# Patient Record
Sex: FEMALE | Race: WHITE | Employment: UNEMPLOYED | URBAN - METROPOLITAN AREA
[De-identification: names, ages, dates, MRNs, and addresses within clinical notes are randomized per-mention and may not be internally consistent; named-entity substitution may affect disease eponyms.]

---

## 2017-10-01 LAB
GLUCOSE BLD STRIP.AUTO-MCNC: 555 MG/DL (ref 65–100)
SERVICE CMNT-IMP: ABNORMAL

## 2017-10-01 PROCEDURE — 99285 EMERGENCY DEPT VISIT HI MDM: CPT

## 2017-10-01 PROCEDURE — 93005 ELECTROCARDIOGRAM TRACING: CPT

## 2017-10-01 PROCEDURE — 96360 HYDRATION IV INFUSION INIT: CPT

## 2017-10-01 PROCEDURE — 36415 COLL VENOUS BLD VENIPUNCTURE: CPT | Performed by: STUDENT IN AN ORGANIZED HEALTH CARE EDUCATION/TRAINING PROGRAM

## 2017-10-01 PROCEDURE — 82962 GLUCOSE BLOOD TEST: CPT

## 2017-10-01 PROCEDURE — 83880 ASSAY OF NATRIURETIC PEPTIDE: CPT | Performed by: STUDENT IN AN ORGANIZED HEALTH CARE EDUCATION/TRAINING PROGRAM

## 2017-10-01 PROCEDURE — 80053 COMPREHEN METABOLIC PANEL: CPT | Performed by: STUDENT IN AN ORGANIZED HEALTH CARE EDUCATION/TRAINING PROGRAM

## 2017-10-01 PROCEDURE — 85025 COMPLETE CBC W/AUTO DIFF WBC: CPT | Performed by: STUDENT IN AN ORGANIZED HEALTH CARE EDUCATION/TRAINING PROGRAM

## 2017-10-02 ENCOUNTER — HOSPITAL ENCOUNTER (EMERGENCY)
Age: 37
Discharge: HOME | End: 2017-10-02
Attending: STUDENT IN AN ORGANIZED HEALTH CARE EDUCATION/TRAINING PROGRAM | Admitting: STUDENT IN AN ORGANIZED HEALTH CARE EDUCATION/TRAINING PROGRAM
Payer: MEDICAID

## 2017-10-02 VITALS
HEART RATE: 82 BPM | BODY MASS INDEX: 41.85 KG/M2 | TEMPERATURE: 97.8 F | RESPIRATION RATE: 20 BRPM | SYSTOLIC BLOOD PRESSURE: 137 MMHG | WEIGHT: 276.13 LBS | DIASTOLIC BLOOD PRESSURE: 73 MMHG | HEIGHT: 68 IN | OXYGEN SATURATION: 96 %

## 2017-10-02 DIAGNOSIS — R73.9 HYPERGLYCEMIA: Primary | ICD-10-CM

## 2017-10-02 LAB
ALBUMIN SERPL-MCNC: 2.1 G/DL (ref 3.5–5)
ALBUMIN/GLOB SERPL: ABNORMAL {RATIO} (ref 1.1–2.2)
ALP SERPL-CCNC: ABNORMAL U/L (ref 45–117)
ALT SERPL-CCNC: ABNORMAL U/L (ref 12–78)
ANION GAP SERPL CALC-SCNC: 11 MMOL/L (ref 5–15)
APPEARANCE UR: CLEAR
AST SERPL-CCNC: ABNORMAL U/L (ref 15–37)
ATRIAL RATE: 95 BPM
BACTERIA URNS QL MICRO: NEGATIVE /HPF
BASOPHILS # BLD: 0 K/UL (ref 0–0.1)
BASOPHILS NFR BLD: 0 % (ref 0–1)
BILIRUB SERPL-MCNC: 1.4 MG/DL (ref 0.2–1)
BILIRUB UR QL: NEGATIVE
BNP SERPL-MCNC: 65 PG/ML (ref 0–125)
BUN SERPL-MCNC: ABNORMAL MG/DL (ref 6–20)
BUN/CREAT SERPL: ABNORMAL (ref 12–20)
CALCIUM SERPL-MCNC: ABNORMAL MG/DL (ref 8.5–10.1)
CALCULATED P AXIS, ECG09: 47 DEGREES
CALCULATED R AXIS, ECG10: 0 DEGREES
CALCULATED T AXIS, ECG11: 34 DEGREES
CHLORIDE SERPL-SCNC: 86 MMOL/L (ref 97–108)
CO2 SERPL-SCNC: 25 MMOL/L (ref 21–32)
COLOR UR: ABNORMAL
CREAT SERPL-MCNC: 0.66 MG/DL (ref 0.55–1.02)
DIAGNOSIS, 93000: NORMAL
DIFFERENTIAL METHOD BLD: ABNORMAL
EOSINOPHIL # BLD: 0.1 K/UL (ref 0–0.4)
EOSINOPHIL NFR BLD: 1 % (ref 0–7)
EPITH CASTS URNS QL MICRO: ABNORMAL /LPF
ERYTHROCYTE [DISTWIDTH] IN BLOOD BY AUTOMATED COUNT: 15.3 % (ref 11.5–14.5)
GLOBULIN SER CALC-MCNC: ABNORMAL G/DL (ref 2–4)
GLUCOSE BLD STRIP.AUTO-MCNC: 303 MG/DL (ref 65–100)
GLUCOSE BLD STRIP.AUTO-MCNC: 424 MG/DL (ref 65–100)
GLUCOSE SERPL-MCNC: 718 MG/DL (ref 65–100)
GLUCOSE UR STRIP.AUTO-MCNC: >1000 MG/DL
HCT VFR BLD AUTO: 34.8 % (ref 35–47)
HGB BLD-MCNC: 11.8 G/DL (ref 11.5–16)
HGB UR QL STRIP: NEGATIVE
HYALINE CASTS URNS QL MICRO: ABNORMAL /LPF (ref 0–5)
KETONES UR QL STRIP.AUTO: NEGATIVE MG/DL
LEUKOCYTE ESTERASE UR QL STRIP.AUTO: NEGATIVE
LYMPHOCYTES # BLD: 1.6 K/UL (ref 0.8–3.5)
LYMPHOCYTES NFR BLD: 22 % (ref 12–49)
MCH RBC QN AUTO: 28 PG (ref 26–34)
MCHC RBC AUTO-ENTMCNC: 33.9 G/DL (ref 30–36.5)
MCV RBC AUTO: 82.7 FL (ref 80–99)
MONOCYTES # BLD: 0.3 K/UL (ref 0–1)
MONOCYTES NFR BLD: 4 % (ref 5–13)
NEUTS SEG # BLD: 5.2 K/UL (ref 1.8–8)
NEUTS SEG NFR BLD: 73 % (ref 32–75)
NITRITE UR QL STRIP.AUTO: NEGATIVE
P-R INTERVAL, ECG05: 126 MS
PH UR STRIP: 6 [PH] (ref 5–8)
PLATELET # BLD AUTO: 143 K/UL (ref 150–400)
POTASSIUM SERPL-SCNC: 3.8 MMOL/L (ref 3.5–5.1)
PROT SERPL-MCNC: ABNORMAL G/DL (ref 6.4–8.2)
PROT UR STRIP-MCNC: ABNORMAL MG/DL
Q-T INTERVAL, ECG07: 366 MS
QRS DURATION, ECG06: 86 MS
QTC CALCULATION (BEZET), ECG08: 459 MS
RBC # BLD AUTO: 4.21 M/UL (ref 3.8–5.2)
RBC #/AREA URNS HPF: ABNORMAL /HPF (ref 0–5)
RBC MORPH BLD: ABNORMAL
SERVICE CMNT-IMP: ABNORMAL
SERVICE CMNT-IMP: ABNORMAL
SODIUM SERPL-SCNC: 122 MMOL/L (ref 136–145)
SP GR UR REFRACTOMETRY: 1.03 (ref 1–1.03)
UROBILINOGEN UR QL STRIP.AUTO: 0.2 EU/DL (ref 0.2–1)
VENTRICULAR RATE, ECG03: 95 BPM
WBC # BLD AUTO: 7.2 K/UL (ref 3.6–11)
WBC URNS QL MICRO: ABNORMAL /HPF (ref 0–4)

## 2017-10-02 PROCEDURE — 74011636637 HC RX REV CODE- 636/637: Performed by: STUDENT IN AN ORGANIZED HEALTH CARE EDUCATION/TRAINING PROGRAM

## 2017-10-02 PROCEDURE — 74011250636 HC RX REV CODE- 250/636: Performed by: STUDENT IN AN ORGANIZED HEALTH CARE EDUCATION/TRAINING PROGRAM

## 2017-10-02 PROCEDURE — 82962 GLUCOSE BLOOD TEST: CPT

## 2017-10-02 PROCEDURE — 81001 URINALYSIS AUTO W/SCOPE: CPT | Performed by: STUDENT IN AN ORGANIZED HEALTH CARE EDUCATION/TRAINING PROGRAM

## 2017-10-02 RX ADMIN — HUMAN INSULIN 30 UNITS: 100 INJECTION, SOLUTION SUBCUTANEOUS at 01:36

## 2017-10-02 RX ADMIN — SODIUM CHLORIDE 1000 ML: 900 INJECTION, SOLUTION INTRAVENOUS at 02:56

## 2017-10-02 NOTE — ED TRIAGE NOTES
Per EMS pt. Called because she felt like her sugar was crashing. Ate some gummy bears. Pt. States she has been off her insulin pump since June . EMS BS read 584 and then Hi. Gave 500ml NS. Pt. Also c/o of abdominal fullness for 3 days and leg swelling. Pt. States she also fainted tonight.

## 2017-10-02 NOTE — DISCHARGE INSTRUCTIONS
Learning About High Blood Sugar  What is high blood sugar? Your body turns the food you eat into glucose (sugar), which it uses for energy. But if your body isn't able to use the sugar right away, it can build up in your blood and lead to high blood sugar. When the amount of sugar in your blood stays too high for too much of the time, you may have diabetes. Diabetes is a disease that can cause serious health problems. The good news is that lifestyle changes may help you get your blood sugar back to normal and avoid or delay diabetes. What causes high blood sugar? Sugar (glucose) can build up in your blood if you:  · Are overweight. · Have a family history of diabetes. · Take certain medicines, such as steroids. What are the symptoms? Having high blood sugar may not cause any symptoms at all. Or it may make you feel very thirsty or very hungry. You may also urinate more often than usual, have blurry vision, or lose weight without trying. How is high blood sugar treated? You can take steps to lower your blood sugar level if you understand what makes it get higher. Your doctor may want you to learn how to test your blood sugar level at home. Then you can see how illness, stress, or different kinds of food or medicine raise or lower your blood sugar level. Other tests may be needed to see if you have diabetes. How can you prevent high blood sugar? · Watch your weight. If you're overweight, losing just a small amount of weight may help. Reducing fat around your waist is most important. · Limit the amount of calories, sweets, and unhealthy fat you eat. Ask your doctor if a dietitian can help you. A registered dietitian can help you create meal plans that fit your lifestyle. · Get at least 30 minutes of exercise on most days of the week. Exercise helps control your blood sugar. It also helps you maintain a healthy weight. Walking is a good choice.  You also may want to do other activities, such as running, swimming, cycling, or playing tennis or team sports. · If your doctor prescribed medicines, take them exactly as prescribed. Call your doctor if you think you are having a problem with your medicine. You will get more details on the specific medicines your doctor prescribes. Follow-up care is a key part of your treatment and safety. Be sure to make and go to all appointments, and call your doctor if you are having problems. It's also a good idea to know your test results and keep a list of the medicines you take. Where can you learn more? Go to http://ish-irish.info/. Enter O108 in the search box to learn more about \"Learning About High Blood Sugar. \"  Current as of: March 13, 2017  Content Version: 11.3  © 3465-9006 [employer]. Care instructions adapted under license by [employer] (which disclaims liability or warranty for this information). If you have questions about a medical condition or this instruction, always ask your healthcare professional. [address] any warranty or liability for your use of this information.
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
## 2017-10-03 ENCOUNTER — OFFICE VISIT (OUTPATIENT)
Dept: INTERNAL MEDICINE CLINIC | Age: 37
End: 2017-10-03

## 2017-10-03 VITALS
SYSTOLIC BLOOD PRESSURE: 150 MMHG | OXYGEN SATURATION: 95 % | RESPIRATION RATE: 18 BRPM | BODY MASS INDEX: 41.65 KG/M2 | HEIGHT: 68 IN | TEMPERATURE: 98.8 F | WEIGHT: 274.8 LBS | HEART RATE: 84 BPM | DIASTOLIC BLOOD PRESSURE: 80 MMHG

## 2017-10-03 DIAGNOSIS — Z23 ENCOUNTER FOR IMMUNIZATION: ICD-10-CM

## 2017-10-03 DIAGNOSIS — E78.5 HYPERLIPIDEMIA LDL GOAL <70: ICD-10-CM

## 2017-10-03 DIAGNOSIS — F32.A DEPRESSION, UNSPECIFIED DEPRESSION TYPE: ICD-10-CM

## 2017-10-03 DIAGNOSIS — I10 ESSENTIAL HYPERTENSION: ICD-10-CM

## 2017-10-03 DIAGNOSIS — R60.0 BILATERAL LEG EDEMA: ICD-10-CM

## 2017-10-03 LAB — HBA1C MFR BLD HPLC: 12.3 %

## 2017-10-03 RX ORDER — SERTRALINE HYDROCHLORIDE 25 MG/1
25 TABLET, FILM COATED ORAL
Qty: 30 TAB | Refills: 2 | Status: SHIPPED | OUTPATIENT
Start: 2017-10-03 | End: 2018-05-17

## 2017-10-03 RX ORDER — LANCETS 28 GAUGE
EACH MISCELLANEOUS
Qty: 200 LANCET | Refills: 2 | Status: SHIPPED | OUTPATIENT
Start: 2017-10-03 | End: 2017-10-05

## 2017-10-03 RX ORDER — FUROSEMIDE 20 MG/1
20 TABLET ORAL DAILY
Qty: 5 TAB | Refills: 0 | Status: SHIPPED | OUTPATIENT
Start: 2017-10-03 | End: 2017-10-10

## 2017-10-03 RX ORDER — ERGOCALCIFEROL 1.25 MG/1
50000 CAPSULE ORAL
COMMUNITY
End: 2017-11-30 | Stop reason: SDUPTHER

## 2017-10-03 RX ORDER — ATORVASTATIN CALCIUM 40 MG/1
TABLET, FILM COATED ORAL DAILY
COMMUNITY
End: 2017-10-10

## 2017-10-03 RX ORDER — METFORMIN HYDROCHLORIDE 1000 MG/1
1000 TABLET ORAL 2 TIMES DAILY WITH MEALS
Qty: 60 TAB | Refills: 1 | Status: ON HOLD | OUTPATIENT
Start: 2017-10-03 | End: 2017-10-10

## 2017-10-03 RX ORDER — OMEGA-3-ACID ETHYL ESTERS 1 G/1
2 CAPSULE, LIQUID FILLED ORAL 2 TIMES DAILY
Status: ON HOLD | COMMUNITY
End: 2017-10-10

## 2017-10-03 RX ORDER — SERTRALINE HYDROCHLORIDE 25 MG/1
25 TABLET, FILM COATED ORAL
COMMUNITY
End: 2017-10-03 | Stop reason: SDUPTHER

## 2017-10-03 RX ORDER — LISINOPRIL 10 MG/1
10 TABLET ORAL DAILY
Qty: 30 TAB | Refills: 2 | Status: SHIPPED | OUTPATIENT
Start: 2017-10-03 | End: 2017-11-30 | Stop reason: SDUPTHER

## 2017-10-03 RX ORDER — INSULIN PUMP SYRINGE, 3 ML
EACH MISCELLANEOUS
Qty: 1 KIT | Refills: 0 | Status: SHIPPED | OUTPATIENT
Start: 2017-10-03 | End: 2017-10-05

## 2017-10-03 RX ORDER — METFORMIN HYDROCHLORIDE 500 MG/1
TABLET ORAL
COMMUNITY
End: 2017-10-03 | Stop reason: SDUPTHER
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
## 2017-10-03 NOTE — MR AVS SNAPSHOT
Visit Information Date & Time Provider Department Dept. Phone Encounter #  
 10/3/2017 10:15 AM Christian Ware MD [address] [place] [address] Internal Medicine [phone] [id number] Follow-up Instructions Return in about 1 month (around 11/3/2017) for HTN, DM. Upcoming Health Maintenance Date Due  
 FOOT EXAM Q1 8/26/1990 MICROALBUMIN Q1 8/26/1990 EYE EXAM RETINAL OR DILATED Q1 8/26/1990 DTaP/Tdap/Td series (1 - Tdap) 8/26/2001 PAP AKA CERVICAL CYTOLOGY 8/26/2001 HEMOGLOBIN A1C Q6M 9/9/2011 LIPID PANEL Q1 3/9/2012 INFLUENZA AGE 9 TO ADULT 8/1/2017 Allergies as of 10/3/2017  Review Complete On: 10/3/2017 By: Moon Roth MD  
  
 Severity Noted Reaction Type Reactions Toradol [Ketorolac Tromethamine] High 01/25/2011   Systemic Unknown (comments) Seizure like symptoms per pt. Augmentin [Amoxicillin-pot Clavulanate]  10/03/2017    Swelling Demerol [Meperidine]  10/01/2017    Swelling Ibuprofen  01/25/2011    Diarrhea, Nausea and Vomiting Muscle tightness Keflex [Cephalexin]  03/07/2011    Shortness of Breath Morphine  10/01/2017    Swelling Nubain [Nalbuphine]  12/12/2010    Hives Pcn [Penicillins]  12/12/2010    Swelling Sulfa (Sulfonamide Antibiotics)  12/12/2010    Unknown (comments) Current Immunizations  Reviewed on 4/15/2011 Name Date Influenza Vaccine Split 12/14/2010  6:36 PM  
 ZZZ-RETIRED (DO NOT USE) Pneumococcal Vaccine (Unspecified Type) 10/1/2007 Not reviewed this visit You Were Diagnosed With   
  
 Codes Comments IDDM (insulin dependent diabetes mellitus) ([facility]    -  Primary ICD-10-CM: E11.9, Z79.4 ICD-9-CM: 250.00, V58.67 Depression, unspecified depression type     ICD-10-CM: F32.9 ICD-9-CM: 108 Essential hypertension     ICD-10-CM: I10 
ICD-9-CM: 401.9 Bilateral leg edema     ICD-10-CM: R60.0 ICD-9-CM: 782.3 Encounter for immunization     ICD-10-CM: E88 ICD-9-CM: V03.89 Vitals BP Pulse Temp Resp Height(growth percentile) Weight(growth percentile) 166/89 (BP 1 Location: Left arm, BP Patient Position: Sitting) 84 98.8 °F (37.1 °C) (Oral) 18 5' 8\" (1.727 m) 274 lb 12.8 oz (124.6 kg) LMP SpO2 BMI OB Status Smoking Status 09/17/2017 95% 41.78 kg/m2 Having regular periods Current Every Day Smoker Vitals History BMI and BSA Data Body Mass Index Body Surface Area 41.78 kg/m 2 2.45 m 2 Preferred Pharmacy Pharmacy Name Phone [facility] [address] [facility] [address] Noelle Friedman [phone] Your Updated Medication List  
  
   
This list is accurate as of: 10/3/17 11:30 AM.  Always use your most recent med list.  
  
  
  
  
 Blood-Glucose Meter monitoring kit Commonly known as:  FREESTYLE LITE METER Check blood sugar 4-5 times/day. fenofibrate nanocrystallized 145 mg tablet Commonly known as:  [employer] Take 1 Tab by mouth daily. furosemide 20 mg tablet Commonly known as:  LASIX Take 1 Tab by mouth daily. gabapentin 300 mg capsule Commonly known as:  NEURONTIN Take 1 Cap by mouth three (3) times daily. glucose blood VI test strips strip Commonly known as:  FREESTYLE LITE STRIPS Check blood sugar 4-5 times/day. HumuLIN R U-500 500 unit/mL Soln Generic drug:  insulin CONCENTRATED regular  
by SubCUTAneous route. HYDROXYZINE HCL PO Take  by mouth. insulin aspart protamine/insulin aspart 100 unit/mL (70-30) injection Commonly known as:  NOVOLOG MIX 70/30  
0.4 mL by SubCUTAneous route. 25 units with breakfast and dinner  
  
 insulin detemir 100 unit/mL injection Commonly known as:  LEVEMIR  
80 Units by SubCUTAneous route nightly. lancets 28 gauge Misc Commonly known as:  FREESTYLE LANCETS Check blood sugar 4-5 times/day. LIPITOR 40 mg tablet Generic drug:  atorvastatin Take  by mouth daily. lisinopril 10 mg tablet Commonly known as:   Take 1 Tab by mouth daily. LOVAZA 1 gram capsule Generic drug:  omega-3 acid ethyl esters Take 2 g by mouth two (2) times a day. metFORMIN 1,000 mg tablet Commonly known as:  GLUCOPHAGE Take 1 Tab by mouth two (2) times daily (with meals). sertraline 25 mg tablet Commonly known as:  ZOLOFT Take 1 Tab by mouth nightly. VITAMIN D2 50,000 unit capsule Generic drug:  ergocalciferol Take 50,000 Units by mouth every seven (7) days. Prescriptions Sent to Pharmacy Refills  
 insulin detemir (LEVEMIR) 100 unit/mL injection 2 Si Units by SubCUTAneous route nightly. Class: Normal  
 Pharmacy: [facility] [address] [place] [address] Ph #: [phone] Route: SubCUTAneous  
 metFORMIN (GLUCOPHAGE) 1,000 mg tablet 1 Sig: Take 1 Tab by mouth two (2) times daily (with meals). Class: Normal  
 Pharmacy: [facility] [place] [address] Ph #: [phone] Route: Oral  
 lancets (FREESTYLE LANCETS) 28 gauge misc 2 Sig: Check blood sugar 4-5 times/day. Class: Normal  
 Pharmacy: [address] [place] [address] Ph #: [phone]  
 glucose blood VI test strips (FREESTYLE LITE STRIPS) strip 2 Sig: Check blood sugar 4-5 times/day. Class: Normal  
 Pharmacy: [facility] [place] [address] Ph #: [phone] Blood-Glucose Meter (FREESTYLE LITE METER) monitoring kit 0 Sig: Check blood sugar 4-5 times/day. Class: Normal  
 Pharmacy: [address] [place] [address] Ph #: [phone]  
 sertraline (ZOLOFT) 25 mg tablet 2 Sig: Take 1 Tab by mouth nightly. Class: Normal  
 Pharmacy: [facility] [place] [address] Ph #: [phone] Route: Oral  
 lisinopril (PRINIVIL, ZESTRIL) 10 mg tablet 2 Sig: Take 1 Tab by mouth daily. Class: Normal  
 Pharmacy: [id number] Ph #: [phone] Route: Oral  
 furosemide (LASIX) 20 mg tablet 0 Sig: Take 1 Tab by mouth daily. Class: Normal  
 Pharmacy: [address] Ph #: [phone] Route: Oral  
  
We Performed the Following LIPID PANEL [20928 CPT(R)] METABOLIC PANEL, COMPREHENSIVE [20448 CPT(R)] MICROALBUMIN, UR, RAND W/ MICROALBUMIN/CREA RATIO [id number] CPT(R)] REFERRAL TO ENDOCRINOLOGY [DRD50 Custom] REFERRAL TO OPHTHALMOLOGY [REF57 Custom] Follow-up Instructions Return in about 1 month (around 11/3/2017) for HTN, DM. Referral Information Referral ID Referred By Referred To  
  
 [id number] KIRT SHOEMAKER MD   
   [address] [place] [address] Phone: [phone] Fax: [phone] Visits Status Start Date End Date 1 New Request 10/3/17 10/3/18 If your referral has a status of pending review or denied, additional information will be sent to support the outcome of this decision. Referral ID Referred By Referred To  
 [id number] KIRT SHOEMAKER [facility] Phone: [phone] Fax: [phone] Visits Status Start Date End Date 1 New Request 10/3/17 10/3/18 If your referral has a status of pending review or denied, additional information will be sent to support the outcome of this decision. Introducing [facility]! Janet Cosme introduces [employer] patient portal. Now you can access parts of your medical record, email your doctor's office, and request medication refills online. 1. In your internet browser, go to [email or web address] 2. Click on the First Time User? Click Here link in the Sign In box. You will see the New Member Sign Up page. 3. Enter your [employer] Access Code exactly as it appears below.  You will not need to use this code after youve completed the sign-up process. If you do not sign up before the expiration date, you must request a new code. · [employer] Access Code: [email or web address] Expires: 12/31/2017  4:30 AM 
 
4. Enter the last four digits of your Social Security Number (xxxx) and Date of Birth (mm/dd/yyyy) as indicated and click Submit. You will be taken to the next sign-up page. 5. Create a [employer] ID. This will be your [employer] login ID and cannot be changed, so think of one that is secure and easy to remember. 6. Create a [employer] password. You can change your password at any time. 7. Enter your Password Reset Question and Answer. This can be used at a later time if you forget your password. 8. Enter your e-mail address. You will receive e-mail notification when new information is available in [address] 9. Click Sign Up. You can now view and download portions of your medical record. 10. Click the Download Summary menu link to download a portable copy of your medical information. If you have questions, please visit the Frequently Asked Questions section of the [employer] website. Remember, [employer] is NOT to be used for urgent needs. For medical emergencies, dial 911. Now available from your iPhone and Android! Please provide this summary of care documentation to your next provider. Your primary care clinician is listed as NONE. If you have any questions after today's visit, please call [phone].

## 2017-10-04 LAB
ALBUMIN/CREAT UR: 959.8 MG/G CREAT (ref 0–30)
CREAT UR-MCNC: 46.3 MG/DL
MICROALBUMIN UR-MCNC: 444.4 UG/ML

## 2017-10-05 ENCOUNTER — APPOINTMENT (OUTPATIENT)
Dept: GENERAL RADIOLOGY | Age: 37
DRG: 950 | End: 2017-10-05
Attending: EMERGENCY MEDICINE
Payer: MEDICAID

## 2017-10-05 ENCOUNTER — APPOINTMENT (OUTPATIENT)
Dept: ULTRASOUND IMAGING | Age: 37
DRG: 950 | End: 2017-10-05
Attending: INTERNAL MEDICINE
Payer: MEDICAID

## 2017-10-05 ENCOUNTER — HOSPITAL ENCOUNTER (INPATIENT)
Age: 37
LOS: 5 days | Discharge: HOME | DRG: 950 | End: 2017-10-10
Attending: EMERGENCY MEDICINE | Admitting: INTERNAL MEDICINE
Payer: MEDICAID

## 2017-10-05 DIAGNOSIS — K85.80 OTHER ACUTE PANCREATITIS, UNSPECIFIED COMPLICATION STATUS: Primary | ICD-10-CM

## 2017-10-05 DIAGNOSIS — E78.2 ELEVATED TRIGLYCERIDES WITH HIGH CHOLESTEROL: ICD-10-CM

## 2017-10-05 PROBLEM — K85.90 PANCREATITIS: Status: ACTIVE | Noted: 2017-10-05

## 2017-10-05 LAB
ADMINISTERED INITIALS, ADMINIT: NORMAL
ADMINISTERED INITIALS, ADMINIT: NORMAL
ALBUMIN SERPL-MCNC: 2 G/DL (ref 3.5–5)
ALBUMIN/GLOB SERPL: ABNORMAL {RATIO} (ref 1.1–2.2)
ALP SERPL-CCNC: ABNORMAL U/L (ref 45–117)
ALT SERPL-CCNC: ABNORMAL U/L (ref 12–78)
ANION GAP BLD CALC-SCNC: 14 MMOL/L (ref 5–15)
ANION GAP SERPL CALC-SCNC: 6 MMOL/L (ref 5–15)
ANION GAP SERPL CALC-SCNC: ABNORMAL MMOL/L (ref 5–15)
AST SERPL-CCNC: ABNORMAL U/L (ref 15–37)
BASOPHILS # BLD: 0 K/UL
BASOPHILS # BLD: 0 K/UL
BASOPHILS NFR BLD: 0 %
BASOPHILS NFR BLD: 0 %
BILIRUB SERPL-MCNC: ABNORMAL MG/DL (ref 0.2–1)
BUN BLD-MCNC: 15 MG/DL (ref 9–20)
BUN SERPL-MCNC: 11 MG/DL (ref 6–20)
BUN SERPL-MCNC: 9 MG/DL (ref 6–20)
BUN/CREAT SERPL: 5 (ref 12–20)
BUN/CREAT SERPL: 7 (ref 12–20)
CA-I BLD-MCNC: 1.14 MMOL/L (ref 1.12–1.32)
CALCIUM SERPL-MCNC: ABNORMAL MG/DL (ref 8.5–10.1)
CALCIUM SERPL-MCNC: ABNORMAL MG/DL (ref 8.5–10.1)
CHLORIDE BLD-SCNC: 102 MMOL/L (ref 98–107)
CHLORIDE SERPL-SCNC: 83 MMOL/L (ref 97–108)
CHLORIDE SERPL-SCNC: 88 MMOL/L (ref 97–108)
CO2 BLD-SCNC: 22 MMOL/L (ref 21–32)
CO2 SERPL-SCNC: 27 MMOL/L (ref 21–32)
CO2 SERPL-SCNC: 29 MMOL/L (ref 21–32)
CREAT BLD-MCNC: 0.7 MG/DL (ref 0.6–1.3)
CREAT SERPL-MCNC: 1.5 MG/DL (ref 0.55–1.02)
CREAT SERPL-MCNC: 1.81 MG/DL (ref 0.55–1.02)
D50 ADMINISTERED, D50ADM: 0 ML
D50 ADMINISTERED, D50ADM: 0 ML
D50 ORDER, D50ORD: 0 ML
D50 ORDER, D50ORD: 0 ML
EOSINOPHIL # BLD: 0 K/UL
EOSINOPHIL # BLD: 0.2 K/UL
EOSINOPHIL NFR BLD: 0 %
EOSINOPHIL NFR BLD: 2 %
ERYTHROCYTE [DISTWIDTH] IN BLOOD BY AUTOMATED COUNT: 14.8 % (ref 11.5–14.5)
ERYTHROCYTE [DISTWIDTH] IN BLOOD BY AUTOMATED COUNT: 14.9 % (ref 11.5–14.5)
GLOBULIN SER CALC-MCNC: ABNORMAL G/DL (ref 2–4)
GLSCOM COMMENTS: NORMAL
GLSCOM COMMENTS: NORMAL
GLUCOSE BLD STRIP.AUTO-MCNC: 367 MG/DL (ref 65–100)
GLUCOSE BLD STRIP.AUTO-MCNC: 374 MG/DL (ref 65–100)
GLUCOSE BLD STRIP.AUTO-MCNC: 387 MG/DL (ref 65–100)
GLUCOSE BLD STRIP.AUTO-MCNC: 406 MG/DL (ref 65–100)
GLUCOSE BLD STRIP.AUTO-MCNC: 410 MG/DL (ref 65–100)
GLUCOSE BLD-MCNC: 355 MG/DL (ref 65–100)
GLUCOSE SERPL-MCNC: 563 MG/DL (ref 65–100)
GLUCOSE SERPL-MCNC: 639 MG/DL (ref 65–100)
GLUCOSE, GLC: 374 MG/DL
GLUCOSE, GLC: 406 MG/DL
HCT VFR BLD AUTO: 36.1 % (ref 35–47)
HCT VFR BLD AUTO: 37.2 % (ref 35–47)
HCT VFR BLD CALC: 40 % (ref 35–47)
HGB BLD-MCNC: 13.6 GM/DL (ref 11.5–16)
HGB BLD-MCNC: ABNORMAL G/DL (ref 11.5–16)
HGB BLD-MCNC: ABNORMAL G/DL (ref 11.5–16)
HIGH TARGET, HITG: 300 MG/DL
HIGH TARGET, HITG: 300 MG/DL
INSULIN ADMINSTERED, INSADM: 6.9 UNITS/HOUR
INSULIN ADMINSTERED, INSADM: 9.4 UNITS/HOUR
INSULIN ORDER, INSORD: 6.9 UNITS/HOUR
INSULIN ORDER, INSORD: 9.4 UNITS/HOUR
LIPASE SERPL-CCNC: >3000 U/L (ref 73–393)
LOW TARGET, LOT: 200 MG/DL
LOW TARGET, LOT: 200 MG/DL
LYMPHOCYTES # BLD: 1.5 K/UL
LYMPHOCYTES # BLD: 1.5 K/UL
LYMPHOCYTES NFR BLD: 16 %
LYMPHOCYTES NFR BLD: 17 %
MAGNESIUM SERPL-MCNC: 1 MG/DL (ref 1.6–2.4)
MCH RBC QN AUTO: ABNORMAL PG (ref 26–34)
MCH RBC QN AUTO: ABNORMAL PG (ref 26–34)
MCHC RBC AUTO-ENTMCNC: ABNORMAL G/DL (ref 30–36.5)
MCHC RBC AUTO-ENTMCNC: ABNORMAL G/DL (ref 30–36.5)
MCV RBC AUTO: 79.5 FL (ref 80–99)
MCV RBC AUTO: 80.2 FL (ref 80–99)
MINUTES UNTIL NEXT BG, NBG: 60 MIN
MINUTES UNTIL NEXT BG, NBG: 60 MIN
MONOCYTES # BLD: 0.2 K/UL
MONOCYTES # BLD: 0.3 K/UL
MONOCYTES NFR BLD: 2 %
MONOCYTES NFR BLD: 4 %
MULTIPLIER, MUL: 0.02
MULTIPLIER, MUL: 0.03
NEUTS BAND NFR BLD MANUAL: 3 %
NEUTS BAND NFR BLD MANUAL: 4 %
NEUTS SEG # BLD: 6.7 K/UL
NEUTS SEG # BLD: 7.4 K/UL
NEUTS SEG NFR BLD: 74 %
NEUTS SEG NFR BLD: 78 %
ORDER INITIALS, ORDINIT: NORMAL
ORDER INITIALS, ORDINIT: NORMAL
PHOSPHATE SERPL-MCNC: 2.1 MG/DL (ref 2.6–4.7)
PLATELET # BLD AUTO: 149 K/UL (ref 150–400)
PLATELET # BLD AUTO: 160 K/UL (ref 150–400)
POTASSIUM BLD-SCNC: 3.4 MMOL/L (ref 3.5–5.1)
POTASSIUM SERPL-SCNC: 3.5 MMOL/L (ref 3.5–5.1)
POTASSIUM SERPL-SCNC: 3.6 MMOL/L (ref 3.5–5.1)
PROT SERPL-MCNC: ABNORMAL G/DL (ref 6.4–8.2)
RBC # BLD AUTO: 4.54 M/UL (ref 3.8–5.2)
RBC # BLD AUTO: 4.64 M/UL (ref 3.8–5.2)
RBC MORPH BLD: ABNORMAL
RBC MORPH BLD: ABNORMAL
SERVICE CMNT-IMP: ABNORMAL
SODIUM BLD-SCNC: 134 MMOL/L (ref 136–145)
SODIUM SERPL-SCNC: 121 MMOL/L (ref 136–145)
SODIUM SERPL-SCNC: ABNORMAL MMOL/L (ref 136–145)
WBC # BLD AUTO: 8.7 K/UL (ref 3.6–11)
WBC # BLD AUTO: 9.1 K/UL (ref 3.6–11)
WBC MORPH BLD: ABNORMAL

## 2017-10-05 PROCEDURE — 76700 US EXAM ABDOM COMPLETE: CPT

## 2017-10-05 PROCEDURE — 65660000000 HC RM CCU STEPDOWN

## 2017-10-05 PROCEDURE — 74011250636 HC RX REV CODE- 250/636: Performed by: EMERGENCY MEDICINE

## 2017-10-05 PROCEDURE — 80053 COMPREHEN METABOLIC PANEL: CPT | Performed by: EMERGENCY MEDICINE

## 2017-10-05 PROCEDURE — 83690 ASSAY OF LIPASE: CPT | Performed by: EMERGENCY MEDICINE

## 2017-10-05 PROCEDURE — 83735 ASSAY OF MAGNESIUM: CPT | Performed by: INTERNAL MEDICINE

## 2017-10-05 PROCEDURE — 96375 TX/PRO/DX INJ NEW DRUG ADDON: CPT

## 2017-10-05 PROCEDURE — 74011250636 HC RX REV CODE- 250/636: Performed by: INTERNAL MEDICINE

## 2017-10-05 PROCEDURE — 36415 COLL VENOUS BLD VENIPUNCTURE: CPT | Performed by: EMERGENCY MEDICINE

## 2017-10-05 PROCEDURE — 74011000258 HC RX REV CODE- 258: Performed by: INTERNAL MEDICINE

## 2017-10-05 PROCEDURE — 96376 TX/PRO/DX INJ SAME DRUG ADON: CPT

## 2017-10-05 PROCEDURE — 96374 THER/PROPH/DIAG INJ IV PUSH: CPT

## 2017-10-05 PROCEDURE — 74011636637 HC RX REV CODE- 636/637: Performed by: INTERNAL MEDICINE

## 2017-10-05 PROCEDURE — 74022 RADEX COMPL AQT ABD SERIES: CPT

## 2017-10-05 PROCEDURE — 99285 EMERGENCY DEPT VISIT HI MDM: CPT

## 2017-10-05 PROCEDURE — 80048 BASIC METABOLIC PNL TOTAL CA: CPT | Performed by: INTERNAL MEDICINE

## 2017-10-05 PROCEDURE — 82962 GLUCOSE BLOOD TEST: CPT

## 2017-10-05 PROCEDURE — 96361 HYDRATE IV INFUSION ADD-ON: CPT

## 2017-10-05 PROCEDURE — 83036 HEMOGLOBIN GLYCOSYLATED A1C: CPT | Performed by: INTERNAL MEDICINE

## 2017-10-05 PROCEDURE — 74011000250 HC RX REV CODE- 250: Performed by: INTERNAL MEDICINE

## 2017-10-05 PROCEDURE — 85025 COMPLETE CBC W/AUTO DIFF WBC: CPT | Performed by: EMERGENCY MEDICINE

## 2017-10-05 PROCEDURE — 80047 BASIC METABLC PNL IONIZED CA: CPT

## 2017-10-05 PROCEDURE — 74011250637 HC RX REV CODE- 250/637: Performed by: INTERNAL MEDICINE

## 2017-10-05 PROCEDURE — 74011636637 HC RX REV CODE- 636/637: Performed by: EMERGENCY MEDICINE

## 2017-10-05 PROCEDURE — 84100 ASSAY OF PHOSPHORUS: CPT | Performed by: INTERNAL MEDICINE

## 2017-10-05 RX ORDER — BISACODYL 5 MG
5 TABLET, DELAYED RELEASE (ENTERIC COATED) ORAL DAILY PRN
Status: DISCONTINUED | OUTPATIENT
Start: 2017-10-05 | End: 2017-10-10 | Stop reason: HOSPADM

## 2017-10-05 RX ORDER — ACETAMINOPHEN 325 MG/1
650 TABLET ORAL
Status: DISCONTINUED | OUTPATIENT
Start: 2017-10-05 | End: 2017-10-10 | Stop reason: HOSPADM

## 2017-10-05 RX ORDER — SODIUM CHLORIDE 0.9 % (FLUSH) 0.9 %
5-10 SYRINGE (ML) INJECTION EVERY 8 HOURS
Status: DISCONTINUED | OUTPATIENT
Start: 2017-10-05 | End: 2017-10-10 | Stop reason: HOSPADM

## 2017-10-05 RX ORDER — HYDROMORPHONE HYDROCHLORIDE 2 MG/ML
0.5 INJECTION, SOLUTION INTRAMUSCULAR; INTRAVENOUS; SUBCUTANEOUS
Status: COMPLETED | OUTPATIENT
Start: 2017-10-05 | End: 2017-10-05

## 2017-10-05 RX ORDER — ALBUTEROL SULFATE 90 UG/1
2 AEROSOL, METERED RESPIRATORY (INHALATION)
COMMUNITY

## 2017-10-05 RX ORDER — HYDRALAZINE HYDROCHLORIDE 20 MG/ML
10 INJECTION INTRAMUSCULAR; INTRAVENOUS
Status: DISCONTINUED | OUTPATIENT
Start: 2017-10-05 | End: 2017-10-10 | Stop reason: HOSPADM

## 2017-10-05 RX ORDER — MAGNESIUM SULFATE 100 %
4 CRYSTALS MISCELLANEOUS AS NEEDED
Status: DISCONTINUED | OUTPATIENT
Start: 2017-10-05 | End: 2017-10-08 | Stop reason: SDUPTHER

## 2017-10-05 RX ORDER — ACETAMINOPHEN 500 MG
2000 TABLET ORAL
COMMUNITY
End: 2017-10-10

## 2017-10-05 RX ORDER — HEPARIN SODIUM 5000 [USP'U]/ML
5000 INJECTION, SOLUTION INTRAVENOUS; SUBCUTANEOUS EVERY 8 HOURS
Status: DISCONTINUED | OUTPATIENT
Start: 2017-10-05 | End: 2017-10-05

## 2017-10-05 RX ORDER — ONDANSETRON 2 MG/ML
4 INJECTION INTRAMUSCULAR; INTRAVENOUS
Status: DISCONTINUED | OUTPATIENT
Start: 2017-10-05 | End: 2017-10-10 | Stop reason: HOSPADM

## 2017-10-05 RX ORDER — ATORVASTATIN CALCIUM 40 MG/1
40 TABLET, FILM COATED ORAL
Status: DISCONTINUED | OUTPATIENT
Start: 2017-10-05 | End: 2017-10-10 | Stop reason: HOSPADM

## 2017-10-05 RX ORDER — INSULIN LISPRO 100 [IU]/ML
INJECTION, SOLUTION INTRAVENOUS; SUBCUTANEOUS
Status: DISCONTINUED | OUTPATIENT
Start: 2017-10-06 | End: 2017-10-08

## 2017-10-05 RX ORDER — SODIUM CHLORIDE, SODIUM LACTATE, POTASSIUM CHLORIDE, CALCIUM CHLORIDE 600; 310; 30; 20 MG/100ML; MG/100ML; MG/100ML; MG/100ML
150 INJECTION, SOLUTION INTRAVENOUS CONTINUOUS
Status: DISCONTINUED | OUTPATIENT
Start: 2017-10-05 | End: 2017-10-05

## 2017-10-05 RX ORDER — HYDROCODONE BITARTRATE AND ACETAMINOPHEN 5; 325 MG/1; MG/1
1 TABLET ORAL
Status: DISCONTINUED | OUTPATIENT
Start: 2017-10-05 | End: 2017-10-10 | Stop reason: HOSPADM

## 2017-10-05 RX ORDER — DEXTROSE 50 % IN WATER (D50W) INTRAVENOUS SYRINGE
12.5-25 AS NEEDED
Status: DISCONTINUED | OUTPATIENT
Start: 2017-10-05 | End: 2017-10-08 | Stop reason: SDUPTHER

## 2017-10-05 RX ORDER — SODIUM CHLORIDE 450 MG/100ML
150 INJECTION, SOLUTION INTRAVENOUS CONTINUOUS
Status: DISCONTINUED | OUTPATIENT
Start: 2017-10-05 | End: 2017-10-06

## 2017-10-05 RX ORDER — ONDANSETRON 2 MG/ML
4 INJECTION INTRAMUSCULAR; INTRAVENOUS
Status: COMPLETED | OUTPATIENT
Start: 2017-10-05 | End: 2017-10-05

## 2017-10-05 RX ORDER — METOCLOPRAMIDE HYDROCHLORIDE 5 MG/ML
10 INJECTION INTRAMUSCULAR; INTRAVENOUS
Status: DISCONTINUED | OUTPATIENT
Start: 2017-10-05 | End: 2017-10-10 | Stop reason: HOSPADM

## 2017-10-05 RX ORDER — GABAPENTIN 300 MG/1
300 CAPSULE ORAL 3 TIMES DAILY
COMMUNITY
End: 2017-11-28

## 2017-10-05 RX ORDER — HYDROMORPHONE HYDROCHLORIDE 2 MG/ML
1 INJECTION, SOLUTION INTRAMUSCULAR; INTRAVENOUS; SUBCUTANEOUS
Status: DISCONTINUED | OUTPATIENT
Start: 2017-10-05 | End: 2017-10-10 | Stop reason: HOSPADM

## 2017-10-05 RX ORDER — SERTRALINE HYDROCHLORIDE 50 MG/1
25 TABLET, FILM COATED ORAL DAILY
Status: DISCONTINUED | OUTPATIENT
Start: 2017-10-06 | End: 2017-10-10 | Stop reason: HOSPADM

## 2017-10-05 RX ORDER — SODIUM CHLORIDE 0.9 % (FLUSH) 0.9 %
5-10 SYRINGE (ML) INJECTION AS NEEDED
Status: DISCONTINUED | OUTPATIENT
Start: 2017-10-05 | End: 2017-10-10 | Stop reason: HOSPADM

## 2017-10-05 RX ORDER — FENTANYL CITRATE 50 UG/ML
50 INJECTION, SOLUTION INTRAMUSCULAR; INTRAVENOUS
Status: COMPLETED | OUTPATIENT
Start: 2017-10-05 | End: 2017-10-05

## 2017-10-05 RX ORDER — FENOFIBRATE 145 MG/1
145 TABLET, COATED ORAL DAILY
Status: DISCONTINUED | OUTPATIENT
Start: 2017-10-06 | End: 2017-10-10 | Stop reason: HOSPADM

## 2017-10-05 RX ADMIN — SODIUM CHLORIDE 150 ML/HR: 450 INJECTION, SOLUTION INTRAVENOUS at 20:57

## 2017-10-05 RX ADMIN — INSULIN HUMAN 5 UNITS: 100 INJECTION, SOLUTION PARENTERAL at 18:36

## 2017-10-05 RX ADMIN — HYDROMORPHONE HYDROCHLORIDE 0.5 MG: 2 INJECTION INTRAMUSCULAR; INTRAVENOUS; SUBCUTANEOUS at 18:37

## 2017-10-05 RX ADMIN — FENTANYL CITRATE 50 MCG: 50 INJECTION, SOLUTION INTRAMUSCULAR; INTRAVENOUS at 16:28

## 2017-10-05 RX ADMIN — ONDANSETRON 4 MG: 2 INJECTION INTRAMUSCULAR; INTRAVENOUS at 18:37

## 2017-10-05 RX ADMIN — SODIUM CHLORIDE 1000 ML: 900 INJECTION, SOLUTION INTRAVENOUS at 18:40

## 2017-10-05 RX ADMIN — SODIUM CHLORIDE 1000 ML: 900 INJECTION, SOLUTION INTRAVENOUS at 16:30

## 2017-10-05 RX ADMIN — BISACODYL 5 MG: 5 TABLET, COATED ORAL at 22:23

## 2017-10-05 RX ADMIN — ATORVASTATIN CALCIUM 40 MG: 40 TABLET, FILM COATED ORAL at 22:22

## 2017-10-05 RX ADMIN — INSULIN HUMAN 5 UNITS: 100 INJECTION, SOLUTION PARENTERAL at 16:28

## 2017-10-05 RX ADMIN — FAMOTIDINE 20 MG: 10 INJECTION, SOLUTION INTRAVENOUS at 22:13

## 2017-10-05 RX ADMIN — ONDANSETRON 4 MG: 2 INJECTION INTRAMUSCULAR; INTRAVENOUS at 16:28

## 2017-10-05 RX ADMIN — HYDROMORPHONE HYDROCHLORIDE 1 MG: 2 INJECTION INTRAMUSCULAR; INTRAVENOUS; SUBCUTANEOUS at 20:57

## 2017-10-05 RX ADMIN — SODIUM CHLORIDE 6.9 UNITS/HR: 900 INJECTION, SOLUTION INTRAVENOUS at 22:11

## 2017-10-05 NOTE — IP AVS SNAPSHOT
[address] [facility] 
[phone] Patient: Cyndee Stewart MRN: [id number] Current Discharge Medication List  
  
START taking these medications Dose & Instructions Dispensing Information Comments Morning Noon Evening Bedtime  
 famotidine 20 mg tablet Commonly known as:  PEPCID Your last dose was: Your next dose is:    
   
   
 Dose:  20 mg Take 1 Tab by mouth two (2) times a day. Quantity:  60 Tab Refills:  1  
     
   
   
   
  
 folic acid 1 mg tablet Commonly known as:  Vidhya Your last dose was: Your next dose is:    
   
   
 Dose:  1 mg Take 1 Tab by mouth daily. Quantity:  30 Tab Refills:  1 HYDROcodone-acetaminophen 5-325 mg per tablet Commonly known as:  Dylan Callahan Your last dose was: Your next dose is:    
   
   
 Dose:  1 Tab Take 1 Tab by mouth every six (6) hours as needed. Max Daily Amount: 4 Tabs. Quantity:  20 Tab Refills:  0  
     
   
   
   
  
 insulin glargine 100 unit/mL (3 mL) Inpn Commonly known as:  LANTUS SOLOSTAR Your last dose was: Your next dose is:    
   
   
 Dose:  60 Units 60 Units by SubCUTAneous route daily. Quantity:  6 Pen Refills:  1 Insulin Needles (Disposable) 29 gauge x 1/2\" Ndle Commonly known as:  PEN NEEDLE Your last dose was: Your next dose is:    
   
   
 Dose:  1 Each  
1 Each by Does Not Apply route four (4) times daily. Quantity:  120 Pen Needle Refills:  1  
     
   
   
   
  
 ondansetron hcl 8 mg tablet Commonly known as:  ZOFRAN (AS HYDROCHLORIDE) Your last dose was: Your next dose is:    
   
   
 Dose:  8 mg Take 1 Tab by mouth every eight (8) hours as needed for Nausea. Quantity:  20 Tab Refills:  0 CONTINUE these medications which have CHANGED Dose & Instructions Dispensing Information Comments Morning Noon Evening Bedtime  
 atorvastatin 40 mg tablet Commonly known as:  LIPITOR What changed:   
- how much to take - when to take this Your last dose was: Your next dose is:    
   
   
 Dose:  40 mg Take 1 Tab by mouth nightly. Quantity:  30 Tab Refills:  1 CONTINUE these medications which have NOT CHANGED Dose & Instructions Dispensing Information Comments Morning Noon Evening Bedtime  
 fenofibrate nanocrystallized 145 mg tablet Commonly known as:  [employer] Your last dose was: Your next dose is:    
   
   
 Dose:  145 mg Take 1 Tab by mouth daily. Quantity:  30 Tab Refills:  1  
     
   
   
   
  
 gabapentin 300 mg capsule Commonly known as:  NEURONTIN Your last dose was: Your next dose is:    
   
   
 Dose:  300 mg Take 300 mg by mouth three (3) times daily. Refills:  0  
     
   
   
   
  
 lisinopril 10 mg tablet Commonly known as:  Monique Tor Your last dose was: Your next dose is:    
   
   
 Dose:  10 mg Take 1 Tab by mouth daily. Quantity:  30 Tab Refills:  2  
     
   
   
   
  
 metFORMIN 1,000 mg tablet Commonly known as:  GLUCOPHAGE Your last dose was: Your next dose is:    
   
   
 Dose:  1000 mg Take 1 Tab by mouth two (2) times daily (with meals). Quantity:  60 Tab Refills:  1  
     
   
   
   
  
 omega-3 acid ethyl esters 1 gram capsule Commonly known as:  Kristen Spatz Your last dose was: Your next dose is:    
   
   
 Dose:  2 g Take 2 Caps by mouth two (2) times a day. Quantity:  120 Cap Refills:  1 PROAIR HFA 90 mcg/actuation inhaler Generic drug:  albuterol Your last dose was: Your next dose is:    
   
   
 Dose:  2 Puff Take 2 Puffs by inhalation every four (4) hours as needed for Wheezing. Refills:  0  
     
   
   
   
  
 sertraline 25 mg tablet Commonly known as:  ZOLOFT Your last dose was: Your next dose is:    
   
   
 Dose:  25 mg Take 1 Tab by mouth nightly. Quantity:  30 Tab Refills:  2 VITAMIN D2 50,000 unit capsule Generic drug:  ergocalciferol Your last dose was: Your next dose is:    
   
   
 Dose:  45700 Units Take 50,000 Units by mouth every seven (7) days. Refills:  0 STOP taking these medications   
 acetaminophen 500 mg tablet Commonly known as:  TYLENOL  
   
  
 furosemide 20 mg tablet Commonly known as:  LASIX  
   
  
 insulin detemir 100 unit/mL injection Commonly known as:  LEVEMIR Where to Get Your Medications Information on where to get these meds will be given to you by the nurse or doctor. ! Ask your nurse or doctor about these medications  
  atorvastatin 40 mg tablet  
 famotidine 20 mg tablet  
 fenofibrate nanocrystallized 553 mg tablet  
 folic acid 1 mg tablet HYDROcodone-acetaminophen 5-325 mg per tablet  
 insulin glargine 100 unit/mL (3 mL) Inpn Insulin Needles (Disposable) 29 gauge x 1/2\" Ndle  
 metFORMIN 1,000 mg tablet  
 omega-3 acid ethyl esters 1 gram capsule  
 ondansetron hcl 8 mg tablet

## 2017-10-05 NOTE — ED NOTES
Bedside and Verbal shift change received from Los Jernigan RN. Report included the following information: SBAR, ED Summary, MAR and Recent Results.

## 2017-10-05 NOTE — ED NOTES
Bedside and Verbal shift change report given to Cristal Valentino RN (oncoming nurse). Report included the following information: SBAR, ED Summary, MAR and Recent Results.

## 2017-10-05 NOTE — IP AVS SNAPSHOT
[address] [facility] 
[phone] Patient: Kezia Cordero MRN: [id number] BTM:7/50/4900 You are allergic to the following Allergen Reactions Toradol (Ketorolac Tromethamine) Unknown (comments) Seizure like symptoms per pt. Augmentin (Amoxicillin-Pot Clavulanate) Swelling Demerol (Meperidine) Swelling Heparin Hives Ibuprofen Diarrhea Nausea and Vomiting Muscle tightness Keflex (Cephalexin) Shortness of Breath Morphine Swelling Nubain (Nalbuphine) Hives Pcn (Penicillins) Swelling Sulfa (Sulfonamide Antibiotics) Unknown (comments) Recent Documentation Height Weight BMI OB Status Smoking Status 1.727 m 123.6 kg 41.43 kg/m2 Having regular periods Current Every Day Smoker Unresulted Labs Order Current Status HEP B SURFACE AG In process Emergency Contacts Name Discharge Info Relation Home Work Mobile Angeles Palma N/A  AT THIS TIME [6] Other Relative [6] [phone] Kang Palma  Spouse [3] [phone] About your hospitalization You were admitted on:  October 5, 2017 You last received care in the:  [facility] 2 PROGRESSIVE CARE You were discharged on:  October 10, 2017 Unit phone number:  [phone] Why you were hospitalized Your primary diagnosis was:  Not on File Your diagnoses also included:  Pancreatitis, Iddm (Insulin Dependent Diabetes Mellitus) (Hcc), Hyperlipidemia Ldl Goal <70 Providers Seen During Your Hospitalizations Provider Role Specialty Primary office phone Marilu Espinoza MD Attending Provider Emergency Medicine [phone] Luis Winslow MD Attending Provider Hospitalist [phone] Your Primary Care Physician (PCP) Primary Care Physician Office Phone Office Fax [address] [phone] Follow-up Information Follow up With Details Comments Contact Info Lubna Obregon MD Go on 10/12/2017 PCP follow up at 8:[address] [facility] 13 
[phone] New Addison MD In 2 weeks The nurse will call you to schedule this apppointment [address] [facility] [address] [facility] 
[phone] Linda Bustamante MD Go on 10/27/2017 GI follow up at 11:15AM  [address] [facility] [address] [facility] 
[phone] Your Appointments Thursday October 12, 2017  8:45 AM EDT HOSPITAL FOLLOW-UP with Lubna Obregon MD  
[facility] Internal Medicine ([facility]) Ellen Santana [address] [facility] 13  
[phone] Tuesday October 31, 2017  8:00 AM EDT ROUTINE CARE with Lubna Obregon MD  
[facility] Internal Medicine [facility]) [address] [facility] 13  
[phone] Current Discharge Medication List  
  
START taking these medications Dose & Instructions Dispensing Information Comments Morning Noon Evening Bedtime  
 famotidine 20 mg tablet Commonly known as:  PEPCID Your last dose was: Your next dose is:    
   
   
 Dose:  20 mg Take 1 Tab by mouth two (2) times a day. Quantity:  60 Tab Refills:  1  
     
   
   
   
  
 folic acid 1 mg tablet Commonly known as:  [employer] Your last dose was: Your next dose is:    
   
   
 Dose:  1 mg Take 1 Tab by mouth daily. Quantity:  30 Tab Refills:  1 HYDROcodone-acetaminophen 5-325 mg per tablet Commonly known as:  Nandini Arts Your last dose was: Your next dose is:    
   
   
 Dose:  1 Tab Take 1 Tab by mouth every six (6) hours as needed. Max Daily Amount: 4 Tabs. Quantity:  20 Tab Refills:  0  
     
   
   
   
  
 insulin glargine 100 unit/mL (3 mL) Inpn Commonly known as:  LANTUS SOLOSTAR  
   
 Your last dose was: Your next dose is:    
   
   
 Dose:  60 Units 60 Units by SubCUTAneous route daily. Quantity:  6 Pen Refills:  1 Insulin Needles (Disposable) 29 gauge x 1/2\" Ndle Commonly known as:  PEN NEEDLE Your last dose was: Your next dose is:    
   
   
 Dose:  1 Each  
1 Each by Does Not Apply route four (4) times daily. Quantity:  120 Pen Needle Refills:  1  
     
   
   
   
  
 ondansetron hcl 8 mg tablet Commonly known as:  ZOFRAN (AS HYDROCHLORIDE) Your last dose was: Your next dose is:    
   
   
 Dose:  8 mg Take 1 Tab by mouth every eight (8) hours as needed for Nausea. Quantity:  20 Tab Refills:  0 CONTINUE these medications which have CHANGED Dose & Instructions Dispensing Information Comments Morning Noon Evening Bedtime  
 atorvastatin 40 mg tablet Commonly known as:  LIPITOR What changed:   
- how much to take - when to take this Your last dose was: Your next dose is:    
   
   
 Dose:  40 mg Take 1 Tab by mouth nightly. Quantity:  30 Tab Refills:  1 CONTINUE these medications which have NOT CHANGED Dose & Instructions Dispensing Information Comments Morning Noon Evening Bedtime  
 fenofibrate nanocrystallized 145 mg tablet Commonly known as:  [employer] Your last dose was: Your next dose is:    
   
   
 Dose:  145 mg Take 1 Tab by mouth daily. Quantity:  30 Tab Refills:  1  
     
   
   
   
  
 gabapentin 300 mg capsule Commonly known as:  NEURONTIN Your last dose was: Your next dose is:    
   
   
 Dose:  300 mg Take 300 mg by mouth three (3) times daily. Refills:  0  
     
   
   
   
  
 lisinopril 10 mg tablet Commonly known as:  Danica Kan Your last dose was: Your next dose is:    
   
   
 Dose:  10 mg Take 1 Tab by mouth daily. Quantity:  30 Tab Refills:  2  
     
   
   
   
  
 metFORMIN 1,000 mg tablet Commonly known as:  GLUCOPHAGE Your last dose was: Your next dose is:    
   
   
 Dose:  1000 mg Take 1 Tab by mouth two (2) times daily (with meals). Quantity:  60 Tab Refills:  1  
     
   
   
   
  
 omega-3 acid ethyl esters 1 gram capsule Commonly known as:  Evelin Walker Your last dose was: Your next dose is:    
   
   
 Dose:  2 g Take 2 Caps by mouth two (2) times a day. Quantity:  120 Cap Refills:  1 PROAIR HFA 90 mcg/actuation inhaler Generic drug:  albuterol Your last dose was: Your next dose is:    
   
   
 Dose:  2 Puff Take 2 Puffs by inhalation every four (4) hours as needed for Wheezing. Refills:  0  
     
   
   
   
  
 sertraline 25 mg tablet Commonly known as:  ZOLOFT Your last dose was: Your next dose is:    
   
   
 Dose:  25 mg Take 1 Tab by mouth nightly. Quantity:  30 Tab Refills:  2 VITAMIN D2 50,000 unit capsule Generic drug:  ergocalciferol Your last dose was: Your next dose is:    
   
   
 Dose:  84270 Units Take 50,000 Units by mouth every seven (7) days. Refills:  0 STOP taking these medications   
 acetaminophen 500 mg tablet Commonly known as:  TYLENOL  
   
  
 furosemide 20 mg tablet Commonly known as:  LASIX  
   
  
 insulin detemir 100 unit/mL injection Commonly known as:  LEVEMIR Where to Get Your Medications Information on where to get these meds will be given to you by the nurse or doctor. ! Ask your nurse or doctor about these medications  
  atorvastatin 40 mg tablet  
 famotidine 20 mg tablet  
 fenofibrate nanocrystallized 588 mg tablet  
 folic acid 1 mg tablet HYDROcodone-acetaminophen 5-325 mg per tablet  
 insulin glargine 100 unit/mL (3 mL) Inpn Insulin Needles (Disposable) 29 gauge x 1/2\" Ndle  
 metFORMIN 1,000 mg tablet  
 omega-3 acid ethyl esters 1 gram capsule  
 ondansetron hcl 8 mg tablet Discharge Instructions HOSPITALIST DISCHARGE INSTRUCTIONS 
NAME: Miguel Owens :  1980 MRN:  [id number] Date/Time:  10/10/2017 9:36 AM 
 
ADMIT DATE: 10/5/2017 DISCHARGE DATE: 10/10/2017 DIAGNOSIS:  DKA, Hypertriglyceridemia, Acute Pancreatitis, Hyponatremia, LORRIE, HTN, Thrombocytopenia, Morbid Obesity MEDICATIONS: 
  
 
         As per medication reconciliation · It is important that you take the medication exactly as they are prescribed. · Keep your medication in the bottles provided by the pharmacist and keep a list of the medication names, dosages, and times to be taken in your wallet. · Do not take other medications without consulting your doctor. Pain Management: per above medications What to do at [facility] Recommended diet:  Cardiac Diet and Diabetic Diet Recommended activity: Activity as tolerated and No driving while on analgesics If you experience any of the following symptoms then please call your primary care physician or return to the emergency room if you cannot get hold of your doctor: 
Fever, chills, nausea, vomiting, diarrhea, change in mentation, falling, bleeding, shortness of breath. Follow Up: 
 PCP you are to call and set up an appointment to see them in 2 week. F/U GI 
F/U Endocrinology Information obtained by : 
I understand that if any problems occur once I am at home I am to contact my physician. I understand and acknowledge receipt of the instructions indicated above. Physician's or R.N.'s Signature                                                                  Date/Time Patient or Representative Signature                                                          Date/Time Discharge Orders None [employer] Announcement We are excited to announce that we are making your provider's discharge notes available to you in [employer]. You will see these notes when they are completed and signed by the physician that discharged you from your recent hospital stay. If you have any questions or concerns about any information you see in [employer], please call the Health Information Department where you were seen or reach out to your Primary Care Provider for more information about your plan of care. Introducing [facility]! [employer] introduces [employer] patient portal. Now you can access parts of your medical record, email your doctor's office, and request medication refills online. 1. In your internet browser, go to [email or web address] 2. Click on the First Time User? Click Here link in the Sign In box. You will see the New Member Sign Up page. 3. Enter your [employer] Access Code exactly as it appears below. You will not need to use this code after youve completed the sign-up process. If you do not sign up before the expiration date, you must request a new code. · [employer] Access Code: [email or web address] Expires: 12/31/2017  4:30 AM 
 
4. Enter the last four digits of your Social Security Number (xxxx) and Date of Birth (mm/dd/yyyy) as indicated and click Submit. You will be taken to the next sign-up page. 5. Create a [employer] ID. This will be your [employer] login ID and cannot be changed, so think of one that is secure and easy to remember. 6. Create a [employer] password. You can change your password at any time. 7. Enter your Password Reset Question and Answer.  This can be used at a later time if you forget your password. 8. Enter your e-mail address. You will receive e-mail notification when new information is available in [address] 9. Click Sign Up. You can now view and download portions of your medical record. 10. Click the Download Summary menu link to download a portable copy of your medical information. If you have questions, please visit the Frequently Asked Questions section of the [employer] website. Remember, [employer] is NOT to be used for urgent needs. For medical emergencies, dial 911. Now available from your iPhone and Android! General Information Please provide this summary of care documentation to your next provider. Patient Signature:  ____________________________________________________________ Date:  ____________________________________________________________  
  
Nannette Kwaner Provider Signature:  ____________________________________________________________ Date:  ____________________________________________________________

## 2017-10-05 NOTE — ED NOTES
Patient arrives via EMS. Patient states she started with generalized abdominal pain x 2 hours with nausea and vomiting. Patient reports vomiting x 6. Hx of gastritis and pancreatitis.
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
## 2017-10-06 ENCOUNTER — APPOINTMENT (OUTPATIENT)
Dept: GENERAL RADIOLOGY | Age: 37
DRG: 950 | End: 2017-10-06
Attending: RADIOLOGY
Payer: MEDICAID

## 2017-10-06 ENCOUNTER — APPOINTMENT (OUTPATIENT)
Dept: INTERVENTIONAL RADIOLOGY/VASCULAR | Age: 37
DRG: 950 | End: 2017-10-06
Attending: INTERNAL MEDICINE
Payer: MEDICAID

## 2017-10-06 LAB
ADMINISTERED INITIALS, ADMINIT: NORMAL
ANION GAP SERPL CALC-SCNC: 7 MMOL/L (ref 5–15)
ANION GAP SERPL CALC-SCNC: 8 MMOL/L (ref 5–15)
ANION GAP SERPL CALC-SCNC: 9 MMOL/L (ref 5–15)
BUN SERPL-MCNC: 10 MG/DL (ref 6–20)
BUN SERPL-MCNC: ABNORMAL MG/DL (ref 6–20)
BUN SERPL-MCNC: ABNORMAL MG/DL (ref 6–20)
BUN/CREAT SERPL: 7 (ref 12–20)
BUN/CREAT SERPL: ABNORMAL (ref 12–20)
BUN/CREAT SERPL: ABNORMAL (ref 12–20)
CALCIUM SERPL-MCNC: ABNORMAL MG/DL (ref 8.5–10.1)
CHLORIDE SERPL-SCNC: 88 MMOL/L (ref 97–108)
CHLORIDE SERPL-SCNC: 90 MMOL/L (ref 97–108)
CHLORIDE SERPL-SCNC: 91 MMOL/L (ref 97–108)
CHOLEST SERPL-MCNC: 758 MG/DL
CO2 SERPL-SCNC: 25 MMOL/L (ref 21–32)
CO2 SERPL-SCNC: 26 MMOL/L (ref 21–32)
CO2 SERPL-SCNC: 26 MMOL/L (ref 21–32)
CREAT SERPL-MCNC: 1.5 MG/DL (ref 0.55–1.02)
CREAT SERPL-MCNC: 1.52 MG/DL (ref 0.55–1.02)
CREAT SERPL-MCNC: 1.76 MG/DL (ref 0.55–1.02)
D50 ADMINISTERED, D50ADM: 0 ML
D50 ORDER, D50ORD: 0 ML
EST. AVERAGE GLUCOSE BLD GHB EST-MCNC: 258 MG/DL
GLSCOM COMMENTS: NORMAL
GLUCOSE BLD STRIP.AUTO-MCNC: 192 MG/DL (ref 65–100)
GLUCOSE BLD STRIP.AUTO-MCNC: 212 MG/DL (ref 65–100)
GLUCOSE BLD STRIP.AUTO-MCNC: 221 MG/DL (ref 65–100)
GLUCOSE BLD STRIP.AUTO-MCNC: 222 MG/DL (ref 65–100)
GLUCOSE BLD STRIP.AUTO-MCNC: 222 MG/DL (ref 65–100)
GLUCOSE BLD STRIP.AUTO-MCNC: 233 MG/DL (ref 65–100)
GLUCOSE BLD STRIP.AUTO-MCNC: 244 MG/DL (ref 65–100)
GLUCOSE BLD STRIP.AUTO-MCNC: 246 MG/DL (ref 65–100)
GLUCOSE BLD STRIP.AUTO-MCNC: 252 MG/DL (ref 65–100)
GLUCOSE BLD STRIP.AUTO-MCNC: 261 MG/DL (ref 65–100)
GLUCOSE BLD STRIP.AUTO-MCNC: 261 MG/DL (ref 65–100)
GLUCOSE BLD STRIP.AUTO-MCNC: 266 MG/DL (ref 65–100)
GLUCOSE BLD STRIP.AUTO-MCNC: 296 MG/DL (ref 65–100)
GLUCOSE BLD STRIP.AUTO-MCNC: 297 MG/DL (ref 65–100)
GLUCOSE BLD STRIP.AUTO-MCNC: 347 MG/DL (ref 65–100)
GLUCOSE BLD STRIP.AUTO-MCNC: 356 MG/DL (ref 65–100)
GLUCOSE SERPL-MCNC: 382 MG/DL (ref 65–100)
GLUCOSE SERPL-MCNC: 412 MG/DL (ref 65–100)
GLUCOSE SERPL-MCNC: 496 MG/DL (ref 65–100)
GLUCOSE, GLC: 192 MG/DL
GLUCOSE, GLC: 212 MG/DL
GLUCOSE, GLC: 221 MG/DL
GLUCOSE, GLC: 222 MG/DL
GLUCOSE, GLC: 222 MG/DL
GLUCOSE, GLC: 233 MG/DL
GLUCOSE, GLC: 244 MG/DL
GLUCOSE, GLC: 246 MG/DL
GLUCOSE, GLC: 252 MG/DL
GLUCOSE, GLC: 261 MG/DL
GLUCOSE, GLC: 261 MG/DL
GLUCOSE, GLC: 266 MG/DL
GLUCOSE, GLC: 296 MG/DL
GLUCOSE, GLC: 297 MG/DL
GLUCOSE, GLC: 347 MG/DL
GLUCOSE, GLC: 356 MG/DL
HBA1C MFR BLD: 10.6 % (ref 4.2–6.3)
HDLC SERPL-MCNC: 74 MG/DL
HDLC SERPL: 10.2 {RATIO} (ref 0–5)
HIGH TARGET, HITG: 300 MG/DL
INSULIN ADMINSTERED, INSADM: 10.1 UNITS/HOUR
INSULIN ADMINSTERED, INSADM: 10.1 UNITS/HOUR
INSULIN ADMINSTERED, INSADM: 10.3 UNITS/HOUR
INSULIN ADMINSTERED, INSADM: 11.8 UNITS/HOUR
INSULIN ADMINSTERED, INSADM: 11.8 UNITS/HOUR
INSULIN ADMINSTERED, INSADM: 11.9 UNITS/HOUR
INSULIN ADMINSTERED, INSADM: 14.4 UNITS/HOUR
INSULIN ADMINSTERED, INSADM: 5.3 UNITS/HOUR
INSULIN ADMINSTERED, INSADM: 6.1 UNITS/HOUR
INSULIN ADMINSTERED, INSADM: 6.4 UNITS/HOUR
INSULIN ADMINSTERED, INSADM: 6.5 UNITS/HOUR
INSULIN ADMINSTERED, INSADM: 7.4 UNITS/HOUR
INSULIN ADMINSTERED, INSADM: 7.7 UNITS/HOUR
INSULIN ADMINSTERED, INSADM: 8.1 UNITS/HOUR
INSULIN ADMINSTERED, INSADM: 8.7 UNITS/HOUR
INSULIN ADMINSTERED, INSADM: 9.2 UNITS/HOUR
INSULIN ORDER, INSORD: 10.1 UNITS/HOUR
INSULIN ORDER, INSORD: 10.1 UNITS/HOUR
INSULIN ORDER, INSORD: 10.3 UNITS/HOUR
INSULIN ORDER, INSORD: 11.8 UNITS/HOUR
INSULIN ORDER, INSORD: 11.8 UNITS/HOUR
INSULIN ORDER, INSORD: 11.9 UNITS/HOUR
INSULIN ORDER, INSORD: 14.4 UNITS/HOUR
INSULIN ORDER, INSORD: 5.3 UNITS/HOUR
INSULIN ORDER, INSORD: 6.1 UNITS/HOUR
INSULIN ORDER, INSORD: 6.4 UNITS/HOUR
INSULIN ORDER, INSORD: 6.5 UNITS/HOUR
INSULIN ORDER, INSORD: 7.4 UNITS/HOUR
INSULIN ORDER, INSORD: 7.7 UNITS/HOUR
INSULIN ORDER, INSORD: 8.1 UNITS/HOUR
INSULIN ORDER, INSORD: 8.7 UNITS/HOUR
INSULIN ORDER, INSORD: 9.2 UNITS/HOUR
LDLC SERPL CALC-MCNC: ABNORMAL MG/DL (ref 0–100)
LDLC SERPL DIRECT ASSAY-MCNC: 306 MG/DL (ref 0–100)
LIPASE SERPL-CCNC: >3000 U/L (ref 73–393)
LIPID PROFILE,FLP: ABNORMAL
LOW TARGET, LOT: 200 MG/DL
MAGNESIUM SERPL-MCNC: 1.2 MG/DL (ref 1.6–2.4)
MAGNESIUM SERPL-MCNC: 1.2 MG/DL (ref 1.6–2.4)
MAGNESIUM SERPL-MCNC: 2.5 MG/DL (ref 1.6–2.4)
MINUTES UNTIL NEXT BG, NBG: 120 MIN
MINUTES UNTIL NEXT BG, NBG: 60 MIN
MULTIPLIER, MUL: 0.04
MULTIPLIER, MUL: 0.05
ORDER INITIALS, ORDINIT: NORMAL
PHOSPHATE SERPL-MCNC: 3.3 MG/DL (ref 2.6–4.7)
POTASSIUM SERPL-SCNC: 3.1 MMOL/L (ref 3.5–5.1)
POTASSIUM SERPL-SCNC: 3.2 MMOL/L (ref 3.5–5.1)
POTASSIUM SERPL-SCNC: 3.2 MMOL/L (ref 3.5–5.1)
SERVICE CMNT-IMP: ABNORMAL
SODIUM SERPL-SCNC: 122 MMOL/L (ref 136–145)
SODIUM SERPL-SCNC: 123 MMOL/L (ref 136–145)
SODIUM SERPL-SCNC: 125 MMOL/L (ref 136–145)
TRIGL SERPL-MCNC: >4000 MG/DL (ref ?–150)
TRIGL SERPL-MCNC: >4000 MG/DL (ref ?–150)
VLDLC SERPL CALC-MCNC: ABNORMAL MG/DL

## 2017-10-06 PROCEDURE — 80048 BASIC METABOLIC PNL TOTAL CA: CPT | Performed by: EMERGENCY MEDICINE

## 2017-10-06 PROCEDURE — 65660000000 HC RM CCU STEPDOWN

## 2017-10-06 PROCEDURE — 83721 ASSAY OF BLOOD LIPOPROTEIN: CPT | Performed by: INTERNAL MEDICINE

## 2017-10-06 PROCEDURE — 82962 GLUCOSE BLOOD TEST: CPT

## 2017-10-06 PROCEDURE — C1752 CATH,HEMODIALYSIS,SHORT-TERM: HCPCS

## 2017-10-06 PROCEDURE — P9045 ALBUMIN (HUMAN), 5%, 250 ML: HCPCS | Performed by: INTERNAL MEDICINE

## 2017-10-06 PROCEDURE — 74011000250 HC RX REV CODE- 250: Performed by: INTERNAL MEDICINE

## 2017-10-06 PROCEDURE — 74011000258 HC RX REV CODE- 258: Performed by: INTERNAL MEDICINE

## 2017-10-06 PROCEDURE — 02H633Z INSERTION OF INFUSION DEVICE INTO RIGHT ATRIUM, PERCUTANEOUS APPROACH: ICD-10-PCS | Performed by: RADIOLOGY

## 2017-10-06 PROCEDURE — 80061 LIPID PANEL: CPT | Performed by: INTERNAL MEDICINE

## 2017-10-06 PROCEDURE — 80048 BASIC METABOLIC PNL TOTAL CA: CPT | Performed by: INTERNAL MEDICINE

## 2017-10-06 PROCEDURE — 77030018719 HC DRSG PTCH ANTIMIC J&J -A

## 2017-10-06 PROCEDURE — 71010 XR CHEST PORT: CPT

## 2017-10-06 PROCEDURE — 84100 ASSAY OF PHOSPHORUS: CPT | Performed by: INTERNAL MEDICINE

## 2017-10-06 PROCEDURE — 74011000258 HC RX REV CODE- 258: Performed by: HOSPITALIST

## 2017-10-06 PROCEDURE — 74011250636 HC RX REV CODE- 250/636: Performed by: INTERNAL MEDICINE

## 2017-10-06 PROCEDURE — 83735 ASSAY OF MAGNESIUM: CPT | Performed by: INTERNAL MEDICINE

## 2017-10-06 PROCEDURE — 76937 US GUIDE VASCULAR ACCESS: CPT

## 2017-10-06 PROCEDURE — 74011000250 HC RX REV CODE- 250: Performed by: RADIOLOGY

## 2017-10-06 PROCEDURE — 74011636637 HC RX REV CODE- 636/637: Performed by: INTERNAL MEDICINE

## 2017-10-06 PROCEDURE — 77010033678 HC OXYGEN DAILY

## 2017-10-06 PROCEDURE — 83690 ASSAY OF LIPASE: CPT | Performed by: INTERNAL MEDICINE

## 2017-10-06 PROCEDURE — 74011250637 HC RX REV CODE- 250/637: Performed by: INTERNAL MEDICINE

## 2017-10-06 PROCEDURE — 6A551Z3 PHERESIS OF PLASMA, MULTIPLE: ICD-10-PCS | Performed by: INTERNAL MEDICINE

## 2017-10-06 PROCEDURE — 84478 ASSAY OF TRIGLYCERIDES: CPT | Performed by: INTERNAL MEDICINE

## 2017-10-06 PROCEDURE — 83735 ASSAY OF MAGNESIUM: CPT | Performed by: EMERGENCY MEDICINE

## 2017-10-06 PROCEDURE — C1892 INTRO/SHEATH,FIXED,PEEL-AWAY: HCPCS

## 2017-10-06 PROCEDURE — 36514 APHERESIS PLASMA: CPT

## 2017-10-06 PROCEDURE — 36415 COLL VENOUS BLD VENIPUNCTURE: CPT | Performed by: INTERNAL MEDICINE

## 2017-10-06 RX ORDER — CALCIUM GLUCONATE 94 MG/ML
3 INJECTION, SOLUTION INTRAVENOUS ONCE
Status: COMPLETED | OUTPATIENT
Start: 2017-10-06 | End: 2017-10-06

## 2017-10-06 RX ORDER — DEXTROSE MONOHYDRATE AND SODIUM CHLORIDE 5; .9 G/100ML; G/100ML
100 INJECTION, SOLUTION INTRAVENOUS CONTINUOUS
Status: DISCONTINUED | OUTPATIENT
Start: 2017-10-06 | End: 2017-10-08

## 2017-10-06 RX ORDER — MAGNESIUM SULFATE HEPTAHYDRATE 40 MG/ML
2 INJECTION, SOLUTION INTRAVENOUS ONCE
Status: COMPLETED | OUTPATIENT
Start: 2017-10-06 | End: 2017-10-06

## 2017-10-06 RX ORDER — LIDOCAINE HYDROCHLORIDE 20 MG/ML
20 INJECTION, SOLUTION INFILTRATION; PERINEURAL
Status: COMPLETED | OUTPATIENT
Start: 2017-10-06 | End: 2017-10-06

## 2017-10-06 RX ORDER — DEXTROSE MONOHYDRATE AND SODIUM CHLORIDE 5; .45 G/100ML; G/100ML
150 INJECTION, SOLUTION INTRAVENOUS CONTINUOUS
Status: DISCONTINUED | OUTPATIENT
Start: 2017-10-06 | End: 2017-10-06

## 2017-10-06 RX ORDER — ALBUMIN HUMAN 50 G/1000ML
3500 SOLUTION INTRAVENOUS ONCE
Status: COMPLETED | OUTPATIENT
Start: 2017-10-06 | End: 2017-10-06

## 2017-10-06 RX ORDER — POTASSIUM CHLORIDE 750 MG/1
40 TABLET, FILM COATED, EXTENDED RELEASE ORAL
Status: COMPLETED | OUTPATIENT
Start: 2017-10-06 | End: 2017-10-06

## 2017-10-06 RX ORDER — DIPHENHYDRAMINE HYDROCHLORIDE 50 MG/ML
25 INJECTION, SOLUTION INTRAMUSCULAR; INTRAVENOUS ONCE
Status: COMPLETED | OUTPATIENT
Start: 2017-10-06 | End: 2017-10-06

## 2017-10-06 RX ADMIN — SODIUM CHLORIDE 7.4 UNITS/HR: 900 INJECTION, SOLUTION INTRAVENOUS at 18:33

## 2017-10-06 RX ADMIN — SODIUM CHLORIDE 8.7 UNITS/HR: 900 INJECTION, SOLUTION INTRAVENOUS at 13:05

## 2017-10-06 RX ADMIN — DIPHENHYDRAMINE HYDROCHLORIDE 25 MG: 50 INJECTION, SOLUTION INTRAMUSCULAR; INTRAVENOUS at 18:57

## 2017-10-06 RX ADMIN — HYDROMORPHONE HYDROCHLORIDE 1 MG: 2 INJECTION INTRAMUSCULAR; INTRAVENOUS; SUBCUTANEOUS at 09:12

## 2017-10-06 RX ADMIN — HYDROCODONE BITARTRATE AND ACETAMINOPHEN 1 TABLET: 5; 325 TABLET ORAL at 01:17

## 2017-10-06 RX ADMIN — ATORVASTATIN CALCIUM 40 MG: 40 TABLET, FILM COATED ORAL at 21:20

## 2017-10-06 RX ADMIN — ONDANSETRON 4 MG: 2 INJECTION INTRAMUSCULAR; INTRAVENOUS at 03:54

## 2017-10-06 RX ADMIN — SODIUM CHLORIDE 10.3 UNITS/HR: 900 INJECTION, SOLUTION INTRAVENOUS at 04:38

## 2017-10-06 RX ADMIN — SODIUM CHLORIDE 8.1 UNITS/HR: 900 INJECTION, SOLUTION INTRAVENOUS at 07:05

## 2017-10-06 RX ADMIN — SERTRALINE HYDROCHLORIDE 25 MG: 50 TABLET ORAL at 08:54

## 2017-10-06 RX ADMIN — SODIUM CHLORIDE 9.2 UNITS/HR: 900 INJECTION, SOLUTION INTRAVENOUS at 05:45

## 2017-10-06 RX ADMIN — POTASSIUM CHLORIDE 40 MEQ: 750 TABLET, FILM COATED, EXTENDED RELEASE ORAL at 08:53

## 2017-10-06 RX ADMIN — LIDOCAINE HYDROCHLORIDE 400 MG: 20 INJECTION, SOLUTION INFILTRATION; PERINEURAL at 14:42

## 2017-10-06 RX ADMIN — POTASSIUM PHOSPHATE, MONOBASIC AND POTASSIUM PHOSPHATE, DIBASIC: 224; 236 INJECTION, SOLUTION INTRAVENOUS at 11:56

## 2017-10-06 RX ADMIN — HYDROMORPHONE HYDROCHLORIDE 1 MG: 2 INJECTION INTRAMUSCULAR; INTRAVENOUS; SUBCUTANEOUS at 23:36

## 2017-10-06 RX ADMIN — ONDANSETRON 4 MG: 2 INJECTION INTRAMUSCULAR; INTRAVENOUS at 17:04

## 2017-10-06 RX ADMIN — Medication 10 ML: at 06:51

## 2017-10-06 RX ADMIN — HYDROMORPHONE HYDROCHLORIDE 1 MG: 2 INJECTION INTRAMUSCULAR; INTRAVENOUS; SUBCUTANEOUS at 03:54

## 2017-10-06 RX ADMIN — FENOFIBRATE 145 MG: 145 TABLET ORAL at 08:53

## 2017-10-06 RX ADMIN — FAMOTIDINE 20 MG: 10 INJECTION, SOLUTION INTRAVENOUS at 21:19

## 2017-10-06 RX ADMIN — HYDROMORPHONE HYDROCHLORIDE 1 MG: 2 INJECTION INTRAMUSCULAR; INTRAVENOUS; SUBCUTANEOUS at 17:04

## 2017-10-06 RX ADMIN — ALBUMIN (HUMAN) 175 G: 2.5 SOLUTION INTRAVENOUS at 18:57

## 2017-10-06 RX ADMIN — SODIUM CHLORIDE 5.3 UNITS/HR: 900 INJECTION, SOLUTION INTRAVENOUS at 15:45

## 2017-10-06 RX ADMIN — DEXTROSE MONOHYDRATE AND SODIUM CHLORIDE 150 ML/HR: 5; .45 INJECTION, SOLUTION INTRAVENOUS at 08:39

## 2017-10-06 RX ADMIN — Medication 10 ML: at 21:19

## 2017-10-06 RX ADMIN — FAMOTIDINE 20 MG: 10 INJECTION, SOLUTION INTRAVENOUS at 08:54

## 2017-10-06 RX ADMIN — SODIUM CHLORIDE 6.1 UNITS/HR: 900 INJECTION, SOLUTION INTRAVENOUS at 21:25

## 2017-10-06 RX ADMIN — SODIUM CHLORIDE 6.4 UNITS/HR: 900 INJECTION, SOLUTION INTRAVENOUS at 17:05

## 2017-10-06 RX ADMIN — CALCIUM GLUCONATE 3 G: 94 INJECTION, SOLUTION INTRAVENOUS at 18:56

## 2017-10-06 RX ADMIN — SODIUM CHLORIDE 150 ML/HR: 450 INJECTION, SOLUTION INTRAVENOUS at 02:00

## 2017-10-06 RX ADMIN — MAGNESIUM SULFATE HEPTAHYDRATE 2 G: 40 INJECTION, SOLUTION INTRAVENOUS at 10:46

## 2017-10-06 RX ADMIN — DEXTROSE MONOHYDRATE AND SODIUM CHLORIDE 100 ML/HR: 5; .9 INJECTION, SOLUTION INTRAVENOUS at 10:46
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
## 2017-10-06 NOTE — ED NOTES
Hospitalist Dr. Deshawn Fang updated at this time on pt's accucheck results and current labs. To continue with glucostabilizer template to adjust insulin rate. Continue with current titrate orders and notify MD with changes.

## 2017-10-06 NOTE — PROGRESS NOTES
Called report to [facility] for patient. Informed of Ihsan placement and chest Xray.

## 2017-10-06 NOTE — PROGRESS NOTES
Patient arrived from floor on room air sats 89%. I asked patient if she has been on oxygen and she stated yes. Placed on [id number] [facility] now 95%. Patient alert and oriented X 3. Consent Obtained.

## 2017-10-06 NOTE — ED NOTES
TRANSFER - OUT REPORT:      Verbal report given to Jarret Fernandez   RN on Gael Jefferson  being transferred to [phone] PCU for routine progression of care       Report consisted of patients Situation, Background, Assessment and   Recommendations(SBAR). Information from the following report(s) SBAR, ED Summary and MAR was reviewed with the receiving nurse. Lines:   Peripheral IV 10/05/17 Left Forearm (Active)   Site Assessment Clean, dry, & intact 10/5/2017  4:11 PM   Phlebitis Assessment 0 10/5/2017  4:11 PM   Infiltration Assessment 0 10/5/2017  4:11 PM   Hub Color/Line Status Pink 10/5/2017  4:11 PM       Peripheral IV 10/05/17 Right Antecubital (Active)   Site Assessment Clean, dry, & intact 10/5/2017  8:19 PM   Dressing Type 4 X 4 10/5/2017  8:19 PM   Hub Color/Line Status Pink 10/5/2017  8:19 PM        Opportunity for questions and clarification was provided.       Patient transported with:   Monitor  O2 @ 2 liters

## 2017-10-06 NOTE — PROCEDURES
[facility]   [phone]   Vitals Pre Post Assessment Pre Post   /70 105/65 LOC A&Ox4 A&Ox4   HR 96 90 Lungs Clear&bronchovescicularx5, even&unlaboured. Clear&broncho  vescicularx5, even&unlabour  ed. Temp 99.0 98.8 Cardiac Reg rate&NSR Reg rate&NSR   Resp 18 18 Skin Warm & dry Warm & dry   Weight 274lbs 274lbs Edema None noted None noted   Height 5' 8\" 5' 8\" Pain denies denies     Plasmapheresis Orders   Product: 5% Albumin IV                                            Volume: 3.5L                                               Duration: min Start: 1840 End: 1955 Total: 90min     Fluids    Volume Processed: 8373ml   Plasma Removed: 4056ml   Replacement Fluid: 3376ml   Citrate: 162ml   NS: 120ml (Solvent for Calcium Gluconate)     Access   Type & Location: RIJ catheter   Comments: Newly placed, placement verified radiologically. Exhibits good flows upon aspiration; new dressing dry and intact and s drainage nor inflammation noted around biopatch. Post-tx all possible blood returned via full rinceback. Both catheter ports flushed and indwelled c 0.9% and capped and taped. Meds Given   Name Dose Route   Benadryl 25mg IV   Calcium Gluconate 3grams administered slow IVPB over tx course s difficulties               Labs   Obtained/Reviewed  Critical Results Called CBC, plts, retic count, triglycerides  MD aware     Comments:  Pre-tx consents verified/time-out performed. Tx progressed well and without incident; writer left pts room c pt in no new acute distress and denying complaints c stable vss WNL, bed in lowest position c side rails upx3, wheels lockedx4, and call bell within reach. Reported off to Bre Noonan. Atif Martinez RN.

## 2017-10-06 NOTE — PROGRESS NOTES
Hospitalist Progress Note    NAME: Julia Moralez   :  1980   MRN:  [id number]       Assessment / Plan:  DKA: DKA is resolved, gap has closed, but patient still NPO due to pancreatitis, will c/w insulin drip for now. Acute Pancreatitis: due to hypertriglyceridemia, keep NPO, give symptomatic treatment. Monitor Lipase. Hypertriglyceridemia: may need Plasma Exchange, D/W Nephrology. Patient also has a CBD dilated, will ask for GI input. Hyponatremia: changed IVF to NS, monitor. ?Seizure disorder  not on meds, monitor. Acute kidney injury: creatinine so far trending down, monitor, c/w IVF. Depression  C/w zoloft  HTN hold nephrotoxic meds, c/w hydralazine prn and pain control with IV dilaudid  Morbid obesity Body mass index is 41.66 kg/(m^2). Surrogate Decision Maker: pt's mother in law  Code status: Full  Prophylaxis: Hep SQ  Recommended Disposition: Home w/Family     Subjective:     Chief Complaint / Reason for Physician Visit  \"My belly still hurts\". Discussed with RN events overnight. Review of Systems:  Symptom Y/N Comments  Symptom Y/N Comments   Fever/Chills    Chest Pain     Poor Appetite    Edema     Cough    Abdominal Pain y    Sputum    Joint Pain     SOB/VIRK    Pruritis/Rash     Nausea/vomit    Tolerating PT/OT     Diarrhea    Tolerating Diet n    Constipation    Other       Could NOT obtain due to:      Objective:     VITALS:   Last 24hrs VS reviewed since prior progress note.  Most recent are:  Patient Vitals for the past 24 hrs:   Temp Pulse Resp BP SpO2   10/06/17 0715 98.4 °F (36.9 °C) 98 16 113/76 94 %   10/06/17 0627 98.3 °F (36.8 °C) 100 18 123/78 92 %   10/06/17 0600 - - - 123/73 96 %   10/06/17 0515 98.6 °F (37 °C) - - - -   10/06/17 0500 - - - 113/75 96 %   10/06/17 0400 - - - 135/77 95 %   10/06/17 0359 98.2 °F (36.8 °C) 98 17 138/87 96 %   10/06/17 0321 98.1 °F (36.7 °C) (!) 103 17 (!) 139/93 95 %   10/06/17 0320 - - - 139/83 95 %   10/06/17 0300 - - - 142/86 95 % 10/06/17 0201 98.2 °F (36.8 °C) (!) 104 17 (!) 143/93 95 %   10/06/17 0119 98.8 °F (37.1 °C) (!) 113 18 (!) 153/95 94 %   10/06/17 0100 - - - (!) 150/97 95 %   10/06/17 0000 - - - (!) 155/95 94 %   10/05/17 2351 98.2 °F (36.8 °C) (!) 102 18 (!) 167/97 94 %   10/05/17 2300 - - - (!) 164/96 92 %   10/05/17 2214 98.2 °F (36.8 °C) 95 17 160/88 95 %   10/05/17 2000 - - - 165/90 93 %   10/05/17 1915 - - - (!) 154/92 93 %   10/05/17 1900 - - - 164/90 93 %   10/05/17 1845 - - - 166/90 92 %   10/05/17 1833 - 90 18 (!) 164/94 97 %   10/05/17 1800 - - - (!) 161/96 95 %   10/05/17 1730 - - - 160/89 92 %   10/05/17 1715 - - - 164/90 92 %   10/05/17 1700 - - - (!) 160/94 93 %   10/05/17 1645 - - - 161/89 95 %   10/05/17 1630 - - - (!) 165/98 96 %   10/05/17 1603 98.5 °F (36.9 °C) 97 16 (!) 161/109 100 %     No intake or output data in the 24 hours ending 10/06/17 0940     PHYSICAL EXAM:  General: WD, WN. Alert, cooperative, no acute distress    EENT:  EOMI. Anicteric sclerae. MMM  Resp:  Coarse BS  CV:  Regular  rhythm,  No edema  GI:  Soft, Non distended, epi-meso gastrium  tender.  +Bowel sounds  Neurologic:  Alert and oriented X 3, normal speech,   Psych:   Good insight. Not anxious nor agitated  Skin:  No rashes. No jaundice    Reviewed most current lab test results and cultures  YES  Reviewed most current radiology test results   YES  Review and summation of old records today    NO  Reviewed patient's current orders and MAR    YES  PMH/SH reviewed - no change compared to H&P  ________________________________________________________________________  Care Plan discussed with:    Comments   Patient y    Family      RN y    Care Manager     Consultant  y Renal                     Multidiciplinary team rounds were held today with , nursing, pharmacist and clinical coordinator. Patient's plan of care was discussed; medications were reviewed and discharge planning was addressed. ________________________________________________________________________  Total NON critical care TIME:    Minutes    Total CRITICAL CARE TIME Spent: 35  Minutes non procedure based      Comments   >50% of visit spent in counseling and coordination of care y    ________________________________________________________________________  Alexa Carrasco MD     Procedures: see electronic medical records for all procedures/Xrays and details which were not copied into this note but were reviewed prior to creation of Plan. LABS:  I reviewed today's most current labs and imaging studies.   Pertinent labs include:  Recent Labs      10/05/17   2007  10/05/17   1610   WBC  9.1  8.7   HGB  UNABLE TO OBTAIN ACCURATE RESULTS DUE TO GROSS LIPEMIA  UNABLE TO OBTAIN ACCURATE RESULTS DUE TO GROSS LIPEMIA   HCT  36.1  37.2   PLT  160  149*     Recent Labs      10/06/17   0509  10/06/17   0351  10/06/17   0004  10/05/17   2007  10/05/17   1610   NA   --   123*  122*  121*  UNABLE TO OBTAIN ACCURATE RESULTS DUE TO GROSS LIPEMIA   K   --   3.1*  3.2*  3.6  3.5   CL   --   90*  88*  88*  83*   CO2   --   26  25  27  29   GLU   --   412*  496*  563*  639*   BUN   --   UNABLE TO OBTAIN ACCURATE RESULTS DUE TO GROSS LIPEMIA  UNABLE TO OBTAIN ACCURATE RESULTS DUE TO GROSS LIPEMIA  11  9   CREA   --   1.52*  1.76*  1.50*  1.81*   CA   --   UNABLE TO OBTAIN ACCURATE RESULTS DUE TO GROSS LIPEMIA  UNABLE TO OBTAIN ACCURATE RESULTS DUE TO GROSS LIPEMIA  UNABLE TO OBTAIN ACCURATE RESULTS DUE TO GROSS LIPEMIA  UNABLE TO OBTAIN ACCURATE RESULTS DUE TO GROSS LIPEMIA   MG  2.5*   --   1.2*  1.0*   --    PHOS   --    --    --   2.1*   --    ALB   --    --    --    --   2.0*   TBILI   --    --    --    --   UNABLE TO OBTAIN ACCURATE RESULTS DUE TO GROSS LIPEMIA   SGOT   --    --    --    --   UNABLE TO OBTAIN ACCURATE RESULTS DUE TO GROSS LIPEMIA   ALT   --    --    --    --   UNABLE TO OBTAIN ACCURATE RESULTS DUE TO GROSS LIPEMIA       Signed: Oren Coleman MD

## 2017-10-06 NOTE — ED NOTES
Hospitalist paged at this time for update on blood sugar. Standing orders state to contact MD if blood sugar is less than 250. Pt's current blood sugar is 244. Rate adjusted according to glucostabilizer.   Awaiting orders for fluids
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
## 2017-10-06 NOTE — PROGRESS NOTES
Endocrinology Progress Note    Received a call from Dr. Juan Antonio Mckenna about this patient. She is planning on pheresis due to high TGs that have caused pancreatitis. Pt is currently on an insulin drip. I told her that I would be happy to see her in the hospital Monday morning as I'm headed out of [facility] and my partner, Dr. Faheem Sheets, is on call for our group over the weekend should questions arise. I would recommend continuing the insulin drip over the weekend to try and get her TGs back down under 1000 and we can determine an insulin regimen to start her on when I see her on Monday. I asked Dr. Juan Antonio Mckenna to place a formal consult and she said she would do so. Please don't hesitate to call [phone] with further questions.     Tutu Swanson MD

## 2017-10-06 NOTE — PROGRESS NOTES
PCU SHIFT NURSING NOTE      Bedside shift change report given to Mulu Batista (oncoming nurse) by Milton Hand (offgoing nurse). Report included the following information SBAR, Kardex, Intake/Output, MAR and Recent Results. Shift Summary: 0715  Pt in bed resting  On insulin gtt at 8.1  NSR on monitor    0900  Labs sent  1100  Called GI consult   1500  Pt returned to unit from [facility]      Admission Date 10/5/2017   Admission Diagnosis Pancreatitis   Consults None        Consults   []PT   []OT   []Speech   []Case Management      [] Palliative      Cardiac Monitoring Order   []Yes   []No     IV drips   []Yes    Drip:                            Dose:  Drip:                            Dose:  Drip:                            Dose:   []No     GI Prophylaxis   []Yes   []No         DVT Prophylaxis   SCDs:             Romario stockings:         [] Medication   []Contraindicated   []None      Activity Level Activity Level: Up with Assistance     Activity Assistance: Partial (one person)   Purposeful Rounding every 1-2 hour? []Yes   Mancia Score  Total Score: 1   Bed Alarm (If score 3 or >)   []Yes   [] Refused (See signed refusal form in chart)   Joel Score  Joel Score: 18   Joel Score (if score 14 or less)   []PMT consult   []Wound Care consult      []Specialty bed   [] Nutrition consult          Needs prior to discharge:   Home O2 required:    []Yes   []No    If yes, how much O2 required? Other:    Last Bowel Movement:        Influenza Vaccine          Pneumonia Vaccine           Diet Active Orders   Diet    DIET NPO      LDAs               Peripheral IV 10/05/17 Left Forearm (Active)   Site Assessment Clean, dry, & intact 10/6/2017  6:27 AM   Phlebitis Assessment 0 10/6/2017  6:27 AM   Infiltration Assessment 0 10/6/2017  6:27 AM   Dressing Status Clean, dry, & intact 10/6/2017  6:27 AM   Dressing Type Tape;Transparent 10/6/2017  6:27 AM   Hub Color/Line Status Pink; Infusing;Flushed 10/6/2017  6:27 AM   Action Taken Open ports on tubing capped 10/6/2017  6:27 AM   Alcohol Cap Used Yes 10/6/2017  6:27 AM       Peripheral IV 10/05/17 Right Antecubital (Active)   Site Assessment Clean, dry, & intact 10/6/2017  6:27 AM   Phlebitis Assessment 0 10/6/2017  6:27 AM   Infiltration Assessment 0 10/6/2017  6:27 AM   Dressing Status Clean, dry, & intact 10/6/2017  6:27 AM   Dressing Type Tape;Transparent 10/6/2017  6:27 AM   Hub Color/Line Status Pink; Infusing;Flushed 10/6/2017  6:27 AM   Action Taken Open ports on tubing capped 10/6/2017  6:27 AM   Alcohol Cap Used Yes 10/6/2017  6:27 AM                      Urinary Catheter      Intake & Output        Readmission Risk Assessment Tool Score Low Risk            4       Total Score        4 Pt. Coverage (Medicare=5 , Medicaid, or Self-Pay=4)        Criteria that do not apply:    Has Seen PCP in Last 6 Months (Yes=3, No=0)    . Living with Significant Other. Assisted Living. LTAC. SNF.  or   Rehab    Patient Length of Stay (>5 days = 3)    IP Visits Last 12 Months (1-3=4, 4=9, >4=11)    Charlson Comorbidity Score (Age + Comorbid Conditions)       Expected Length of Stay - - -   Actual Length of Stay 1

## 2017-10-06 NOTE — CONSULTS
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 possible (tomorrow?)  -- agree with involving nephrology for plasmapheresis  -- tricor, plus may need addition of niaspan and/or omega3's after recovery in acute setting  -- continue NPO, if pain improving would add clears tomorrow  -- will continue to follow    Discussed Code Status:    [x]    Full Code      []    DNR    ___________________________________________________  Care Plan discussed with:    [x]    Patient   []    Family   [x]    Nursing   []    Attending  Total Time :  45   minutes   ___________________________________________________  GI: Marta Friend MD
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
## 2017-10-06 NOTE — DIABETES MGMT
[facility]    Elevated A1C Visit Note    Patient currently on insulin drip. Would recommend continuing insulin drip at this time 2' elevated triglycerides and high insulin needs. Attempted to see patient for A1C >9%- patient currently out of her room. DTC to follow up on Monday if she is still here. Patient is a 40 y.o. female with known diabetes- suspect Type 2 because if she really didn't have her insulin pump on since June, patient would not be able to survive without insulin for more than a few days if she had Type 1 diabetes. A1c:   Lab Results   Component Value Date/Time    Hemoglobin A1c 10.6 10/05/2017 09:01 PM    Hemoglobin A1c SENT TO [place] FOR TESTING 03/09/2011 03:45 AM       Recent Glucose Results:   Lab Results   Component Value Date/Time     (H) 10/06/2017 08:56 AM     (H) 10/06/2017 03:51 AM     (H) 10/06/2017 12:04 AM    GLUCPOC 233 (H) 10/06/2017 12:29 PM    GLUCPOC 261 (H) 10/06/2017 10:33 AM    GLUCPOC 261 (H) 10/06/2017 08:34 AM        Lab Results   Component Value Date/Time    Creatinine 1.50 10/06/2017 08:56 AM       Active Orders   Diet    DIET NPO        Will continue to follow as needed. Thank you.     Giacomo Thompson, 66 [address], [address]  Office:  [phone]

## 2017-10-07 LAB
ADMINISTERED INITIALS, ADMINIT: NORMAL
ALBUMIN SERPL-MCNC: 3.4 G/DL (ref 3.5–5)
ALBUMIN/GLOB SERPL: 1.7 {RATIO} (ref 1.1–2.2)
ALP SERPL-CCNC: 32 U/L (ref 45–117)
ALT SERPL-CCNC: 27 U/L (ref 12–78)
ANION GAP SERPL CALC-SCNC: 7 MMOL/L (ref 5–15)
AST SERPL-CCNC: 23 U/L (ref 15–37)
BASOPHILS # BLD: 0 K/UL (ref 0–0.1)
BASOPHILS NFR BLD: 0 % (ref 0–1)
BILIRUB SERPL-MCNC: 0.6 MG/DL (ref 0.2–1)
BUN SERPL-MCNC: 15 MG/DL (ref 6–20)
BUN/CREAT SERPL: 18 (ref 12–20)
CALCIUM SERPL-MCNC: 7.2 MG/DL (ref 8.5–10.1)
CHLORIDE SERPL-SCNC: 108 MMOL/L (ref 97–108)
CO2 SERPL-SCNC: 24 MMOL/L (ref 21–32)
CREAT SERPL-MCNC: 0.85 MG/DL (ref 0.55–1.02)
D50 ADMINISTERED, D50ADM: 0 ML
D50 ORDER, D50ORD: 0 ML
EOSINOPHIL # BLD: 0.1 K/UL (ref 0–0.4)
EOSINOPHIL NFR BLD: 1 % (ref 0–7)
ERYTHROCYTE [DISTWIDTH] IN BLOOD BY AUTOMATED COUNT: 15.6 % (ref 11.5–14.5)
EST. AVERAGE GLUCOSE BLD GHB EST-MCNC: 266 MG/DL
GLOBULIN SER CALC-MCNC: 2 G/DL (ref 2–4)
GLSCOM COMMENTS: NORMAL
GLUCOSE BLD STRIP.AUTO-MCNC: 183 MG/DL (ref 65–100)
GLUCOSE BLD STRIP.AUTO-MCNC: 192 MG/DL (ref 65–100)
GLUCOSE BLD STRIP.AUTO-MCNC: 195 MG/DL (ref 65–100)
GLUCOSE BLD STRIP.AUTO-MCNC: 217 MG/DL (ref 65–100)
GLUCOSE BLD STRIP.AUTO-MCNC: 219 MG/DL (ref 65–100)
GLUCOSE BLD STRIP.AUTO-MCNC: 224 MG/DL (ref 65–100)
GLUCOSE BLD STRIP.AUTO-MCNC: 227 MG/DL (ref 65–100)
GLUCOSE BLD STRIP.AUTO-MCNC: 228 MG/DL (ref 65–100)
GLUCOSE BLD STRIP.AUTO-MCNC: 232 MG/DL (ref 65–100)
GLUCOSE BLD STRIP.AUTO-MCNC: 234 MG/DL (ref 65–100)
GLUCOSE BLD STRIP.AUTO-MCNC: 238 MG/DL (ref 65–100)
GLUCOSE BLD STRIP.AUTO-MCNC: 239 MG/DL (ref 65–100)
GLUCOSE BLD STRIP.AUTO-MCNC: 240 MG/DL (ref 65–100)
GLUCOSE BLD STRIP.AUTO-MCNC: 241 MG/DL (ref 65–100)
GLUCOSE BLD STRIP.AUTO-MCNC: 242 MG/DL (ref 65–100)
GLUCOSE BLD STRIP.AUTO-MCNC: 244 MG/DL (ref 65–100)
GLUCOSE SERPL-MCNC: 241 MG/DL (ref 65–100)
GLUCOSE, GLC: 183 MG/DL
GLUCOSE, GLC: 192 MG/DL
GLUCOSE, GLC: 195 MG/DL
GLUCOSE, GLC: 217 MG/DL
GLUCOSE, GLC: 219 MG/DL
GLUCOSE, GLC: 224 MG/DL
GLUCOSE, GLC: 227 MG/DL
GLUCOSE, GLC: 228 MG/DL
GLUCOSE, GLC: 232 MG/DL
GLUCOSE, GLC: 234 MG/DL
GLUCOSE, GLC: 238 MG/DL
GLUCOSE, GLC: 239 MG/DL
GLUCOSE, GLC: 240 MG/DL
GLUCOSE, GLC: 241 MG/DL
GLUCOSE, GLC: 242 MG/DL
GLUCOSE, GLC: 244 MG/DL
HBA1C MFR BLD: 10.9 % (ref 4.2–6.3)
HCT VFR BLD AUTO: 34 % (ref 35–47)
HGB BLD-MCNC: 11.8 G/DL (ref 11.5–16)
HIGH TARGET, HITG: 300 MG/DL
INSULIN ADMINSTERED, INSADM: 1.4 UNITS/HOUR
INSULIN ADMINSTERED, INSADM: 1.6 UNITS/HOUR
INSULIN ADMINSTERED, INSADM: 1.7 UNITS/HOUR
INSULIN ADMINSTERED, INSADM: 1.8 UNITS/HOUR
INSULIN ADMINSTERED, INSADM: 2.6 UNITS/HOUR
INSULIN ADMINSTERED, INSADM: 3.7 UNITS/HOUR
INSULIN ADMINSTERED, INSADM: 6.4 UNITS/HOUR
INSULIN ADMINSTERED, INSADM: 6.6 UNITS/HOUR
INSULIN ORDER, INSORD: 1.4 UNITS/HOUR
INSULIN ORDER, INSORD: 1.6 UNITS/HOUR
INSULIN ORDER, INSORD: 1.7 UNITS/HOUR
INSULIN ORDER, INSORD: 1.8 UNITS/HOUR
INSULIN ORDER, INSORD: 2.6 UNITS/HOUR
INSULIN ORDER, INSORD: 3.7 UNITS/HOUR
INSULIN ORDER, INSORD: 6.4 UNITS/HOUR
INSULIN ORDER, INSORD: 6.6 UNITS/HOUR
LIPASE SERPL-CCNC: 664 U/L (ref 73–393)
LOW TARGET, LOT: 200 MG/DL
LYMPHOCYTES # BLD: 1.2 K/UL (ref 0.8–3.5)
LYMPHOCYTES NFR BLD: 17 % (ref 12–49)
MAGNESIUM SERPL-MCNC: 1.8 MG/DL (ref 1.6–2.4)
MCH RBC QN AUTO: 28.6 PG (ref 26–34)
MCHC RBC AUTO-ENTMCNC: 34.7 G/DL (ref 30–36.5)
MCV RBC AUTO: 82.3 FL (ref 80–99)
MINUTES UNTIL NEXT BG, NBG: 120 MIN
MINUTES UNTIL NEXT BG, NBG: 60 MIN
MONOCYTES # BLD: 0.3 K/UL (ref 0–1)
MONOCYTES NFR BLD: 5 % (ref 5–13)
MULTIPLIER, MUL: 0.01
MULTIPLIER, MUL: 0.02
MULTIPLIER, MUL: 0.03
MULTIPLIER, MUL: 0.04
MULTIPLIER, MUL: 0.04
NEUTS SEG # BLD: 5.3 K/UL (ref 1.8–8)
NEUTS SEG NFR BLD: 77 % (ref 32–75)
ORDER INITIALS, ORDINIT: NORMAL
PHOSPHATE SERPL-MCNC: 3.2 MG/DL (ref 2.6–4.7)
PLATELET # BLD AUTO: 95 K/UL (ref 150–400)
POTASSIUM SERPL-SCNC: 3.9 MMOL/L (ref 3.5–5.1)
PROT SERPL-MCNC: 5.4 G/DL (ref 6.4–8.2)
RBC # BLD AUTO: 4.13 M/UL (ref 3.8–5.2)
SERVICE CMNT-IMP: ABNORMAL
SODIUM SERPL-SCNC: 139 MMOL/L (ref 136–145)
TRIGL SERPL-MCNC: 1723 MG/DL (ref ?–150)
WBC # BLD AUTO: 6.8 K/UL (ref 3.6–11)

## 2017-10-07 PROCEDURE — 74011250636 HC RX REV CODE- 250/636: Performed by: INTERNAL MEDICINE

## 2017-10-07 PROCEDURE — 36415 COLL VENOUS BLD VENIPUNCTURE: CPT | Performed by: INTERNAL MEDICINE

## 2017-10-07 PROCEDURE — 83735 ASSAY OF MAGNESIUM: CPT | Performed by: INTERNAL MEDICINE

## 2017-10-07 PROCEDURE — P9045 ALBUMIN (HUMAN), 5%, 250 ML: HCPCS | Performed by: INTERNAL MEDICINE

## 2017-10-07 PROCEDURE — 74011250637 HC RX REV CODE- 250/637: Performed by: INTERNAL MEDICINE

## 2017-10-07 PROCEDURE — 80053 COMPREHEN METABOLIC PANEL: CPT | Performed by: INTERNAL MEDICINE

## 2017-10-07 PROCEDURE — 84478 ASSAY OF TRIGLYCERIDES: CPT | Performed by: INTERNAL MEDICINE

## 2017-10-07 PROCEDURE — 84100 ASSAY OF PHOSPHORUS: CPT | Performed by: INTERNAL MEDICINE

## 2017-10-07 PROCEDURE — 83036 HEMOGLOBIN GLYCOSYLATED A1C: CPT | Performed by: INTERNAL MEDICINE

## 2017-10-07 PROCEDURE — 36514 APHERESIS PLASMA: CPT

## 2017-10-07 PROCEDURE — 74011636637 HC RX REV CODE- 636/637: Performed by: INTERNAL MEDICINE

## 2017-10-07 PROCEDURE — 87340 HEPATITIS B SURFACE AG IA: CPT | Performed by: INTERNAL MEDICINE

## 2017-10-07 PROCEDURE — 85025 COMPLETE CBC W/AUTO DIFF WBC: CPT | Performed by: INTERNAL MEDICINE

## 2017-10-07 PROCEDURE — 82962 GLUCOSE BLOOD TEST: CPT

## 2017-10-07 PROCEDURE — 74011000258 HC RX REV CODE- 258: Performed by: INTERNAL MEDICINE

## 2017-10-07 PROCEDURE — 77010033678 HC OXYGEN DAILY

## 2017-10-07 PROCEDURE — 74011000250 HC RX REV CODE- 250: Performed by: INTERNAL MEDICINE

## 2017-10-07 PROCEDURE — 65660000000 HC RM CCU STEPDOWN

## 2017-10-07 PROCEDURE — 83690 ASSAY OF LIPASE: CPT | Performed by: INTERNAL MEDICINE

## 2017-10-07 RX ORDER — ALBUMIN HUMAN 50 G/1000ML
3500 SOLUTION INTRAVENOUS ONCE
Status: COMPLETED | OUTPATIENT
Start: 2017-10-07 | End: 2017-10-07

## 2017-10-07 RX ORDER — DIPHENHYDRAMINE HYDROCHLORIDE 50 MG/ML
25 INJECTION, SOLUTION INTRAMUSCULAR; INTRAVENOUS ONCE
Status: COMPLETED | OUTPATIENT
Start: 2017-10-07 | End: 2017-10-07

## 2017-10-07 RX ORDER — CALCIUM GLUCONATE 94 MG/ML
3 INJECTION, SOLUTION INTRAVENOUS ONCE
Status: COMPLETED | OUTPATIENT
Start: 2017-10-07 | End: 2017-10-07

## 2017-10-07 RX ADMIN — SODIUM CHLORIDE 1.8 UNITS/HR: 900 INJECTION, SOLUTION INTRAVENOUS at 13:19

## 2017-10-07 RX ADMIN — DEXTROSE MONOHYDRATE AND SODIUM CHLORIDE 100 ML/HR: 5; .9 INJECTION, SOLUTION INTRAVENOUS at 23:31

## 2017-10-07 RX ADMIN — HYDROMORPHONE HYDROCHLORIDE 1 MG: 2 INJECTION INTRAMUSCULAR; INTRAVENOUS; SUBCUTANEOUS at 04:36

## 2017-10-07 RX ADMIN — ONDANSETRON 4 MG: 2 INJECTION INTRAMUSCULAR; INTRAVENOUS at 13:12

## 2017-10-07 RX ADMIN — CALCIUM GLUCONATE 3 G: 94 INJECTION, SOLUTION INTRAVENOUS at 13:45

## 2017-10-07 RX ADMIN — HYDROCODONE BITARTRATE AND ACETAMINOPHEN 1 TABLET: 5; 325 TABLET ORAL at 09:20

## 2017-10-07 RX ADMIN — Medication 10 ML: at 05:36

## 2017-10-07 RX ADMIN — FAMOTIDINE 20 MG: 10 INJECTION, SOLUTION INTRAVENOUS at 22:00

## 2017-10-07 RX ADMIN — DIPHENHYDRAMINE HYDROCHLORIDE 25 MG: 50 INJECTION, SOLUTION INTRAMUSCULAR; INTRAVENOUS at 13:43

## 2017-10-07 RX ADMIN — FENOFIBRATE 145 MG: 145 TABLET ORAL at 08:29

## 2017-10-07 RX ADMIN — HYDROMORPHONE HYDROCHLORIDE 1 MG: 2 INJECTION INTRAMUSCULAR; INTRAVENOUS; SUBCUTANEOUS at 08:29

## 2017-10-07 RX ADMIN — FAMOTIDINE 20 MG: 10 INJECTION, SOLUTION INTRAVENOUS at 08:29

## 2017-10-07 RX ADMIN — DEXTROSE MONOHYDRATE AND SODIUM CHLORIDE 100 ML/HR: 5; .9 INJECTION, SOLUTION INTRAVENOUS at 12:22

## 2017-10-07 RX ADMIN — ONDANSETRON 4 MG: 2 INJECTION INTRAMUSCULAR; INTRAVENOUS at 23:23

## 2017-10-07 RX ADMIN — HYDROMORPHONE HYDROCHLORIDE 1 MG: 2 INJECTION INTRAMUSCULAR; INTRAVENOUS; SUBCUTANEOUS at 19:01

## 2017-10-07 RX ADMIN — ATORVASTATIN CALCIUM 40 MG: 40 TABLET, FILM COATED ORAL at 22:00

## 2017-10-07 RX ADMIN — HYDROMORPHONE HYDROCHLORIDE 1 MG: 2 INJECTION INTRAMUSCULAR; INTRAVENOUS; SUBCUTANEOUS at 13:11

## 2017-10-07 RX ADMIN — SODIUM CHLORIDE 1.8 UNITS/HR: 900 INJECTION, SOLUTION INTRAVENOUS at 08:14

## 2017-10-07 RX ADMIN — SODIUM CHLORIDE 1.6 UNITS/HR: 900 INJECTION, SOLUTION INTRAVENOUS at 15:25

## 2017-10-07 RX ADMIN — Medication 10 ML: at 22:00

## 2017-10-07 RX ADMIN — SODIUM CHLORIDE 1.4 UNITS/HR: 900 INJECTION, SOLUTION INTRAVENOUS at 06:57

## 2017-10-07 RX ADMIN — SERTRALINE HYDROCHLORIDE 25 MG: 50 TABLET ORAL at 08:29

## 2017-10-07 RX ADMIN — ONDANSETRON 4 MG: 2 INJECTION INTRAMUSCULAR; INTRAVENOUS at 19:02

## 2017-10-07 RX ADMIN — HYDROMORPHONE HYDROCHLORIDE 1 MG: 2 INJECTION INTRAMUSCULAR; INTRAVENOUS; SUBCUTANEOUS at 23:23

## 2017-10-07 RX ADMIN — ONDANSETRON 4 MG: 2 INJECTION INTRAMUSCULAR; INTRAVENOUS at 04:36

## 2017-10-07 RX ADMIN — ALBUMIN (HUMAN) 175 G: 2.5 SOLUTION INTRAVENOUS at 13:44

## 2017-10-07 RX ADMIN — DEXTROSE MONOHYDRATE AND SODIUM CHLORIDE 100 ML/HR: 5; .9 INJECTION, SOLUTION INTRAVENOUS at 03:00

## 2017-10-07 NOTE — PROGRESS NOTES
PCU SHIFT NURSING NOTE      Bedside shift change report given to Sean Srivastava (oncoming nurse) by Andrew Guerra (offgoing nurse). Report included the following information SBAR, Kardex, Intake/Output, MAR and Recent Results. Shift Summary: 0810  Pt in bed resting  VSS  NSR on monitor  C/o pain in abdomen  Continue insulin gtt   May have ice chips per Dr Adam Hopkins    1300  Pt pulled out IV in L arm    1845  Continue to monitor BS, titrate insulin gtt   Admission Date 10/5/2017   Admission Diagnosis Pancreatitis   Consults IP CONSULT TO NEPHROLOGY  IP CONSULT TO GASTROENTEROLOGY  IP CONSULT TO ENDOCRINOLOGY        Consults   []PT   []OT   []Speech   []Case Management      [] Palliative      Cardiac Monitoring Order   []Yes   []No     IV drips   []Yes    Drip:                            Dose:  Drip:                            Dose:  Drip:                            Dose:   []No     GI Prophylaxis   []Yes   []No         DVT Prophylaxis   SCDs:             Romario stockings:         [] Medication   []Contraindicated   []None      Activity Level Activity Level: Bath Room Privileges, Up with Assistance     Activity Assistance: Partial (one person)   Purposeful Rounding every 1-2 hour? []Yes   Mancia Score  Total Score: 2   Bed Alarm (If score 3 or >)   []Yes   [] Refused (See signed refusal form in chart)   Joel Score  Joel Score: 20   Joel Score (if score 14 or less)   []PMT consult   []Wound Care consult      []Specialty bed   [] Nutrition consult          Needs prior to discharge:   Home O2 required:    []Yes   []No    If yes, how much O2 required?     Other:    Last Bowel Movement: Last Bowel Movement Date: 10/06/17      Influenza Vaccine          Pneumonia Vaccine           Diet Active Orders   Diet    DIET NPO      LDAs               Peripheral IV 10/05/17 Left Forearm (Active)   Site Assessment Clean, dry, & intact 10/7/2017  3:10 AM   Phlebitis Assessment 0 10/7/2017  3:10 AM   Infiltration Assessment 0 10/7/2017  3:10 AM Dressing Status Clean, dry, & intact 10/7/2017  3:10 AM   Dressing Type Tape;Transparent 10/7/2017  3:10 AM   Hub Color/Line Status Pink; Infusing 10/7/2017  3:10 AM   Action Taken Open ports on tubing capped 10/7/2017  3:10 AM   Alcohol Cap Used Yes 10/7/2017  3:10 AM       Peripheral IV 10/05/17 Right Antecubital (Active)   Site Assessment Clean, dry, & intact 10/7/2017  3:10 AM   Phlebitis Assessment 0 10/7/2017  3:10 AM   Infiltration Assessment 0 10/7/2017  3:10 AM   Dressing Status Clean, dry, & intact 10/7/2017  3:10 AM   Dressing Type Tape;Transparent 10/7/2017  3:10 AM   Hub Color/Line Status Pink;Capped;Flushed 10/7/2017  3:10 AM   Action Taken Open ports on tubing capped 10/7/2017  3:10 AM   Alcohol Cap Used Yes 10/7/2017  3:10 AM       Peripheral IV 10/07/17 Right Forearm (Active)   Site Assessment Clean, dry, & intact 10/7/2017  3:10 AM   Phlebitis Assessment 0 10/7/2017  3:10 AM   Infiltration Assessment 0 10/7/2017  3:10 AM   Dressing Status Clean, dry, & intact 10/7/2017  3:10 AM   Dressing Type Tape;Transparent 10/7/2017  3:10 AM   Hub Color/Line Status Pink; Infusing 10/7/2017  3:10 AM   Action Taken Open ports on tubing capped 10/7/2017  3:10 AM   Alcohol Cap Used Yes 10/7/2017  3:10 AM        Hemodialysis Catheter 10/06/17 (Active)   Central Line Being Utilized Yes 10/7/2017  3:10 AM   Criteria for Appropriate Use Dialysis/apheresis 10/7/2017  3:10 AM   Date Accessed  10/06/17 10/6/2017 11:24 PM   Site Assessment Clean, dry, & intact 10/7/2017  3:10 AM   Dressing Status Clean, dry, & intact 10/7/2017  3:10 AM   Dressing Type Disk with Chlorhexadine gluconate (CHG); Transparent 10/7/2017  3:10 AM   Proximal Hub Color/Line Status Red;Capped 10/7/2017  3:10 AM   Distal Hub Color/Line Status Blue;Capped 10/7/2017  3:10 AM                  Urinary Catheter      Intake & Output   Date 10/06/17 0700 - 10/07/17 0659 10/07/17 0700 - 10/08/17 0659   Shift 6220-2158 0729-4726 24 Hour Total 8965-0234 6375-3260 24 Hour Total   I  N  T  A  K  E   I.V.  (mL/kg/hr)  2652.1  (1.8) 2652.1  (0.9)         Anticoagulant  162 162         Rinseback Post Procedure  195 195         Calcium Replacement  120 120         Insulin Volume  263.5 263.5         Volume (dextrose 5% and 0.9% NaCl infusion)  1911.7 1911.7       Shift Total  (mL/kg)  2652.1  (22.1) 2652.1  (22.1)      O  U  T  P  U  T   Urine  (mL/kg/hr)            Urine Occurrence(s)  3 x 3 x       Stool            Stool Occurrence(s)  3 x 3 x       Blood  4056 4056         Blood Product Collected  4056 4056       Shift Total  (mL/kg)  4056  (33.7) 4056  (33.7)      NET  -1403.9 -1403.9      Weight (kg) 124.3 120.2 120.2 120.2 120.2 120.2         Readmission Risk Assessment Tool Score Low Risk            4       Total Score        4 Pt. Coverage (Medicare=5 , Medicaid, or Self-Pay=4)        Criteria that do not apply:    Has Seen PCP in Last 6 Months (Yes=3, No=0)    . Living with Significant Other. Assisted Living. LTAC. SNF.  or   Rehab    Patient Length of Stay (>5 days = 3)    IP Visits Last 12 Months (1-3=4, 4=9, >4=11)    Charlson Comorbidity Score (Age + Comorbid Conditions)       Expected Length of Stay 3d 19h   Actual Length of Stay 2

## 2017-10-07 NOTE — PROGRESS NOTES
[facility] Progress Note        NAME: Erin Lynne       :  1980       MRN:  [id number]     Date/Time: 10/7/2017    Risk of deterioration: medium       Assessment:    Plan:  Severe, longstanding hypertriglyceridemia/dyslipidemia  DM without control  Acute pancreatitis  LORRIE  Obesity  Hx of MI  Hx of seizures Triglycerides > 4000 down to 1700 after PLEX #1-lipase improving-#2   PV exchange with albumin today. HOld  and reassess Monday. I don't think she'll need anymore. Her insulin gtt is being weaned-Dr. Georgette Smith would like this to continue through the weekend (see his note)  Repleting mg/phos/k  No metformin for now  LORRIE resolved  Numerous cardiac risk factors. Will fu Monday, call if problems arise on      Subjective:     Feeling better today, nausea improved, abdominal pain persists, but better than on admission    Review of Systems: see above    Objective:     VITALS:   Last 24hrs VS reviewed since prior progress note. Most recent are:  Visit Vitals    /64 (BP 1 Location: Left arm, BP Patient Position: At rest)    Pulse 88    Temp 98.4 °F (36.9 °C)    Resp 16    Ht 5' 8\" (1.727 m)    Wt 120.2 kg (264 lb 15.9 oz)    SpO2 97%    BMI 40.29 kg/m2     SpO2 Readings from Last 6 Encounters:   10/07/17 97%   10/03/17 95%   10/02/17 96%   11 95%   03/10/11 98%   11 97%    O2 Flow Rate (L/min): 2 l/min       Intake/Output Summary (Last 24 hours) at 10/07/17 1035  Last data filed at 10/07/17 0553   Gross per 24 hour   Intake          2652.12 ml   Output             4056 ml   Net         -1403.88 ml        Telemetry Reviewed      PHYSICAL EXAM:    General   WD, morbidly obese WF in NARD  EENT  ncat  Respiratory   Clear anteriorly  Cardiology  RRR  Abdominal  Obese, tender  Extremities  Trace edema  Skin  Normal skin turgor.   No rashes or skin ulcers noted              Lab Data Reviewed: (see below)    Medications Reviewed: (see below)    PMH/SH reviewed - no change compared to H&P  ___________________________________________________    ___________________________________________________    Attending Physician: Leigh Posey MD     ____________________________________________________  MEDICATIONS:  Current Facility-Administered Medications   Medication Dose Route Frequency    albumin human 5% (BUMINATE) solution 175 g  3,500 mL IntraVENous ONCE    calcium gluconate injection 3 g  3 g IntraVENous ONCE    diphenhydrAMINE (BENADRYL) injection 25 mg  25 mg IntraVENous ONCE    dextrose 5% and 0.9% NaCl infusion  100 mL/hr IntraVENous CONTINUOUS    metoclopramide HCl (REGLAN) injection 10 mg  10 mg IntraVENous ONCE PRN    sodium chloride (NS) flush 5-10 mL  5-10 mL IntraVENous Q8H    sodium chloride (NS) flush 5-10 mL  5-10 mL IntraVENous PRN    insulin regular (NOVOLIN R, HUMULIN R) 100 Units in 0.9% sodium chloride 100 mL infusion  0-50 Units/hr IntraVENous TITRATE    insulin lispro (HUMALOG) injection   SubCUTAneous TIDAC    glucose chewable tablet 16 g  4 Tab Oral PRN    dextrose (D50W) injection syrg 12.5-25 g  12.5-25 g IntraVENous PRN    glucagon (GLUCAGEN) injection 1 mg  1 mg IntraMUSCular PRN    ondansetron (ZOFRAN) injection 4 mg  4 mg IntraVENous Q4H PRN    acetaminophen (TYLENOL) tablet 650 mg  650 mg Oral Q4H PRN    bisacodyl (DULCOLAX) tablet 5 mg  5 mg Oral DAILY PRN    HYDROmorphone (DILAUDID) injection 1 mg  1 mg IntraVENous Q4H PRN    HYDROcodone-acetaminophen (NORCO) 5-325 mg per tablet 1 Tab  1 Tab Oral Q6H PRN    atorvastatin (LIPITOR) tablet 40 mg  40 mg Oral QHS    fenofibrate nanocrystallized (TRICOR) tablet 145 mg  145 mg Oral DAILY    sertraline (ZOLOFT) tablet 25 mg  25 mg Oral DAILY    hydrALAZINE (APRESOLINE) 20 mg/mL injection 10 mg  10 mg IntraVENous Q6H PRN    famotidine (PF) (PEPCID) 20 mg in sodium chloride 0.9 % 10 mL injection  20 mg IntraVENous Q12H        LABS:  Recent Labs      10/07/17   0037  10/05/17   2007   WBC  6.8  9.1   HGB 11. 8  UNABLE TO OBTAIN ACCURATE RESULTS DUE TO GROSS LIPEMIA   HCT  34.0*  36.1   PLT  95*  160     Recent Labs      10/07/17   0037  10/06/17   0856  10/06/17   0509  10/06/17   0351   10/05/17   2007   NA  139  125*   --   123*   < >  121*   K  3.9  3.2*   --   3.1*   < >  3.6   CL  108  91*   --   90*   < >  88*   CO2  24  26   --   26   < >  27   BUN  15  10   --   UNABLE TO OBTAIN ACCURATE RESULTS DUE TO GROSS LIPEMIA   < >  11   CREA  0.85  1.50*   --   1.52*   < >  1.50*   GLU  241*  382*   --   412*   < >  563*   CA  7.2*  UNABLE TO OBTAIN ACCURATE RESULTS DUE TO GROSS LIPEMIA   --   UNABLE TO OBTAIN ACCURATE RESULTS DUE TO GROSS LIPEMIA   < >  UNABLE TO OBTAIN ACCURATE RESULTS DUE TO GROSS LIPEMIA   MG  1.8  1.2*  2.5*   --    < >  1.0*   PHOS  3.2  3.3   --    --    --   2.1*    < > = values in this interval not displayed. Recent Labs      10/07/17   0037  10/06/17   0004  10/05/17   1610   SGOT  23   --   UNABLE TO OBTAIN ACCURATE RESULTS DUE TO GROSS LIPEMIA   ALT  27   --   UNABLE TO OBTAIN ACCURATE RESULTS DUE TO GROSS LIPEMIA   AP  32*   --   UNABLE TO OBTAIN ACCURATE RESULTS DUE TO GROSS LIPEMIA   TBILI  0.6   --   UNABLE TO OBTAIN ACCURATE RESULTS DUE TO GROSS LIPEMIA   TP  5.4*   --   UNABLE TO OBTAIN ACCURATE RESULTS DUE TO GROSS LIPEMIA   ALB  3.4*   --   2.0*   GLOB  2.0   --   Cannot be calculated   LPSE  664*  >3000*  >3000*     No results for input(s): INR, PTP, APTT in the last 72 hours. No lab exists for component: INREXT, INREXT   No results for input(s): FE, TIBC, PSAT, FERR in the last 72 hours. No results for input(s): PH, PCO2, PO2 in the last 72 hours. No results for input(s): CPK, CKNDX, TROIQ in the last 72 hours.     No lab exists for component: CPKMB  Lab Results   Component Value Date/Time    Glucose (POC) 244 10/07/2017 10:16 AM    Glucose (POC) 241 10/07/2017 09:16 AM    Glucose (POC) 238 10/07/2017 08:07 AM    Glucose (POC) 195 10/07/2017 06:56 AM    Glucose (POC) 192 10/07/2017 05:35 AM

## 2017-10-07 NOTE — PROGRESS NOTES
Documented on 10/7/17:    Spiritual Care Partner Volunteer visited patient in [facility] on 10/6/17. Documented by:  Ulysses Milroy, M.Div.    Paging Service [phone])

## 2017-10-07 NOTE — PROCEDURES
[facility] Acutes   [phone]   Vitals Pre Post Assessment Pre Post   /67 115/66 LOC A&Ox4 A&Ox4   HR 86 80 Lungs Clear&bronchovescicularx5, even&unlaboured. Clear&broncho  vescicularx5, even&unlabour  ed. Temp 98.7 98.6 Cardiac Reg rate&NSR Reg rate&NSR   Resp 18 16 Skin Warm & dry Warm & dry   Weight 274lbs 274lbs Edema None noted None noted   Height 5' 8\" 5' 8\" Pain denies denies     Plasmapheresis Orders   Product: 5% Albumin IV                                            Volume: 3.5L                                               Duration: min Start: 1326 End: 1435 Total: 90min     Fluids    Volume Processed: 7838ml   Plasma Removed: 4000ml   Replacement Fluid: 3351ml   Citrate: 135ml   NS: 100ml (solvent for Calcium Gluconate)     Access   Type & Location: RIJ catheter   Comments: Exhibits good flows upon aspiration; new dressing remains dry and intact and s drainage nor inflammation noted around biopatch. Post-tx all possible blood returned via full rinceback. Both catheter ports flushed and indwelled c 0.9% NS and capped and taped. Meds Given   Name Dose Route   Benadryl 25 IV   Calcium Gluconate 3grams administered slow IVPB over tx course s difficulties               Labs   Obtained/Reviewed  Critical Results Called CBC, plts, retic count, triglycerides  MD aware     Comments:  Pre-tx consents verified/time-out performed. Tx progressed well and without incident; writer left pts room c pt in no new acute distress and denying complaints c stable vss WNL, bed in lowest position c side rails upx3, wheels lockedx4, and call bell within reach. Reported off to Colleen Trejo. Afia Lombardo RN.

## 2017-10-07 NOTE — PROGRESS NOTES
1900- Bedside shift change report given to YVONNE Black RN (oncoming nurse) by John Power RN (offgoing nurse). Report included the following information SBAR, Kardex, Procedure Summary, Intake/Output, MAR, Recent Results, Med Rec Status and Cardiac Rhythm NSR.

## 2017-10-07 NOTE — PROGRESS NOTES
1900- Bedside shift change report given to YVONNE Black RN (oncoming nurse) by Andreina Cisneros RN (offgoing nurse). Report included the following information SBAR, Kardex, Procedure Summary, Intake/Output, MAR, Recent Results, Med Rec Status and Cardiac Rhythm NSR     1930- plasmapheresis completed, pt assessed and resting comfortably, insulin gtt infusing with BG driven by glucostabilizer, will continue to monitor.

## 2017-10-07 NOTE — PROGRESS NOTES
GI PROGRESS NOTE    NAME:             Rebeca Sam   :              1980   MRN:              [id number]   Admit Date:     10/5/2017  Todays Date:  10/7/2017      Subjective:           Less abdominal pain. Plasmapheresis yesterday.     Medications-reviewed     Current Facility-Administered Medications   Medication Dose Route Frequency    dextrose 5% and 0.9% NaCl infusion  100 mL/hr IntraVENous CONTINUOUS    metoclopramide HCl (REGLAN) injection 10 mg  10 mg IntraVENous ONCE PRN    sodium chloride (NS) flush 5-10 mL  5-10 mL IntraVENous Q8H    sodium chloride (NS) flush 5-10 mL  5-10 mL IntraVENous PRN    insulin regular (NOVOLIN R, HUMULIN R) 100 Units in 0.9% sodium chloride 100 mL infusion  0-50 Units/hr IntraVENous TITRATE    insulin lispro (HUMALOG) injection   SubCUTAneous TIDAC    glucose chewable tablet 16 g  4 Tab Oral PRN    dextrose (D50W) injection syrg 12.5-25 g  12.5-25 g IntraVENous PRN    glucagon (GLUCAGEN) injection 1 mg  1 mg IntraMUSCular PRN    ondansetron (ZOFRAN) injection 4 mg  4 mg IntraVENous Q4H PRN    acetaminophen (TYLENOL) tablet 650 mg  650 mg Oral Q4H PRN    bisacodyl (DULCOLAX) tablet 5 mg  5 mg Oral DAILY PRN    HYDROmorphone (DILAUDID) injection 1 mg  1 mg IntraVENous Q4H PRN    HYDROcodone-acetaminophen (NORCO) 5-325 mg per tablet 1 Tab  1 Tab Oral Q6H PRN    atorvastatin (LIPITOR) tablet 40 mg  40 mg Oral QHS    fenofibrate nanocrystallized (TRICOR) tablet 145 mg  145 mg Oral DAILY    sertraline (ZOLOFT) tablet 25 mg  25 mg Oral DAILY    hydrALAZINE (APRESOLINE) 20 mg/mL injection 10 mg  10 mg IntraVENous Q6H PRN    famotidine (PF) (PEPCID) 20 mg in sodium chloride 0.9 % 10 mL injection  20 mg IntraVENous Q12H        Objective:   Patient Vitals for the past 8 hrs:   BP Temp Pulse Resp SpO2   10/07/17 0811 117/64 98.4 °F (36.9 °C) 88 16 97 %   10/07/17 0310 117/69 99 °F (37.2 °C) 86 18 94 %        10/05 1901 - 10/07 0700  In: 2652.1 [I.V.:2652.1]  Out: 4056     EXAM:     NEURO-a&o   HEENT-wnl   LUNGS-clear   COR-regular rate and rhythym   ABD-firm, no tenderness,        LABS:  Recent Labs      10/07/17   0037  10/05/17   2007   WBC  6.8  9.1   HGB  11.8  UNABLE TO OBTAIN ACCURATE RESULTS DUE TO GROSS LIPEMIA   HCT  34.0*  36.1   PLT  95*  160     Recent Labs      10/07/17   0037  10/06/17   0856  10/06/17   0509  10/06/17   0351   10/05/17   2007   NA  139  125*   --   123*   < >  121*   K  3.9  3.2*   --   3.1*   < >  3.6   CL  108  91*   --   90*   < >  88*   CO2  24  26   --   26   < >  27   BUN  15  10   --   UNABLE TO OBTAIN ACCURATE RESULTS DUE TO GROSS LIPEMIA   < >  11   CREA  0.85  1.50*   --   1.52*   < >  1.50*   GLU  241*  382*   --   412*   < >  563*   CA  7.2*  UNABLE TO OBTAIN ACCURATE RESULTS DUE TO GROSS LIPEMIA   --   UNABLE TO OBTAIN ACCURATE RESULTS DUE TO GROSS LIPEMIA   < >  UNABLE TO OBTAIN ACCURATE RESULTS DUE TO GROSS LIPEMIA   MG  1.8  1.2*  2.5*   --    < >  1.0*   PHOS  3.2  3.3   --    --    --   2.1*    < > = values in this interval not displayed. Recent Labs      10/07/17   0037  10/06/17   0004  10/05/17   1610   SGOT  23   --   UNABLE TO OBTAIN ACCURATE RESULTS DUE TO GROSS LIPEMIA   AP  32*   --   UNABLE TO OBTAIN ACCURATE RESULTS DUE TO GROSS LIPEMIA   TBILI  0.6   --   UNABLE TO OBTAIN ACCURATE RESULTS DUE TO GROSS LIPEMIA   TP  5.4*   --   UNABLE TO OBTAIN ACCURATE RESULTS DUE TO GROSS LIPEMIA   ALB  3.4*   --   2.0*   GLOB  2.0   --   Cannot be calculated   LPSE  664*  >3000*  >3000*   ALT  27   --   UNABLE TO OBTAIN ACCURATE RESULTS DUE TO GROSS LIPEMIA     No results for input(s): INR, PTP, APTT in the last 72 hours. No lab exists for component: INREXT   No results for input(s): FE, TIBC, PSAT, FERR in the last 72 hours. No results found for: FOL, RBCF                         Assessment:   1. Acute pancreatitis due to 2. Lipase down to 664  2.  Hypertriglyceridemia         Active Problems:    Pancreatitis (10/5/2017)        Plan:   1. Ice chips fine  2.  Follow lipase

## 2017-10-07 NOTE — PROGRESS NOTES
Spiritual Care Partner Volunteer visited patient in [facility] on 10/7/17. Documented by:  Charles Mello M.Div.    Paging Service [phone])

## 2017-10-08 LAB
ADMINISTERED INITIALS, ADMINIT: NORMAL
ALBUMIN SERPL-MCNC: 3.4 G/DL (ref 3.5–5)
ALBUMIN/GLOB SERPL: 2 {RATIO} (ref 1.1–2.2)
ALP SERPL-CCNC: 28 U/L (ref 45–117)
ALT SERPL-CCNC: 12 U/L (ref 12–78)
ANION GAP SERPL CALC-SCNC: 6 MMOL/L (ref 5–15)
AST SERPL-CCNC: 9 U/L (ref 15–37)
BASOPHILS # BLD: 0 K/UL (ref 0–0.1)
BASOPHILS NFR BLD: 0 % (ref 0–1)
BILIRUB SERPL-MCNC: 0.3 MG/DL (ref 0.2–1)
BUN SERPL-MCNC: 10 MG/DL (ref 6–20)
BUN/CREAT SERPL: 16 (ref 12–20)
CALCIUM SERPL-MCNC: 6.8 MG/DL (ref 8.5–10.1)
CHLORIDE SERPL-SCNC: 112 MMOL/L (ref 97–108)
CO2 SERPL-SCNC: 22 MMOL/L (ref 21–32)
CREAT SERPL-MCNC: 0.64 MG/DL (ref 0.55–1.02)
D50 ADMINISTERED, D50ADM: 0 ML
D50 ORDER, D50ORD: 0 ML
EOSINOPHIL # BLD: 0.1 K/UL (ref 0–0.4)
EOSINOPHIL NFR BLD: 1 % (ref 0–7)
ERYTHROCYTE [DISTWIDTH] IN BLOOD BY AUTOMATED COUNT: 16 % (ref 11.5–14.5)
GLOBULIN SER CALC-MCNC: 1.7 G/DL (ref 2–4)
GLSCOM COMMENTS: NORMAL
GLUCOSE BLD STRIP.AUTO-MCNC: 210 MG/DL (ref 65–100)
GLUCOSE BLD STRIP.AUTO-MCNC: 216 MG/DL (ref 65–100)
GLUCOSE BLD STRIP.AUTO-MCNC: 223 MG/DL (ref 65–100)
GLUCOSE BLD STRIP.AUTO-MCNC: 224 MG/DL (ref 65–100)
GLUCOSE BLD STRIP.AUTO-MCNC: 229 MG/DL (ref 65–100)
GLUCOSE BLD STRIP.AUTO-MCNC: 248 MG/DL (ref 65–100)
GLUCOSE BLD STRIP.AUTO-MCNC: 257 MG/DL (ref 65–100)
GLUCOSE BLD STRIP.AUTO-MCNC: 261 MG/DL (ref 65–100)
GLUCOSE BLD STRIP.AUTO-MCNC: 289 MG/DL (ref 65–100)
GLUCOSE SERPL-MCNC: 224 MG/DL (ref 65–100)
GLUCOSE, GLC: 223 MG/DL
GLUCOSE, GLC: 224 MG/DL
GLUCOSE, GLC: 229 MG/DL
GLUCOSE, GLC: 248 MG/DL
GLUCOSE, GLC: 257 MG/DL
GLUCOSE, GLC: 261 MG/DL
HCT VFR BLD AUTO: 31.5 % (ref 35–47)
HGB BLD-MCNC: 10.3 G/DL (ref 11.5–16)
HIGH TARGET, HITG: 300 MG/DL
INSULIN ADMINSTERED, INSADM: 1.6 UNITS/HOUR
INSULIN ADMINSTERED, INSADM: 1.6 UNITS/HOUR
INSULIN ADMINSTERED, INSADM: 1.7 UNITS/HOUR
INSULIN ADMINSTERED, INSADM: 1.9 UNITS/HOUR
INSULIN ADMINSTERED, INSADM: 2 UNITS/HOUR
INSULIN ADMINSTERED, INSADM: 2 UNITS/HOUR
INSULIN ORDER, INSORD: 1.6 UNITS/HOUR
INSULIN ORDER, INSORD: 1.6 UNITS/HOUR
INSULIN ORDER, INSORD: 1.7 UNITS/HOUR
INSULIN ORDER, INSORD: 1.9 UNITS/HOUR
INSULIN ORDER, INSORD: 2 UNITS/HOUR
INSULIN ORDER, INSORD: 2 UNITS/HOUR
LIPASE SERPL-CCNC: 176 U/L (ref 73–393)
LOW TARGET, LOT: 200 MG/DL
LYMPHOCYTES # BLD: 1.2 K/UL (ref 0.8–3.5)
LYMPHOCYTES NFR BLD: 24 % (ref 12–49)
MAGNESIUM SERPL-MCNC: 1.8 MG/DL (ref 1.6–2.4)
MCH RBC QN AUTO: 27.5 PG (ref 26–34)
MCHC RBC AUTO-ENTMCNC: 32.7 G/DL (ref 30–36.5)
MCV RBC AUTO: 84.2 FL (ref 80–99)
MINUTES UNTIL NEXT BG, NBG: 120 MIN
MINUTES UNTIL NEXT BG, NBG: 60 MIN
MONOCYTES # BLD: 0.3 K/UL (ref 0–1)
MONOCYTES NFR BLD: 6 % (ref 5–13)
MULTIPLIER, MUL: 0.01
NEUTS SEG # BLD: 3.4 K/UL (ref 1.8–8)
NEUTS SEG NFR BLD: 69 % (ref 32–75)
ORDER INITIALS, ORDINIT: NORMAL
PLATELET # BLD AUTO: 75 K/UL (ref 150–400)
POTASSIUM SERPL-SCNC: 3.6 MMOL/L (ref 3.5–5.1)
PROT SERPL-MCNC: 5.1 G/DL (ref 6.4–8.2)
RBC # BLD AUTO: 3.74 M/UL (ref 3.8–5.2)
SERVICE CMNT-IMP: ABNORMAL
SODIUM SERPL-SCNC: 140 MMOL/L (ref 136–145)
TRIGL SERPL-MCNC: 626 MG/DL (ref ?–150)
WBC # BLD AUTO: 4.9 K/UL (ref 3.6–11)

## 2017-10-08 PROCEDURE — 74011636637 HC RX REV CODE- 636/637: Performed by: INTERNAL MEDICINE

## 2017-10-08 PROCEDURE — 83735 ASSAY OF MAGNESIUM: CPT | Performed by: INTERNAL MEDICINE

## 2017-10-08 PROCEDURE — 84478 ASSAY OF TRIGLYCERIDES: CPT | Performed by: INTERNAL MEDICINE

## 2017-10-08 PROCEDURE — 65660000000 HC RM CCU STEPDOWN

## 2017-10-08 PROCEDURE — 74011250636 HC RX REV CODE- 250/636: Performed by: INTERNAL MEDICINE

## 2017-10-08 PROCEDURE — 85025 COMPLETE CBC W/AUTO DIFF WBC: CPT | Performed by: INTERNAL MEDICINE

## 2017-10-08 PROCEDURE — 36415 COLL VENOUS BLD VENIPUNCTURE: CPT | Performed by: INTERNAL MEDICINE

## 2017-10-08 PROCEDURE — 74011250637 HC RX REV CODE- 250/637: Performed by: INTERNAL MEDICINE

## 2017-10-08 PROCEDURE — 82962 GLUCOSE BLOOD TEST: CPT

## 2017-10-08 PROCEDURE — 83690 ASSAY OF LIPASE: CPT | Performed by: INTERNAL MEDICINE

## 2017-10-08 PROCEDURE — 80053 COMPREHEN METABOLIC PANEL: CPT | Performed by: INTERNAL MEDICINE

## 2017-10-08 PROCEDURE — 74011000250 HC RX REV CODE- 250: Performed by: INTERNAL MEDICINE

## 2017-10-08 RX ORDER — MAGNESIUM SULFATE 100 %
4 CRYSTALS MISCELLANEOUS AS NEEDED
Status: DISCONTINUED | OUTPATIENT
Start: 2017-10-08 | End: 2017-10-10 | Stop reason: HOSPADM

## 2017-10-08 RX ORDER — DEXTROSE 50 % IN WATER (D50W) INTRAVENOUS SYRINGE
12.5-25 AS NEEDED
Status: DISCONTINUED | OUTPATIENT
Start: 2017-10-08 | End: 2017-10-10 | Stop reason: HOSPADM

## 2017-10-08 RX ORDER — SODIUM CHLORIDE 9 MG/ML
50 INJECTION, SOLUTION INTRAVENOUS CONTINUOUS
Status: DISCONTINUED | OUTPATIENT
Start: 2017-10-08 | End: 2017-10-10 | Stop reason: HOSPADM

## 2017-10-08 RX ORDER — FOLIC ACID 1 MG/1
1 TABLET ORAL DAILY
Status: DISCONTINUED | OUTPATIENT
Start: 2017-10-08 | End: 2017-10-10 | Stop reason: HOSPADM

## 2017-10-08 RX ORDER — INSULIN LISPRO 100 [IU]/ML
INJECTION, SOLUTION INTRAVENOUS; SUBCUTANEOUS
Status: DISCONTINUED | OUTPATIENT
Start: 2017-10-08 | End: 2017-10-09

## 2017-10-08 RX ORDER — INSULIN GLARGINE 100 [IU]/ML
40 INJECTION, SOLUTION SUBCUTANEOUS DAILY
Status: DISCONTINUED | OUTPATIENT
Start: 2017-10-08 | End: 2017-10-09

## 2017-10-08 RX ADMIN — HYDROMORPHONE HYDROCHLORIDE 1 MG: 2 INJECTION INTRAMUSCULAR; INTRAVENOUS; SUBCUTANEOUS at 14:42

## 2017-10-08 RX ADMIN — SODIUM CHLORIDE 50 ML/HR: 900 INJECTION, SOLUTION INTRAVENOUS at 09:38

## 2017-10-08 RX ADMIN — ONDANSETRON 4 MG: 2 INJECTION INTRAMUSCULAR; INTRAVENOUS at 23:20

## 2017-10-08 RX ADMIN — FAMOTIDINE 20 MG: 10 INJECTION, SOLUTION INTRAVENOUS at 20:46

## 2017-10-08 RX ADMIN — ATORVASTATIN CALCIUM 40 MG: 40 TABLET, FILM COATED ORAL at 21:01

## 2017-10-08 RX ADMIN — HYDROMORPHONE HYDROCHLORIDE 1 MG: 2 INJECTION INTRAMUSCULAR; INTRAVENOUS; SUBCUTANEOUS at 08:29

## 2017-10-08 RX ADMIN — HYDROMORPHONE HYDROCHLORIDE 1 MG: 2 INJECTION INTRAMUSCULAR; INTRAVENOUS; SUBCUTANEOUS at 22:26

## 2017-10-08 RX ADMIN — INSULIN LISPRO 2 UNITS: 100 INJECTION, SOLUTION INTRAVENOUS; SUBCUTANEOUS at 22:25

## 2017-10-08 RX ADMIN — INSULIN LISPRO 5 UNITS: 100 INJECTION, SOLUTION INTRAVENOUS; SUBCUTANEOUS at 12:46

## 2017-10-08 RX ADMIN — FENOFIBRATE 145 MG: 145 TABLET ORAL at 08:28

## 2017-10-08 RX ADMIN — FAMOTIDINE 20 MG: 10 INJECTION, SOLUTION INTRAVENOUS at 08:29

## 2017-10-08 RX ADMIN — INSULIN LISPRO 3 UNITS: 100 INJECTION, SOLUTION INTRAVENOUS; SUBCUTANEOUS at 16:58

## 2017-10-08 RX ADMIN — INSULIN GLARGINE 40 UNITS: 100 INJECTION, SOLUTION SUBCUTANEOUS at 08:37

## 2017-10-08 RX ADMIN — Medication 10 ML: at 16:59

## 2017-10-08 RX ADMIN — ACETAMINOPHEN 650 MG: 325 TABLET ORAL at 16:58

## 2017-10-08 RX ADMIN — FOLIC ACID 1 MG: 1 TABLET ORAL at 08:28

## 2017-10-08 RX ADMIN — Medication 10 ML: at 06:03

## 2017-10-08 RX ADMIN — Medication 10 ML: at 21:00

## 2017-10-08 RX ADMIN — SERTRALINE HYDROCHLORIDE 25 MG: 50 TABLET ORAL at 08:28

## 2017-10-08 NOTE — PROGRESS NOTES
Hospitalist Progress Note    NAME: Norbert Flynn   :  1980   MRN:  [id number]       Assessment / Plan:  DKA: DKA is resolved, gap has closed,HbA1C 10.9, D/c insulin drip, start clear liquids, start Lantus and SSI. Acute Pancreatitis: due to hypertriglyceridemia,  give symptomatic treatment. Monitor Lipase so far trending down, start clear liquids today, has a CBD that is dilated, will check MRCP tomorrow, keep NPO after MN. Hypertriglyceridemia:  Nephrology help appreciated got Plasma Exchange x2 days in a row, monitor triglycerides so far trending down. Patient also has a CBD dilated,  GI input appreciated, will check MRCP tomorrow  Hyponatremia: so far resolved, monitor. ?Seizure disorder  not on meds, monitor. Acute kidney injury: creatinine so far trending down, monitor, c/w IVF. So far resolved  Depression  C/w zoloft  HTN hold nephrotoxic meds, c/w hydralazine prn and pain control with IV dilaudid  Thrombocytopenia: will start Folic Acid, D/C  Heparin  Morbid obesity Body mass index is 41.66 kg/(m^2). Surrogate Decision Maker: pt's mother in law  Code status: Full  Prophylaxis: SCD  Recommended Disposition: Home w/Family     Subjective:     Chief Complaint / Reason for Physician Visit  \"I feel better, but my belly still hurts\". Discussed with RN events overnight. Review of Systems:  Symptom Y/N Comments  Symptom Y/N Comments   Fever/Chills    Chest Pain     Poor Appetite    Edema     Cough    Abdominal Pain y    Sputum    Joint Pain     SOB/VIRK    Pruritis/Rash     Nausea/vomit    Tolerating PT/OT     Diarrhea    Tolerating Diet n    Constipation    Other       Could NOT obtain due to:      Objective:     VITALS:   Last 24hrs VS reviewed since prior progress note.  Most recent are:  Patient Vitals for the past 24 hrs:   Temp Pulse Resp BP SpO2   10/08/17 0354 99 °F (37.2 °C) 81 20 102/61 97 %   10/07/17 2316 98.8 °F (37.1 °C) 83 16 106/65 98 %   10/07/17 1928 98.6 °F (37 °C) 80 16 104/69 98 %   10/07/17 1527 98.2 °F (36.8 °C) 78 18 109/59 95 %   10/07/17 1435 98.6 °F (37 °C) 80 16 115/66 100 %   10/07/17 1430 98.5 °F (36.9 °C) 87 18 103/68 100 %   10/07/17 1415 98.5 °F (36.9 °C) 80 16 116/68 100 %   10/07/17 1400 98.6 °F (37 °C) 83 16 111/60 100 %   10/07/17 1345 98.6 °F (37 °C) 85 18 110/65 99 %   10/07/17 1330 98.7 °F (37.1 °C) 84 18 115/67 99 %   10/07/17 1326 98.7 °F (37.1 °C) 86 18 111/67 98 %   10/07/17 1124 98.6 °F (37 °C) 80 16 95/65 98 %       Intake/Output Summary (Last 24 hours) at 10/08/17 0828  Last data filed at 10/08/17 0354   Gross per 24 hour   Intake          2670.73 ml   Output             4000 ml   Net         -1329.27 ml        PHYSICAL EXAM:  General: WD, WN. Alert, cooperative, no acute distress    EENT:  EOMI. Anicteric sclerae. MMM  Resp:  Coarse BS  CV:  Regular  rhythm,  No edema  GI:  Soft, Non distended, epi-meso gastrium  tender.  +Bowel sounds  Neurologic:  Alert and oriented X 3, normal speech,   Psych:   Good insight. Not anxious nor agitated  Skin:  No rashes. No jaundice    Reviewed most current lab test results and cultures  YES  Reviewed most current radiology test results   YES  Review and summation of old records today    NO  Reviewed patient's current orders and MAR    YES  PMH/SH reviewed - no change compared to H&P  ________________________________________________________________________  Care Plan discussed with:    Comments   Patient y    Family      RN y    Care Manager     Consultant  y Renal                     Multidiciplinary team rounds were held today with , nursing, pharmacist and clinical coordinator. Patient's plan of care was discussed; medications were reviewed and discharge planning was addressed.      ________________________________________________________________________  Total NON critical care TIME:  30  Minutes    Total CRITICAL CARE TIME Spent:   Minutes non procedure based      Comments   >50% of visit spent in counseling and coordination of care y    ________________________________________________________________________  Ralph Parrish MD     Procedures: see electronic medical records for all procedures/Xrays and details which were not copied into this note but were reviewed prior to creation of Plan. LABS:  I reviewed today's most current labs and imaging studies. Pertinent labs include:  Recent Labs      10/08/17   0343  10/07/17   0037  10/05/17   2007   WBC  4.9  6.8  9.1   HGB  10.3*  11.8  UNABLE TO OBTAIN ACCURATE RESULTS DUE TO GROSS LIPEMIA   HCT  31.5*  34.0*  36.1   PLT  75*  95*  160     Recent Labs      10/08/17   0343  10/07/17   0037  10/06/17   0856   10/05/17   2007  10/05/17   1610   NA  140  139  125*   < >  121*  UNABLE TO OBTAIN ACCURATE RESULTS DUE TO GROSS LIPEMIA   K  3.6  3.9  3.2*   < >  3.6  3.5   CL  112*  108  91*   < >  88*  83*   CO2  22  24  26   < >  27  29   GLU  224*  241*  382*   < >  563*  639*   BUN  10  15  10   < >  11  9   CREA  0.64  0.85  1.50*   < >  1.50*  1.81*   CA  6.8*  7.2*  UNABLE TO OBTAIN ACCURATE RESULTS DUE TO GROSS LIPEMIA   < >  UNABLE TO OBTAIN ACCURATE RESULTS DUE TO GROSS LIPEMIA  UNABLE TO OBTAIN ACCURATE RESULTS DUE TO GROSS LIPEMIA   MG  1.8  1.8  1.2*   < >  1.0*   --    PHOS   --   3.2  3.3   --   2.1*   --    ALB  3.4*  3.4*   --    --    --   2.0*   TBILI  0.3  0.6   --    --    --   UNABLE TO OBTAIN ACCURATE RESULTS DUE TO GROSS LIPEMIA   SGOT  9*  23   --    --    --   UNABLE TO OBTAIN ACCURATE RESULTS DUE TO GROSS LIPEMIA   ALT  12  27   --    --    --   UNABLE TO OBTAIN ACCURATE RESULTS DUE TO GROSS LIPEMIA    < > = values in this interval not displayed.        Signed: Ralph Parrish MD

## 2017-10-08 NOTE — PROGRESS NOTES
GI PROGRESS NOTE    NAME:             Martin Trinidad   :              1980   MRN:              [id number]   Admit Date:     10/5/2017  Todays Date:  10/8/2017      Subjective:           Less abdominal pain. Just starting clear liquids.     Medications-reviewed     Current Facility-Administered Medications   Medication Dose Route Frequency    insulin glargine (LANTUS) injection 40 Units  40 Units SubCUTAneous DAILY    insulin lispro (HUMALOG) injection   SubCUTAneous AC&HS    glucose chewable tablet 16 g  4 Tab Oral PRN    dextrose (D50W) injection syrg 12.5-25 g  12.5-25 g IntraVENous PRN    glucagon (GLUCAGEN) injection 1 mg  1 mg IntraMUSCular PRN    0.9% sodium chloride infusion  50 mL/hr IntraVENous CONTINUOUS    folic acid (FOLVITE) tablet 1 mg  1 mg Oral DAILY    metoclopramide HCl (REGLAN) injection 10 mg  10 mg IntraVENous ONCE PRN    sodium chloride (NS) flush 5-10 mL  5-10 mL IntraVENous Q8H    sodium chloride (NS) flush 5-10 mL  5-10 mL IntraVENous PRN    glucose chewable tablet 16 g  4 Tab Oral PRN    dextrose (D50W) injection syrg 12.5-25 g  12.5-25 g IntraVENous PRN    glucagon (GLUCAGEN) injection 1 mg  1 mg IntraMUSCular PRN    ondansetron (ZOFRAN) injection 4 mg  4 mg IntraVENous Q4H PRN    acetaminophen (TYLENOL) tablet 650 mg  650 mg Oral Q4H PRN    bisacodyl (DULCOLAX) tablet 5 mg  5 mg Oral DAILY PRN    HYDROmorphone (DILAUDID) injection 1 mg  1 mg IntraVENous Q4H PRN    HYDROcodone-acetaminophen (NORCO) 5-325 mg per tablet 1 Tab  1 Tab Oral Q6H PRN    atorvastatin (LIPITOR) tablet 40 mg  40 mg Oral QHS    fenofibrate nanocrystallized (TRICOR) tablet 145 mg  145 mg Oral DAILY    sertraline (ZOLOFT) tablet 25 mg  25 mg Oral DAILY    hydrALAZINE (APRESOLINE) 20 mg/mL injection 10 mg  10 mg IntraVENous Q6H PRN    famotidine (PF) (PEPCID) 20 mg in sodium chloride 0.9 % 10 mL injection  20 mg IntraVENous Q12H        Objective:     Patient Vitals for the past 8 hrs:   BP Temp Pulse Resp SpO2   10/08/17 0828 130/87 98.9 °F (37.2 °C) 88 18 100 %   10/08/17 0354 102/61 99 °F (37.2 °C) 81 20 97 %        10/06 1901 - 10/08 0700  In: 5322.9 [I.V.:5322.9]  Out: 8056     EXAM:     NEURO-a&o   HEENT-wnl   LUNGS-clear   COR-regular rate and rhythym   ABD-firm, no tenderness,        LABS:  Recent Labs      10/08/17   0343  10/07/17   0037   WBC  4.9  6.8   HGB  10.3*  11.8   HCT  31.5*  34.0*   PLT  75*  95*     Recent Labs      10/08/17   0343  10/07/17   0037  10/06/17   0856   10/05/17   2007   NA  140  139  125*   < >  121*   K  3.6  3.9  3.2*   < >  3.6   CL  112*  108  91*   < >  88*   CO2  22  24  26   < >  27   BUN  10  15  10   < >  11   CREA  0.64  0.85  1.50*   < >  1.50*   GLU  224*  241*  382*   < >  563*   CA  6.8*  7.2*  UNABLE TO OBTAIN ACCURATE RESULTS DUE TO GROSS LIPEMIA   < >  UNABLE TO OBTAIN ACCURATE RESULTS DUE TO GROSS LIPEMIA   MG  1.8  1.8  1.2*   < >  1.0*   PHOS   --   3.2  3.3   --   2.1*    < > = values in this interval not displayed. Recent Labs      10/08/17   0343  10/07/17   0037  10/06/17   0004  10/05/17   1610   SGOT  9*  23   --   UNABLE TO OBTAIN ACCURATE RESULTS DUE TO GROSS LIPEMIA   AP  28*  32*   --   UNABLE TO OBTAIN ACCURATE RESULTS DUE TO GROSS LIPEMIA   TBILI  0.3  0.6   --   UNABLE TO OBTAIN ACCURATE RESULTS DUE TO GROSS LIPEMIA   TP  5.1*  5.4*   --   UNABLE TO OBTAIN ACCURATE RESULTS DUE TO GROSS LIPEMIA   ALB  3.4*  3.4*   --   2.0*   GLOB  1.7*  2.0   --   Cannot be calculated   LPSE  176  664*  >3000*  >3000*   ALT  12  27   --   UNABLE TO OBTAIN ACCURATE RESULTS DUE TO GROSS LIPEMIA                            Assessment:   1. Acute pancreatitis due to 2. Slowly improving. Lipase 176 today. 2. Hypertriglyceridemia  3. Dilated cbd to 1.2 cm, but she is post cholecystectomy so this is likely normal         Active Problems:    Pancreatitis (10/5/2017)        Plan:   1. Clear liquids  2. Follow lipase  3.  MRCP to be obtained, but hope no cbd stone

## 2017-10-09 ENCOUNTER — APPOINTMENT (OUTPATIENT)
Dept: MRI IMAGING | Age: 37
DRG: 950 | End: 2017-10-09
Attending: INTERNAL MEDICINE
Payer: MEDICAID

## 2017-10-09 LAB
ALBUMIN SERPL-MCNC: 3.2 G/DL (ref 3.5–5)
ALBUMIN/GLOB SERPL: 1.5 {RATIO} (ref 1.1–2.2)
ALP SERPL-CCNC: 35 U/L (ref 45–117)
ALT SERPL-CCNC: 17 U/L (ref 12–78)
ANION GAP SERPL CALC-SCNC: 6 MMOL/L (ref 5–15)
AST SERPL-CCNC: 11 U/L (ref 15–37)
BASOPHILS # BLD: 0 K/UL (ref 0–0.1)
BASOPHILS NFR BLD: 0 % (ref 0–1)
BILIRUB SERPL-MCNC: 0.3 MG/DL (ref 0.2–1)
BUN SERPL-MCNC: 6 MG/DL (ref 6–20)
BUN/CREAT SERPL: 10 (ref 12–20)
CALCIUM SERPL-MCNC: 7 MG/DL (ref 8.5–10.1)
CHLORIDE SERPL-SCNC: 109 MMOL/L (ref 97–108)
CO2 SERPL-SCNC: 23 MMOL/L (ref 21–32)
CREAT SERPL-MCNC: 0.58 MG/DL (ref 0.55–1.02)
EOSINOPHIL # BLD: 0.1 K/UL (ref 0–0.4)
EOSINOPHIL NFR BLD: 2 % (ref 0–7)
ERYTHROCYTE [DISTWIDTH] IN BLOOD BY AUTOMATED COUNT: 15.7 % (ref 11.5–14.5)
GLOBULIN SER CALC-MCNC: 2.2 G/DL (ref 2–4)
GLUCOSE BLD STRIP.AUTO-MCNC: 129 MG/DL (ref 65–100)
GLUCOSE BLD STRIP.AUTO-MCNC: 204 MG/DL (ref 65–100)
GLUCOSE BLD STRIP.AUTO-MCNC: 206 MG/DL (ref 65–100)
GLUCOSE BLD STRIP.AUTO-MCNC: 220 MG/DL (ref 65–100)
GLUCOSE SERPL-MCNC: 195 MG/DL (ref 65–100)
HCT VFR BLD AUTO: 30.5 % (ref 35–47)
HGB BLD-MCNC: 9.9 G/DL (ref 11.5–16)
LIPASE SERPL-CCNC: 166 U/L (ref 73–393)
LYMPHOCYTES # BLD: 1.3 K/UL (ref 0.8–3.5)
LYMPHOCYTES NFR BLD: 32 % (ref 12–49)
MAGNESIUM SERPL-MCNC: 1.8 MG/DL (ref 1.6–2.4)
MCH RBC QN AUTO: 27.4 PG (ref 26–34)
MCHC RBC AUTO-ENTMCNC: 32.5 G/DL (ref 30–36.5)
MCV RBC AUTO: 84.5 FL (ref 80–99)
MONOCYTES # BLD: 0.3 K/UL (ref 0–1)
MONOCYTES NFR BLD: 7 % (ref 5–13)
NEUTS SEG # BLD: 2.4 K/UL (ref 1.8–8)
NEUTS SEG NFR BLD: 59 % (ref 32–75)
PLATELET # BLD AUTO: 72 K/UL (ref 150–400)
POTASSIUM SERPL-SCNC: 3.6 MMOL/L (ref 3.5–5.1)
PROT SERPL-MCNC: 5.4 G/DL (ref 6.4–8.2)
RBC # BLD AUTO: 3.61 M/UL (ref 3.8–5.2)
SERVICE CMNT-IMP: ABNORMAL
SODIUM SERPL-SCNC: 138 MMOL/L (ref 136–145)
TRIGL SERPL-MCNC: 550 MG/DL (ref ?–150)
WBC # BLD AUTO: 4.1 K/UL (ref 3.6–11)

## 2017-10-09 PROCEDURE — 74011636637 HC RX REV CODE- 636/637: Performed by: INTERNAL MEDICINE

## 2017-10-09 PROCEDURE — 36415 COLL VENOUS BLD VENIPUNCTURE: CPT | Performed by: INTERNAL MEDICINE

## 2017-10-09 PROCEDURE — 83690 ASSAY OF LIPASE: CPT | Performed by: INTERNAL MEDICINE

## 2017-10-09 PROCEDURE — 82962 GLUCOSE BLOOD TEST: CPT

## 2017-10-09 PROCEDURE — 80053 COMPREHEN METABOLIC PANEL: CPT | Performed by: INTERNAL MEDICINE

## 2017-10-09 PROCEDURE — 74011000250 HC RX REV CODE- 250: Performed by: INTERNAL MEDICINE

## 2017-10-09 PROCEDURE — 74181 MRI ABDOMEN W/O CONTRAST: CPT

## 2017-10-09 PROCEDURE — 65660000000 HC RM CCU STEPDOWN

## 2017-10-09 PROCEDURE — 74011250637 HC RX REV CODE- 250/637: Performed by: INTERNAL MEDICINE

## 2017-10-09 PROCEDURE — 84478 ASSAY OF TRIGLYCERIDES: CPT | Performed by: INTERNAL MEDICINE

## 2017-10-09 PROCEDURE — 83735 ASSAY OF MAGNESIUM: CPT | Performed by: INTERNAL MEDICINE

## 2017-10-09 PROCEDURE — 85025 COMPLETE CBC W/AUTO DIFF WBC: CPT | Performed by: INTERNAL MEDICINE

## 2017-10-09 PROCEDURE — 74011250636 HC RX REV CODE- 250/636: Performed by: INTERNAL MEDICINE

## 2017-10-09 RX ORDER — FAMOTIDINE 20 MG/1
20 TABLET, FILM COATED ORAL 2 TIMES DAILY
Status: DISCONTINUED | OUTPATIENT
Start: 2017-10-09 | End: 2017-10-10 | Stop reason: HOSPADM

## 2017-10-09 RX ORDER — INSULIN LISPRO 100 [IU]/ML
INJECTION, SOLUTION INTRAVENOUS; SUBCUTANEOUS EVERY 6 HOURS
Status: DISCONTINUED | OUTPATIENT
Start: 2017-10-09 | End: 2017-10-10 | Stop reason: HOSPADM

## 2017-10-09 RX ORDER — GABAPENTIN 300 MG/1
300 CAPSULE ORAL 3 TIMES DAILY
Status: DISCONTINUED | OUTPATIENT
Start: 2017-10-09 | End: 2017-10-10 | Stop reason: HOSPADM

## 2017-10-09 RX ORDER — INSULIN GLARGINE 100 [IU]/ML
60 INJECTION, SOLUTION SUBCUTANEOUS DAILY
Status: DISCONTINUED | OUTPATIENT
Start: 2017-10-09 | End: 2017-10-10 | Stop reason: HOSPADM

## 2017-10-09 RX ADMIN — GABAPENTIN 300 MG: 300 CAPSULE ORAL at 21:03

## 2017-10-09 RX ADMIN — Medication 10 ML: at 21:04

## 2017-10-09 RX ADMIN — INSULIN LISPRO 8 UNITS: 100 INJECTION, SOLUTION INTRAVENOUS; SUBCUTANEOUS at 09:22

## 2017-10-09 RX ADMIN — SERTRALINE HYDROCHLORIDE 25 MG: 50 TABLET ORAL at 09:23

## 2017-10-09 RX ADMIN — INSULIN LISPRO 8 UNITS: 100 INJECTION, SOLUTION INTRAVENOUS; SUBCUTANEOUS at 12:48

## 2017-10-09 RX ADMIN — ATORVASTATIN CALCIUM 40 MG: 40 TABLET, FILM COATED ORAL at 21:03

## 2017-10-09 RX ADMIN — Medication 10 ML: at 13:20

## 2017-10-09 RX ADMIN — FAMOTIDINE 20 MG: 20 TABLET, FILM COATED ORAL at 21:03

## 2017-10-09 RX ADMIN — INSULIN LISPRO 8 UNITS: 100 INJECTION, SOLUTION INTRAVENOUS; SUBCUTANEOUS at 21:41

## 2017-10-09 RX ADMIN — Medication 10 ML: at 09:36

## 2017-10-09 RX ADMIN — ONDANSETRON 4 MG: 2 INJECTION INTRAMUSCULAR; INTRAVENOUS at 13:20

## 2017-10-09 RX ADMIN — INSULIN GLARGINE 60 UNITS: 100 INJECTION, SOLUTION SUBCUTANEOUS at 09:22

## 2017-10-09 RX ADMIN — GABAPENTIN 300 MG: 300 CAPSULE ORAL at 15:48

## 2017-10-09 RX ADMIN — HYDROMORPHONE HYDROCHLORIDE 1 MG: 2 INJECTION INTRAMUSCULAR; INTRAVENOUS; SUBCUTANEOUS at 23:25

## 2017-10-09 RX ADMIN — HYDROMORPHONE HYDROCHLORIDE 1 MG: 2 INJECTION INTRAMUSCULAR; INTRAVENOUS; SUBCUTANEOUS at 13:20

## 2017-10-09 RX ADMIN — FENOFIBRATE 145 MG: 145 TABLET ORAL at 09:23

## 2017-10-09 RX ADMIN — FOLIC ACID 1 MG: 1 TABLET ORAL at 09:23

## 2017-10-09 RX ADMIN — FAMOTIDINE 20 MG: 10 INJECTION, SOLUTION INTRAVENOUS at 09:24

## 2017-10-09 NOTE — PROGRESS NOTES
Problem: Falls - Risk of  Goal: *Absence of Falls  Document Doug Fall Risk and appropriate interventions in the flowsheet.    Outcome: Progressing Towards Goal  Fall Risk Interventions:  Mobility Interventions: Assess mobility with egress test, Patient to call before getting OOB           Medication Interventions: Teach patient to arise slowly, Evaluate medications/consider consulting pharmacy

## 2017-10-09 NOTE — PROGRESS NOTES
PCU SHIFT NURSING NOTE      Bedside and Verbal shift change report given to Valentino Keepers RN (oncoming nurse) by Farhan Zelaya RN (offgoing nurse). Report included the following information SBAR, Kardex, MAR, Recent Results and Cardiac Rhythm NSR. Shift Summary:   0900: Pt currently NPO for MRI today. 1330: Ihsan cath removed. Pressure held for 5 minutes per policy. No s/s bleeding.

## 2017-10-09 NOTE — PROGRESS NOTES
GI PROGRESS NOTE    NAME:             Cindy Peralta   :              1980   MRN:              [id number]   Admit Date:     10/5/2017  Todays Date:  10/9/2017      Subjective:           Less abdominal pain. Tolerated clear liquids yesterday.  Npo all day long for MRCP    Medications-reviewed     Current Facility-Administered Medications   Medication Dose Route Frequency    insulin glargine (LANTUS) injection 60 Units  60 Units SubCUTAneous DAILY    insulin lispro (HUMALOG) injection   SubCUTAneous Q6H    famotidine (PEPCID) tablet 20 mg  20 mg Oral BID    gabapentin (NEURONTIN) capsule 300 mg  300 mg Oral TID    glucose chewable tablet 16 g  4 Tab Oral PRN    dextrose (D50W) injection syrg 12.5-25 g  12.5-25 g IntraVENous PRN    0.9% sodium chloride infusion  50 mL/hr IntraVENous CONTINUOUS    folic acid (FOLVITE) tablet 1 mg  1 mg Oral DAILY    metoclopramide HCl (REGLAN) injection 10 mg  10 mg IntraVENous ONCE PRN    sodium chloride (NS) flush 5-10 mL  5-10 mL IntraVENous Q8H    sodium chloride (NS) flush 5-10 mL  5-10 mL IntraVENous PRN    glucagon (GLUCAGEN) injection 1 mg  1 mg IntraMUSCular PRN    ondansetron (ZOFRAN) injection 4 mg  4 mg IntraVENous Q4H PRN    acetaminophen (TYLENOL) tablet 650 mg  650 mg Oral Q4H PRN    bisacodyl (DULCOLAX) tablet 5 mg  5 mg Oral DAILY PRN    HYDROmorphone (DILAUDID) injection 1 mg  1 mg IntraVENous Q4H PRN    HYDROcodone-acetaminophen (NORCO) 5-325 mg per tablet 1 Tab  1 Tab Oral Q6H PRN    atorvastatin (LIPITOR) tablet 40 mg  40 mg Oral QHS    fenofibrate nanocrystallized (TRICOR) tablet 145 mg  145 mg Oral DAILY    sertraline (ZOLOFT) tablet 25 mg  25 mg Oral DAILY    hydrALAZINE (APRESOLINE) 20 mg/mL injection 10 mg  10 mg IntraVENous Q6H PRN        Objective:     Patient Vitals for the past 8 hrs:   BP Temp Pulse Resp SpO2 Height Weight   10/09/17 1545 122/89 97.8 °F (36.6 °C) 69 16 98 % - -   10/09/17 0927 126/88 98.4 °F (36.9 °C) 74 16 100 % - -   10/09/17 0920 - - - - - 5' 8\" (1.727 m) 123.6 kg (272 lb 7.8 oz)        10/07 1901 - 10/09 0700  In: 3168.4 [I.V.:3168.4]  Out: -     EXAM:     NEURO-a&o   HEENT-wnl   LUNGS-clear   COR-regular rate and rhythym   ABD-soft, no tenderness,        LABS:  Recent Labs      10/09/17   0328  10/08/17   0343   WBC  4.1  4.9   HGB  9.9*  10.3*   HCT  30.5*  31.5*   PLT  72*  75*     Recent Labs      10/09/17   0328  10/08/17   0343  10/07/17   0037   NA  138  140  139   K  3.6  3.6  3.9   CL  109*  112*  108   CO2  23  22  24   BUN  6  10  15   CREA  0.58  0.64  0.85   GLU  195*  224*  241*   CA  7.0*  6.8*  7.2*   MG  1.8  1.8  1.8   PHOS   --    --   3.2     Recent Labs      10/09/17   0328  10/08/17   0343  10/07/17   0037   SGOT  11*  9*  23   AP  35*  28*  32*   TBILI  0.3  0.3  0.6   TP  5.4*  5.1*  5.4*   ALB  3.2*  3.4*  3.4*   GLOB  2.2  1.7*  2.0   LPSE  166  176  664*   ALT  17  12  27                            Assessment:   1. Acute pancreatitis due to 2. Continues improving. Lipase 166 today. 2. Hypertriglyceridemia  3. Dilated cbd to 1.2 cm, but she is post cholecystectomy so this is likely normal         Active Problems:    Pancreatitis (10/5/2017)        Plan:   1. Full liquids after MRCP  2. Follow lipase  3.  MRCP to be obtained, but hope no cbd stone

## 2017-10-09 NOTE — PROGRESS NOTES
Hospitalist Progress Note    NAME: Jackelyn Bullard   :  1980   MRN:  [id number]       Assessment / Plan:  DKA: DKA is resolved, gap has closed,HbA1C 10.9, D/c insulin drip, tolerated full liquids yesterday, c/w  Lantus and SSI. Endocrinology help appreciated  Acute Pancreatitis: due to hypertriglyceridemia,  give symptomatic treatment. Monitor Lipase so far trending down, so far tolerating full liquids, has a CBD that is dilated s/p Cholecystectomy, due for  MRCP today GI help appreciated  Hypertriglyceridemia:  Nephrology help appreciated got Plasma Exchange x2 days in a row, monitor triglycerides so far trending down. Patient also has a CBD dilated,  GI input appreciated, will check MRCP today  Hyponatremia: so far resolved, monitor. ?Seizure disorder  not on meds, monitor. Acute kidney injury: creatinine so far trending down, monitor, c/w IVF. So far resolved  Depression  C/w zoloft  HTN hold nephrotoxic meds, c/w hydralazine prn and pain control with IV dilaudid  Thrombocytopenia: will start Folic Acid, D/C  Heparin  Morbid obesity Body mass index is 41.66 kg/(m^2). Surrogate Decision Maker: pt's mother in law  Code status: Full  Prophylaxis: SCD  Recommended Disposition: Home w/Family     If MRCP is OK and no further recommendations from GI may D/c home tomorrow, will advance further her diet today     Subjective:     Chief Complaint / Reason for Physician Visit  \"I feel much better\". Discussed with RN events overnight. Review of Systems:  Symptom Y/N Comments  Symptom Y/N Comments   Fever/Chills    Chest Pain     Poor Appetite    Edema     Cough    Abdominal Pain n    Sputum    Joint Pain     SOB/VIRK    Pruritis/Rash     Nausea/vomit    Tolerating PT/OT     Diarrhea    Tolerating Diet n    Constipation    Other       Could NOT obtain due to:      Objective:     VITALS:   Last 24hrs VS reviewed since prior progress note.  Most recent are:  Patient Vitals for the past 24 hrs:   Temp Pulse Resp BP SpO2   10/09/17 0927 98.4 °F (36.9 °C) 74 16 126/88 100 %   10/09/17 0358 98.4 °F (36.9 °C) 63 16 103/77 97 %   10/08/17 2313 98.1 °F (36.7 °C) - - - -   10/08/17 2312 - 68 16 119/76 99 %   10/08/17 2040 98.7 °F (37.1 °C) 70 16 129/86 99 %   10/08/17 1438 98.6 °F (37 °C) 96 17 111/76 100 %       Intake/Output Summary (Last 24 hours) at 10/09/17 1138  Last data filed at 10/09/17 0358   Gross per 24 hour   Intake           879.17 ml   Output                0 ml   Net           879.17 ml        PHYSICAL EXAM:  General: WD, WN. Alert, cooperative, no acute distress    EENT:  EOMI. Anicteric sclerae. MMM  Resp:  Coarse BS  CV:  Regular  rhythm,  No edema  GI:  Soft, Non distended, no  tender.  +Bowel sounds  Neurologic:  Alert and oriented X 3, normal speech,   Psych:   Good insight. Not anxious nor agitated  Skin:  No rashes. No jaundice    Reviewed most current lab test results and cultures  YES  Reviewed most current radiology test results   YES  Review and summation of old records today    NO  Reviewed patient's current orders and MAR    YES  PMH/ reviewed - no change compared to H&P  ________________________________________________________________________  Care Plan discussed with:    Comments   Patient y    Family      RN y    Care Manager     Consultant  y Renal                     Multidiciplinary team rounds were held today with , nursing, pharmacist and clinical coordinator. Patient's plan of care was discussed; medications were reviewed and discharge planning was addressed.      ________________________________________________________________________  Total NON critical care TIME:  30  Minutes    Total CRITICAL CARE TIME Spent:   Minutes non procedure based      Comments   >50% of visit spent in counseling and coordination of care y    ________________________________________________________________________  Ladonna Han MD     Procedures: see electronic medical records for all procedures/Xrays and details which were not copied into this note but were reviewed prior to creation of Plan. LABS:  I reviewed today's most current labs and imaging studies.   Pertinent labs include:  Recent Labs      10/09/17   0328  10/08/17   0343  10/07/17   0037   WBC  4.1  4.9  6.8   HGB  9.9*  10.3*  11.8   HCT  30.5*  31.5*  34.0*   PLT  72*  75*  95*     Recent Labs      10/09/17   0328  10/08/17   0343  10/07/17   0037   NA  138  140  139   K  3.6  3.6  3.9   CL  109*  112*  108   CO2  23  22  24   GLU  195*  224*  241*   BUN  6  10  15   CREA  0.58  0.64  0.85   CA  7.0*  6.8*  7.2*   MG  1.8  1.8  1.8   PHOS   --    --   3.2   ALB  3.2*  3.4*  3.4*   TBILI  0.3  0.3  0.6   SGOT  11*  9*  23   ALT  17  12  27       Signed: Keyon Falcon MD
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
## 2017-10-09 NOTE — DIABETES MGMT
DTC Progress Note    Recommendations/ Comments: Noted pt being followed by [facility] - Dr. Cool Crew. Pt with elevated fasting BG this morning and Lantus dose increased to 60 units daily and correction scale adjusted. DTC to sign off at this time. Chart reviewed on [facility] for hyperglycemia/elevated A1c. Patient is a 40 y.o. female with known history of Type 2 Diabetes on Metformin 1,000mg bid and Levemir 80 units nightly at home. A1c:   Lab Results   Component Value Date/Time    Hemoglobin A1c 10.9 10/07/2017 12:37 AM    Hemoglobin A1c 10.6 10/05/2017 09:01 PM       Recent Glucose Results:   Lab Results   Component Value Date/Time     (H) 10/09/2017 03:28 AM    GLUCPOC 204 (H) 10/09/2017 11:16 AM    GLUCPOC 220 (H) 10/09/2017 07:14 AM    GLUCPOC 216 (H) 10/08/2017 09:43 PM        Lab Results   Component Value Date/Time    Creatinine 0.58 10/09/2017 03:28 AM     Estimated Creatinine Clearance: 184.1 mL/min (based on Cr of 0.58). Active Orders   Diet    DIET NPO        PO intake: No data found. Current hospital DM medication:   -Lantus 60 units daily   -Humalog normal sensitivity correction    Will continue to follow as needed.     Thank you    Nathaniel Allen, [address], [address]  Office: [phone]

## 2017-10-09 NOTE — PROGRESS NOTES
[facility] Progress Note        NAME: Pablito Bergeron       :  1980       MRN:  [id number]     Date/Time: 10/9/2017    Risk of deterioration: mild       Assessment:    Plan:  Severe, longstanding hypertriglyceridemia/dyslipidemia  DM without control  Acute pancreatitis  LORRIE  Obesity  Hx of MI  Hx of seizures Triglycerides > 4000 down to 1700>>to 550 today after 2 PLEX Rx    Lipase normalized  LORRIE resolved    No more need for PLEX  Remove Ihsan  Counseled on importance of good DM/Lipid control as outpt. F/u with Endocrinology    Will sign off. Please call us back with questions/concerns    D/W pt     Subjective:     Feeling better. Denies n/v/abd pain. oob in chair    Review of Systems: see above    Objective:     VITALS:   Last 24hrs VS reviewed since prior progress note.  Most recent are:  Visit Vitals    /88 (BP 1 Location: Left arm, BP Patient Position: Sitting)    Pulse 74    Temp 98.4 °F (36.9 °C)    Resp 16    Ht 5' 8\" (1.727 m)    Wt 123.6 kg (272 lb 7.8 oz)    SpO2 100%    BMI 41.43 kg/m2     SpO2 Readings from Last 6 Encounters:   10/09/17 100%   10/03/17 95%   10/02/17 96%   11 95%   03/10/11 98%   11 97%    O2 Flow Rate (L/min): 2 l/min       Intake/Output Summary (Last 24 hours) at 10/09/17 1032  Last data filed at 10/09/17 0358   Gross per 24 hour   Intake           879.17 ml   Output                0 ml   Net           879.17 ml        Telemetry Reviewed      PHYSICAL EXAM:    General   WD, morbidly obese WF in NARD  EENT  ncat  R Ihsan intact  Soft, NT  Extremities  Trace edema  AOx3     Lab Data Reviewed: (see below)    Medications Reviewed: (see below)    PMH/SH reviewed - no change compared to H&P  ___________________________________________________    ___________________________________________________    Attending Physician: Blanca Walker, MD     ____________________________________________________  MEDICATIONS:  Current Facility-Administered Medications Medication Dose Route Frequency    insulin glargine (LANTUS) injection 60 Units  60 Units SubCUTAneous DAILY    insulin lispro (HUMALOG) injection   SubCUTAneous Q6H    glucose chewable tablet 16 g  4 Tab Oral PRN    dextrose (D50W) injection syrg 12.5-25 g  12.5-25 g IntraVENous PRN    0.9% sodium chloride infusion  50 mL/hr IntraVENous CONTINUOUS    folic acid (FOLVITE) tablet 1 mg  1 mg Oral DAILY    metoclopramide HCl (REGLAN) injection 10 mg  10 mg IntraVENous ONCE PRN    sodium chloride (NS) flush 5-10 mL  5-10 mL IntraVENous Q8H    sodium chloride (NS) flush 5-10 mL  5-10 mL IntraVENous PRN    glucagon (GLUCAGEN) injection 1 mg  1 mg IntraMUSCular PRN    ondansetron (ZOFRAN) injection 4 mg  4 mg IntraVENous Q4H PRN    acetaminophen (TYLENOL) tablet 650 mg  650 mg Oral Q4H PRN    bisacodyl (DULCOLAX) tablet 5 mg  5 mg Oral DAILY PRN    HYDROmorphone (DILAUDID) injection 1 mg  1 mg IntraVENous Q4H PRN    HYDROcodone-acetaminophen (NORCO) 5-325 mg per tablet 1 Tab  1 Tab Oral Q6H PRN    atorvastatin (LIPITOR) tablet 40 mg  40 mg Oral QHS    fenofibrate nanocrystallized (TRICOR) tablet 145 mg  145 mg Oral DAILY    sertraline (ZOLOFT) tablet 25 mg  25 mg Oral DAILY    hydrALAZINE (APRESOLINE) 20 mg/mL injection 10 mg  10 mg IntraVENous Q6H PRN    famotidine (PF) (PEPCID) 20 mg in sodium chloride 0.9 % 10 mL injection  20 mg IntraVENous Q12H        LABS:  Recent Labs      10/09/17   0328  10/08/17   0343   WBC  4.1  4.9   HGB  9.9*  10.3*   HCT  30.5*  31.5*   PLT  72*  75*     Recent Labs      10/09/17   0328  10/08/17   0343  10/07/17   0037   NA  138  140  139   K  3.6  3.6  3.9   CL  109*  112*  108   CO2  23  22  24   BUN  6  10  15   CREA  0.58  0.64  0.85   GLU  195*  224*  241*   CA  7.0*  6.8*  7.2*   MG  1.8  1.8  1.8   PHOS   --    --   3.2     Recent Labs      10/09/17   0328  10/08/17   0343  10/07/17   0037   SGOT  11*  9*  23   ALT  17  12  27   AP  35*  28*  32*   TBILI  0.3 0.3  0.6   TP  5.4*  5.1*  5.4*   ALB  3.2*  3.4*  3.4*   GLOB  2.2  1.7*  2.0   LPSE  166  176  664*     No results for input(s): INR, PTP, APTT in the last 72 hours. No lab exists for component: INREXT, INREXT   No results for input(s): FE, TIBC, PSAT, FERR in the last 72 hours. No results for input(s): PH, PCO2, PO2 in the last 72 hours. No results for input(s): CPK, CKNDX, TROIQ in the last 72 hours.     No lab exists for component: CPKMB  Lab Results   Component Value Date/Time    Glucose (POC) 220 10/09/2017 07:14 AM    Glucose (POC) 216 10/08/2017 09:43 PM    Glucose (POC) 210 10/08/2017 04:33 PM    Glucose (POC) 289 10/08/2017 12:43 PM    Glucose (POC) 257 10/08/2017 10:41 AM

## 2017-10-09 NOTE — PROGRESS NOTES
I have reviewed discharge instructions with the patient and daughter. The patient and daughter verbalized understanding. A patient fact sheet was provided on new medications. Verified that patient had paper prescriptions for these new medications. Time was provided for questions. IVs were removed. Tele box was removed from patient and returned to the Telemetry Room.
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
## 2017-10-10 ENCOUNTER — TELEPHONE (OUTPATIENT)
Dept: ENDOCRINOLOGY | Age: 37
End: 2017-10-10

## 2017-10-10 VITALS
OXYGEN SATURATION: 96 % | HEIGHT: 68 IN | RESPIRATION RATE: 18 BRPM | HEART RATE: 77 BPM | SYSTOLIC BLOOD PRESSURE: 140 MMHG | BODY MASS INDEX: 41.3 KG/M2 | DIASTOLIC BLOOD PRESSURE: 87 MMHG | WEIGHT: 272.49 LBS | TEMPERATURE: 98.3 F

## 2017-10-10 LAB
ALBUMIN SERPL-MCNC: 3 G/DL (ref 3.5–5)
ALBUMIN/GLOB SERPL: 1.2 {RATIO} (ref 1.1–2.2)
ALP SERPL-CCNC: 40 U/L (ref 45–117)
ALT SERPL-CCNC: 20 U/L (ref 12–78)
ANION GAP SERPL CALC-SCNC: 6 MMOL/L (ref 5–15)
AST SERPL-CCNC: 23 U/L (ref 15–37)
BASOPHILS # BLD: 0 K/UL
BASOPHILS NFR BLD: 0 %
BILIRUB SERPL-MCNC: 0.3 MG/DL (ref 0.2–1)
BUN SERPL-MCNC: 5 MG/DL (ref 6–20)
BUN/CREAT SERPL: 9 (ref 12–20)
CALCIUM SERPL-MCNC: 7.5 MG/DL (ref 8.5–10.1)
CHLORIDE SERPL-SCNC: 112 MMOL/L (ref 97–108)
CO2 SERPL-SCNC: 24 MMOL/L (ref 21–32)
CREAT SERPL-MCNC: 0.54 MG/DL (ref 0.55–1.02)
DIFFERENTIAL METHOD BLD: ABNORMAL
EOSINOPHIL # BLD: 0.1 K/UL
EOSINOPHIL NFR BLD: 2 %
ERYTHROCYTE [DISTWIDTH] IN BLOOD BY AUTOMATED COUNT: 15 % (ref 11.5–14.5)
GLOBULIN SER CALC-MCNC: 2.5 G/DL (ref 2–4)
GLUCOSE BLD STRIP.AUTO-MCNC: 147 MG/DL (ref 65–100)
GLUCOSE BLD STRIP.AUTO-MCNC: 266 MG/DL (ref 65–100)
GLUCOSE SERPL-MCNC: 129 MG/DL (ref 65–100)
HBV SURFACE AG SER QL: 0.22 INDEX
HBV SURFACE AG SER QL: NEGATIVE
HCT VFR BLD AUTO: 29.5 % (ref 35–47)
HGB BLD-MCNC: 9.6 G/DL (ref 11.5–16)
LIPASE SERPL-CCNC: 124 U/L (ref 73–393)
LYMPHOCYTES # BLD: 1.3 K/UL
LYMPHOCYTES NFR BLD: 26 %
MAGNESIUM SERPL-MCNC: 2 MG/DL (ref 1.6–2.4)
MCH RBC QN AUTO: 27 PG (ref 26–34)
MCHC RBC AUTO-ENTMCNC: 32.5 G/DL (ref 30–36.5)
MCV RBC AUTO: 83.1 FL (ref 80–99)
MONOCYTES # BLD: 0.2 K/UL
MONOCYTES NFR BLD: 3 %
NEUTS BAND NFR BLD MANUAL: 1 %
NEUTS SEG # BLD: 3.4 K/UL
NEUTS SEG NFR BLD: 68 %
PLATELET # BLD AUTO: 92 K/UL (ref 150–400)
POTASSIUM SERPL-SCNC: 3.4 MMOL/L (ref 3.5–5.1)
PROT SERPL-MCNC: 5.5 G/DL (ref 6.4–8.2)
RBC # BLD AUTO: 3.55 M/UL (ref 3.8–5.2)
RBC MORPH BLD: ABNORMAL
SERVICE CMNT-IMP: ABNORMAL
SERVICE CMNT-IMP: ABNORMAL
SODIUM SERPL-SCNC: 142 MMOL/L (ref 136–145)
WBC # BLD AUTO: 5 K/UL (ref 3.6–11)
WBC MORPH BLD: ABNORMAL

## 2017-10-10 PROCEDURE — 74011636637 HC RX REV CODE- 636/637: Performed by: INTERNAL MEDICINE

## 2017-10-10 PROCEDURE — 36415 COLL VENOUS BLD VENIPUNCTURE: CPT | Performed by: INTERNAL MEDICINE

## 2017-10-10 PROCEDURE — 74011250637 HC RX REV CODE- 250/637: Performed by: INTERNAL MEDICINE

## 2017-10-10 PROCEDURE — 82962 GLUCOSE BLOOD TEST: CPT

## 2017-10-10 PROCEDURE — 85025 COMPLETE CBC W/AUTO DIFF WBC: CPT | Performed by: INTERNAL MEDICINE

## 2017-10-10 PROCEDURE — 83690 ASSAY OF LIPASE: CPT | Performed by: INTERNAL MEDICINE

## 2017-10-10 PROCEDURE — 74011250636 HC RX REV CODE- 250/636: Performed by: INTERNAL MEDICINE

## 2017-10-10 PROCEDURE — 80053 COMPREHEN METABOLIC PANEL: CPT | Performed by: INTERNAL MEDICINE

## 2017-10-10 PROCEDURE — 83735 ASSAY OF MAGNESIUM: CPT | Performed by: INTERNAL MEDICINE

## 2017-10-10 RX ORDER — HYDROCODONE BITARTRATE AND ACETAMINOPHEN 5; 325 MG/1; MG/1
1 TABLET ORAL
Qty: 20 TAB | Refills: 0 | Status: SHIPPED | OUTPATIENT
Start: 2017-10-10 | End: 2017-11-28

## 2017-10-10 RX ORDER — POTASSIUM CHLORIDE 20 MEQ/1
20 TABLET, EXTENDED RELEASE ORAL
Status: COMPLETED | OUTPATIENT
Start: 2017-10-10 | End: 2017-10-10

## 2017-10-10 RX ORDER — PEN NEEDLE, DIABETIC 29 G X1/2"
1 NEEDLE, DISPOSABLE MISCELLANEOUS 4 TIMES DAILY
Qty: 120 PEN NEEDLE | Refills: 1 | Status: SHIPPED | OUTPATIENT
Start: 2017-10-10 | End: 2017-11-30 | Stop reason: ALTCHOICE

## 2017-10-10 RX ORDER — INSULIN GLARGINE 100 [IU]/ML
INJECTION, SOLUTION SUBCUTANEOUS
Qty: 3 PEN | Refills: 0 | Status: SHIPPED | COMMUNITY
Start: 2017-10-10 | End: 2017-11-28

## 2017-10-10 RX ORDER — ONDANSETRON HYDROCHLORIDE 8 MG/1
8 TABLET, FILM COATED ORAL
Qty: 20 TAB | Refills: 0 | Status: SHIPPED | OUTPATIENT
Start: 2017-10-10 | End: 2018-01-11

## 2017-10-10 RX ORDER — FOLIC ACID 1 MG/1
1 TABLET ORAL DAILY
Qty: 30 TAB | Refills: 1 | Status: SHIPPED | OUTPATIENT
Start: 2017-10-10 | End: 2018-05-17

## 2017-10-10 RX ORDER — FENOFIBRATE 145 MG/1
145 TABLET, COATED ORAL DAILY
Qty: 30 TAB | Refills: 1 | Status: SHIPPED | OUTPATIENT
Start: 2017-10-10 | End: 2017-11-30 | Stop reason: SDUPTHER

## 2017-10-10 RX ORDER — METFORMIN HYDROCHLORIDE 1000 MG/1
1000 TABLET ORAL 2 TIMES DAILY WITH MEALS
Qty: 60 TAB | Refills: 1 | Status: SHIPPED | OUTPATIENT
Start: 2017-10-10 | End: 2017-11-30 | Stop reason: SDUPTHER

## 2017-10-10 RX ORDER — FAMOTIDINE 20 MG/1
20 TABLET, FILM COATED ORAL 2 TIMES DAILY
Qty: 60 TAB | Refills: 1 | Status: SHIPPED | OUTPATIENT
Start: 2017-10-10 | End: 2018-05-17

## 2017-10-10 RX ORDER — INSULIN GLARGINE 100 [IU]/ML
60 INJECTION, SOLUTION SUBCUTANEOUS DAILY
Qty: 6 PEN | Refills: 1 | Status: SHIPPED | OUTPATIENT
Start: 2017-10-10 | End: 2017-11-30 | Stop reason: SDUPTHER

## 2017-10-10 RX ORDER — INSULIN LISPRO 100 [IU]/ML
INJECTION, SOLUTION INTRAVENOUS; SUBCUTANEOUS
Qty: 2 PACKAGE | Refills: 1 | Status: SHIPPED | OUTPATIENT
Start: 2017-10-10 | End: 2017-10-10

## 2017-10-10 RX ORDER — ATORVASTATIN CALCIUM 40 MG/1
40 TABLET, FILM COATED ORAL
Qty: 30 TAB | Refills: 1 | Status: SHIPPED | OUTPATIENT
Start: 2017-10-10 | End: 2017-11-30 | Stop reason: SDUPTHER

## 2017-10-10 RX ORDER — OMEGA-3-ACID ETHYL ESTERS 1 G/1
2 CAPSULE, LIQUID FILLED ORAL 2 TIMES DAILY
Qty: 120 CAP | Refills: 1 | Status: SHIPPED | OUTPATIENT
Start: 2017-10-10 | End: 2017-11-30 | Stop reason: SDUPTHER

## 2017-10-10 RX ADMIN — GABAPENTIN 300 MG: 300 CAPSULE ORAL at 08:42

## 2017-10-10 RX ADMIN — FENOFIBRATE 145 MG: 145 TABLET ORAL at 08:42

## 2017-10-10 RX ADMIN — HYDROCODONE BITARTRATE AND ACETAMINOPHEN 1 TABLET: 5; 325 TABLET ORAL at 11:37

## 2017-10-10 RX ADMIN — INSULIN GLARGINE 60 UNITS: 100 INJECTION, SOLUTION SUBCUTANEOUS at 08:43

## 2017-10-10 RX ADMIN — FOLIC ACID 1 MG: 1 TABLET ORAL at 08:42

## 2017-10-10 RX ADMIN — FAMOTIDINE 20 MG: 20 TABLET, FILM COATED ORAL at 08:42

## 2017-10-10 RX ADMIN — POTASSIUM CHLORIDE 20 MEQ: 1500 TABLET, EXTENDED RELEASE ORAL at 08:42

## 2017-10-10 RX ADMIN — SERTRALINE HYDROCHLORIDE 25 MG: 50 TABLET ORAL at 08:42

## 2017-10-10 RX ADMIN — SODIUM CHLORIDE 50 ML/HR: 900 INJECTION, SOLUTION INTRAVENOUS at 00:47

## 2017-10-10 RX ADMIN — INSULIN LISPRO 4 UNITS: 100 INJECTION, SOLUTION INTRAVENOUS; SUBCUTANEOUS at 08:43

## 2017-10-10 NOTE — PROGRESS NOTES
[address] [facility] [facility]    Fax: [phone]        To Whom may it concern. This letter is to certify that  Miguel Owens   was admitted  10/05/17  and Discharged    10/10/17               . Miguel Owens  may return to work/school on 10/13/17      If you have any questions, please do not hesitate to contact me.             Dr. José Manuel Ferguson  [id number]

## 2017-10-10 NOTE — PROGRESS NOTES
Problem: Falls - Risk of  Goal: *Absence of Falls  Document Doug Fall Risk and appropriate interventions in the flowsheet.    Fall Risk Interventions:  Mobility Interventions: Assess mobility with egress test           Medication Interventions: Patient to call before getting OOB, Teach patient to arise slowly

## 2017-10-10 NOTE — PROGRESS NOTES
GI Progress Note (for Eran)  Fam Topete TPZ:[birth year] HTO:[id number]   [facility] GI: Joslyn Zapien MD  PCP: Tanya Knight MD  Date/Time:  10/10/2017 8:48 AM   Assessment:   · Acute pancreatitis 2/2 HTG s/p PLEX  · Dilated CBD s/p cholecystectomy; MRCP unremarkable  · Doing well clinically     Plan:   · Can likely be discharged from a GI standpoint if tolerates diet ok today and can transition to PO pain meds (should be on a low fat diet)  · Stressed importance of maintaining normal Triglyceride levels     Subjective:   Pt doing well w/o complaints of n/v. Eating diet this AM    Complaint Y/N Description   Abdominal Pain     Hematemesis     Hematochezia     Melena     Constipation     Diarrhea     Dyspepsia     Dysphagia     Jaundiced     Nausea/vomiting       Review of Systems:  Symptom Y/N Comments  Symptom Y/N Comments   Fever/Chills    Chest Pain     Cough    Headaches     Sputum    Joint Pain     SOB/VIRK    Pruritis/Rash     Tolerating Diet    Other       Could NOT obtain due to:      Objective:   VITALS:   Last 24hrs VS reviewed since prior progress note. Most recent are:  Visit Vitals    /87 (BP 1 Location: Left arm, BP Patient Position: At rest)    Pulse 77    Temp 98.3 °F (36.8 °C)    Resp 18    Ht 5' 8\" (1.727 m)    Wt 123.6 kg (272 lb 7.8 oz)    SpO2 96%    BMI 41.43 kg/m2       Intake/Output Summary (Last 24 hours) at 10/10/17 0848  Last data filed at 10/10/17 0622   Gross per 24 hour   Intake             2240 ml   Output                0 ml   Net             2240 ml     PHYSICAL EXAM:  General: WD, WN. Alert, cooperative, no acute distress    HEENT: NC, Atraumatic. PERRLA, EOMI. Anicteric sclerae. Lungs:  CTA Bilaterally. No Wheezing/Rhonchi/Rales. Heart:  Regular  rhythm,  No murmur (), No Rubs, No Gallops  Abdomen: Soft, Non distended, Non tender.  +Bowel sounds, no HSM  Extremities: No c/c/e  Neurologic:  Alert and oriented X 3.     Psych:   Good insight. Not anxious nor agitated. Lab and Radiology Data Reviewed: (see below)    Medications Reviewed: (see below)  PMH/SH reviewed - no change compared to H&P  ________________________________________________________________________  Care Plan discussed with:  Patient x   Family     RN               Consultant:       Dawit Breaux MD     Procedures: see electronic medical records for all procedures/Xrays and details which were not copied into this note but were reviewed prior to creation of Plan. LABS:  Recent Labs      10/10/17   0325  10/09/17   0328   WBC  5.0  4.1   HGB  9.6*  9.9*   HCT  29.5*  30.5*   PLT  92*  72*     Recent Labs      10/10/17   0325  10/09/17   0328  10/08/17   0343   NA  142  138  140   K  3.4*  3.6  3.6   CL  112*  109*  112*   CO2  24  23  22   BUN  5*  6  10   CREA  0.54*  0.58  0.64   GLU  129*  195*  224*   CA  7.5*  7.0*  6.8*   MG  2.0  1.8  1.8     Recent Labs      10/10/17   0325  10/09/17   0328  10/08/17   0343   SGOT  23  11*  9*   AP  40*  35*  28*   TP  5.5*  5.4*  5.1*   ALB  3.0*  3.2*  3.4*   GLOB  2.5  2.2  1.7*   LPSE  124  166  176     No results for input(s): INR, PTP, APTT in the last 72 hours. No lab exists for component: INREXT   No results for input(s): FE, TIBC, PSAT, FERR in the last 72 hours. No results found for: FOL, RBCF  No results for input(s): PH, PCO2, PO2 in the last 72 hours. No results for input(s): CPK, CKMB in the last 72 hours.     No lab exists for component: TROPONINI  Lab Results   Component Value Date/Time    Color YELLOW/STRAW 10/02/2017 02:16 AM    Appearance CLEAR 10/02/2017 02:16 AM    Specific gravity 1.030 10/02/2017 02:16 AM    Specific gravity 1.020 04/15/2011 01:30 AM    pH (UA) 6.0 10/02/2017 02:16 AM    Protein TRACE 10/02/2017 02:16 AM    Glucose >1000 10/02/2017 02:16 AM    Ketone NEGATIVE  10/02/2017 02:16 AM    Bilirubin NEGATIVE  10/02/2017 02:16 AM    Urobilinogen 0.2 10/02/2017 02:16 AM    Nitrites NEGATIVE  10/02/2017 02:16 AM Leukocyte Esterase NEGATIVE  10/02/2017 02:16 AM    Epithelial cells FEW 10/02/2017 02:16 AM    Bacteria NEGATIVE  10/02/2017 02:16 AM    WBC 0-4 10/02/2017 02:16 AM    RBC 0-5 10/02/2017 02:16 AM       MEDICATIONS:  Current Facility-Administered Medications   Medication Dose Route Frequency    insulin glargine (LANTUS) injection 60 Units  60 Units SubCUTAneous DAILY    insulin lispro (HUMALOG) injection   SubCUTAneous Q6H    famotidine (PEPCID) tablet 20 mg  20 mg Oral BID    gabapentin (NEURONTIN) capsule 300 mg  300 mg Oral TID    glucose chewable tablet 16 g  4 Tab Oral PRN    dextrose (D50W) injection syrg 12.5-25 g  12.5-25 g IntraVENous PRN    0.9% sodium chloride infusion  50 mL/hr IntraVENous CONTINUOUS    folic acid (FOLVITE) tablet 1 mg  1 mg Oral DAILY    metoclopramide HCl (REGLAN) injection 10 mg  10 mg IntraVENous ONCE PRN    sodium chloride (NS) flush 5-10 mL  5-10 mL IntraVENous Q8H    sodium chloride (NS) flush 5-10 mL  5-10 mL IntraVENous PRN    glucagon (GLUCAGEN) injection 1 mg  1 mg IntraMUSCular PRN    ondansetron (ZOFRAN) injection 4 mg  4 mg IntraVENous Q4H PRN    acetaminophen (TYLENOL) tablet 650 mg  650 mg Oral Q4H PRN    bisacodyl (DULCOLAX) tablet 5 mg  5 mg Oral DAILY PRN    HYDROmorphone (DILAUDID) injection 1 mg  1 mg IntraVENous Q4H PRN    HYDROcodone-acetaminophen (NORCO) 5-325 mg per tablet 1 Tab  1 Tab Oral Q6H PRN    atorvastatin (LIPITOR) tablet 40 mg  40 mg Oral QHS    fenofibrate nanocrystallized (TRICOR) tablet 145 mg  145 mg Oral DAILY    sertraline (ZOLOFT) tablet 25 mg  25 mg Oral DAILY    hydrALAZINE (APRESOLINE) 20 mg/mL injection 10 mg  10 mg IntraVENous Q6H PRN

## 2017-10-10 NOTE — TELEPHONE ENCOUNTER
[facility], called to schedule a hospital follow-up for this patient. She is currently scheduled in January as a new patient. Can you give me a day and time and I will call her?

## 2017-10-10 NOTE — PROGRESS NOTES
Pt provided with discharge instructions, Rx and instructions on follow up care. Pt verbalized understanding of discharge instructions, RX and follow up care. Pt ambulated off unit accompanied by family.

## 2017-10-10 NOTE — DISCHARGE INSTRUCTIONS
HOSPITALIST DISCHARGE INSTRUCTIONS  NAME: Miguel Owens   :  1980   MRN:  [id number]     Date/Time:  10/10/2017 9:36 AM    ADMIT DATE: 10/5/2017     DISCHARGE DATE: 10/10/2017     DIAGNOSIS:  DKA, Hypertriglyceridemia, Acute Pancreatitis, Hyponatremia, LORRIE, HTN, Thrombocytopenia, Morbid Obesity    MEDICATIONS:                As per medication reconciliation    · It is important that you take the medication exactly as they are prescribed. · Keep your medication in the bottles provided by the pharmacist and keep a list of the medication names, dosages, and times to be taken in your wallet. · Do not take other medications without consulting your doctor. Pain Management: per above medications    What to do at Home    Recommended diet:  Cardiac Diet and Diabetic Diet    Recommended activity: Activity as tolerated and No driving while on analgesics    If you experience any of the following symptoms then please call your primary care physician or return to the emergency room if you cannot get hold of your doctor:  Fever, chills, nausea, vomiting, diarrhea, change in mentation, falling, bleeding, shortness of breath. Follow Up:   PCP you are to call and set up an appointment to see them in 2 week. F/U GI  F/U Endocrinology      Information obtained by :  I understand that if any problems occur once I am at home I am to contact my physician. I understand and acknowledge receipt of the instructions indicated above.                                                                                                                                            Physician's or R.N.'s Signature                                                                  Date/Time                                                                                                                                              Patient or Representative Signature                                                          Date/Time
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
## 2017-10-10 NOTE — ROUTINE PROCESS
follow up appointments scheduled and placed on AVS.   Samir Wan, [address] [place] [address]  [phone]

## 2017-10-11 ENCOUNTER — TELEPHONE (OUTPATIENT)
Dept: INTERNAL MEDICINE CLINIC | Age: 37
End: 2017-10-11

## 2017-10-11 NOTE — TELEPHONE ENCOUNTER
Spoke to patient this morning and scheduled her hospital follow-up with you for 10/26/17 at 1:50 PM.

## 2017-10-11 NOTE — PROGRESS NOTES
Endocrinology Progress Note    **LATE ENTRY--PATIENT WAS SEEN TODAY AT 1PM PRIOR TO DISCHARGE**    Reviewed blood sugars from yesterday and today:    Component       GLUCOSE,FAST - POC   Latest Ref Rng & Units       65 - 100 mg/dL   10/10/2017      10:57  (H)   10/10/2017      7:22  (H)   10/9/2017      9:24  (H)   10/9/2017      4:14  (H)   10/9/2017      11:16  (H)   10/9/2017      7:14  (H)     The 60 units of lantus worked well while she was NPO for MRCP but she ate a full liquid diet last night and her sugar erica to 206 at bedtime and with 8 units humalog, her sugar was back down this morning. She is stable for discharge and I provided her with 3 sample lantus pens and 15 pen needles until we can assure that her lantus is covered by her insurance. I will also have her restart metformin upon discharge and provided her with the following plan when I saw her:    Assessment/Plan:  1) Type 2 diabetes uncontrolled: her most recent Hgb A1c was was 10.9% in 10/17. She was very insulin resistant and did best while on U-500 in the past but this may not be necessary going forward given she has lost a lot of weight. 60 units of lantus seemed to work well so I will continue this dose upon discharge. I will also have her go back on metformin and will have her slowly restart this as below. Our office will call her at [phone] to arrange a f/u appt in the next 2-3 weeks. I advised her to buy a Relion meter at [facility] until we can find out what meter is covered by her insurance.    - cont lantus 60 units daily  - restart metformin 1g 1/2 tab at dinner x 3 days then 1/2 tab bid x 3 days, then 1 tab bid     I spent 15 minutes of face to face time on her case and > 50% of the time was spent reviewing the chart and coordinating her care with Dr. Yarely Alba and the nurse caring for her.     Please don't hesitate to call [phone] with further questions.        Francisco Daily MD     Copy sent to:  Dr. Corona Carmichael via [facility]

## 2017-11-28 ENCOUNTER — HOSPITAL ENCOUNTER (EMERGENCY)
Age: 37
Discharge: HOME | End: 2017-11-28
Attending: EMERGENCY MEDICINE | Admitting: EMERGENCY MEDICINE
Payer: COMMERCIAL

## 2017-11-28 ENCOUNTER — APPOINTMENT (OUTPATIENT)
Dept: CT IMAGING | Age: 37
End: 2017-11-28
Attending: EMERGENCY MEDICINE
Payer: COMMERCIAL

## 2017-11-28 VITALS
HEIGHT: 68 IN | OXYGEN SATURATION: 99 % | BODY MASS INDEX: 40.16 KG/M2 | WEIGHT: 265 LBS | SYSTOLIC BLOOD PRESSURE: 144 MMHG | TEMPERATURE: 98.2 F | HEART RATE: 92 BPM | RESPIRATION RATE: 19 BRPM | DIASTOLIC BLOOD PRESSURE: 92 MMHG

## 2017-11-28 DIAGNOSIS — L02.213 ABSCESS OF CHEST WALL: Primary | ICD-10-CM

## 2017-11-28 DIAGNOSIS — E10.65 HYPERGLYCEMIA DUE TO TYPE 1 DIABETES MELLITUS (HCC): ICD-10-CM

## 2017-11-28 LAB
ALBUMIN SERPL-MCNC: 2.5 G/DL (ref 3.5–5)
ALBUMIN/GLOB SERPL: 0.6 {RATIO} (ref 1.1–2.2)
ALP SERPL-CCNC: 65 U/L (ref 45–117)
ALT SERPL-CCNC: ABNORMAL U/L (ref 12–78)
ANION GAP SERPL CALC-SCNC: 10 MMOL/L (ref 5–15)
AST SERPL-CCNC: 95 U/L (ref 15–37)
BASOPHILS # BLD: 0 K/UL (ref 0–0.1)
BASOPHILS NFR BLD: 0 % (ref 0–1)
BILIRUB SERPL-MCNC: 0.9 MG/DL (ref 0.2–1)
BUN SERPL-MCNC: 11 MG/DL (ref 6–20)
BUN/CREAT SERPL: 14 (ref 12–20)
CALCIUM SERPL-MCNC: ABNORMAL MG/DL (ref 8.5–10.1)
CHLORIDE SERPL-SCNC: 95 MMOL/L (ref 97–108)
CO2 SERPL-SCNC: 21 MMOL/L (ref 21–32)
CREAT SERPL-MCNC: 0.79 MG/DL (ref 0.55–1.02)
EOSINOPHIL # BLD: 0.1 K/UL (ref 0–0.4)
EOSINOPHIL NFR BLD: 1 % (ref 0–7)
ERYTHROCYTE [DISTWIDTH] IN BLOOD BY AUTOMATED COUNT: 14.6 % (ref 11.5–14.5)
GLOBULIN SER CALC-MCNC: 4.3 G/DL (ref 2–4)
GLUCOSE BLD STRIP.AUTO-MCNC: 320 MG/DL (ref 65–100)
GLUCOSE BLD STRIP.AUTO-MCNC: 347 MG/DL (ref 65–100)
GLUCOSE BLD STRIP.AUTO-MCNC: 419 MG/DL (ref 65–100)
GLUCOSE SERPL-MCNC: 454 MG/DL (ref 65–100)
HCT VFR BLD AUTO: 35 % (ref 35–47)
HGB BLD-MCNC: 12 G/DL (ref 11.5–16)
LACTATE SERPL-SCNC: NORMAL MMOL/L (ref 0.4–2)
LYMPHOCYTES # BLD: 0.9 K/UL (ref 0.8–3.5)
LYMPHOCYTES NFR BLD: 9 % (ref 12–49)
MCH RBC QN AUTO: 27.9 PG (ref 26–34)
MCHC RBC AUTO-ENTMCNC: 34.8 G/DL (ref 30–36.5)
MCV RBC AUTO: 80.3 FL (ref 80–99)
MONOCYTES # BLD: 1.1 K/UL (ref 0–1)
MONOCYTES NFR BLD: 11 % (ref 5–13)
NEUTS SEG # BLD: 7.8 K/UL (ref 1.8–8)
NEUTS SEG NFR BLD: 79 % (ref 32–75)
NRBC # BLD: 0.37 K/UL (ref 0–0.01)
NRBC BLD-RTO: 4.2 PER 100 WBC
PLATELET # BLD AUTO: 124 K/UL (ref 150–400)
POTASSIUM SERPL-SCNC: 4.2 MMOL/L (ref 3.5–5.1)
PROT SERPL-MCNC: 6.8 G/DL (ref 6.4–8.2)
RBC # BLD AUTO: 4.36 M/UL (ref 3.8–5.2)
RBC MORPH BLD: ABNORMAL
SERVICE CMNT-IMP: ABNORMAL
SODIUM SERPL-SCNC: 126 MMOL/L (ref 136–145)
WBC # BLD AUTO: 9.9 K/UL (ref 3.6–11)
WBC MORPH BLD: ABNORMAL

## 2017-11-28 PROCEDURE — 83605 ASSAY OF LACTIC ACID: CPT | Performed by: EMERGENCY MEDICINE

## 2017-11-28 PROCEDURE — 96375 TX/PRO/DX INJ NEW DRUG ADDON: CPT

## 2017-11-28 PROCEDURE — 99285 EMERGENCY DEPT VISIT HI MDM: CPT

## 2017-11-28 PROCEDURE — 75810000289 HC I&D ABSCESS SIMP/COMP/MULT

## 2017-11-28 PROCEDURE — 77030019895 HC PCKNG STRP IODO -A

## 2017-11-28 PROCEDURE — 96366 THER/PROPH/DIAG IV INF ADDON: CPT

## 2017-11-28 PROCEDURE — 71260 CT THORAX DX C+: CPT

## 2017-11-28 PROCEDURE — 36415 COLL VENOUS BLD VENIPUNCTURE: CPT | Performed by: EMERGENCY MEDICINE

## 2017-11-28 PROCEDURE — 96367 TX/PROPH/DG ADDL SEQ IV INF: CPT

## 2017-11-28 PROCEDURE — 74011636320 HC RX REV CODE- 636/320: Performed by: EMERGENCY MEDICINE

## 2017-11-28 PROCEDURE — 96361 HYDRATE IV INFUSION ADD-ON: CPT

## 2017-11-28 PROCEDURE — 74011636637 HC RX REV CODE- 636/637: Performed by: EMERGENCY MEDICINE

## 2017-11-28 PROCEDURE — 96365 THER/PROPH/DIAG IV INF INIT: CPT

## 2017-11-28 PROCEDURE — 82962 GLUCOSE BLOOD TEST: CPT

## 2017-11-28 PROCEDURE — 74011250636 HC RX REV CODE- 250/636: Performed by: EMERGENCY MEDICINE

## 2017-11-28 PROCEDURE — 74011000258 HC RX REV CODE- 258: Performed by: EMERGENCY MEDICINE

## 2017-11-28 PROCEDURE — 87040 BLOOD CULTURE FOR BACTERIA: CPT | Performed by: EMERGENCY MEDICINE

## 2017-11-28 PROCEDURE — 85025 COMPLETE CBC W/AUTO DIFF WBC: CPT | Performed by: EMERGENCY MEDICINE

## 2017-11-28 PROCEDURE — 80053 COMPREHEN METABOLIC PANEL: CPT | Performed by: EMERGENCY MEDICINE

## 2017-11-28 RX ORDER — SODIUM CHLORIDE 0.9 % (FLUSH) 0.9 %
10 SYRINGE (ML) INJECTION
Status: COMPLETED | OUTPATIENT
Start: 2017-11-28 | End: 2017-11-28

## 2017-11-28 RX ORDER — CLINDAMYCIN HYDROCHLORIDE 300 MG/1
300 CAPSULE ORAL 4 TIMES DAILY
Qty: 40 CAP | Refills: 0 | Status: SHIPPED | OUTPATIENT
Start: 2017-11-28 | End: 2018-01-11

## 2017-11-28 RX ORDER — SODIUM CHLORIDE 9 MG/ML
50 INJECTION, SOLUTION INTRAVENOUS
Status: COMPLETED | OUTPATIENT
Start: 2017-11-28 | End: 2017-11-28

## 2017-11-28 RX ORDER — HYDROCODONE BITARTRATE AND ACETAMINOPHEN 5; 325 MG/1; MG/1
1 TABLET ORAL
Qty: 12 TAB | Refills: 0 | Status: SHIPPED | OUTPATIENT
Start: 2017-11-28 | End: 2017-12-29

## 2017-11-28 RX ORDER — LEVOFLOXACIN 5 MG/ML
500 INJECTION, SOLUTION INTRAVENOUS
Status: DISCONTINUED | OUTPATIENT
Start: 2017-11-28 | End: 2017-11-28

## 2017-11-28 RX ORDER — FENTANYL CITRATE 50 UG/ML
50 INJECTION, SOLUTION INTRAMUSCULAR; INTRAVENOUS
Status: COMPLETED | OUTPATIENT
Start: 2017-11-28 | End: 2017-11-28

## 2017-11-28 RX ORDER — HYDROMORPHONE HYDROCHLORIDE 2 MG/ML
1 INJECTION, SOLUTION INTRAMUSCULAR; INTRAVENOUS; SUBCUTANEOUS
Status: COMPLETED | OUTPATIENT
Start: 2017-11-28 | End: 2017-11-28

## 2017-11-28 RX ADMIN — FENTANYL CITRATE 50 MCG: 50 INJECTION, SOLUTION INTRAMUSCULAR; INTRAVENOUS at 18:30

## 2017-11-28 RX ADMIN — Medication 10 ML: at 21:18

## 2017-11-28 RX ADMIN — HYDROMORPHONE HYDROCHLORIDE 1 MG: 2 INJECTION, SOLUTION INTRAMUSCULAR; INTRAVENOUS; SUBCUTANEOUS at 20:54

## 2017-11-28 RX ADMIN — IOPAMIDOL 80 ML: 755 INJECTION, SOLUTION INTRAVENOUS at 21:18

## 2017-11-28 RX ADMIN — SODIUM CHLORIDE 1000 ML: 900 INJECTION, SOLUTION INTRAVENOUS at 19:57

## 2017-11-28 RX ADMIN — CEFEPIME HYDROCHLORIDE 2 G: 2 INJECTION, POWDER, FOR SOLUTION INTRAVENOUS at 18:30

## 2017-11-28 RX ADMIN — INSULIN HUMAN 5 UNITS: 100 INJECTION, SOLUTION PARENTERAL at 19:55

## 2017-11-28 RX ADMIN — SODIUM CHLORIDE 1000 ML: 900 INJECTION, SOLUTION INTRAVENOUS at 18:31

## 2017-11-28 RX ADMIN — SODIUM CHLORIDE 50 ML/HR: 900 INJECTION, SOLUTION INTRAVENOUS at 21:18

## 2017-11-28 RX ADMIN — Medication 10 ML: at 19:56

## 2017-11-28 RX ADMIN — SODIUM CHLORIDE 1000 ML: 900 INJECTION, SOLUTION INTRAVENOUS at 21:53

## 2017-11-28 RX ADMIN — VANCOMYCIN HYDROCHLORIDE 1000 MG: 1 INJECTION, POWDER, LYOPHILIZED, FOR SOLUTION INTRAVENOUS at 19:14

## 2017-11-28 NOTE — ED NOTES
Patient arrives via EMS with c/o weakness and fatigue x1 week getting progressively worse. Patient states she also has a red swollen spot on the left breast that is painful to touch that she first saw at the beginning of November.

## 2017-11-29 ENCOUNTER — TELEPHONE (OUTPATIENT)
Dept: ENDOCRINOLOGY | Age: 37
End: 2017-11-29

## 2017-11-29 RX ORDER — BLOOD-GLUCOSE METER
EACH MISCELLANEOUS
Qty: 1 EACH | Refills: 0 | Status: ON HOLD | OUTPATIENT
Start: 2017-11-29 | End: 2018-01-01

## 2017-11-29 NOTE — ED NOTES
Elizabeth Weir MD   has done a bedside review of the discharge instructions. The patient is in understanding. The patients line(s) are removed. The patient is dressed, and belongings together for discharge.

## 2017-11-29 NOTE — TELEPHONE ENCOUNTER
Spoke to patient this afternoon. Offered her the 12:10 PM appointment tomorrow (11-30-17) and she accepted it. Patient is scheduled in [facility] for this day and time.

## 2017-11-29 NOTE — ED PROVIDER NOTES
EMERGENCY DEPARTMENT HISTORY AND PHYSICAL EXAM      Date: 11/28/2017  Patient Name: Anthony Barrera    History of Presenting Illness     Chief Complaint   Patient presents with    Fatigue     Patient arrives via EMS with c/o weakness and fatigue x1 week getting progressively worse. Patient states she also has a red swollen spot on the left breast that is painful to touch that she first saw at the beginning of November.  Wound Check       History Provided By: Patient    HPI: Anthony Barrera, 40 y.o. female with PMHx significant for DM / MI / HTN / Hyperlipidemia, presents via EMS to the ED with cc of a painful, erythematous and swollen abscess on her left breast x 1 month that has \"tripled in size\" since two days ago. She also endorses generalized weakness, nausea, vomiting and fever. Pt notes that her fever was 102.4 degrees this morning. She has applied heat to her left breast without relief of pain or having it come to a head. Pt states that she regularly exfoliates and cleanses her skin thoroughly. She has been taking Tylenol with some relief of sx, but ran out yesterday evening. Pt notes that as of recently, her blood sugar has been high. She denies any PMHx of MRSA. Pt denies any SOB, headache, back pain or sore throat. PCP: Estelle Martinez MD    There are no other complaints, changes, or physical findings at this time. Current Facility-Administered Medications   Medication Dose Route Frequency Provider Last Rate Last Dose    lidocaine-EPINEPHrine (PF) (XYLOCAINE) 1 %-1:200,000 injection 15 mg  1.5 mL SubCUTAneous NOW Ludwig Jaramillo MD         Current Outpatient Prescriptions   Medication Sig Dispense Refill    clindamycin (CLEOCIN) 300 mg capsule Take 1 Cap by mouth four (4) times daily. 40 Cap 0    HYDROcodone-acetaminophen (NORCO) 5-325 mg per tablet Take 1 Tab by mouth every six (6) hours as needed for Pain. Max Daily Amount: 4 Tabs.  12 Tab 0    atorvastatin (LIPITOR) 40 mg tablet Take 1 Tab by mouth nightly. 30 Tab 1    famotidine (PEPCID) 20 mg tablet Take 1 Tab by mouth two (2) times a day. 60 Tab 1    fenofibrate nanocrystallized (TRICOR) 145 mg tablet Take 1 Tab by mouth daily. 30 Tab 1    folic acid (FOLVITE) 1 mg tablet Take 1 Tab by mouth daily. 30 Tab 1    omega-3 acid ethyl esters (LOVAZA) 1 gram capsule Take 2 Caps by mouth two (2) times a day. 120 Cap 1    insulin glargine (LANTUS SOLOSTAR) 100 unit/mL (3 mL) inpn 60 Units by SubCUTAneous route daily. 6 Pen 1    Insulin Needles, Disposable, (PEN NEEDLE) 29 gauge x 1/2\" ndle 1 Each by Does Not Apply route four (4) times daily. 120 Pen Needle 1    ondansetron hcl (ZOFRAN, AS HYDROCHLORIDE,) 8 mg tablet Take 1 Tab by mouth every eight (8) hours as needed for Nausea. 20 Tab 0    metFORMIN (GLUCOPHAGE) 1,000 mg tablet Take 1 Tab by mouth two (2) times daily (with meals). 60 Tab 1    albuterol (PROAIR HFA) 90 mcg/actuation inhaler Take 2 Puffs by inhalation every four (4) hours as needed for Wheezing.  ergocalciferol (VITAMIN D2) 50,000 unit capsule Take 50,000 Units by mouth every seven (7) days.  sertraline (ZOLOFT) 25 mg tablet Take 1 Tab by mouth nightly. 30 Tab 2    lisinopril (PRINIVIL, ZESTRIL) 10 mg tablet Take 1 Tab by mouth daily.  30 Tab 2       Past History     Past Medical History:  Past Medical History:   Diagnosis Date    Calculus of kidney     Diabetes ([facility] [address]     Hyperlipidemia     Hypertension     MI (myocardial infarction)     Other ill-defined conditions(799.89)     Neuropathies       Past Surgical History:  Past Surgical History:   Procedure Laterality Date    CARDIAC SURG PROCEDURE UNLIST      cath    CYSTOSCOPY      HX  SECTION      x 2    HX CHOLECYSTECTOMY      HX GYN      tubes clamped    HX TONSIL AND ADENOIDECTOMY      HX WISDOM TEETH EXTRACTION         Family History:  Family History   Problem Relation Age of Onset    Hypertension Mother     Stroke Mother     Diabetes Mother  Heart Disease Father     Diabetes Father        Social History:  Social History   Substance Use Topics    Smoking status: Former Smoker     Packs/day: 0.50    Smokeless tobacco: Never Used    Alcohol use No      Comment: once per year       Allergies: Allergies   Allergen Reactions    Toradol [Ketorolac Tromethamine] Unknown (comments)     Seizure like symptoms per pt.  Augmentin [Amoxicillin-Pot Clavulanate] Swelling    Demerol [Meperidine] Swelling    Heparin Hives    Ibuprofen Diarrhea and Nausea and Vomiting     Muscle tightness    Keflex [Cephalexin] Shortness of Breath    Morphine Swelling    Nubain [Nalbuphine] Hives    Pcn [Penicillins] Swelling    Sulfa (Sulfonamide Antibiotics) Unknown (comments)         Review of Systems   Review of Systems   Constitutional: Positive for fever. Negative for chills. HENT: Negative for sore throat. Respiratory: Negative for cough and shortness of breath. Cardiovascular: Negative for chest pain. Gastrointestinal: Positive for nausea and vomiting. Negative for constipation and diarrhea. Genitourinary: Negative for dysuria. Musculoskeletal: Negative for back pain. Positive for swelling, erythema and pain to left breast   Skin: Negative for rash. Neurological: Positive for weakness. Negative for numbness and headaches. Psychiatric/Behavioral: Negative for confusion. All other systems reviewed and are negative. Physical Exam   Physical Exam   Constitutional: She is oriented to person, place, and time. She appears well-developed and well-nourished. HENT:   Head: Normocephalic and atraumatic. Eyes: Conjunctivae and EOM are normal.   Neck: Normal range of motion. Neck supple. Cardiovascular: Normal rate and regular rhythm. Pulmonary/Chest: Effort normal and breath sounds normal. No respiratory distress. She exhibits tenderness. Chest is exquisitely tender to palpation   Abdominal: Soft. She exhibits no distension. There is no tenderness. Musculoskeletal: Normal range of motion. Spontaneous and normal ROM of all extremities    Neurological: She is alert and oriented to person, place, and time. Skin: Skin is warm and dry. There is erythema. Large area of erythema over the mid sternal chest with induration and fluctuance    Psychiatric: She has a normal mood and affect. Nursing note and vitals reviewed. Diagnostic Study Results     Labs -     Recent Results (from the past 12 hour(s))   GLUCOSE, POC    Collection Time: 11/28/17  6:10 PM   Result Value Ref Range    Glucose (POC) 419 (H) 65 - 100 mg/dL    Performed by Monitor    CBC WITH AUTOMATED DIFF    Collection Time: 11/28/17  6:21 PM   Result Value Ref Range    WBC 9.9 3.6 - 11.0 K/uL    RBC 4.36 3.80 - 5.20 M/uL    HGB 12.0 11.5 - 16.0 g/dL    HCT 35.0 35.0 - 47.0 %    MCV 80.3 80.0 - 99.0 FL    MCH 27.9 26.0 - 34.0 PG    MCHC 34.8 30.0 - 36.5 g/dL    RDW 14.6 (H) 11.5 - 14.5 %    PLATELET 912 (L) 970 - 400 K/uL    NEUTROPHILS 79 (H) 32 - 75 %    LYMPHOCYTES 9 (L) 12 - 49 %    MONOCYTES 11 5 - 13 %    EOSINOPHILS 1 0 - 7 %    BASOPHILS 0 0 - 1 %    ABS. NEUTROPHILS 7.8 1.8 - 8.0 K/UL    ABS. LYMPHOCYTES 0.9 0.8 - 3.5 K/UL    ABS. MONOCYTES 1.1 (H) 0.0 - 1.0 K/UL    ABS. EOSINOPHILS 0.1 0.0 - 0.4 K/UL    ABS.  BASOPHILS 0.0 0.0 - 0.1 K/UL    RBC COMMENTS ANISOCYTOSIS  1+        WBC COMMENTS REACTIVE LYMPHS     LACTIC ACID    Collection Time: 11/28/17  6:21 PM   Result Value Ref Range    Lactic acid  0.4 - 2.0 MMOL/L     UNABLE TO OBTAIN ACCURATE RESULTS DUE TO GROSS LIPEMIA   METABOLIC PANEL, COMPREHENSIVE    Collection Time: 11/28/17  6:21 PM   Result Value Ref Range    Sodium 126 (L) 136 - 145 mmol/L    Potassium 4.2 3.5 - 5.1 mmol/L    Chloride 95 (L) 97 - 108 mmol/L    CO2 21 21 - 32 mmol/L    Anion gap 10 5 - 15 mmol/L    Glucose 454 (H) 65 - 100 mg/dL    BUN 11 6 - 20 MG/DL    Creatinine 0.79 0.55 - 1.02 MG/DL    BUN/Creatinine ratio 14 12 - 20 GFR est AA >60 >60 ml/min/1.73m2    GFR est non-AA >60 >60 ml/min/1.73m2    Calcium  8.5 - 10.1 MG/DL     UNABLE TO OBTAIN ACCURATE RESULTS DUE TO GROSS LIPEMIA    Bilirubin, total 0.9 0.2 - 1.0 MG/DL    ALT (SGPT)  12 - 78 U/L     UNABLE TO OBTAIN ACCURATE RESULTS DUE TO GROSS LIPEMIA    AST (SGOT) 95 (H) 15 - 37 U/L    Alk. phosphatase 65 45 - 117 U/L    Protein, total 6.8 6.4 - 8.2 g/dL    Albumin 2.5 (L) 3.5 - 5.0 g/dL    Globulin 4.3 (H) 2.0 - 4.0 g/dL    A-G Ratio 0.6 (L) 1.1 - 2.2     NUCLEATED RBC    Collection Time: 11/28/17  6:21 PM   Result Value Ref Range    NRBC 4.2 (H) 0  WBC    ABSOLUTE NRBC 0.37 (H) 0.00 - 0.01 K/uL   GLUCOSE, POC    Collection Time: 11/28/17  7:10 PM   Result Value Ref Range    Glucose (POC) 347 (H) 65 - 100 mg/dL    Performed by 2 Aden Craig, POC    Collection Time: 11/28/17  8:50 PM   Result Value Ref Range    Glucose (POC) 320 (H) 65 - 100 mg/dL    Performed by Vandana Hale        Radiologic Studies -   CT Results  (Last 48 hours)               11/28/17 2117  CT CHEST W CONT Final result    Impression:  IMPRESSION:   3.9 x 4.9 x 4.4 cm subcutaneous abscess anterior chest wall. No underlying bony   involvement       18 mm right upper paratracheal node likely reactive. Recommend follow-up in 3   months to ensure resolution           Narrative:  INDICATION: Chest wall abscess       COMPARISON: October 6, 2017       TECHNIQUE:  Following the uneventful intravenous administration of 80 cc   Isovue-300, 5 mm axial images were obtained through the chest. Coronal and   sagittal reconstructions were generated. CT dose reduction was achieved through   use of a standardized protocol tailored for this examination and automatic   exposure control for dose modulation. FINDINGS:       THYROID: No nodule. MEDIASTINUM: There is an 18 x 17 mm right upper paratracheal node   JANAE: No mass or lymphadenopathy. THORACIC AORTA: No dissection or aneurysm.    MAIN PULMONARY ARTERY: Normal in caliber. TRACHEA/BRONCHI: Patent. ESOPHAGUS: No wall thickening or dilatation. HEART: Normal in size. PLEURA: No effusion or pneumothorax. LUNGS: No nodule, mass, or airspace disease. INCIDENTALLY IMAGED UPPER ABDOMEN: No focal abnormality. BONES: No destructive bone lesion. In the anterior chest subcutaneous tissues there is a 3.9 x 4.9 x 4.4 cm   irregular subcutaneous low-density lesion with edema in the adjacent soft   tissues and thickening of the adjacent skin. Medical Decision Making   I am the first provider for this patient. I reviewed the vital signs, available nursing notes, past medical history, past surgical history, family history and social history. Vital Signs-Reviewed the patient's vital signs. Patient Vitals for the past 12 hrs:   Temp Pulse Resp BP SpO2   11/28/17 2101 98.2 °F (36.8 °C) 92 19 (!) 144/92 99 %   11/28/17 1749 99.6 °F (37.6 °C) 97 14 (!) 147/96 97 %       Pulse Oximetry Analysis - 99% on room air    Records Reviewed: Nursing Notes, Previous Radiology Studies and Previous Laboratory Studies    Provider Notes (Medical Decision Making):   Patient presents with skin erythema and warmth of the chest/breast. DDx: cellulitis, abscess, osteomyelitis, necrotizing fascitis, folliculitis. Will get labs and possibly imaging. Given patient is a type I diabetic with significant erythema and swelling, will get CT chest to evaluate abscess. ED Course:   Initial assessment performed. The patients presenting problems have been discussed, and they are in agreement with the care plan formulated and outlined with them. I have encouraged them to ask questions as they arise throughout their visit. CONSULT NOTE:   10:09 PM  Josh Zee MD spoke with Melisa Ca. Estrellita Larsen MD,   Specialty: Breast Surgery  Discussed pt's hx, disposition, and available diagnostic and imaging results. Reviewed care plans.  Consultant agrees with plans as outlined. Consultant says to do I&D in the ED, give patient antibiotics and have her follow up in the office tomorrow. Written by Laquita Sandoval. MARLON Felizibe, as dictated by Cleopatra Hodge MD.    PROGRESS NOTE:  10:10 PM  Pt's labs show no evidence of DKA or sepsis. Written by Laquita Sandoval. MARLON Felizibe, as dictated by Cleopatra Hodge MD    PROGRESS NOTE:  10:53 PM  Karla Enriquez PA-C did I&D and patient was able to express a large amount of purulent drainage. Written by Laquita Sandoval. MARLON Felizibe, as dictated by Cleopatra Hodge MD.    Procedure Note - Incision and Drainage:   10:53 PM  Performed by: Karla Enriquez PA-C  Complexity: complex  Skin prepped with Betadine. Sterile field established. Anesthesia achieved with 3 mLs of Lidocaine 1% with epinephrine using a local infiltration. Abscess to chest was incised with # 11 blade, and 50 mLs of purulent drainage was expressed. Wound probed and irrigated. Area was packed using 1 inch iodoform gauze. Sterile dressing applied. Estimated blood loss: 5 mL  The procedure took 16-30 minutes, and pt tolerated well. Written by MARLON Gruber, as dictated by Karla Enriquez PA-C. Critical Care Time:   0 minutes    Disposition:  DISCHARGE NOTE:  10:56 PM  The patient's results have been reviewed with family and/or caregiver. They verbally convey their understanding and agreement of the patient's signs, symptoms, diagnosis, treatment, and prognosis. They additionally agree to follow up as recommended in the discharge instructions or to return to the Emergency Room should the patient's condition change prior to their follow-up appointment. The family and/or caregiver verbally agrees with the care-plan and all of their questions have been answered.  The discharge instructions have also been provided to the them along with educational information regarding the patient's diagnosis and a list of reasons why the patient would want to return to the ER prior to their follow-up appointment should their condition change. Written by Janessa Granados. MARLON Carl, as dictated by Luis Felipe Stephens MD.    PLAN:  1. Discharge Medication List as of 11/28/2017 10:47 PM      START taking these medications    Details   clindamycin (CLEOCIN) 300 mg capsule Take 1 Cap by mouth four (4) times daily. , Normal, Disp-40 Cap, R-0      HYDROcodone-acetaminophen (NORCO) 5-325 mg per tablet Take 1 Tab by mouth every six (6) hours as needed for Pain. Max Daily Amount: 4 Tabs., Print, Disp-12 Tab, R-0         CONTINUE these medications which have NOT CHANGED    Details   atorvastatin (LIPITOR) 40 mg tablet Take 1 Tab by mouth nightly. , Print, Disp-30 Tab, R-1      famotidine (PEPCID) 20 mg tablet Take 1 Tab by mouth two (2) times a day., Print, Disp-60 Tab, R-1      fenofibrate nanocrystallized (TRICOR) 145 mg tablet Take 1 Tab by mouth daily. , Print, Disp-30 Tab, R-1      folic acid (FOLVITE) 1 mg tablet Take 1 Tab by mouth daily. , Print, Disp-30 Tab, R-1      omega-3 acid ethyl esters (LOVAZA) 1 gram capsule Take 2 Caps by mouth two (2) times a day., Print, Disp-120 Cap, R-1      insulin glargine (LANTUS SOLOSTAR) 100 unit/mL (3 mL) inpn 60 Units by SubCUTAneous route daily. , Print, Disp-6 Pen, R-1      Insulin Needles, Disposable, (PEN NEEDLE) 29 gauge x 1/2\" ndle 1 Each by Does Not Apply route four (4) times daily. , Print, Disp-120 Pen Needle, R-1      ondansetron hcl (ZOFRAN, AS HYDROCHLORIDE,) 8 mg tablet Take 1 Tab by mouth every eight (8) hours as needed for Nausea. , Print, Disp-20 Tab, R-0      metFORMIN (GLUCOPHAGE) 1,000 mg tablet Take 1 Tab by mouth two (2) times daily (with meals). , Print, Disp-60 Tab, R-1      albuterol (PROAIR HFA) 90 mcg/actuation inhaler Take 2 Puffs by inhalation every four (4) hours as needed for Wheezing., Historical Med      ergocalciferol (VITAMIN D2) 50,000 unit capsule Take 50,000 Units by mouth every seven (7) days. , Historical Med sertraline (ZOLOFT) 25 mg tablet Take 1 Tab by mouth nightly., Normal, Disp-30 Tab, R-2      lisinopril (PRINIVIL, ZESTRIL) 10 mg tablet Take 1 Tab by mouth daily. , Normal, Disp-30 Tab, R-2           2. Follow-up Information     Follow up With Details Comments Contact Info    Blane Perez MD Schedule an appointment as soon as possible for a visit in 1 day  [address] [place] [address]  [phone]          Return to ED if worse     Diagnosis     Clinical Impression:   1. Abscess of chest wall    2. Hyperglycemia due to type 1 diabetes mellitus ([facility] [address]        Attestations: This note is prepared by Esteban Joyner, acting as Scribe for Elizabeth Weir MD.    Elizabeth Weir MD: The scribe's documentation has been prepared under my direction and personally reviewed by me in its entirety. I confirm that the note above accurately reflects all work, treatment, procedures, and medical decision making performed by me.

## 2017-11-29 NOTE — TELEPHONE ENCOUNTER
You can offer her 12:10 tomorrow and if this doesn't work, you can put her on a Tuesday morning at 8:50am sometime in the next 1-2 months.

## 2017-11-30 ENCOUNTER — OFFICE VISIT (OUTPATIENT)
Dept: ENDOCRINOLOGY | Age: 37
End: 2017-11-30

## 2017-11-30 ENCOUNTER — TELEPHONE (OUTPATIENT)
Dept: ENDOCRINOLOGY | Age: 37
End: 2017-11-30

## 2017-11-30 VITALS
DIASTOLIC BLOOD PRESSURE: 73 MMHG | HEART RATE: 92 BPM | WEIGHT: 268.4 LBS | HEIGHT: 68 IN | BODY MASS INDEX: 40.68 KG/M2 | SYSTOLIC BLOOD PRESSURE: 135 MMHG

## 2017-11-30 DIAGNOSIS — E66.01 OBESITY, MORBID (HCC): ICD-10-CM

## 2017-11-30 DIAGNOSIS — E78.5 HYPERLIPIDEMIA LDL GOAL <100: ICD-10-CM

## 2017-11-30 DIAGNOSIS — I10 ESSENTIAL HYPERTENSION: ICD-10-CM

## 2017-11-30 DIAGNOSIS — E55.9 VITAMIN D DEFICIENCY: ICD-10-CM

## 2017-11-30 RX ORDER — METFORMIN HYDROCHLORIDE 1000 MG/1
1000 TABLET ORAL 2 TIMES DAILY WITH MEALS
Qty: 60 TAB | Refills: 11 | Status: SHIPPED | OUTPATIENT
Start: 2017-11-30 | End: 2018-08-23 | Stop reason: SDUPTHER

## 2017-11-30 RX ORDER — FENOFIBRATE 145 MG/1
145 TABLET, COATED ORAL DAILY
Qty: 30 TAB | Refills: 11 | Status: SHIPPED | OUTPATIENT
Start: 2017-11-30 | End: 2018-08-23 | Stop reason: SDUPTHER

## 2017-11-30 RX ORDER — LISINOPRIL 10 MG/1
10 TABLET ORAL DAILY
Qty: 30 TAB | Refills: 11 | Status: SHIPPED | OUTPATIENT
Start: 2017-11-30 | End: 2018-05-17

## 2017-11-30 RX ORDER — ERGOCALCIFEROL 1.25 MG/1
50000 CAPSULE ORAL
Qty: 4 CAP | Refills: 11 | Status: SHIPPED | OUTPATIENT
Start: 2017-11-30 | End: 2018-07-09

## 2017-11-30 RX ORDER — PEN NEEDLE, DIABETIC 30 GX3/16"
NEEDLE, DISPOSABLE MISCELLANEOUS
Qty: 100 PEN NEEDLE | Refills: 11 | Status: SHIPPED | OUTPATIENT
Start: 2017-11-30 | End: 2018-05-17

## 2017-11-30 RX ORDER — OMEGA-3-ACID ETHYL ESTERS 1 G/1
2 CAPSULE, LIQUID FILLED ORAL 2 TIMES DAILY
Qty: 120 CAP | Refills: 11 | Status: SHIPPED | OUTPATIENT
Start: 2017-11-30 | End: 2018-08-23 | Stop reason: SDUPTHER

## 2017-11-30 RX ORDER — ATORVASTATIN CALCIUM 40 MG/1
40 TABLET, FILM COATED ORAL
Qty: 30 TAB | Refills: 11 | Status: SHIPPED | OUTPATIENT
Start: 2017-11-30 | End: 2018-08-23 | Stop reason: SDUPTHER

## 2017-11-30 RX ORDER — GABAPENTIN 300 MG/1
600 CAPSULE ORAL 3 TIMES DAILY
Qty: 180 CAP | Refills: 11 | Status: SHIPPED | OUTPATIENT
Start: 2017-11-30 | End: 2018-08-23 | Stop reason: SDUPTHER

## 2017-11-30 RX ORDER — INSULIN GLARGINE 100 [IU]/ML
INJECTION, SOLUTION SUBCUTANEOUS
Qty: 45 ML | Refills: 11 | Status: ON HOLD | OUTPATIENT
Start: 2017-11-30 | End: 2018-01-01

## 2017-11-30 NOTE — PROGRESS NOTES
Chief Complaint   Patient presents with    Diabetes     pcp and pharmacy confirmed    Diabetic Foot Exam     due    Other     eye exam is due     History of Present Illness: Carlos Muro is a 40 y.o. female here for follow up of diabetes. Weight down 4 lbs since hospital stay in 10/17 when I saw her for an initial impatient consult. She was supposed to come on 10/26/17 and missed this appointment and called yesterday to reschedule and I'm seeing her today. She has been taking 60 of lantus at night since discharge and worked up on the metformin to a whole tab bid and hasn't missed any insulin or metformin. Initially when her appetite wasn't as good her sugars were staying down but when she started eating more regular food, her sugars went up into the 200-300s but most recently has developed a left breast abscess and has seen sugars in the 400-550 range over the past few days. Was seen in the ER on 11/28/17 and her sugar was 454 that day. Was started on clindamycin and underwent I&D and was told to f/u with Dr. Riggs Crew office but hasn't heard back yet from them about a visit so I sent him a staff message through [facility] today to try and get this scheduled. No abd pain or n/v. Trying not to eat any fried foods and baking all her foods. Previously was treated for vitamin D deficiency and hasn't been on this in the past 2 years. Has noticed a rash developing on her elbows and forearms since her sugars have been higher that looks like xanthomas from her high cholesterol as her lab specimen from the ER was lipemic. States she has been compliant with her BP and lipid regimen. Current Outpatient Prescriptions   Medication Sig    clindamycin (CLEOCIN) 300 mg capsule Take 1 Cap by mouth four (4) times daily.  HYDROcodone-acetaminophen (NORCO) 5-325 mg per tablet Take 1 Tab by mouth every six (6) hours as needed for Pain. Max Daily Amount: 4 Tabs.     atorvastatin (LIPITOR) 40 mg tablet Take 1 Tab by mouth nightly.  famotidine (PEPCID) 20 mg tablet Take 1 Tab by mouth two (2) times a day.  fenofibrate nanocrystallized (TRICOR) 145 mg tablet Take 1 Tab by mouth daily.  folic acid (FOLVITE) 1 mg tablet Take 1 Tab by mouth daily.  omega-3 acid ethyl esters (LOVAZA) 1 gram capsule Take 2 Caps by mouth two (2) times a day.  insulin glargine (LANTUS SOLOSTAR) 100 unit/mL (3 mL) inpn 60 Units by SubCUTAneous route daily.  Insulin Needles, Disposable, (PEN NEEDLE) 29 gauge x 1/2\" ndle 1 Each by Does Not Apply route four (4) times daily.  ondansetron hcl (ZOFRAN, AS HYDROCHLORIDE,) 8 mg tablet Take 1 Tab by mouth every eight (8) hours as needed for Nausea.  metFORMIN (GLUCOPHAGE) 1,000 mg tablet Take 1 Tab by mouth two (2) times daily (with meals).  albuterol (PROAIR HFA) 90 mcg/actuation inhaler Take 2 Puffs by inhalation every four (4) hours as needed for Wheezing.  sertraline (ZOLOFT) 25 mg tablet Take 1 Tab by mouth nightly.  lisinopril (PRINIVIL, ZESTRIL) 10 mg tablet Take 1 Tab by mouth daily.  glucose blood VI test strips (ONETOUCH VERIO) strip 4 times/day    Blood-Glucose Meter (ONETOUCH VERIO FLEX) misc 4 times/day     No current facility-administered medications for this visit. Allergies   Allergen Reactions    Toradol [Ketorolac Tromethamine] Unknown (comments)     Seizure like symptoms per pt.     Augmentin [Amoxicillin-Pot Clavulanate] Swelling    Demerol [Meperidine] Swelling    Heparin Hives    Ibuprofen Diarrhea and Nausea and Vomiting     Muscle tightness    Keflex [Cephalexin] Shortness of Breath    Morphine Swelling    Nubain [Nalbuphine] Hives    Pcn [Penicillins] Swelling    Sulfa (Sulfonamide Antibiotics) Unknown (comments)     Review of Systems:  - Eyes: no blurry vision or double vision  - Cardiovascular: no chest pain  - Respiratory: no shortness of breath  - Musculoskeletal: no myalgias  - Neurological: no numbness/tingling in extremities    Physical Examination:  Blood pressure 135/73, pulse 92, height 5' 8\" (1.727 m), weight 268 lb 6.4 oz (121.7 kg). - General: pleasant, no distress, good eye contact   - Neck: no carotid bruits  - Cardiovascular: regular, normal rate, nl s1 and s2, no m/r/g,   - Respiratory: clear bilaterally  - Integumentary: no edema,   - Psychiatric: normal mood and affect         Diabetic foot exam performed by Reva Zhong MD     Measurement  Response Nurse Comment Physician Comment   Monofilament  R - reduced sensation with micro filament  L - reduced sensation with micro filament     Pulse DP R - 2+ (normal)  L - 2+ (normal)     Pulse TP R - decreased  L - decreased     Structural deformity R - None  L - None     Skin Integrity / Deformity R - Mild - callus  L - Mild - callus        Reviewed by:                 Data Reviewed: Component      Latest Ref Rng & Units 11/28/2017           6:21 PM   Sodium      136 - 145 mmol/L 126 (L)   Potassium      3.5 - 5.1 mmol/L 4.2   Chloride      97 - 108 mmol/L 95 (L)   CO2      21 - 32 mmol/L 21   Anion gap      5 - 15 mmol/L 10   Glucose      65 - 100 mg/dL 454 (H)   BUN      6 - 20 MG/DL 11   Creatinine      0.55 - 1.02 MG/DL 0.79   BUN/Creatinine ratio      12 - 20   14   GFR est AA      >60 ml/min/1.73m2 >60   GFR est non-AA      >60 ml/min/1.73m2 >60   Calcium      8.5 - 10.1 MG/DL UNABLE TO OBTAIN ACCURATE RESULTS DUE TO GROSS LIPEMIA   Bilirubin, total      0.2 - 1.0 MG/DL 0.9   ALT (SGPT)      12 - 78 U/L UNABLE TO OBTAIN ACCURATE RESULTS DUE TO GROSS LIPEMIA   AST      15 - 37 U/L 95 (H)   Alk. phosphatase      45 - 117 U/L 65   Protein, total      6.4 - 8.2 g/dL 6.8   Albumin      3.5 - 5.0 g/dL 2.5 (L)   Globulin      2.0 - 4.0 g/dL 4.3 (H)   A-G Ratio      1.1 - 2.2   0.6 (L)       Assessment/Plan:    1.  Uncontrolled type 2 diabetes mellitus with diabetic polyneuropathy, with long-term current use of insulin ([address]): her most recent Hgb A1c was was 10.9% in 10/17.  She was very insulin resistant and did best while on U-500 in the past but this may not be necessary going forward given she has lost a lot of weight. I'm concerned she is not on enough basal insulin as her sugars have been very uncontrolled since discharge from the hospital when going back to a regular diet and this may have led to her developing a breast abscess and now the infection is making it even more difficult to control her sugars. I will add a morning dose of lantus and gave her titration parameters as below. Will plan on avoiding GLP-1 and DPP-IV agents as these can induce pancreatitis and she has already had several episodes of pancreatitis from high TGs.  - increase lantus to 40 units in the morning and 60 units at night  - cont metformin 1g bid   - check bs 4 times per day due to fluctuating sugars  - foot exam done 11/17  - due for eye exam  - check Hgb A1c, cmp, and microalbumin prior to next visit       2. Essential hypertension: her BP was at goal < 140/90  - cont lisinopril 10 mg daily        3. Hyperlipidemia LDL goal <100:  and TG > 4000 during inpatient stay in 10/17 when she require plasmapheresis and insulin drip to treat the high TGs. Part of her severely elevated TGs is relative deficiency of lipoprotein lipase which occurs with severe hyperglycemia as insulin is needed for this enzyme to work to cleave TGs. Therefore, with better blood sugar control, her TGs should improve. - cont lipitor 40 mg daily  - cont fenofibrate 145 mg daily  - cont lovaza 2 caps bid  - check lipids prior to next visit          4. Vitamin D deficiency: recalls being told her level was very low in the past and needed supplementation with high dose vitamin D.  - begin ergocalciferol 50K units weekly  - check Vitamin D 25-OH level prior to next visit    5.   Morbid Obesity: will screen for hypothyroidism again as previously she had a TSH of 4.28 in 12/10 during a hospital stay but was back down to 2.51 in 3/11 and hasn't been checked since then. May consider phentermine +/- topamax in the future. - check TSH prior to next visit          Patient Instructions   1) Go home and take 40 units of lantus now and then continue 60 units at night and starting tomorrow try 40 in the morning and 60 at night for the next 4 days. If your morning sugars are still over 150 on Monday morning, then try 50 of lantus in the morning and 60 at night for another 4 days and if still over 150, then try 60 twice daily. Be in touch with me by the end of next week if your sugars are still over 150 despite taking 60 units twice daily. 2) I sent refills for all your meds to [facility] including the once a week vitamin D so get back on this. 3) Sign up for [employer]. I will send you a reminder e-mail 3-4 weeks prior to your next visit and you will have the order already in the [employer] system so you can just go sometime in the 3-7 days before the next visit to have your labs drawn.           Follow-up Disposition:  Return for 1/11/18 at 12:10pm.    Copy sent to:  Dr. Juany Aguirre via [facility]  Dr. Aniceto Levy via [facility]

## 2017-11-30 NOTE — MR AVS SNAPSHOT
Visit Information Date & Time Provider Department Dept. Phone Encounter #  
 11/30/2017 12:10 PM Colvin Gaucher, [address] [facility] Diabetes and Endocrinology [phone] [id number] Follow-up Instructions Return for 1/11/18 at 12:10pm.  
  
Your Appointments 12/5/2017 11:45 AM  
ROUTINE CARE with Cher Martinez MD  
[facility] Internal Medicine [facility] MED [facility]) Appt Note: f/up Ellen Santana [address] [place] [address]  
[phone]  
  
   
 [facility] [id number]  
  
    
 2/20/2018 11:00 AM  
New Patient with MD Manda Howe [facility]) Appt Note: Np establish care [address] [facility] [address] [phone]  
  
   
 [address] [facility] [address] [address] Upcoming Health Maintenance Date Due  
 FOOT EXAM Q1 8/26/1990 EYE EXAM RETINAL OR DILATED Q1 8/26/1990 DTaP/Tdap/Td series (1 - Tdap) 8/26/2001 PAP AKA CERVICAL CYTOLOGY 8/26/2001 HEMOGLOBIN A1C Q6M 4/7/2018 MICROALBUMIN Q1 10/3/2018 LIPID PANEL Q1 10/6/2018 Allergies as of 11/30/2017  Review Complete On: 11/30/2017 By: Colvin Gaucher, MD  
  
 Severity Noted Reaction Type Reactions Toradol [Ketorolac Tromethamine] High 01/25/2011   Systemic Unknown (comments) Seizure like symptoms per pt. Augmentin [Amoxicillin-pot Clavulanate]  10/03/2017    Swelling Demerol [Meperidine]  10/01/2017    Swelling Heparin  10/05/2017    Hives Ibuprofen  01/25/2011    Diarrhea, Nausea and Vomiting Muscle tightness Keflex [Cephalexin]  03/07/2011    Shortness of Breath Morphine  10/01/2017    Swelling Nubain [Nalbuphine]  12/12/2010    Hives Pcn [Penicillins]  12/12/2010    Swelling Sulfa (Sulfonamide Antibiotics)  12/12/2010    Unknown (comments) Current Immunizations  Reviewed on 10/3/2017 Name Date Influenza Vaccine (Quad) PF 10/3/2017 Influenza Vaccine Split 12/14/2010  6:36 PM  
 ZZZ-RETIRED (DO NOT USE) Pneumococcal Vaccine (Unspecified Type) 10/1/2007 Not reviewed this visit You Were Diagnosed With   
  
 Codes Comments IDDM (insulin dependent diabetes mellitus) ([facility]     ICD-10-CM: E11.9, Z79.4 ICD-9-CM: 250.00, V58.67 Essential hypertension     ICD-10-CM: I10 
ICD-9-CM: 401.9 Vitals BP Pulse Height(growth percentile) Weight(growth percentile) BMI OB Status 135/73 92 5' 8\" (1.727 m) 268 lb 6.4 oz (121.7 kg) 40.81 kg/m2 Having regular periods Smoking Status Former Smoker Vitals History BMI and BSA Data Body Mass Index Body Surface Area  
 40.81 kg/m 2 2.42 m 2 Preferred Pharmacy Pharmacy Name [facility] [address] [phone] Your Updated Medication List  
  
   
This list is accurate as of: 11/30/17  1:34 PM.  Always use your most recent med list.  
  
  
  
  
 atorvastatin 40 mg tablet Commonly known as:  LIPITOR Take 1 Tab by mouth nightly. Blood-Glucose Meter Misc Commonly known as:  ONETOUCH VERIO FLEX  
4 times/day  
  
 clindamycin 300 mg capsule Commonly known as:  CLEOCIN Take 1 Cap by mouth four (4) times daily. ergocalciferol 50,000 unit capsule Commonly known as:  VITAMIN D2 Take 1 Cap by mouth every seven (7) days. famotidine 20 mg tablet Commonly known as:  PEPCID Take 1 Tab by mouth two (2) times a day. fenofibrate nanocrystallized 145 mg tablet Commonly known as:  [employer] Take 1 Tab by mouth daily. folic acid 1 mg tablet Commonly known as:  [employer] Take 1 Tab by mouth daily. gabapentin 300 mg capsule Commonly known as:  NEURONTIN Take 2 Caps by mouth three (3) times daily. glucose blood VI test strips strip Commonly known as:  ONETOUCH VERIO  
4 times/day HYDROcodone-acetaminophen 5-325 mg per tablet Commonly known as:  Yoshi Bennett Take 1 Tab by mouth every six (6) hours as needed for Pain. Max Daily Amount: 4 Tabs. insulin glargine 100 unit/mL (3 mL) Inpn Commonly known as:  LANTUS SOLOSTAR Inject 40 units in the morning and 60 units at night and increase as directed up to a max of 150 units per day Insulin Needles (Disposable) 31 gauge x 5/16\" Ndle Commonly known as:  BD INSULIN PEN NEEDLE UF SHORT Use as directed twice daily  
  
 lisinopril 10 mg tablet Commonly known as:  Giuliana Loss Take 1 Tab by mouth daily. metFORMIN 1,000 mg tablet Commonly known as:  GLUCOPHAGE Take 1 Tab by mouth two (2) times daily (with meals). omega-3 acid ethyl esters 1 gram capsule Commonly known as:  Bethene [place] Take 2 Caps by mouth two (2) times a day. ondansetron hcl 8 mg tablet Commonly known as:  ZOFRAN (AS HYDROCHLORIDE) Take 1 Tab by mouth every eight (8) hours as needed for Nausea. PROAIR HFA 90 mcg/actuation inhaler Generic drug:  albuterol Take 2 Puffs by inhalation every four (4) hours as needed for Wheezing. sertraline 25 mg tablet Commonly known as:  ZOLOFT Take 1 Tab by mouth nightly. Prescriptions Sent to Pharmacy Refills  
 insulin glargine (LANTUS SOLOSTAR) 100 unit/mL (3 mL) inpn 11 Sig: Inject 40 units in the morning and 60 units at night and increase as directed up to a max of 150 units per day Class: Normal  
 Pharmacy: [address] Ph #: [phone] Insulin Needles, Disposable, (BD INSULIN PEN NEEDLE UF SHORT) 31 gauge x 5/16\" ndle 11 Sig: Use as directed twice daily Class: Normal  
 Pharmacy: [address] [facility] Ph #: [phone]  
 atorvastatin (LIPITOR) 40 mg tablet 11 Sig: Take 1 Tab by mouth nightly.   
 Class: Normal  
 Pharmacy: [address] [facility] Ph #: [phone] Route: Oral  
 fenofibrate nanocrystallized (TRICOR) 145 mg tablet 11 Sig: Take 1 Tab by mouth daily. Class: Normal  
 Pharmacy: [facility] [address] [place] [address] Ph #: [phone] Route: Oral  
 omega-3 acid ethyl esters (LOVAZA) 1 gram capsule 11 Sig: Take 2 Caps by mouth two (2) times a day. Class: Normal  
 Pharmacy: [facility] [address] [place] [address] Ph #: [phone] Route: Oral  
 metFORMIN (GLUCOPHAGE) 1,000 mg tablet 11 Sig: Take 1 Tab by mouth two (2) times daily (with meals). Class: Normal  
 Pharmacy: [address] [place] [address] [address] [place] [address] Ph #: [phone] Route: Oral  
 ergocalciferol (VITAMIN D2) 50,000 unit capsule 11 Sig: Take 1 Cap by mouth every seven (7) days. Class: Normal  
 Pharmacy: [facility] [address] [place] [address] Ph #: [phone] Route: Oral  
 lisinopril (PRINIVIL, ZESTRIL) 10 mg tablet 11 Sig: Take 1 Tab by mouth daily. Class: Normal  
 Pharmacy: [facility] [address] [place] [address] Ph #: [phone] Route: Oral  
 gabapentin (NEURONTIN) 300 mg capsule 11 Sig: Take 2 Caps by mouth three (3) times daily. Class: Normal  
 Pharmacy: [facility] [address] [place] [address] Ph #: [phone] Route: Oral  
  
Follow-up Instructions Return for 1/11/18 at 12:10pm.  
  
  
Patient Instructions 1) Go home and take 40 units of lantus now and then continue 60 units at night and starting tomorrow try 40 in the morning and 60 at night for the next 4 days. If your morning sugars are still over 150 on Monday morning, then try 50 of lantus in the morning and 60 at night for another 4 days and if still over 150, then try 60 twice daily. Be in touch with me by the end of next week if your sugars are still over 150 despite taking 60 units twice daily. 2) I sent refills for all your meds to [facility] including the once a week vitamin D so get back on this. 3) Sign up for [employer]. I will send you a reminder e-mail 3-4 weeks prior to your next visit and you will have the order already in the [facility] system so you can just go sometime in the 3-7 days before the next visit to have your labs drawn. Introducing [facility]! [facility] introduces [employer] patient portal. Now you can access parts of your medical record, email your doctor's office, and request medication refills online. 1. In your internet browser, go to [email or web address] 2. Click on the First Time User? Click Here link in the Sign In box. You will see the New Member Sign Up page. 3. Enter your [employer] Access Code exactly as it appears below. You will not need to use this code after youve completed the sign-up process. If you do not sign up before the expiration date, you must request a new code. · [employer] Access Code: [email or web address] Expires: 12/31/2017  3:30 AM 
 
4. Enter the last four digits of your Social Security Number (xxxx) and Date of Birth (mm/dd/yyyy) as indicated and click Submit. You will be taken to the next sign-up page. 5. Create a [employer] ID. This will be your [employer] login ID and cannot be changed, so think of one that is secure and easy to remember. 6. Create a [employer] password. You can change your password at any time. 7. Enter your Password Reset Question and Answer. This can be used at a later time if you forget your password. 8. Enter your e-mail address. You will receive e-mail notification when new information is available in [address] 9. Click Sign Up. You can now view and download portions of your medical record. 10. Click the Download Summary menu link to download a portable copy of your medical information.  
 
If you have questions, please visit the Frequently Asked Questions section of the [employer]. Remember, [employer] [facility] is NOT to be used for urgent needs. For medical emergencies, dial 911. Now available from your iPhone and Android! Please provide this summary of care documentation to your next provider. Your primary care clinician is listed as Christian Ca. If you have any questions after today's visit, please call [phone].

## 2017-11-30 NOTE — PATIENT INSTRUCTIONS
1) Go home and take 40 units of lantus now and then continue 60 units at night and starting tomorrow try 40 in the morning and 60 at night for the next 4 days. If your morning sugars are still over 150 on Monday morning, then try 50 of lantus in the morning and 60 at night for another 4 days and if still over 150, then try 60 twice daily. Be in touch with me by the end of next week if your sugars are still over 150 despite taking 60 units twice daily. 2) I sent refills for all your meds to [facility] including the once a week vitamin D so get back on this. 3) Sign up for [employer]. I will send you a reminder e-mail 3-4 weeks prior to your next visit and you will have the order already in the [employer] system so you can just go sometime in the 3-7 days before the next visit to have your labs drawn.

## 2017-11-30 NOTE — TELEPHONE ENCOUNTER
----- Message from Suman Lowry sent at 11/30/2017  2:24 PM EST -----  Regarding: RE: New patient  Apologies, not sure where the disconnect happened but I called her and added her on for 11am tomorrow at [address] [facility]. ----- Message -----     From: Alex Mccann MD     Sent: 11/30/2017   1:50 PM       To: Esther Ivan MD, #  Subject: RE: New patient                                  She told me she spoke with someone from your office and they said they would call her back but never heard from anyone so that's why I was reaching out directly. Thanks so very much. Take care and sorry for any confusion. Edwin Conde    ----- Message -----     From: Los Irwin MD     Sent: 11/30/2017   1:44 PM       To: Alex Mccann MD, Nancy Sruthi, #  Subject: New patient                                      Amado Dubin,    I talked to the PA that night and gave her my phone number. The instructions were for the patient to call the office at 8:30am and we would work her in on Wednesday. I was wondering why we never heard from her. Sorry for the confusion. We will call her and get her in asap. Cristo Hanley, Janis Adams or Otoniel Moody, can one of you call her and work her in tomorrow. Thanks.  ----- Message -----     From: Alex Mccann MD     Sent: 11/30/2017   1:10 PM       To: Los Irwin MD    This patient of mine was seen in the ER on 11/28 and was told she should follow up with your office for a breast abscess and has called to make an appointment and was told she would be called back but has yet to hear from your office so if your nurse could call her at [phone] as soon as possible that would be great.      Thanks,  Edwin Conde

## 2017-12-01 ENCOUNTER — OFFICE VISIT (OUTPATIENT)
Dept: SURGERY | Age: 37
End: 2017-12-01

## 2017-12-01 VITALS
HEART RATE: 72 BPM | BODY MASS INDEX: 40.62 KG/M2 | SYSTOLIC BLOOD PRESSURE: 120 MMHG | DIASTOLIC BLOOD PRESSURE: 62 MMHG | WEIGHT: 268 LBS | HEIGHT: 68 IN

## 2017-12-01 DIAGNOSIS — L02.91 ABSCESS: Primary | ICD-10-CM

## 2017-12-01 RX ORDER — HYDROCODONE BITARTRATE AND ACETAMINOPHEN 10; 325 MG/1; MG/1
1 TABLET ORAL
Qty: 25 TAB | Refills: 0 | Status: SHIPPED | OUTPATIENT
Start: 2017-12-01 | End: 2017-12-29

## 2017-12-01 RX ORDER — LEVOFLOXACIN 500 MG/1
500 TABLET, FILM COATED ORAL DAILY
Qty: 10 TAB | Refills: 0 | Status: SHIPPED | OUTPATIENT
Start: 2017-12-01 | End: 2017-12-11

## 2017-12-01 RX ORDER — DOXYCYCLINE 100 MG/1
100 TABLET ORAL 2 TIMES DAILY
Qty: 20 TAB | Refills: 0 | Status: SHIPPED | OUTPATIENT
Start: 2017-12-01 | End: 2017-12-11

## 2017-12-01 NOTE — PROGRESS NOTES
HISTORY OF PRESENT ILLNESS  Lyssa Hodgson is a 40 y.o. female. HPI  NEW patient referral for consultation by Dr. Dano Faustin for a LEFT breast abscess. The patient was seen in the ER 2017 where the abscess was I&D'd and packed. The patient has not changed the packing since 2017. She also started clindamycin and has had not much change in the abscess since she started antibiotics. She describes the area as red, painful to palpation 8/10 on pain scale, and draining copious amounts of a bloody drainage. She is out of pain medication. Family history - mother had breast cancer post menopausal and  from a fall. Paternal grandmother - ovarian cancer post menopausal    No imaging. Past Medical History:   Diagnosis Date    Calculus of kidney     Diabetes ([facility] [address]     Hyperlipidemia     Hypertension     MI (myocardial infarction)     Other ill-defined conditions(799.89)     Neuropathies    Pancreatitis     due to hypertriglyceridemia    Vitamin D deficiency 2017       Past Surgical History:   Procedure Laterality Date    CARDIAC SURG PROCEDURE UNLIST      cath    CYSTOSCOPY      HX  SECTION      x 2    HX CHOLECYSTECTOMY      HX GYN      tubes clamped    HX TONSIL AND ADENOIDECTOMY      HX WISDOM TEETH EXTRACTION         Social History     Social History    Marital status:      Spouse name: N/A    Number of children: N/A    Years of education: N/A     Occupational History    Unemployed      Social History Main Topics    Smoking status: Former Smoker     Packs/day: 0.50     Quit date: 2017    Smokeless tobacco: Never Used    Alcohol use No      Comment: once per year    Drug use: No    Sexual activity: Yes     Partners: Male     Other Topics Concern    Not on file     Social History Narrative    Patient is .        Current Outpatient Prescriptions on File Prior to Visit   Medication Sig Dispense Refill    insulin glargine (LANTUS SOLOSTAR) 100 unit/mL (3 mL) inpn Inject 40 units in the morning and 60 units at night and increase as directed up to a max of 150 units per day 45 mL 11    Insulin Needles, Disposable, (BD INSULIN PEN NEEDLE UF SHORT) 31 gauge x 5/16\" ndle Use as directed twice daily 100 Pen Needle 11    atorvastatin (LIPITOR) 40 mg tablet Take 1 Tab by mouth nightly. 30 Tab 11    fenofibrate nanocrystallized (TRICOR) 145 mg tablet Take 1 Tab by mouth daily. 30 Tab 11    omega-3 acid ethyl esters (LOVAZA) 1 gram capsule Take 2 Caps by mouth two (2) times a day. 120 Cap 11    metFORMIN (GLUCOPHAGE) 1,000 mg tablet Take 1 Tab by mouth two (2) times daily (with meals). 60 Tab 11    ergocalciferol (VITAMIN D2) 50,000 unit capsule Take 1 Cap by mouth every seven (7) days. 4 Cap 11    lisinopril (PRINIVIL, ZESTRIL) 10 mg tablet Take 1 Tab by mouth daily. 30 Tab 11    gabapentin (NEURONTIN) 300 mg capsule Take 2 Caps by mouth three (3) times daily. 180 Cap 11    glucose blood VI test strips (ONETOUCH VERIO) strip 4 times/day 200 Strip 11    Blood-Glucose Meter (ONETOUCH VERIO FLEX) misc 4 times/day 1 Each 0    clindamycin (CLEOCIN) 300 mg capsule Take 1 Cap by mouth four (4) times daily. 40 Cap 0    HYDROcodone-acetaminophen (NORCO) 5-325 mg per tablet Take 1 Tab by mouth every six (6) hours as needed for Pain. Max Daily Amount: 4 Tabs. 12 Tab 0    famotidine (PEPCID) 20 mg tablet Take 1 Tab by mouth two (2) times a day. 60 Tab 1    folic acid (FOLVITE) 1 mg tablet Take 1 Tab by mouth daily. 30 Tab 1    ondansetron hcl (ZOFRAN, AS HYDROCHLORIDE,) 8 mg tablet Take 1 Tab by mouth every eight (8) hours as needed for Nausea. 20 Tab 0    albuterol (PROAIR HFA) 90 mcg/actuation inhaler Take 2 Puffs by inhalation every four (4) hours as needed for Wheezing.  sertraline (ZOLOFT) 25 mg tablet Take 1 Tab by mouth nightly. 30 Tab 2     No current facility-administered medications on file prior to visit.         Allergies Allergen Reactions    Toradol [Ketorolac Tromethamine] Unknown (comments)     Seizure like symptoms per pt.  Augmentin [Amoxicillin-Pot Clavulanate] Swelling    Demerol [Meperidine] Swelling    Heparin Hives    Ibuprofen Diarrhea and Nausea and Vomiting     Muscle tightness    Keflex [Cephalexin] Shortness of Breath    Morphine Swelling    Nubain [Nalbuphine] Hives    Pcn [Penicillins] Swelling    Sulfa (Sulfonamide Antibiotics) Unknown (comments)       OB History      Para Term  AB Living    0         SAB TAB Ectopic Molar Multiple Live Births                 Obstetric Comments    Menarche: 12   LMP:11/15/2017  # of Children: 2. Age at Delivery of First Child 35. Hysterectomy/oophorectomy:  No/no. Breast Bx: no Hx of Breast Feeding: no  BCP: tubal ligation. Hormone therapy:none. ROS  Constitutional: Positive for malaise/fatigue. HENT: Negative. Eyes: Negative. Respiratory: Negative. Cardiovascular: Negative. Gastrointestinal: Positive for abdominal pain. Genitourinary: Negative. Musculoskeletal: Positive for back pain and myalgias. Skin: Negative. Neurological: Negative. Psychiatric/Behavioral: Negative. Physical Exam   Cardiovascular: Normal rate and normal heart sounds. Pulmonary/Chest: Breath sounds normal. Right breast exhibits no inverted nipple, no mass, no nipple discharge, no skin change and no tenderness. Left breast exhibits skin change. Left breast exhibits no inverted nipple, no mass, no nipple discharge and no tenderness. Breasts are symmetrical.       Lymphadenopathy:        Right cervical: No superficial cervical, no deep cervical and no posterior cervical adenopathy present. Left cervical: No superficial cervical, no deep cervical and no posterior cervical adenopathy present. Right axillary: No pectoral and no lateral adenopathy present. Left axillary: No pectoral and no lateral adenopathy present.     I & D  Indication : Abscess cavity left breast.   Prep : We cleaned the skin with alcohol. Anesthesia : We anesthetized with Xylocaine. Guidance : We used real-time ultrasound guidance. Technique : We made a nicking incision in the skin  Yield : None. Site has already been drained in ED  Result : This substantially decompressed the swelling. Dressing : We placed a clean dressing,Clean dry dressing applied. Tolerance : The patient tolerated the procedure well,The patient's pain was decreased at the completion of the procedure. Disposition : The patient was sent home in stable condition,The patient will be on antibiotic medication for more days,We will followup as noted in the Plan and Next Appointment. ASSESSMENT and PLAN    ICD-10-CM ICD-9-CM    1. Abscess L02.91 682.9      Pt presents with previously packed LEFT breast abscess cavity, likely related to infected sebaceous cyst. Incised and cleaned abscess cavity today without complications, which pt tolerated well. Repacked site afterwards and covered with dressing. Advised pt to repack site once a day. She states her mother-in-law can help her with this. Will switch pt's antibiotic rx to Doxycycline and Levaquin as well as rx Hydrocodone for pain. She will f/u with Dr. Gordo Worrell on Monday. This plan was reviewed with the patient and patient agrees. All questions were answered.     Written by Sabina Garza, as dictated by Dr. Sarahy Zavala MD.

## 2017-12-01 NOTE — PROGRESS NOTES
HISTORY OF PRESENT ILLNESS  Lyssa Hodgson is a 40 y.o. female. HPI NEW patient referral for consultation by Dr. Erwin Carrera for a LEFT breast abscess. The patient was seen in the ER 2017 where the abscess was I&D'd and packed. The patient has not changed the packing since 2017. She also started clindamycin and has had not much change in the abscess since she started antibiotics. She describes the area as red, painful to palpation 8/10 on pain scale, and draining copious amounts of a bloody drainage. She is out of pain medication. Family history - mother had breast cancer post menopausal and  from a fall. Paternal grandmother - ovarian cancer post menopausal  No imaging. Review of Systems   Constitutional: Positive for malaise/fatigue. HENT: Negative. Eyes: Negative. Respiratory: Negative. Cardiovascular: Negative. Gastrointestinal: Positive for abdominal pain. Genitourinary: Negative. Musculoskeletal: Positive for back pain and myalgias. Skin: Negative. Neurological: Negative. Psychiatric/Behavioral: Negative.         Physical Exam    ASSESSMENT and PLAN  {ASSESSMENT/PLAN:86948}

## 2017-12-01 NOTE — COMMUNICATION BODY
HISTORY OF PRESENT ILLNESS  Eris Hope is a 40 y.o. female. HPI  NEW patient referral for consultation by Dr. Jennie Cisse for a LEFT breast abscess. The patient was seen in the ER 2017 where the abscess was I&D'd and packed. The patient has not changed the packing since 2017. She also started clindamycin and has had not much change in the abscess since she started antibiotics. She describes the area as red, painful to palpation 8/10 on pain scale, and draining copious amounts of a bloody drainage. She is out of pain medication. Family history - mother had breast cancer post menopausal and  from a fall. Paternal grandmother - ovarian cancer post menopausal    No imaging. Past Medical History:   Diagnosis Date    Calculus of kidney     Diabetes ([facility] [address]     Hyperlipidemia     Hypertension     MI (myocardial infarction)     Other ill-defined conditions(799.89)     Neuropathies    Pancreatitis     due to hypertriglyceridemia    Vitamin D deficiency 2017       Past Surgical History:   Procedure Laterality Date    CARDIAC SURG PROCEDURE UNLIST      cath    CYSTOSCOPY      HX  SECTION      x 2    HX CHOLECYSTECTOMY      HX GYN      tubes clamped    HX TONSIL AND ADENOIDECTOMY      HX WISDOM TEETH EXTRACTION         Social History     Social History    Marital status:      Spouse name: N/A    Number of children: N/A    Years of education: N/A     Occupational History    Unemployed      Social History Main Topics    Smoking status: Former Smoker     Packs/day: 0.50     Quit date: 2017    Smokeless tobacco: Never Used    Alcohol use No      Comment: once per year    Drug use: No    Sexual activity: Yes     Partners: Male     Other Topics Concern    Not on file     Social History Narrative    Patient is .        Current Outpatient Prescriptions on File Prior to Visit   Medication Sig Dispense Refill    insulin glargine (LANTUS SOLOSTAR) 100 unit/mL (3 mL) inpn Inject 40 units in the morning and 60 units at night and increase as directed up to a max of 150 units per day 45 mL 11    Insulin Needles, Disposable, (BD INSULIN PEN NEEDLE UF SHORT) 31 gauge x 5/16\" ndle Use as directed twice daily 100 Pen Needle 11    atorvastatin (LIPITOR) 40 mg tablet Take 1 Tab by mouth nightly. 30 Tab 11    fenofibrate nanocrystallized (TRICOR) 145 mg tablet Take 1 Tab by mouth daily. 30 Tab 11    omega-3 acid ethyl esters (LOVAZA) 1 gram capsule Take 2 Caps by mouth two (2) times a day. 120 Cap 11    metFORMIN (GLUCOPHAGE) 1,000 mg tablet Take 1 Tab by mouth two (2) times daily (with meals). 60 Tab 11    ergocalciferol (VITAMIN D2) 50,000 unit capsule Take 1 Cap by mouth every seven (7) days. 4 Cap 11    lisinopril (PRINIVIL, ZESTRIL) 10 mg tablet Take 1 Tab by mouth daily. 30 Tab 11    gabapentin (NEURONTIN) 300 mg capsule Take 2 Caps by mouth three (3) times daily. 180 Cap 11    glucose blood VI test strips (ONETOUCH VERIO) strip 4 times/day 200 Strip 11    Blood-Glucose Meter (ONETOUCH VERIO FLEX) misc 4 times/day 1 Each 0    clindamycin (CLEOCIN) 300 mg capsule Take 1 Cap by mouth four (4) times daily. 40 Cap 0    HYDROcodone-acetaminophen (NORCO) 5-325 mg per tablet Take 1 Tab by mouth every six (6) hours as needed for Pain. Max Daily Amount: 4 Tabs. 12 Tab 0    famotidine (PEPCID) 20 mg tablet Take 1 Tab by mouth two (2) times a day. 60 Tab 1    folic acid (FOLVITE) 1 mg tablet Take 1 Tab by mouth daily. 30 Tab 1    ondansetron hcl (ZOFRAN, AS HYDROCHLORIDE,) 8 mg tablet Take 1 Tab by mouth every eight (8) hours as needed for Nausea. 20 Tab 0    albuterol (PROAIR HFA) 90 mcg/actuation inhaler Take 2 Puffs by inhalation every four (4) hours as needed for Wheezing.  sertraline (ZOLOFT) 25 mg tablet Take 1 Tab by mouth nightly. 30 Tab 2     No current facility-administered medications on file prior to visit.         Allergies Allergen Reactions    Toradol [Ketorolac Tromethamine] Unknown (comments)     Seizure like symptoms per pt.  Augmentin [Amoxicillin-Pot Clavulanate] Swelling    Demerol [Meperidine] Swelling    Heparin Hives    Ibuprofen Diarrhea and Nausea and Vomiting     Muscle tightness    Keflex [Cephalexin] Shortness of Breath    Morphine Swelling    Nubain [Nalbuphine] Hives    Pcn [Penicillins] Swelling    Sulfa (Sulfonamide Antibiotics) Unknown (comments)       OB History      Para Term  AB Living    0         SAB TAB Ectopic Molar Multiple Live Births                 Obstetric Comments    Menarche: 12   LMP:11/15/2017  # of Children: 2. Age at Delivery of First Child 35. Hysterectomy/oophorectomy:  No/no. Breast Bx: no Hx of Breast Feeding: no  BCP: tubal ligation. Hormone therapy:none. ROS  Constitutional: Positive for malaise/fatigue. HENT: Negative. Eyes: Negative. Respiratory: Negative. Cardiovascular: Negative. Gastrointestinal: Positive for abdominal pain. Genitourinary: Negative. Musculoskeletal: Positive for back pain and myalgias. Skin: Negative. Neurological: Negative. Psychiatric/Behavioral: Negative. Physical Exam   Cardiovascular: Normal rate and normal heart sounds. Pulmonary/Chest: Breath sounds normal. Right breast exhibits no inverted nipple, no mass, no nipple discharge, no skin change and no tenderness. Left breast exhibits skin change. Left breast exhibits no inverted nipple, no mass, no nipple discharge and no tenderness. Breasts are symmetrical.       Lymphadenopathy:        Right cervical: No superficial cervical, no deep cervical and no posterior cervical adenopathy present. Left cervical: No superficial cervical, no deep cervical and no posterior cervical adenopathy present. Right axillary: No pectoral and no lateral adenopathy present. Left axillary: No pectoral and no lateral adenopathy present.     I & D  Indication : Abscess cavity left breast.   Prep : We cleaned the skin with alcohol. Anesthesia : We anesthetized with Xylocaine. Guidance : We used real-time ultrasound guidance. Technique : We made a nicking incision in the skin  Yield : None. Site has already been drained in ED  Result : This substantially decompressed the swelling. Dressing : We placed a clean dressing,Clean dry dressing applied. Tolerance : The patient tolerated the procedure well,The patient's pain was decreased at the completion of the procedure. Disposition : The patient was sent home in stable condition,The patient will be on antibiotic medication for more days,We will followup as noted in the Plan and Next Appointment. ASSESSMENT and PLAN    ICD-10-CM ICD-9-CM    1. Abscess L02.91 682.9      Pt presents with previously packed LEFT breast abscess cavity, likely related to infected sebaceous cyst. Incised and cleaned abscess cavity today without complications, which pt tolerated well. Repacked site afterwards and covered with dressing. Advised pt to repack site once a day. She states her mother-in-law can help her with this. Will switch pt's antibiotic rx to Doxycycline and Levaquin as well as rx Hydrocodone for pain. She will f/u with Dr. Miller Pink on Monday. This plan was reviewed with the patient and patient agrees. All questions were answered.     Written by Bethel Sal, as dictated by Dr. Emilio Douglas MD.

## 2017-12-01 NOTE — MR AVS SNAPSHOT
Visit Information Date & Time Provider Department Dept. Phone Encounter #  
 12/1/2017 72:49 AM Dixie Eubanks MD [address] at [address] [place] [address] [id number] Your Appointments 12/4/2017  4:10 PM  
Follow Up with [address] at [facility] Appt Note: LEFT Breast abscess dr. Ninoska Marino wants pt to be seen per patricia [address] [facility] [address] [facility] [address] [facility] [address]  
[phone]  
  
   
 [address] [address]  
  
    
 12/5/2017 11:45 AM  
ROUTINE CARE with Joan Gibbs MD  
[address] [facility]) Appt Note: f/up [address] [facility] [facility] [address] [facility] [address]  
[phone]  
  
   
 [facility] [address]  
  
    
 1/11/2018 12:10 PM  
Follow Up with Corina Wiley MD  
[address] [facility] Endocrinology [facility]) Appt Note: f/u   ok per provider [address] [phone] [address] [place] [address] Road  
  
    
 2/20/2018 11:00 AM  
New Patient with MD Slime Del Rosario [facility] Appt Note: Np establish care [address] [facility] [address] [phone]  
  
   
 [address] Upcoming Health Maintenance Date Due  
 FOOT EXAM Q1 8/26/1990 EYE EXAM RETINAL OR DILATED Q1 8/26/1990 DTaP/Tdap/Td series (1 - Tdap) 8/26/2001 PAP AKA CERVICAL CYTOLOGY 8/26/2001 HEMOGLOBIN A1C Q6M 4/7/2018 MICROALBUMIN Q1 10/3/2018 LIPID PANEL Q1 10/6/2018 Allergies as of 12/1/2017  Review Complete On: 12/1/2017 By: Jesse Fay RN Severity Noted Reaction Type Reactions Toradol [Ketorolac Tromethamine] High 01/25/2011   Systemic Unknown (comments) Seizure like symptoms per pt. Augmentin [Amoxicillin-pot Clavulanate]  10/03/2017    Swelling Demerol [Meperidine]  10/01/2017    Swelling Heparin  10/05/2017    Hives Ibuprofen  01/25/2011    Diarrhea, Nausea and Vomiting Muscle tightness Keflex [Cephalexin]  03/07/2011    Shortness of Breath Morphine  10/01/2017    Swelling Nubain [Nalbuphine]  12/12/2010    Hives Pcn [Penicillins]  12/12/2010    Swelling Sulfa (Sulfonamide Antibiotics)  12/12/2010    Unknown (comments) Current Immunizations  Reviewed on 10/3/2017 Name Date Influenza Vaccine (Quad) PF 10/3/2017 Influenza Vaccine Split 12/14/2010  6:36 PM  
 ZZZ-RETIRED (DO NOT USE) Pneumococcal Vaccine (Unspecified Type) 10/1/2007 Not reviewed this visit You Were Diagnosed With   
  
 Codes Comments Abscess    -  Primary ICD-10-CM: L02.91 
ICD-9-CM: 451. 9 Vitals BP Pulse Height(growth percentile) Weight(growth percentile) LMP BMI  
 120/62 72 5' 8\" (1.727 m) 268 lb (121.6 kg) 11/15/2017 40.75 kg/m2 OB Status Smoking Status Having regular periods Former Smoker BMI and BSA Data Body Mass Index Body Surface Area 40.75 kg/m 2 2.42 m 2 Preferred Pharmacy Pharmacy Name Phone [facility] [address] [place], [place] - [address] [place] [address] Valorie Marcus [phone] Your Updated Medication List  
  
   
This list is accurate as of: 12/1/17 12:10 PM.  Always use your most recent med list.  
  
  
  
  
 atorvastatin 40 mg tablet Commonly known as:  LIPITOR Take 1 Tab by mouth nightly. Blood-Glucose Meter Misc Commonly known as:  ONETOUCH VERIO FLEX  
4 times/day  
  
 clindamycin 300 mg capsule Commonly known as:  CLEOCIN Take 1 Cap by mouth four (4) times daily. doxycycline 100 mg tablet Commonly known as:  ADOXA Take 1 Tab by mouth two (2) times a day for 10 days. ergocalciferol 50,000 unit capsule Commonly known as:  VITAMIN D2  
 Take 1 Cap by mouth every seven (7) days. famotidine 20 mg tablet Commonly known as:  PEPCID Take 1 Tab by mouth two (2) times a day. fenofibrate nanocrystallized 145 mg tablet Commonly known as:  [employer] Take 1 Tab by mouth daily. folic acid 1 mg tablet Commonly known as:  [employer] Take 1 Tab by mouth daily. gabapentin 300 mg capsule Commonly known as:  NEURONTIN Take 2 Caps by mouth three (3) times daily. glucose blood VI test strips strip Commonly known as:  ONETOUCH VERIO  
4 times/day  
  
 * HYDROcodone-acetaminophen 5-325 mg per tablet Commonly known as:  Jordi Davey Take 1 Tab by mouth every six (6) hours as needed for Pain. Max Daily Amount: 4 Tabs. * HYDROcodone-acetaminophen  mg tablet Commonly known as:  Jordi Davey Take 1 Tab by mouth every six (6) hours as needed for Pain. Max Daily Amount: 4 Tabs. insulin glargine 100 unit/mL (3 mL) Inpn Commonly known as:  LANTUS SOLOSTAR Inject 40 units in the morning and 60 units at night and increase as directed up to a max of 150 units per day Insulin Needles (Disposable) 31 gauge x 5/16\" Ndle Commonly known as:  BD INSULIN PEN NEEDLE UF SHORT Use as directed twice daily  
  
 levoFLOXacin 500 mg tablet Commonly known as:  Jazmin Dianne Take 1 Tab by mouth daily for 10 days. lisinopril 10 mg tablet Commonly known as:  Harjinder Waterman Take 1 Tab by mouth daily. metFORMIN 1,000 mg tablet Commonly known as:  GLUCOPHAGE Take 1 Tab by mouth two (2) times daily (with meals). omega-3 acid ethyl esters 1 gram capsule Commonly known as:  Yamil Serene Take 2 Caps by mouth two (2) times a day. ondansetron hcl 8 mg tablet Commonly known as:  ZOFRAN (AS HYDROCHLORIDE) Take 1 Tab by mouth every eight (8) hours as needed for Nausea. PROAIR HFA 90 mcg/actuation inhaler Generic drug:  albuterol Take 2 Puffs by inhalation every four (4) hours as needed for Wheezing. sertraline 25 mg tablet Commonly known as:  ZOLOFT Take 1 Tab by mouth nightly. * Notice: This list has 2 medication(s) that are the same as other medications prescribed for you. Read the directions carefully, and ask your doctor or other care provider to review them with you. Prescriptions Printed Refills HYDROcodone-acetaminophen (NORCO)  mg tablet 0 Sig: Take 1 Tab by mouth every six (6) hours as needed for Pain. Max Daily Amount: 4 Tabs. Class: Print Route: Oral  
  
Prescriptions Sent to Pharmacy Refills  
 doxycycline (ADOXA) 100 mg tablet 0 Sig: Take 1 Tab by mouth two (2) times a day for 10 days. Class: Normal  
 Pharmacy: [address] Ph #: [phone] Route: Oral  
 levoFLOXacin (LEVAQUIN) 500 mg tablet 0 Sig: Take 1 Tab by mouth daily for 10 days. Class: Normal  
 Pharmacy: [address] Ph #: [phone] Route: Oral  
  
Introducing [facility]! [employer] introduces [employer] patient portal. Now you can access parts of your medical record, email your doctor's office, and request medication refills online. 1. In your internet browser, go to [email or web address] 2. Click on the First Time User? Click Here link in the Sign In box. You will see the New Member Sign Up page. 3. Enter your [employer] Access Code exactly as it appears below. You will not need to use this code after youve completed the sign-up process. If you do not sign up before the expiration date, you must request a new code. · [employer] Access Code: [email or web address] Expires: 12/31/2017  3:30 AM 
 
4. Enter the last four digits of your Social Security Number (xxxx) and Date of Birth (mm/dd/yyyy) as indicated and click Submit. You will be taken to the next sign-up page. 5. Create a [employer] ID. This will be your [employer] login ID and cannot be changed, so think of one that is secure and easy to remember. 6. Create a [employer] password. You can change your password at any time. 7. Enter your Password Reset Question and Answer. This can be used at a later time if you forget your password. 8. Enter your e-mail address. You will receive e-mail notification when new information is available in [address] 9. Click Sign Up. You can now view and download portions of your medical record. 10. Click the Download Summary menu link to download a portable copy of your medical information. If you have questions, please visit the Frequently Asked Questions section of the [employer] website. Remember, [employer] is NOT to be used for urgent needs. For medical emergencies, dial 911. Now available from your iPhone and Android! Please provide this summary of care documentation to your next provider. Your primary care clinician is listed as Christian Ca. If you have any questions after today's visit, please call [phone].

## 2017-12-04 ENCOUNTER — OFFICE VISIT (OUTPATIENT)
Dept: SURGERY | Age: 37
End: 2017-12-04

## 2017-12-04 ENCOUNTER — DOCUMENTATION ONLY (OUTPATIENT)
Dept: SURGERY | Age: 37
End: 2017-12-04

## 2017-12-04 VITALS
DIASTOLIC BLOOD PRESSURE: 71 MMHG | HEIGHT: 68 IN | SYSTOLIC BLOOD PRESSURE: 128 MMHG | HEART RATE: 78 BPM | WEIGHT: 268 LBS | BODY MASS INDEX: 40.62 KG/M2

## 2017-12-04 DIAGNOSIS — N61.1 BREAST ABSCESS OF FEMALE: Primary | ICD-10-CM

## 2017-12-04 LAB
BACTERIA SPEC CULT: NORMAL
SERVICE CMNT-IMP: NORMAL

## 2017-12-04 RX ORDER — FLUCONAZOLE 100 MG/1
100 TABLET ORAL DAILY
Qty: 7 TAB | Refills: 0 | Status: SHIPPED | OUTPATIENT
Start: 2017-12-04 | End: 2017-12-11

## 2017-12-04 NOTE — PROGRESS NOTES
HISTORY OF PRESENT ILLNESS  Kalpesh Zamora is a 40 y.o. female. HPI ESTABLISHED patient here for follow up of LEFT breast abscess. Patient saw Dr. Amada Grant on Friday and had LEFT I&D of what was likely an infected sebaceous cyst. Patient has been taking doxycycline and Levaquin and has developed a yeast infection. She has also been taking percocet for pain. Rates pain 8/10 at this time. The patient was seen in the ER 2017 where the abscess was I&D'd and packed and she started clindamycin.      Family history - mother had breast cancer post menopausal and  from a fall. Paternal grandmother - ovarian cancer post menopausal     No imaging. Review of Systems   All other systems reviewed and are negative. Physical Exam   Pulmonary/Chest:       Left abscess cavity packed. Clean. No purulence. Decreased erythema   Nursing note and vitals reviewed. ASSESSMENT and PLAN    ICD-10-CM ICD-9-CM    1.  Breast abscess of female N61.1 611.0      - continue daily wound care, abx  - f/u 2 weeks

## 2017-12-04 NOTE — PATIENT INSTRUCTIONS
Breast Abscess: Care Instructions  Your Care Instructions  An abscess is a pocket of pus formed by infection. Breast abscesses are most common in women who are breastfeeding. You can usually continue to breastfeed your baby in spite of a breast abscess. It will not harm your baby. If your doctor advises you to stop breastfeeding on the affected breast while it heals, you can continue breastfeeding from the healthy breast.  Sometimes antibiotics are used to treat a breast abscess. If antibiotics do not cure the abscess, it may need to be drained through a small cut (incision). You may have had a sedative to help you relax. You may be unsteady after having sedation. It can take a few hours for the medicine's effects to wear off. Common side effects of sedation include nausea, vomiting, and feeling sleepy or tired. The doctor has checked you carefully, but problems can develop later. If you notice any problems or new symptoms, get medical treatment right away. Follow-up care is a key part of your treatment and safety. Be sure to make and go to all appointments, and call your doctor if you are having problems. It's also a good idea to know your test results and keep a list of the medicines you take. How can you care for yourself at home? · If the doctor gave you a sedative:  ¨ For 24 hours, don't do anything that requires attention to detail. It takes time for the medicine's effects to completely wear off. ¨ For your safety, do not drive or operate any machinery that could be dangerous. Wait until the medicine wears off and you can think clearly and react easily. · If your doctor prescribed antibiotics, take them as directed. Do not stop taking them just because you feel better. You need to take the full course of antibiotics. · If your doctor drained the abscess, you may have a tube or gauze in the abscess to allow it to continue draining.  Follow your doctor's instructions on bathing and caring for the wound.  · If you are breastfeeding, continue breastfeeding or pumping breast milk, as your doctor advises. It is important to empty your breasts regularly. However, your doctor may advise you to discard the milk from the affected breast until the abscess heals. ¨ Before breastfeeding, place a warm, wet washcloth over the breast for about 15 minutes. Try this at least 3 times a day. ¨ If pus is no longer draining from the abscess, breastfeed on both sides. ¨ Gently massage your breast to stimulate milk flow. ¨ Pump or hand-express a small amount of breast milk before breastfeeding if your breasts are too full with milk or if it hurts too much to nurse. This will make your breasts less full and may make it easier for your baby to nurse. ¨ Try feeding from the healthy breast. Then, after your milk is flowing, breastfeed from the affected breast until it feels soft. · Be safe with medicines. Take pain medicines exactly as directed. ¨ If your doctor gave you a prescription medicine for pain, take it as prescribed. ¨ If you are not taking a prescription pain medicine, ask your doctor if you can take an over-the-counter medicine. ¨ Do not take two or more pain medicines at the same time unless your doctor told you to. Many pain medicines have acetaminophen, which is Tylenol. Too much acetaminophen (Tylenol) can be harmful. · Put ice or a cold pack on your breast for 10 to 15 minutes at a time to reduce pain and swelling. If you are breastfeeding, do this between feedings. Put a thin cloth between the ice and your skin. · If pus is draining from your infected breast, wash the nipple gently and let it air-dry before you put your bra back on. A disposable breast pad placed in the bra cup will help absorb the pus. When should you call for help? Call 911 anytime you think you may need emergency care. For example, call if:  ? · You have trouble breathing. ? · You passed out (lost consciousness).    ?Call your doctor now or seek immediate medical care if:  ? · You have new or worse nausea or vomiting. ? · Your symptoms of infection get worse. This may include:  ¨ Increased pain, swelling, redness, or warmth around a breast.  ¨ Red streaks extending from the breast.  ¨ Pus draining from a breast.  ¨ A new or higher fever. ? Watch closely for changes in your health, and be sure to contact your doctor if:  ? · You do not get better as expected. Where can you learn more? Go to http://ish-irish.info/. Enter H549 in the search box to learn more about \"Breast Abscess: Care Instructions. \"  Current as of: October 13, 2016  Content Version: 11.4  © 9445-4296 [employer]. Care instructions adapted under license by [employer] (which disclaims liability or warranty for this information). If you have questions about a medical condition or this instruction, always ask your healthcare professional. [place] [address] any warranty or liability for your use of this information.

## 2017-12-04 NOTE — PROGRESS NOTES
Returned call from [address], re: potential drug drug interaction with fluconazole and levaquin. Dr. Lowell Lofton is aware and okay with proceeding with patient taking these.

## 2017-12-04 NOTE — PROGRESS NOTES
HISTORY OF PRESENT ILLNESS  Belle Lutz is a 40 y.o. female.   HPI    ROS    Physical Exam    ASSESSMENT and PLAN  {ASSESSMENT/PLAN:72629}

## 2017-12-05 PROBLEM — N61.1 BREAST ABSCESS OF FEMALE: Status: ACTIVE | Noted: 2017-12-05

## 2017-12-12 ENCOUNTER — DOCUMENTATION ONLY (OUTPATIENT)
Dept: SURGERY | Age: 37
End: 2017-12-12

## 2017-12-12 NOTE — PROGRESS NOTES
Received documentation from [facility] stating that patient has received opioid therapy from three different providers in the last 60 days. Will make Dr. Mak River Dr. Lowell Lofton both aware of this. She has an upcoming appointment with Dr. Lowell Lofton on 12/18/17.

## 2017-12-29 ENCOUNTER — HOSPITAL ENCOUNTER (OUTPATIENT)
Dept: LAB | Age: 37
Discharge: HOME | End: 2017-12-29

## 2017-12-29 ENCOUNTER — HOSPITAL ENCOUNTER (INPATIENT)
Age: 37
LOS: 2 days | Discharge: HOME | DRG: 420 | End: 2018-01-01
Attending: EMERGENCY MEDICINE | Admitting: INTERNAL MEDICINE
Payer: MEDICAID

## 2017-12-29 ENCOUNTER — HOSPITAL ENCOUNTER (EMERGENCY)
Age: 37
Discharge: OTHER INSTITUTION | End: 2017-12-29
Attending: FAMILY MEDICINE

## 2017-12-29 VITALS
SYSTOLIC BLOOD PRESSURE: 122 MMHG | BODY MASS INDEX: 41.98 KG/M2 | RESPIRATION RATE: 16 BRPM | OXYGEN SATURATION: 99 % | HEART RATE: 74 BPM | TEMPERATURE: 97.6 F | DIASTOLIC BLOOD PRESSURE: 68 MMHG | HEIGHT: 68 IN | WEIGHT: 277 LBS

## 2017-12-29 DIAGNOSIS — N17.9 AKI (ACUTE KIDNEY INJURY) (HCC): ICD-10-CM

## 2017-12-29 DIAGNOSIS — E87.1 HYPONATREMIA: ICD-10-CM

## 2017-12-29 DIAGNOSIS — E10.10 DIABETIC KETOACIDOSIS WITHOUT COMA ASSOCIATED WITH TYPE 1 DIABETES MELLITUS (HCC): ICD-10-CM

## 2017-12-29 DIAGNOSIS — R10.31 ABDOMINAL PAIN, RIGHT LOWER QUADRANT: ICD-10-CM

## 2017-12-29 DIAGNOSIS — R73.9 HYPERGLYCEMIA: Primary | ICD-10-CM

## 2017-12-29 LAB
APPEARANCE UR: CLEAR
BACTERIA URNS QL MICRO: NEGATIVE /HPF
BASE DEFICIT BLDV-SCNC: 0.6 MMOL/L
BDY SITE: ABNORMAL
BILIRUB UR QL: NEGATIVE
BILIRUB UR QL: NEGATIVE
BREATHS.SPONTANEOUS ON VENT: 18
COLOR UR: ABNORMAL
EPITH CASTS URNS QL MICRO: ABNORMAL /LPF
GLUCOSE BLD STRIP.AUTO-MCNC: 487 MG/DL (ref 65–100)
GLUCOSE BLD STRIP.AUTO-MCNC: 503 MG/DL (ref 65–100)
GLUCOSE UR QL STRIP.AUTO: 500 MG/DL
GLUCOSE UR STRIP.AUTO-MCNC: >1000 MG/DL
HCG UR QL: NEGATIVE
HCO3 BLDV-SCNC: 24 MMOL/L (ref 23–28)
HGB UR QL STRIP: NEGATIVE
KETONES UR QL STRIP.AUTO: NEGATIVE MG/DL
KETONES UR-MCNC: NEGATIVE MG/DL
LEUKOCYTE ESTERASE UR QL STRIP.AUTO: NEGATIVE
LEUKOCYTE ESTERASE UR QL STRIP: NEGATIVE
NITRITE UR QL STRIP.AUTO: NEGATIVE
NITRITE UR QL: NEGATIVE
PCO2 BLDV: 39 MMHG (ref 41–51)
PH BLDV: 7.4 [PH] (ref 7.32–7.42)
PH UR STRIP: 6.5 [PH] (ref 5–8)
PH UR: 6 [PH] (ref 5–8)
PO2 BLDV: 61 MMHG (ref 25–40)
PROT UR QL: NEGATIVE MG/DL
PROT UR STRIP-MCNC: ABNORMAL MG/DL
RBC # UR STRIP: NEGATIVE /UL
RBC #/AREA URNS HPF: ABNORMAL /HPF (ref 0–5)
SAO2 % BLDV: 92 % (ref 65–88)
SAO2% DEVICE SAO2% SENSOR NAME: ABNORMAL
SERVICE CMNT-IMP: ABNORMAL
SERVICE CMNT-IMP: ABNORMAL
SP GR UR REFRACTOMETRY: <1.005 (ref 1–1.03)
SP GR UR: 1.01 (ref 1–1.03)
SPECIMEN SITE: ABNORMAL
UA: UC IF INDICATED,UAUC: ABNORMAL
UROBILINOGEN UR QL STRIP.AUTO: 0.2 EU/DL (ref 0.2–1)
UROBILINOGEN UR QL: 0.2 EU/DL (ref 0.2–1)
WBC URNS QL MICRO: ABNORMAL /HPF (ref 0–4)

## 2017-12-29 PROCEDURE — 83605 ASSAY OF LACTIC ACID: CPT | Performed by: EMERGENCY MEDICINE

## 2017-12-29 PROCEDURE — 85025 COMPLETE CBC W/AUTO DIFF WBC: CPT | Performed by: EMERGENCY MEDICINE

## 2017-12-29 PROCEDURE — 99285 EMERGENCY DEPT VISIT HI MDM: CPT

## 2017-12-29 PROCEDURE — 87040 BLOOD CULTURE FOR BACTERIA: CPT | Performed by: EMERGENCY MEDICINE

## 2017-12-29 PROCEDURE — 80053 COMPREHEN METABOLIC PANEL: CPT | Performed by: EMERGENCY MEDICINE

## 2017-12-29 PROCEDURE — 96361 HYDRATE IV INFUSION ADD-ON: CPT

## 2017-12-29 PROCEDURE — 81001 URINALYSIS AUTO W/SCOPE: CPT | Performed by: EMERGENCY MEDICINE

## 2017-12-29 PROCEDURE — 74011250636 HC RX REV CODE- 250/636: Performed by: EMERGENCY MEDICINE

## 2017-12-29 PROCEDURE — 82962 GLUCOSE BLOOD TEST: CPT

## 2017-12-29 PROCEDURE — 87086 URINE CULTURE/COLONY COUNT: CPT | Performed by: FAMILY MEDICINE

## 2017-12-29 PROCEDURE — 36415 COLL VENOUS BLD VENIPUNCTURE: CPT | Performed by: EMERGENCY MEDICINE

## 2017-12-29 PROCEDURE — 82803 BLOOD GASES ANY COMBINATION: CPT | Performed by: EMERGENCY MEDICINE

## 2017-12-29 PROCEDURE — 96375 TX/PRO/DX INJ NEW DRUG ADDON: CPT

## 2017-12-29 PROCEDURE — 96374 THER/PROPH/DIAG INJ IV PUSH: CPT

## 2017-12-29 RX ORDER — ONDANSETRON 2 MG/ML
4 INJECTION INTRAMUSCULAR; INTRAVENOUS
Status: COMPLETED | OUTPATIENT
Start: 2017-12-29 | End: 2017-12-29

## 2017-12-29 RX ORDER — HYDROMORPHONE HYDROCHLORIDE 2 MG/ML
1 INJECTION, SOLUTION INTRAMUSCULAR; INTRAVENOUS; SUBCUTANEOUS
Status: COMPLETED | OUTPATIENT
Start: 2017-12-29 | End: 2017-12-29

## 2017-12-29 RX ADMIN — SODIUM CHLORIDE 1000 ML: 900 INJECTION, SOLUTION INTRAVENOUS at 23:05

## 2017-12-29 RX ADMIN — HYDROMORPHONE HYDROCHLORIDE 1 MG: 2 INJECTION, SOLUTION INTRAMUSCULAR; INTRAVENOUS; SUBCUTANEOUS at 23:06

## 2017-12-29 RX ADMIN — ONDANSETRON 4 MG: 2 INJECTION INTRAMUSCULAR; INTRAVENOUS at 23:04

## 2017-12-29 NOTE — IP AVS SNAPSHOT
[address] [facility] 
[phone] Patient: He Jennings MRN: [id number] QIJ:4/45/7144 About your hospitalization You were admitted on:  December 30, 2017 You last received care in the:  [facility] PROGRESSIVE CARE You were discharged on:  January 1, 2018 Why you were hospitalized Your primary diagnosis was:  Not on File Your diagnoses also included:  Dka (Diabetic Ketoacidoses) (Hcc) Things You Need To Do (next 8 weeks) Follow up with Zandra Rinaldi MD  
  
Phone:  [phone] Where:  [address] [facility] Thursday Jan 11, 2018 Follow Up with Srinivas Seals MD at 12:10 PM  
Where:  [facility] [address] [place] [address] Tuesday Feb 20, 2018 New Patient with Zandra Rinaldi MD at 11:00 AM  
Where: [address] [place] [address]) Discharge Orders None A check jenise indicates which time of day the medication should be taken. My Medications TAKE these medications as instructed Instructions Each Dose to Equal  
 Morning Noon Evening Bedtime  
 atorvastatin 40 mg tablet Commonly known as:  LIPITOR Your last dose was: Your next dose is: Take 1 Tab by mouth nightly. 40 mg Blood-Glucose Meter Misc Commonly known as:  ONETOUCH VERIO FLEX Your last dose was: Your next dose is:    
   
   
 4 times/day  
     
   
   
   
  
 clindamycin 300 mg capsule Commonly known as:  CLEOCIN Your last dose was: Your next dose is: Take 1 Cap by mouth four (4) times daily. 300 mg  
    
   
   
   
  
 ergocalciferol 50,000 unit capsule Commonly known as:  VITAMIN D2 Your last dose was: Your next dose is: Take 1 Cap by mouth every seven (7) days. 01114 Units famotidine 20 mg tablet Commonly known as:  PEPCID Your last dose was: Your next dose is: Take 1 Tab by mouth two (2) times a day. 20 mg  
    
   
   
   
  
 fenofibrate nanocrystallized 145 mg tablet Commonly known as:  [employer] Your last dose was: Your next dose is: Take 1 Tab by mouth daily. 145 mg  
    
   
   
   
  
 folic acid 1 mg tablet Commonly known as:  Google Your last dose was: Your next dose is: Take 1 Tab by mouth daily. 1 mg  
    
   
   
   
  
 gabapentin 300 mg capsule Commonly known as:  NEURONTIN Your last dose was: Your next dose is: Take 2 Caps by mouth three (3) times daily. 600 mg  
    
   
   
   
  
 glucose blood VI test strips strip Commonly known as:  Mora Klinefelter Your last dose was: Your next dose is:    
   
   
 4 times/day  
     
   
   
   
  
 insulin glargine 100 unit/mL (3 mL) Inpn Commonly known as:  LANTUS SOLOSTAR Your last dose was: Your next dose is:    
   
   
 Inject 40 units in the morning and 60 units at night and increase as directed up to a max of 150 units per day  
     
   
   
   
  
 insulin lispro 100 unit/mL kwikpen Commonly known as:  HumaLOG KwikPen Your last dose was: Your next dose is:    
   
   
 Administer 3 times a day before meals, based your glucose levels as below         140-199=5 units           200-249=10 units 250-299=15 units 300-349=20 units 350-399=25 units 400-450=30 units Insulin Needles (Disposable) 31 gauge x 5/16\" Ndle Commonly known as:  BD INSULIN PEN NEEDLE UF SHORT Your last dose was: Your next dose is:    
   
   
 Use as directed twice daily  
     
   
   
   
  
 lisinopril 10 mg tablet Commonly known as:  He Jenkins Your last dose was: Your next dose is: Take 1 Tab by mouth daily. 10 mg  
    
   
   
   
  
 metFORMIN 1,000 mg tablet Commonly known as:  GLUCOPHAGE Your last dose was: Your next dose is: Take 1 Tab by mouth two (2) times daily (with meals). 1000 mg  
    
   
   
   
  
 miconazole 2 % vaginal cream  
Commonly known as:  Kim Labrador Your last dose was: Your next dose is: Insert 1 Applicator into vagina every evening for 5 days. 1 Applicator  
    
   
   
   
  
 omega-3 acid ethyl esters 1 gram capsule Commonly known as:  Guillen Fuss Your last dose was: Your next dose is: Take 2 Caps by mouth two (2) times a day. 2 g  
    
   
   
   
  
 ondansetron hcl 8 mg tablet Commonly known as:  ZOFRAN (AS HYDROCHLORIDE) Your last dose was: Your next dose is: Take 1 Tab by mouth every eight (8) hours as needed for Nausea. 8 mg PROAIR HFA 90 mcg/actuation inhaler Generic drug:  albuterol Your last dose was: Your next dose is: Take 2 Puffs by inhalation every four (4) hours as needed for Wheezing. 2 Puff  
    
   
   
   
  
 sertraline 25 mg tablet Commonly known as:  ZOLOFT Your last dose was: Your next dose is: Take 1 Tab by mouth nightly. 25 mg Where to Get Your Medications Information on where to get these meds will be given to you by the nurse or doctor. ! Ask your nurse or doctor about these medications Blood-Glucose Meter Misc  
 insulin lispro 100 unit/mL kwikpen  
 miconazole 2 % vaginal cream  
  
  
  
  
Discharge Instructions Take lantus 100 units twice a day Introducing \[facility]\"" & HEALTH SERVICES! [employer] introduces [employer] patient portal. Now you can access parts of your medical record, email your doctor's office, and request medication refills online.    
 
1. In your internet browser, go to [email or web address] 2. Click on the First Time User? Click Here link in the Sign In box. You will see the New Member Sign Up page. 3. Enter your [employer] Access Code exactly as it appears below. You will not need to use this code after youve completed the sign-up process. If you do not sign up before the expiration date, you must request a new code. · [employer] Access Code: [email or web address] Expires: 4/1/2018 12:47 PM 
 
4. Enter the last four digits of your Social Security Number (xxxx) and Date of Birth (mm/dd/yyyy) as indicated and click Submit. You will be taken to the next sign-up page. 5. Create a Cloud[employer] ID. This will be your [employer] login ID and cannot be changed, so think of one that is secure and easy to remember. 6. Create a [employer] password. You can change your password at any time. 7. Enter your Password Reset Question and Answer. This can be used at a later time if you forget your password. 8. Enter your e-mail address. You will receive e-mail notification when new information is available in [address] 9. Click Sign Up. You can now view and download portions of your medical record. 10. Click the Download Summary menu link to download a portable copy of your medical information. If you have questions, please visit the Frequently Asked Questions section of the [employer] website. Remember, [employer] is NOT to be used for urgent needs. For medical emergencies, dial 911. Now available from your iPhone and Android! Unresulted Labs-Please follow up with your PCP about these lab tests Order Current Status CULTURE, BLOOD, PAIRED Preliminary result Providers Seen During Your Hospitalization Provider Specialty Primary office phone Belen Enamorado MD Emergency Medicine [phone] Seward Mohs, MD Internal Medicine [phone] Your Primary Care Physician (PCP) Primary Care Physician Office Phone Office Fax [address] [place] [address], [address] [facility] [phone] You are allergic to the following Allergen Reactions Toradol (Ketorolac Tromethamine) Unknown (comments) Seizure like symptoms per pt. Augmentin (Amoxicillin-Pot Clavulanate) Swelling Demerol (Meperidine) Swelling Heparin Hives Ibuprofen Diarrhea Nausea and Vomiting Muscle tightness Keflex (Cephalexin) Shortness of Breath Morphine Swelling Nubain (Nalbuphine) Hives Pcn (Penicillins) Swelling Sulfa (Sulfonamide Antibiotics) Unknown (comments) Recent Documentation Height Weight Breastfeeding? BMI OB Status Smoking Status 1.727 m 125 kg No 41.9 kg/m2 Having regular periods Former Smoker Emergency Contacts Name Discharge Info Relation Home Work Mobile Angeles Palma DISCHARGE CAREGIVER [3] Other Relative [6] [phone] Kang Palma DISCHARGE CAREGIVER [3] Spouse [3] [phone] Patient Belongings The following personal items are in your possession at time of discharge: 
  Dental Appliances: None  Visual Aid: None      Home Medications: Sent home   Jewelry: Earrings  Clothing: At bedside, Belt, Footwear, Pants, Socks, Undergarments, With patient    Other Valuables: Purse  Personal Items Sent to Safe: n/a Please provide this summary of care documentation to your next provider. Signatures-by signing, you are acknowledging that this After Visit Summary has been reviewed with you and you have received a copy. Patient Signature:  ____________________________________________________________ Date:  ____________________________________________________________  
  
Shea Moritz Provider Signature:  ____________________________________________________________ Date:  ____________________________________________________________

## 2017-12-29 NOTE — IP AVS SNAPSHOT
[address] 
[phone] Patient: Slava Hale MRN: [id number] JCL:5/[birth year]/6151 My Medications TAKE these medications as instructed Instructions Each Dose to Equal  
 Morning Noon Evening Bedtime  
 atorvastatin 40 mg tablet Commonly known as:  LIPITOR Your last dose was: Your next dose is: Take 1 Tab by mouth nightly. 40 mg Blood-Glucose Meter Misc Commonly known as:  ONETOUCH VERIO FLEX Your last dose was: Your next dose is:    
   
   
 4 times/day  
     
   
   
   
  
 clindamycin 300 mg capsule Commonly known as:  CLEOCIN Your last dose was: Your next dose is: Take 1 Cap by mouth four (4) times daily. 300 mg  
    
   
   
   
  
 ergocalciferol 50,000 unit capsule Commonly known as:  VITAMIN D2 Your last dose was: Your next dose is: Take 1 Cap by mouth every seven (7) days. [address]674 Units  
    
   
   
   
  
 famotidine 20 mg tablet Commonly known as:  PEPCID Your last dose was: Your next dose is: Take 1 Tab by mouth two (2) times a day. 20 mg  
    
   
   
   
  
 fenofibrate nanocrystallized 145 mg tablet Commonly known as:  [employer] Your last dose was: Your next dose is: Take 1 Tab by mouth daily. 145 mg  
    
   
   
   
  
 folic acid 1 mg tablet Commonly known as:  Google Your last dose was: Your next dose is: Take 1 Tab by mouth daily. 1 mg  
    
   
   
   
  
 gabapentin 300 mg capsule Commonly known as:  NEURONTIN Your last dose was: Your next dose is: Take 2 Caps by mouth three (3) times daily. 600 mg  
    
   
   
   
  
 glucose blood VI test strips strip Commonly known as:  Eliud Vega Your last dose was: Your next dose is:    
   
   
 4 times/day  
     
   
   
   
  
 insulin glargine 100 unit/mL (3 mL) Inpn Commonly known as:  LANTUS SOLOSTAR Your last dose was: Your next dose is:    
   
   
 Inject 40 units in the morning and 60 units at night and increase as directed up to a max of 150 units per day  
     
   
   
   
  
 insulin lispro 100 unit/mL kwikpen Commonly known as:  HumaLOG KwikPen Your last dose was: Your next dose is:    
   
   
 Administer 3 times a day before meals, based your glucose levels as below         140-199=5 units           200-249=10 units 250-299=15 units 300-349=20 units 350-399=25 units 400-450=30 units Insulin Needles (Disposable) 31 gauge x 5/16\" Ndle Commonly known as:  BD INSULIN PEN NEEDLE UF SHORT Your last dose was: Your next dose is:    
   
   
 Use as directed twice daily  
     
   
   
   
  
 lisinopril 10 mg tablet Commonly known as:  Marlin Cage Your last dose was: Your next dose is: Take 1 Tab by mouth daily. 10 mg  
    
   
   
   
  
 metFORMIN 1,000 mg tablet Commonly known as:  GLUCOPHAGE Your last dose was: Your next dose is: Take 1 Tab by mouth two (2) times daily (with meals). 1000 mg  
    
   
   
   
  
 miconazole 2 % vaginal cream  
Commonly known as:  Rosilyn Ala Your last dose was: Your next dose is: Insert 1 Applicator into vagina every evening for 5 days. 1 Applicator  
    
   
   
   
  
 omega-3 acid ethyl esters 1 gram capsule Commonly known as:  Ion Mejia Your last dose was: Your next dose is: Take 2 Caps by mouth two (2) times a day. 2 g  
    
   
   
   
  
 ondansetron hcl 8 mg tablet Commonly known as:  ZOFRAN (AS HYDROCHLORIDE) Your last dose was: Your next dose is: Take 1 Tab by mouth every eight (8) hours as needed for Nausea. 8 mg PROAIR HFA 90 mcg/actuation inhaler Generic drug:  albuterol Your last dose was: Your next dose is: Take 2 Puffs by inhalation every four (4) hours as needed for Wheezing. 2 Puff  
    
   
   
   
  
 sertraline 25 mg tablet Commonly known as:  ZOLOFT Your last dose was: Your next dose is: Take 1 Tab by mouth nightly. 25 mg Where to Get Your Medications Information on where to get these meds will be given to you by the nurse or doctor. ! Ask your nurse or doctor about these medications   Blood-Glucose Meter Misc  
 insulin lispro 100 unit/mL kwikpen  
 miconazole 2 % vaginal cream

## 2017-12-30 ENCOUNTER — APPOINTMENT (OUTPATIENT)
Dept: CT IMAGING | Age: 37
DRG: 420 | End: 2017-12-30
Attending: EMERGENCY MEDICINE
Payer: MEDICAID

## 2017-12-30 PROBLEM — E11.10 DKA (DIABETIC KETOACIDOSES): Status: ACTIVE | Noted: 2017-12-30

## 2017-12-30 LAB
ADMINISTERED INITIALS, ADMINIT: NORMAL
ALBUMIN SERPL-MCNC: ABNORMAL G/DL (ref 3.5–5)
ALBUMIN SERPL-MCNC: ABNORMAL G/DL (ref 3.5–5)
ALBUMIN/GLOB SERPL: ABNORMAL {RATIO} (ref 1.1–2.2)
ALBUMIN/GLOB SERPL: ABNORMAL {RATIO} (ref 1.1–2.2)
ALP SERPL-CCNC: ABNORMAL U/L (ref 45–117)
ALP SERPL-CCNC: ABNORMAL U/L (ref 45–117)
ALT SERPL-CCNC: 144 U/L (ref 12–78)
ALT SERPL-CCNC: ABNORMAL U/L (ref 12–78)
ANION GAP SERPL CALC-SCNC: 13 MMOL/L (ref 5–15)
ANION GAP SERPL CALC-SCNC: 14 MMOL/L (ref 5–15)
ANION GAP SERPL CALC-SCNC: 15 MMOL/L (ref 5–15)
ANION GAP SERPL CALC-SCNC: 16 MMOL/L (ref 5–15)
ANION GAP SERPL CALC-SCNC: ABNORMAL MMOL/L (ref 5–15)
AST SERPL-CCNC: 352 U/L (ref 15–37)
AST SERPL-CCNC: ABNORMAL U/L (ref 15–37)
BASOPHILS # BLD: 0 K/UL (ref 0–0.1)
BASOPHILS NFR BLD: 0 % (ref 0–1)
BILIRUB SERPL-MCNC: ABNORMAL MG/DL (ref 0.2–1)
BILIRUB SERPL-MCNC: ABNORMAL MG/DL (ref 0.2–1)
BUN SERPL-MCNC: 12 MG/DL (ref 6–20)
BUN SERPL-MCNC: 8 MG/DL (ref 6–20)
BUN SERPL-MCNC: ABNORMAL MG/DL (ref 6–20)
BUN/CREAT SERPL: 19 (ref 12–20)
BUN/CREAT SERPL: ABNORMAL (ref 12–20)
CALCIUM SERPL-MCNC: ABNORMAL MG/DL (ref 8.5–10.1)
CHLORIDE SERPL-SCNC: 88 MMOL/L (ref 97–108)
CHLORIDE SERPL-SCNC: 92 MMOL/L (ref 97–108)
CHLORIDE SERPL-SCNC: 93 MMOL/L (ref 97–108)
CHLORIDE SERPL-SCNC: 96 MMOL/L (ref 97–108)
CHLORIDE SERPL-SCNC: 99 MMOL/L (ref 97–108)
CO2 SERPL-SCNC: 17 MMOL/L (ref 21–32)
CO2 SERPL-SCNC: 18 MMOL/L (ref 21–32)
CO2 SERPL-SCNC: ABNORMAL MMOL/L (ref 21–32)
CREAT SERPL-MCNC: 0.63 MG/DL (ref 0.55–1.02)
CREAT SERPL-MCNC: 1.09 MG/DL (ref 0.55–1.02)
CREAT SERPL-MCNC: 1.39 MG/DL (ref 0.55–1.02)
CREAT SERPL-MCNC: ABNORMAL MG/DL (ref 0.55–1.02)
CREAT SERPL-MCNC: ABNORMAL MG/DL (ref 0.55–1.02)
D50 ADMINISTERED, D50ADM: 0 ML
D50 ORDER, D50ORD: 0 ML
DIFFERENTIAL METHOD BLD: ABNORMAL
EOSINOPHIL # BLD: 0.1 K/UL (ref 0–0.4)
EOSINOPHIL NFR BLD: 1 % (ref 0–7)
ERYTHROCYTE [DISTWIDTH] IN BLOOD BY AUTOMATED COUNT: 15.5 % (ref 11.5–14.5)
EST. AVERAGE GLUCOSE BLD GHB EST-MCNC: 275 MG/DL
GLOBULIN SER CALC-MCNC: ABNORMAL G/DL (ref 2–4)
GLOBULIN SER CALC-MCNC: ABNORMAL G/DL (ref 2–4)
GLSCOM COMMENTS: NORMAL
GLUCOSE BLD STRIP.AUTO-MCNC: 138 MG/DL (ref 65–100)
GLUCOSE BLD STRIP.AUTO-MCNC: 139 MG/DL (ref 65–100)
GLUCOSE BLD STRIP.AUTO-MCNC: 147 MG/DL (ref 65–100)
GLUCOSE BLD STRIP.AUTO-MCNC: 173 MG/DL (ref 65–100)
GLUCOSE BLD STRIP.AUTO-MCNC: 188 MG/DL (ref 65–100)
GLUCOSE BLD STRIP.AUTO-MCNC: 195 MG/DL (ref 65–100)
GLUCOSE BLD STRIP.AUTO-MCNC: 199 MG/DL (ref 65–100)
GLUCOSE BLD STRIP.AUTO-MCNC: 205 MG/DL (ref 65–100)
GLUCOSE BLD STRIP.AUTO-MCNC: 218 MG/DL (ref 65–100)
GLUCOSE BLD STRIP.AUTO-MCNC: 227 MG/DL (ref 65–100)
GLUCOSE BLD STRIP.AUTO-MCNC: 229 MG/DL (ref 65–100)
GLUCOSE BLD STRIP.AUTO-MCNC: 241 MG/DL (ref 65–100)
GLUCOSE BLD STRIP.AUTO-MCNC: 263 MG/DL (ref 65–100)
GLUCOSE BLD STRIP.AUTO-MCNC: 298 MG/DL (ref 65–100)
GLUCOSE BLD STRIP.AUTO-MCNC: 303 MG/DL (ref 65–100)
GLUCOSE BLD STRIP.AUTO-MCNC: 310 MG/DL (ref 65–100)
GLUCOSE BLD STRIP.AUTO-MCNC: 360 MG/DL (ref 65–100)
GLUCOSE BLD STRIP.AUTO-MCNC: 438 MG/DL (ref 65–100)
GLUCOSE BLD STRIP.AUTO-MCNC: 439 MG/DL (ref 65–100)
GLUCOSE SERPL-MCNC: 328 MG/DL (ref 65–100)
GLUCOSE SERPL-MCNC: 354 MG/DL (ref 65–100)
GLUCOSE SERPL-MCNC: 587 MG/DL (ref 65–100)
GLUCOSE SERPL-MCNC: 608 MG/DL (ref 65–100)
GLUCOSE SERPL-MCNC: 631 MG/DL (ref 65–100)
GLUCOSE, GLC: 138 MG/DL
GLUCOSE, GLC: 147 MG/DL
GLUCOSE, GLC: 173 MG/DL
GLUCOSE, GLC: 188 MG/DL
GLUCOSE, GLC: 195 MG/DL
GLUCOSE, GLC: 199 MG/DL
GLUCOSE, GLC: 205 MG/DL
GLUCOSE, GLC: 218 MG/DL
GLUCOSE, GLC: 227 MG/DL
GLUCOSE, GLC: 229 MG/DL
GLUCOSE, GLC: 241 MG/DL
GLUCOSE, GLC: 263 MG/DL
GLUCOSE, GLC: 298 MG/DL
GLUCOSE, GLC: 303 MG/DL
GLUCOSE, GLC: 310 MG/DL
GLUCOSE, GLC: 360 MG/DL
GLUCOSE, GLC: 439 MG/DL
HBA1C MFR BLD: 11.2 % (ref 4.2–6.3)
HCG UR QL: NEGATIVE
HCT VFR BLD AUTO: 33 % (ref 35–47)
HGB BLD-MCNC: 11.3 G/DL (ref 11.5–16)
HIGH TARGET, HITG: 180 MG/DL
HIGH TARGET, HITG: 250 MG/DL
INSULIN ADMINSTERED, INSADM: 10.2 UNITS/HOUR
INSULIN ADMINSTERED, INSADM: 10.2 UNITS/HOUR
INSULIN ADMINSTERED, INSADM: 11.1 UNITS/HOUR
INSULIN ADMINSTERED, INSADM: 12.4 UNITS/HOUR
INSULIN ADMINSTERED, INSADM: 12.5 UNITS/HOUR
INSULIN ADMINSTERED, INSADM: 12.7 UNITS/HOUR
INSULIN ADMINSTERED, INSADM: 14.2 UNITS/HOUR
INSULIN ADMINSTERED, INSADM: 14.3 UNITS/HOUR
INSULIN ADMINSTERED, INSADM: 14.9 UNITS/HOUR
INSULIN ADMINSTERED, INSADM: 15.2 UNITS/HOUR
INSULIN ADMINSTERED, INSADM: 16.7 UNITS/HOUR
INSULIN ADMINSTERED, INSADM: 6.9 UNITS/HOUR
INSULIN ADMINSTERED, INSADM: 7.6 UNITS/HOUR
INSULIN ADMINSTERED, INSADM: 9 UNITS/HOUR
INSULIN ADMINSTERED, INSADM: 9.6 UNITS/HOUR
INSULIN ADMINSTERED, INSADM: 9.7 UNITS/HOUR
INSULIN ADMINSTERED, INSADM: 9.7 UNITS/HOUR
INSULIN ORDER, INSORD: 10.2 UNITS/HOUR
INSULIN ORDER, INSORD: 10.2 UNITS/HOUR
INSULIN ORDER, INSORD: 11.1 UNITS/HOUR
INSULIN ORDER, INSORD: 12.4 UNITS/HOUR
INSULIN ORDER, INSORD: 12.5 UNITS/HOUR
INSULIN ORDER, INSORD: 12.7 UNITS/HOUR
INSULIN ORDER, INSORD: 14.2 UNITS/HOUR
INSULIN ORDER, INSORD: 14.3 UNITS/HOUR
INSULIN ORDER, INSORD: 14.9 UNITS/HOUR
INSULIN ORDER, INSORD: 15.2 UNITS/HOUR
INSULIN ORDER, INSORD: 16.7 UNITS/HOUR
INSULIN ORDER, INSORD: 6.9 UNITS/HOUR
INSULIN ORDER, INSORD: 7.6 UNITS/HOUR
INSULIN ORDER, INSORD: 9 UNITS/HOUR
INSULIN ORDER, INSORD: 9.6 UNITS/HOUR
INSULIN ORDER, INSORD: 9.7 UNITS/HOUR
INSULIN ORDER, INSORD: 9.7 UNITS/HOUR
LACTATE BLD-SCNC: 1.5 MMOL/L (ref 0.4–2)
LACTATE SERPL-SCNC: NORMAL MMOL/L (ref 0.4–2)
LACTATE SERPL-SCNC: NORMAL MMOL/L (ref 0.4–2)
LIPASE SERPL-CCNC: 149 U/L (ref 73–393)
LOW TARGET, LOT: 140 MG/DL
LOW TARGET, LOT: 150 MG/DL
LYMPHOCYTES # BLD: 0.3 K/UL (ref 0.8–3.5)
LYMPHOCYTES NFR BLD: 3 % (ref 12–49)
MCH RBC QN AUTO: 27.1 PG (ref 26–34)
MCHC RBC AUTO-ENTMCNC: 34.2 G/DL (ref 30–36.5)
MCV RBC AUTO: 79.1 FL (ref 80–99)
MINUTES UNTIL NEXT BG, NBG: 60 MIN
MONOCYTES # BLD: 0.2 K/UL (ref 0–1)
MONOCYTES NFR BLD: 2 % (ref 5–13)
MULTIPLIER, MUL: 0.02
MULTIPLIER, MUL: 0.03
MULTIPLIER, MUL: 0.04
MULTIPLIER, MUL: 0.05
MULTIPLIER, MUL: 0.06
MULTIPLIER, MUL: 0.07
MULTIPLIER, MUL: 0.08
MULTIPLIER, MUL: 0.09
MULTIPLIER, MUL: 0.09
MULTIPLIER, MUL: 0.1
MULTIPLIER, MUL: 0.11
NEUTS SEG # BLD: 8.1 K/UL (ref 1.8–8)
NEUTS SEG NFR BLD: 94 % (ref 32–75)
NRBC # BLD: 0.06 K/UL (ref 0–0.01)
NRBC BLD-RTO: 0.6 PER 100 WBC
ORDER INITIALS, ORDINIT: NORMAL
PLATELET # BLD AUTO: 175 K/UL (ref 150–400)
POTASSIUM SERPL-SCNC: 3.6 MMOL/L (ref 3.5–5.1)
POTASSIUM SERPL-SCNC: 3.6 MMOL/L (ref 3.5–5.1)
POTASSIUM SERPL-SCNC: 3.7 MMOL/L (ref 3.5–5.1)
POTASSIUM SERPL-SCNC: 3.8 MMOL/L (ref 3.5–5.1)
POTASSIUM SERPL-SCNC: 3.9 MMOL/L (ref 3.5–5.1)
PROT SERPL-MCNC: ABNORMAL G/DL (ref 6.4–8.2)
PROT SERPL-MCNC: ABNORMAL G/DL (ref 6.4–8.2)
RBC # BLD AUTO: 4.17 M/UL (ref 3.8–5.2)
RBC MORPH BLD: ABNORMAL
SERVICE CMNT-IMP: ABNORMAL
SODIUM SERPL-SCNC: 123 MMOL/L (ref 136–145)
SODIUM SERPL-SCNC: 125 MMOL/L (ref 136–145)
SODIUM SERPL-SCNC: 125 MMOL/L (ref 136–145)
SODIUM SERPL-SCNC: 128 MMOL/L (ref 136–145)
SODIUM SERPL-SCNC: 129 MMOL/L (ref 136–145)
WBC # BLD AUTO: 8.7 K/UL (ref 3.6–11)
WBC NRBC COR # BLD: ABNORMAL 10*3/UL

## 2017-12-30 PROCEDURE — 74011250636 HC RX REV CODE- 250/636: Performed by: INTERNAL MEDICINE

## 2017-12-30 PROCEDURE — 74011250637 HC RX REV CODE- 250/637: Performed by: INTERNAL MEDICINE

## 2017-12-30 PROCEDURE — 74011636637 HC RX REV CODE- 636/637: Performed by: INTERNAL MEDICINE

## 2017-12-30 PROCEDURE — 82962 GLUCOSE BLOOD TEST: CPT

## 2017-12-30 PROCEDURE — 96376 TX/PRO/DX INJ SAME DRUG ADON: CPT

## 2017-12-30 PROCEDURE — 96365 THER/PROPH/DIAG IV INF INIT: CPT

## 2017-12-30 PROCEDURE — 81025 URINE PREGNANCY TEST: CPT

## 2017-12-30 PROCEDURE — 83690 ASSAY OF LIPASE: CPT | Performed by: EMERGENCY MEDICINE

## 2017-12-30 PROCEDURE — 96361 HYDRATE IV INFUSION ADD-ON: CPT

## 2017-12-30 PROCEDURE — 83605 ASSAY OF LACTIC ACID: CPT | Performed by: EMERGENCY MEDICINE

## 2017-12-30 PROCEDURE — 74176 CT ABD & PELVIS W/O CONTRAST: CPT

## 2017-12-30 PROCEDURE — 74011000258 HC RX REV CODE- 258: Performed by: INTERNAL MEDICINE

## 2017-12-30 PROCEDURE — 74011636637 HC RX REV CODE- 636/637: Performed by: EMERGENCY MEDICINE

## 2017-12-30 PROCEDURE — 74011250636 HC RX REV CODE- 250/636: Performed by: EMERGENCY MEDICINE

## 2017-12-30 PROCEDURE — 77030027138 HC INCENT SPIROMETER -A

## 2017-12-30 PROCEDURE — 65660000000 HC RM CCU STEPDOWN

## 2017-12-30 PROCEDURE — 83605 ASSAY OF LACTIC ACID: CPT

## 2017-12-30 PROCEDURE — 80048 BASIC METABOLIC PNL TOTAL CA: CPT | Performed by: INTERNAL MEDICINE

## 2017-12-30 PROCEDURE — 36415 COLL VENOUS BLD VENIPUNCTURE: CPT | Performed by: INTERNAL MEDICINE

## 2017-12-30 PROCEDURE — 80048 BASIC METABOLIC PNL TOTAL CA: CPT | Performed by: EMERGENCY MEDICINE

## 2017-12-30 PROCEDURE — 96375 TX/PRO/DX INJ NEW DRUG ADDON: CPT

## 2017-12-30 PROCEDURE — 83036 HEMOGLOBIN GLYCOSYLATED A1C: CPT | Performed by: INTERNAL MEDICINE

## 2017-12-30 PROCEDURE — 80053 COMPREHEN METABOLIC PANEL: CPT | Performed by: EMERGENCY MEDICINE

## 2017-12-30 RX ORDER — GABAPENTIN 300 MG/1
600 CAPSULE ORAL 3 TIMES DAILY
Status: DISCONTINUED | OUTPATIENT
Start: 2017-12-30 | End: 2018-01-01 | Stop reason: HOSPADM

## 2017-12-30 RX ORDER — FENOFIBRATE 145 MG/1
145 TABLET, COATED ORAL DAILY
Status: DISCONTINUED | OUTPATIENT
Start: 2017-12-30 | End: 2018-01-01 | Stop reason: HOSPADM

## 2017-12-30 RX ORDER — HYDROCODONE BITARTRATE AND ACETAMINOPHEN 5; 325 MG/1; MG/1
2 TABLET ORAL
Status: DISCONTINUED | OUTPATIENT
Start: 2017-12-30 | End: 2018-01-01 | Stop reason: HOSPADM

## 2017-12-30 RX ORDER — LABETALOL HYDROCHLORIDE 5 MG/ML
10 INJECTION, SOLUTION INTRAVENOUS
Status: DISCONTINUED | OUTPATIENT
Start: 2017-12-30 | End: 2018-01-01 | Stop reason: HOSPADM

## 2017-12-30 RX ORDER — SODIUM CHLORIDE 0.9 % (FLUSH) 0.9 %
5-10 SYRINGE (ML) INJECTION AS NEEDED
Status: DISCONTINUED | OUTPATIENT
Start: 2017-12-30 | End: 2018-01-01 | Stop reason: HOSPADM

## 2017-12-30 RX ORDER — SODIUM CHLORIDE AND POTASSIUM CHLORIDE .9; .15 G/100ML; G/100ML
SOLUTION INTRAVENOUS CONTINUOUS
Status: DISCONTINUED | OUTPATIENT
Start: 2017-12-30 | End: 2018-01-01 | Stop reason: HOSPADM

## 2017-12-30 RX ORDER — ONDANSETRON 2 MG/ML
4 INJECTION INTRAMUSCULAR; INTRAVENOUS
Status: DISCONTINUED | OUTPATIENT
Start: 2017-12-30 | End: 2018-01-01 | Stop reason: HOSPADM

## 2017-12-30 RX ORDER — IPRATROPIUM BROMIDE AND ALBUTEROL SULFATE 2.5; .5 MG/3ML; MG/3ML
3 SOLUTION RESPIRATORY (INHALATION)
Status: DISCONTINUED | OUTPATIENT
Start: 2017-12-30 | End: 2018-01-01 | Stop reason: HOSPADM

## 2017-12-30 RX ORDER — INSULIN LISPRO 100 [IU]/ML
INJECTION, SOLUTION INTRAVENOUS; SUBCUTANEOUS
Status: DISCONTINUED | OUTPATIENT
Start: 2017-12-30 | End: 2017-12-31

## 2017-12-30 RX ORDER — SERTRALINE HYDROCHLORIDE 50 MG/1
25 TABLET, FILM COATED ORAL
Status: DISCONTINUED | OUTPATIENT
Start: 2017-12-30 | End: 2018-01-01 | Stop reason: HOSPADM

## 2017-12-30 RX ORDER — METFORMIN HYDROCHLORIDE 500 MG/1
1000 TABLET ORAL 2 TIMES DAILY WITH MEALS
Status: DISCONTINUED | OUTPATIENT
Start: 2017-12-30 | End: 2018-01-01 | Stop reason: HOSPADM

## 2017-12-30 RX ORDER — ACETAMINOPHEN 325 MG/1
650 TABLET ORAL
Status: DISCONTINUED | OUTPATIENT
Start: 2017-12-30 | End: 2018-01-01 | Stop reason: HOSPADM

## 2017-12-30 RX ORDER — FOLIC ACID 1 MG/1
1 TABLET ORAL DAILY
Status: DISCONTINUED | OUTPATIENT
Start: 2017-12-30 | End: 2018-01-01 | Stop reason: HOSPADM

## 2017-12-30 RX ORDER — SODIUM CHLORIDE 0.9 % (FLUSH) 0.9 %
5-10 SYRINGE (ML) INJECTION EVERY 8 HOURS
Status: DISCONTINUED | OUTPATIENT
Start: 2017-12-30 | End: 2018-01-01 | Stop reason: HOSPADM

## 2017-12-30 RX ORDER — DEXTROSE, SODIUM CHLORIDE, AND POTASSIUM CHLORIDE 5; .9; .15 G/100ML; G/100ML; G/100ML
75 INJECTION INTRAVENOUS CONTINUOUS
Status: DISCONTINUED | OUTPATIENT
Start: 2017-12-30 | End: 2017-12-30

## 2017-12-30 RX ORDER — FAMOTIDINE 20 MG/1
20 TABLET, FILM COATED ORAL 2 TIMES DAILY
Status: DISCONTINUED | OUTPATIENT
Start: 2017-12-30 | End: 2018-01-01 | Stop reason: HOSPADM

## 2017-12-30 RX ORDER — ONDANSETRON 2 MG/ML
4 INJECTION INTRAMUSCULAR; INTRAVENOUS
Status: COMPLETED | OUTPATIENT
Start: 2017-12-30 | End: 2017-12-30

## 2017-12-30 RX ORDER — DEXTROSE 50 % IN WATER (D50W) INTRAVENOUS SYRINGE
12.5-25 AS NEEDED
Status: DISCONTINUED | OUTPATIENT
Start: 2017-12-30 | End: 2018-01-01 | Stop reason: HOSPADM

## 2017-12-30 RX ORDER — MAGNESIUM SULFATE 100 %
4 CRYSTALS MISCELLANEOUS AS NEEDED
Status: DISCONTINUED | OUTPATIENT
Start: 2017-12-30 | End: 2017-12-31

## 2017-12-30 RX ORDER — POTASSIUM CHLORIDE 750 MG/1
40 TABLET, FILM COATED, EXTENDED RELEASE ORAL ONCE
Status: COMPLETED | OUTPATIENT
Start: 2017-12-30 | End: 2017-12-30

## 2017-12-30 RX ORDER — LISINOPRIL 5 MG/1
10 TABLET ORAL DAILY
Status: DISCONTINUED | OUTPATIENT
Start: 2017-12-30 | End: 2018-01-01 | Stop reason: HOSPADM

## 2017-12-30 RX ORDER — HYDROMORPHONE HYDROCHLORIDE 2 MG/ML
1 INJECTION, SOLUTION INTRAMUSCULAR; INTRAVENOUS; SUBCUTANEOUS
Status: COMPLETED | OUTPATIENT
Start: 2017-12-30 | End: 2017-12-30

## 2017-12-30 RX ORDER — SODIUM CHLORIDE 9 MG/ML
125 INJECTION, SOLUTION INTRAVENOUS CONTINUOUS
Status: DISCONTINUED | OUTPATIENT
Start: 2017-12-30 | End: 2017-12-30

## 2017-12-30 RX ORDER — ATORVASTATIN CALCIUM 40 MG/1
40 TABLET, FILM COATED ORAL
Status: DISCONTINUED | OUTPATIENT
Start: 2017-12-30 | End: 2018-01-01 | Stop reason: HOSPADM

## 2017-12-30 RX ADMIN — SODIUM CHLORIDE AND POTASSIUM CHLORIDE: 9; 1.49 INJECTION, SOLUTION INTRAVENOUS at 20:08

## 2017-12-30 RX ADMIN — SODIUM CHLORIDE 7.6 UNITS/HR: 900 INJECTION, SOLUTION INTRAVENOUS at 05:36

## 2017-12-30 RX ADMIN — GABAPENTIN 600 MG: 300 CAPSULE ORAL at 21:06

## 2017-12-30 RX ADMIN — INSULIN HUMAN 10 UNITS: 100 INJECTION, SOLUTION PARENTERAL at 00:34

## 2017-12-30 RX ADMIN — FAMOTIDINE 20 MG: 20 TABLET, FILM COATED ORAL at 17:12

## 2017-12-30 RX ADMIN — GABAPENTIN 600 MG: 300 CAPSULE ORAL at 09:15

## 2017-12-30 RX ADMIN — METFORMIN HYDROCHLORIDE 1000 MG: 500 TABLET, FILM COATED ORAL at 09:15

## 2017-12-30 RX ADMIN — SODIUM CHLORIDE 10.2 UNITS/HR: 900 INJECTION, SOLUTION INTRAVENOUS at 14:50

## 2017-12-30 RX ADMIN — SODIUM CHLORIDE 9.6 UNITS/HR: 900 INJECTION, SOLUTION INTRAVENOUS at 22:06

## 2017-12-30 RX ADMIN — SODIUM CHLORIDE 9 UNITS/HR: 900 INJECTION, SOLUTION INTRAVENOUS at 06:51

## 2017-12-30 RX ADMIN — POTASSIUM CHLORIDE 40 MEQ: 750 TABLET, FILM COATED, EXTENDED RELEASE ORAL at 09:15

## 2017-12-30 RX ADMIN — OMEGA-3 FATTY ACIDS CAP DELAYED RELEASE 1000 MG 1 CAPSULE: 1000 CAPSULE DELAYED RELEASE at 09:15

## 2017-12-30 RX ADMIN — Medication 10 ML: at 23:05

## 2017-12-30 RX ADMIN — GABAPENTIN 600 MG: 300 CAPSULE ORAL at 16:10

## 2017-12-30 RX ADMIN — SODIUM CHLORIDE 125 ML/HR: 900 INJECTION, SOLUTION INTRAVENOUS at 08:18

## 2017-12-30 RX ADMIN — FENOFIBRATE 145 MG: 145 TABLET ORAL at 09:15

## 2017-12-30 RX ADMIN — ATORVASTATIN CALCIUM 40 MG: 40 TABLET, FILM COATED ORAL at 21:07

## 2017-12-30 RX ADMIN — HYDROCODONE BITARTRATE AND ACETAMINOPHEN 2 TABLET: 5; 325 TABLET ORAL at 11:42

## 2017-12-30 RX ADMIN — Medication 10 ML: at 14:00

## 2017-12-30 RX ADMIN — LISINOPRIL 10 MG: 5 TABLET ORAL at 09:15

## 2017-12-30 RX ADMIN — Medication 10 ML: at 09:15

## 2017-12-30 RX ADMIN — SODIUM CHLORIDE 500 ML: 900 INJECTION, SOLUTION INTRAVENOUS at 09:54

## 2017-12-30 RX ADMIN — METFORMIN HYDROCHLORIDE 1000 MG: 500 TABLET, FILM COATED ORAL at 16:11

## 2017-12-30 RX ADMIN — POTASSIUM CHLORIDE, DEXTROSE MONOHYDRATE AND SODIUM CHLORIDE 75 ML/HR: 150; 5; 900 INJECTION, SOLUTION INTRAVENOUS at 13:20

## 2017-12-30 RX ADMIN — HYDROMORPHONE HYDROCHLORIDE 1 MG: 1 INJECTION, SOLUTION INTRAMUSCULAR; INTRAVENOUS; SUBCUTANEOUS at 03:40

## 2017-12-30 RX ADMIN — ACETAMINOPHEN 650 MG: 325 TABLET ORAL at 10:02

## 2017-12-30 RX ADMIN — FAMOTIDINE 20 MG: 20 TABLET, FILM COATED ORAL at 09:15

## 2017-12-30 RX ADMIN — SERTRALINE HYDROCHLORIDE 25 MG: 50 TABLET ORAL at 21:07

## 2017-12-30 RX ADMIN — ONDANSETRON HYDROCHLORIDE 4 MG: 2 INJECTION, SOLUTION INTRAMUSCULAR; INTRAVENOUS at 14:00

## 2017-12-30 RX ADMIN — INSULIN HUMAN 10 UNITS: 100 INJECTION, SOLUTION PARENTERAL at 05:14

## 2017-12-30 RX ADMIN — FOLIC ACID 1 MG: 1 TABLET ORAL at 09:15

## 2017-12-30 RX ADMIN — ONDANSETRON 4 MG: 2 INJECTION INTRAMUSCULAR; INTRAVENOUS at 03:12

## 2017-12-30 RX ADMIN — SODIUM CHLORIDE 1000 ML: 900 INJECTION, SOLUTION INTRAVENOUS at 02:53

## 2017-12-30 NOTE — PROGRESS NOTES
TRANSFER - IN REPORT:    Verbal report received from Leonel Laurent RN (name) on Vasquez Gates  being received from ED (unit) for routine progression of care      Report consisted of patients Situation, Background, Assessment and   Recommendations(SBAR). Information from the following report(s) SBAR, Kardex, ED Summary, Procedure Summary, Intake/Output, MAR, Recent Results, Med Rec Status, Cardiac Rhythm NSR and Alarm Parameters  was reviewed with the receiving nurse. Opportunity for questions and clarification was provided. Assessment completed upon patients arrival to unit and care assumed. 0850--Primary Nurse Peter Lynn RN and Harpal Bergeron RN performed a dual skin assessment on this patient No impairment noted  Joel score is 22      1320--Paged Dr. Sandra Fisher to consult on patient. Patient is currently a patient of this doctor. Will wait for return call from doctor. 7817--Dr. Chantel Chicas returned page and consult information given.

## 2017-12-30 NOTE — ED NOTES
TRANSFER - OUT REPORT:    Verbal report given to [place] (on behalf of Live Pedersen) (name) on Santi Schwarz  being transferred to [facility] [phone] (unit) for routine progression of care       Report consisted of patients Situation, Background, Assessment and   Recommendations(SBAR). Information from the following report(s) SBAR, ED Summary, [facility] and Recent Results was reviewed with the receiving nurse. Lines:   Peripheral IV 12/29/17 Left Antecubital (Active)   Site Assessment Clean, dry, & intact 12/29/2017 10:36 PM   Phlebitis Assessment 0 12/29/2017 10:36 PM   Infiltration Assessment 0 12/29/2017 10:36 PM   Dressing Status Clean, dry, & intact 12/29/2017 10:36 PM   Dressing Type Transparent 12/29/2017 10:36 PM   Hub Color/Line Status Flushed 12/29/2017 10:36 PM   Action Taken Blood drawn 12/29/2017 10:36 PM       Peripheral IV 12/29/17 Right Wrist (Active)   Site Assessment Clean, dry, & intact 12/29/2017 10:36 PM   Phlebitis Assessment 0 12/29/2017 10:36 PM   Infiltration Assessment 0 12/29/2017 10:36 PM   Dressing Status Clean, dry, & intact 12/29/2017 10:36 PM   Dressing Type Transparent 12/29/2017 10:36 PM   Hub Color/Line Status Flushed 12/29/2017 10:36 PM   Action Taken Blood drawn 12/29/2017 10:36 PM        Opportunity for questions and clarification was provided.       Patient transported with:   Registered Nurse, IV pump

## 2017-12-30 NOTE — ED TRIAGE NOTES
Patient arrives to ED with visitors from the [facility] with a report of a blood sugar of about 500. Patient reports that she was over there for right sided abdominbal pain and left flank pain x 2 weeks. Patient reports nausea x 1 week and vomiting today. Patient reports constipation today. Patient reports frequency and painful urination.

## 2017-12-30 NOTE — ED PROVIDER NOTES
EMERGENCY DEPARTMENT HISTORY AND PHYSICAL EXAM      Date: 12/29/2017  Patient Name: Arpita Rosas    History of Presenting Illness     Chief Complaint   Patient presents with    High Blood Sugar     referred to ED by [facility] for blood glucose 503       History Provided By: Patient    HPI: Arpita Rosas, 40 y.o. female with PMHx significant for DM, MI, HTN, and HLD, presents ambulatory to the ED with cc of persistent moderate dysuria and hematuria x several days with nausea, vomiting, subjective fever, and chills onset today. Pt states she was seen at [address] [place] [address] earlier today regarding current sx's and was referred to the ED for a blood glucose of 503. She states her glucose has been high over the past several days as her baseline is 250. Pt endorses using 50 units of lantus in the morning and 60 units at night. She indicates she is not currently on a sliding scale and does not take anything PRN for when her glucose is elevated. Of note, pt is currently being treated for \"a cyst or an abscess\" to her chest followed by the breast center. She indicates she was on multiple abx, however has been off for the past two weeks. She reports three days of diarrhea followed by three days of constipation, but states this has since resolved as her last normal bm was this morning. PCP: Lula Davis MD    There are no other complaints, changes, or physical findings at this time.     Current Facility-Administered Medications   Medication Dose Route Frequency Provider Last Rate Last Dose    insulin regular (NOVOLIN R, HUMULIN R) injection 10 Units  10 Units IntraVENous NOW Selma Saldivar MD        insulin regular (NOVOLIN R, HUMULIN R) 100 Units in 0.9% sodium chloride 100 mL infusion  0-50 Units/hr IntraVENous TITRATE Minnie Austin MD        insulin lispro (HUMALOG) injection   SubCUTAneous TIDAC Minnie Austin MD        glucose chewable tablet 16 g  4 Tab Oral PRN Minnie Austin MD        dextrose (D50W) injection syrg 12.5-25 g  12.5-25 g IntraVENous PRN Tabitha Ferguson MD        glucagon (GLUCAGEN) injection 1 mg  1 mg IntraMUSCular PRN Tabitha Ferguson MD        sodium chloride 0.9 % bolus infusion 500 mL  500 mL IntraVENous ONCE Bertha Valdes MD         Current Outpatient Prescriptions   Medication Sig Dispense Refill    insulin glargine (LANTUS SOLOSTAR) 100 unit/mL (3 mL) inpn Inject 40 units in the morning and 60 units at night and increase as directed up to a max of 150 units per day 45 mL 11    Insulin Needles, Disposable, (BD INSULIN PEN NEEDLE UF SHORT) 31 gauge x 5/16\" ndle Use as directed twice daily 100 Pen Needle 11    atorvastatin (LIPITOR) 40 mg tablet Take 1 Tab by mouth nightly. 30 Tab 11    fenofibrate nanocrystallized (TRICOR) 145 mg tablet Take 1 Tab by mouth daily. 30 Tab 11    omega-3 acid ethyl esters (LOVAZA) 1 gram capsule Take 2 Caps by mouth two (2) times a day. 120 Cap 11    metFORMIN (GLUCOPHAGE) 1,000 mg tablet Take 1 Tab by mouth two (2) times daily (with meals). 60 Tab 11    ergocalciferol (VITAMIN D2) 50,000 unit capsule Take 1 Cap by mouth every seven (7) days. 4 Cap 11    lisinopril (PRINIVIL, ZESTRIL) 10 mg tablet Take 1 Tab by mouth daily. 30 Tab 11    gabapentin (NEURONTIN) 300 mg capsule Take 2 Caps by mouth three (3) times daily. 180 Cap 11    glucose blood VI test strips (ONETOUCH VERIO) strip 4 times/day 200 Strip 11    Blood-Glucose Meter (ONETOUCH VERIO FLEX) misc 4 times/day 1 Each 0    clindamycin (CLEOCIN) 300 mg capsule Take 1 Cap by mouth four (4) times daily. 40 Cap 0    famotidine (PEPCID) 20 mg tablet Take 1 Tab by mouth two (2) times a day. 60 Tab 1    folic acid (FOLVITE) 1 mg tablet Take 1 Tab by mouth daily. 30 Tab 1    ondansetron hcl (ZOFRAN, AS HYDROCHLORIDE,) 8 mg tablet Take 1 Tab by mouth every eight (8) hours as needed for Nausea.  20 Tab 0    albuterol (PROAIR HFA) 90 mcg/actuation inhaler Take 2 Puffs by inhalation every four (4) hours as needed for Wheezing.  sertraline (ZOLOFT) 25 mg tablet Take 1 Tab by mouth nightly. 27 Tab 2       Past History     Past Medical History:  Past Medical History:   Diagnosis Date    Calculus of kidney     Diabetes ([address]     Hyperlipidemia     Hypertension     MI (myocardial infarction)     Other ill-defined conditions(799.89)     Neuropathies    Pancreatitis     due to hypertriglyceridemia    Vitamin D deficiency 2017       Past Surgical History:  Past Surgical History:   Procedure Laterality Date    CARDIAC SURG PROCEDURE UNLIST      cath    CYSTOSCOPY      HX  SECTION      x 2    HX CHOLECYSTECTOMY      HX GYN      tubes clamped    HX TONSIL AND ADENOIDECTOMY      HX WISDOM TEETH EXTRACTION         Family History:  Family History   Problem Relation Age of Onset    Hypertension Mother     Stroke Mother     Diabetes Mother     Cancer Mother 59     breast?    Heart Disease Father     Diabetes Father     Cancer Paternal Grandmother 79     ovarian       Social History:  Social History   Substance Use Topics    Smoking status: Former Smoker     Packs/day: 0.50     Quit date: 2017    Smokeless tobacco: Never Used    Alcohol use No      Comment: once per year       Allergies: Allergies   Allergen Reactions    Toradol [Ketorolac Tromethamine] Unknown (comments)     Seizure like symptoms per pt.  Augmentin [Amoxicillin-Pot Clavulanate] Swelling    Demerol [Meperidine] Swelling    Heparin Hives    Ibuprofen Diarrhea and Nausea and Vomiting     Muscle tightness    Keflex [Cephalexin] Shortness of Breath    Morphine Swelling    Nubain [Nalbuphine] Hives    Pcn [Penicillins] Swelling    Sulfa (Sulfonamide Antibiotics) Unknown (comments)         Review of Systems   Review of Systems   Constitutional: Positive for chills and fever (subjective). Negative for activity change, appetite change and fatigue. HENT: Negative.   Negative for congestion, rhinorrhea and sore throat. Respiratory: Negative. Negative for cough, shortness of breath and wheezing. Cardiovascular: Negative. Negative for chest pain and leg swelling. Gastrointestinal: Positive for constipation (resolved), diarrhea (resolved), nausea and vomiting. Negative for abdominal distention and abdominal pain. Endocrine: Negative. Genitourinary: Positive for dysuria and hematuria. Negative for difficulty urinating, menstrual problem, vaginal bleeding and vaginal discharge. Musculoskeletal: Negative. Negative for arthralgias, joint swelling and myalgias. Skin: Negative. Negative for rash. Neurological: Negative. Negative for dizziness, weakness, light-headedness and headaches. Psychiatric/Behavioral: Negative. Physical Exam   Physical Exam   Constitutional: She is oriented to person, place, and time. She appears well-developed and well-nourished. Mildly ill appearing. Pt appears flushed. HENT:   Head: Atraumatic. Eyes: EOM are normal.   Cardiovascular: Regular rhythm, normal heart sounds and intact distal pulses. Tachycardia present. Exam reveals no gallop and no friction rub. No murmur heard. Pulmonary/Chest: Effort normal and breath sounds normal. No respiratory distress. She has no wheezes. She has no rales. She exhibits no tenderness. Abdominal: Soft. Bowel sounds are normal. She exhibits distension. She exhibits no mass. There is tenderness. There is no rebound and no guarding. Mild RLQ pain and mildly distended abdomen   Musculoskeletal: Normal range of motion. She exhibits no edema or tenderness. Neurological: She is oriented to person, place, and time. Skin: Skin is warm. She is diaphoretic. Cool to the touch   Psychiatric: She has a normal mood and affect. Nursing note and vitals reviewed.         Diagnostic Study Results     Labs -     Recent Results (from the past 12 hour(s))   POC URINE MACROSCOPIC    Collection Time: 12/29/17  6:59 PM   Result Value Ref Range    Spec. gravity (POC) 1.010 1.003 - 1.030      pH, urine  (POC) 6.0 5.0 - 8.0      Protein (POC) NEGATIVE  NEG mg/dL    Glucose, urine (POC) 500 (A) NEG mg/dL    Ketones (POC) NEGATIVE  NEG mg/dL    Bilirubin (POC) NEGATIVE  NEG      Blood (POC) NEGATIVE  NEG      Urobilinogen (POC) 0.2 0.2 - 1.0 EU/dL    Nitrite (POC) NEGATIVE  NEG      Leukocyte esterase (POC) NEGATIVE  NEG     HCG URINE, QL. - POC    Collection Time: 12/29/17  6:59 PM   Result Value Ref Range    Pregnancy test,urine (POC) NEGATIVE  NEG     GLUCOSE, POC    Collection Time: 12/29/17  7:09 PM   Result Value Ref Range    Glucose (POC) 503 (H) 65 - 100 mg/dL    Performed by Cong Powell 852, POC    Collection Time: 12/29/17 10:22 PM   Result Value Ref Range    Glucose (POC) 487 (H) 65 - 100 mg/dL    Performed by Kem Ortiz    VENOUS BLOOD GAS    Collection Time: 12/29/17 10:30 PM   Result Value Ref Range    VENOUS PH 7.40 7.32 - 7.42      VENOUS PCO2 39 (L) 41 - 51 mmHg    VENOUS PO2 61 (H) 25 - 40 mmHg    VENOUS O2 SATURATION 92 (H) 65 - 88 %    VENOUS BICARBONATE 24 23 - 28 mmol/L    VENOUS BASE DEFICIT 0.6 mmol/L    O2 METHOD ROOM AIR      SPONTANEOUS RATE 18.0      Sample source VENOUS      SITE OTHER     CBC WITH AUTOMATED DIFF    Collection Time: 12/29/17 10:32 PM   Result Value Ref Range    WBC 8.7 3.6 - 11.0 K/uL    RBC 4.17 3.80 - 5.20 M/uL    HGB 11.3 (L) 11.5 - 16.0 g/dL    HCT 33.0 (L) 35.0 - 47.0 %    MCV 79.1 (L) 80.0 - 99.0 FL    MCH 27.1 26.0 - 34.0 PG    MCHC 34.2 30.0 - 36.5 g/dL    RDW 15.5 (H) 11.5 - 14.5 %    PLATELET 775 417 - 570 K/uL    NEUTROPHILS 94 (H) 32 - 75 %    LYMPHOCYTES 3 (L) 12 - 49 %    MONOCYTES 2 (L) 5 - 13 %    EOSINOPHILS 1 0 - 7 %    BASOPHILS 0 0 - 1 %    ABS. NEUTROPHILS 8.1 (H) 1.8 - 8.0 K/UL    ABS. LYMPHOCYTES 0.3 (L) 0.8 - 3.5 K/UL    ABS. MONOCYTES 0.2 0.0 - 1.0 K/UL    ABS. EOSINOPHILS 0.1 0.0 - 0.4 K/UL    ABS.  BASOPHILS 0.0 0.0 - 0.1 K/UL    DF SMEAR SCANNED      RBC COMMENTS NORMOCYTIC, NORMOCHROMIC     METABOLIC PANEL, COMPREHENSIVE    Collection Time: 12/29/17 10:32 PM   Result Value Ref Range    Sodium 123 (L) 136 - 145 mmol/L    Potassium 3.6 3.5 - 5.1 mmol/L    Chloride 88 (L) 97 - 108 mmol/L    CO2  21 - 32 mmol/L     UNABLE TO OBTAIN ACCURATE RESULTS DUE TO GROSS LIPEMIA    Anion gap Cannot be calculated 5 - 15 mmol/L    Glucose 631 (HH) 65 - 100 mg/dL    BUN  6 - 20 MG/DL     UNABLE TO OBTAIN ACCURATE RESULTS DUE TO GROSS LIPEMIA    Creatinine  0.55 - 1.02 MG/DL     UNABLE TO OBTAIN ACCURATE RESULTS DUE TO GROSS LIPEMIA    BUN/Creatinine ratio  12 - 20       UNABLE TO OBTAIN ACCURATE RESULTS DUE TO GROSS LIPEMIA    GFR est AA Cannot be calculated >60 ml/min/1.73m2    GFR est non-AA Cannot be calculated >60 ml/min/1.73m2    Calcium  8.5 - 10.1 MG/DL     UNABLE TO OBTAIN ACCURATE RESULTS DUE TO GROSS LIPEMIA    Bilirubin, total  0.2 - 1.0 MG/DL     UNABLE TO OBTAIN ACCURATE RESULTS DUE TO GROSS LIPEMIA    ALT (SGPT)  12 - 78 U/L     UNABLE TO OBTAIN ACCURATE RESULTS DUE TO GROSS LIPEMIA    AST (SGOT)  15 - 37 U/L     UNABLE TO OBTAIN ACCURATE RESULTS DUE TO GROSS LIPEMIA    Alk.  phosphatase  45 - 117 U/L     UNABLE TO OBTAIN ACCURATE RESULTS DUE TO GROSS LIPEMIA    Protein, total  6.4 - 8.2 g/dL     UNABLE TO OBTAIN ACCURATE RESULTS DUE TO GROSS LIPEMIA    Albumin  3.5 - 5.0 g/dL     UNABLE TO OBTAIN ACCURATE RESULTS DUE TO GROSS LIPEMIA    Globulin Cannot be calculated 2.0 - 4.0 g/dL    A-G Ratio Cannot be calculated 1.1 - 2.2     URINALYSIS W/ REFLEX CULTURE    Collection Time: 12/29/17 10:32 PM   Result Value Ref Range    Color YELLOW/STRAW      Appearance CLEAR CLEAR      Specific gravity <1.005 1.003 - 1.030    pH (UA) 6.5 5.0 - 8.0      Protein TRACE (A) NEG mg/dL    Glucose >1000 (A) NEG mg/dL    Ketone NEGATIVE  NEG mg/dL    Bilirubin NEGATIVE  NEG      Blood NEGATIVE  NEG      Urobilinogen 0.2 0.2 - 1.0 EU/dL    Nitrites NEGATIVE  NEG      Leukocyte Esterase NEGATIVE  NEG WBC 0-4 0 - 4 /hpf    RBC 0-5 0 - 5 /hpf    Epithelial cells FEW FEW /lpf    Bacteria NEGATIVE  NEG /hpf    UA:UC IF INDICATED CULTURE NOT INDICATED BY UA RESULT CNI     LACTIC ACID    Collection Time: 12/29/17 10:32 PM   Result Value Ref Range    Lactic acid  0.4 - 2.0 MMOL/L     UNABLE TO OBTAIN ACCURATE RESULTS DUE TO GROSS LIPEMIA   CULTURE, BLOOD, PAIRED    Collection Time: 12/29/17 10:32 PM   Result Value Ref Range    Special Requests: NO SPECIAL REQUESTS      Culture result:        ONE OF FOUR BOTTLES HAS BEEN FLAGGED POSITIVE BY INSTRUMENT. BOTTLE HAS BEEN SENT TO [facility] TO ASSESS FOR POSSIBLE GROWTH. Culture result: REMAINING BOTTLE(S) HAS/HAVE NO GROWTH SO FAR     NUCLEATED RBC    Collection Time: 12/29/17 10:32 PM   Result Value Ref Range    NRBC 0.6 (H) 0  WBC    ABSOLUTE NRBC 0.06 (H) 0.00 - 0.01 K/uL    WBC CORRECTED FOR NR ADJUSTED FOR NUCLEATED RBC'S     LACTIC ACID    Collection Time: 12/30/17 12:46 AM   Result Value Ref Range    Lactic acid  0.4 - 2.0 MMOL/L     UNABLE TO OBTAIN ACCURATE RESULTS DUE TO GROSS LIPEMIA   METABOLIC PANEL, COMPREHENSIVE    Collection Time: 12/30/17 12:46 AM   Result Value Ref Range    Sodium 125 (L) 136 - 145 mmol/L    Potassium 3.6 3.5 - 5.1 mmol/L    Chloride 92 (L) 97 - 108 mmol/L    CO2 17 (L) 21 - 32 mmol/L    Anion gap 16 (H) 5 - 15 mmol/L    Glucose 608 (HH) 65 - 100 mg/dL    BUN  6 - 20 MG/DL     UNABLE TO OBTAIN ACCURATE RESULTS DUE TO GROSS LIPEMIA    Creatinine 1.39 (H) 0.55 - 1.02 MG/DL    BUN/Creatinine ratio  12 - 20       UNABLE TO OBTAIN ACCURATE RESULTS DUE TO GROSS LIPEMIA    GFR est AA 52 (L) >60 ml/min/1.73m2    GFR est non-AA 43 (L) >60 ml/min/1.73m2    Calcium  8.5 - 10.1 MG/DL     UNABLE TO OBTAIN ACCURATE RESULTS DUE TO GROSS LIPEMIA    Bilirubin, total  0.2 - 1.0 MG/DL     UNABLE TO OBTAIN ACCURATE RESULTS DUE TO GROSS LIPEMIA    ALT (SGPT) 144 (H) 12 - 78 U/L    AST (SGOT) 352 (H) 15 - 37 U/L    Alk.  phosphatase  45 - 117 U/L     UNABLE TO OBTAIN ACCURATE RESULTS DUE TO GROSS LIPEMIA    Protein, total  6.4 - 8.2 g/dL     UNABLE TO OBTAIN ACCURATE RESULTS DUE TO GROSS LIPEMIA    Albumin  3.5 - 5.0 g/dL     UNABLE TO OBTAIN ACCURATE RESULTS DUE TO GROSS LIPEMIA    Globulin Cannot be calculated 2.0 - 4.0 g/dL    A-G Ratio Cannot be calculated 1.1 - 2.2     GLUCOSE, POC    Collection Time: 12/30/17  1:40 AM   Result Value Ref Range    Glucose (POC) 438 (H) 65 - 100 mg/dL    Performed by Giles Romberg ASHLEY    HCG URINE, QL. - POC    Collection Time: 12/30/17  1:41 AM   Result Value Ref Range    Pregnancy test,urine (POC) NEGATIVE  NEG     METABOLIC PANEL, BASIC    Collection Time: 12/30/17  3:23 AM   Result Value Ref Range    Sodium 125 (L) 136 - 145 mmol/L    Potassium 3.8 3.5 - 5.1 mmol/L    Chloride 93 (L) 97 - 108 mmol/L    CO2 18 (L) 21 - 32 mmol/L    Anion gap 14 5 - 15 mmol/L    Glucose 587 (H) 65 - 100 mg/dL    BUN  6 - 20 MG/DL     UNABLE TO OBTAIN ACCURATE RESULTS DUE TO GROSS LIPEMIA    Creatinine 1.09 (H) 0.55 - 1.02 MG/DL    BUN/Creatinine ratio  12 - 20       UNABLE TO OBTAIN ACCURATE RESULTS DUE TO GROSS LIPEMIA    GFR est AA >60 >60 ml/min/1.73m2    GFR est non-AA 56 (L) >60 ml/min/1.73m2    Calcium  8.5 - 10.1 MG/DL     UNABLE TO OBTAIN ACCURATE RESULTS DUE TO GROSS LIPEMIA   POC LACTIC ACID    Collection Time: 12/30/17  3:25 AM   Result Value Ref Range    Lactic Acid (POC) 1.5 0.4 - 2.0 mmol/L       Radiologic Studies -     CT Results  (Last 48 hours)               12/30/17 0326  CT ABD PELV WO CONT Final result    Impression:  IMPRESSION:   No acute intraperitoneal process is identified. Narrative:  EXAM:  CT ABD PELV WO CONT   Clinical history: Abdominal pain, dysuria, hematuria   INDICATION: Abdominal pain, dysuria, hematuria, nausea and vomiting       COMPARISON: 2011       CONTRAST:  None. TECHNIQUE:    Thin axial images were obtained through the abdomen and pelvis.  Coronal and   sagittal reconstructions were generated. Oral contrast was not administered. CT   dose reduction was achieved through use of a standardized protocol tailored for   this examination and automatic exposure control for dose modulation. The absence of intravenous contrast material reduces the sensitivity for   evaluation of the solid parenchymal organs of the abdomen. FINDINGS:    LUNG BASES: Clear. INCIDENTALLY IMAGED HEART AND MEDIASTINUM: Imaging findings suggestive of   anemia. LIVER: Hepatic steatosis. GALLBLADDER: Postcholecystectomy   SPLEEN: No mass. PANCREAS: No mass or ductal dilatation. ADRENALS: Unremarkable. KIDNEYS/URETERS: Perinephric stranding. No renal calculi. No hydroureter. No   hydronephrosis. STOMACH: Unremarkable. SMALL BOWEL: No dilatation or wall thickening. COLON: Fecal stasis. APPENDIX: Unremarkable. PERITONEUM: No ascites or pneumoperitoneum. RETROPERITONEUM: No lymphadenopathy or aortic aneurysm. REPRODUCTIVE ORGANS: Tubal ligation clips. URINARY BLADDER: No mass or calculus. BONES: No destructive bone lesion. ADDITIONAL COMMENTS: N/A                 Medical Decision Making   I am the first provider for this patient. I reviewed the vital signs, available nursing notes, past medical history, past surgical history, family history and social history. Vital Signs-Reviewed the patient's vital signs.   Patient Vitals for the past 12 hrs:   Temp Pulse Resp BP SpO2   12/30/17 0430 - 68 15 119/69 95 %   12/30/17 0400 - 82 21 130/81 94 %   12/30/17 0336 - 75 17 - 95 %   12/30/17 0333 - - - 120/79 -   12/30/17 0300 - 78 21 133/68 92 %   12/30/17 0230 - 77 19 132/69 93 %   12/30/17 0200 - 76 17 133/76 93 %   12/30/17 0130 - 87 21 132/76 95 %   12/30/17 0100 - 87 18 140/74 96 %   12/30/17 0030 - 85 22 134/76 95 %   12/30/17 0001 - 86 21 122/76 92 %   12/29/17 2330 - 91 17 135/72 94 %   12/29/17 2314 - - - 146/75 -   12/29/17 2213 99 °F (37.2 °C) (!) 121 26 161/84 97 %       Pulse Oximetry Analysis - 97% on RA    Cardiac Monitor:   Rate: 91 bpm  Rhythm: Normal Sinus Rhythm      Records Reviewed: Nursing Notes and Old Medical Records    Provider Notes (Medical Decision Making): Uncontrolled DM with elevated blood glucose greater than 500 likely due to UTI, pyelonephritis, kidney stone, with concern for associated dehydration and/or electrolyte abnormality     ED Course:   Initial assessment performed. The patients presenting problems have been discussed, and they are in agreement with the care plan formulated and outlined with them. I have encouraged them to ask questions as they arise throughout their visit. CONSULT NOTE:   4:30 AM  Esdras Lorenzo MD spoke with Chantel Baker MD   Specialty: Hospitalist  Discussed pt's hx, disposition, and available diagnostic and imaging results. Reviewed care plans. Consultant will evaluate pt for admission. Dr. Bin Astudillo asked to add another 10 IV insulin bolus and he will add the drip given it may be a period of time before she is seen, but pt will be admitted. Written by Mariluz Hardy ED Scribe, as dictated by Esdras Lorenzo MD.    Critical Care Time:     CRITICAL CARE NOTE :    4:30 AM    IMPENDING DETERIORATION -Metabolic  ASSOCIATED RISK FACTORS - Metabolic changes and Dehydration  MANAGEMENT- Bedside Assessment and Supervision of Care  INTERPRETATION -  CT Scan and Blood Pressure  INTERVENTIONS - hemodynamic mngmt and Metobolic interventions  CASE REVIEW - Hospitalist, Nursing and Family  TREATMENT RESPONSE -Stable  PERFORMED BY - Self    NOTES   :    I have spent >100 minutes of critical care time involved in lab review, consultations with specialist, family decision- making, bedside attention and documentation. During this entire length of time I was immediately available to the patient .     Esdras Lorenzo MD    Disposition:    ADMIT NOTE:  4:35 AM  The patient is being admitted to the hospital by Chantel Baker MD.  The results of their tests and reasons for their admission have been discussed with the patient and/or available family. They convey agreement and understanding for the need to be admitted and for their admission diagnosis. PLAN:  1. Admit to hospitalist      Diagnosis     Clinical Impression:   1. Hyperglycemia    2. LORRIE (acute kidney injury) ([facility] [address]    3. Hyponatremia    4. Abdominal pain, right lower quadrant    5. Diabetic ketoacidosis without coma associated with type 1 diabetes mellitus ([facility] [address]        Attestations: This note is prepared by Gale Garvin, acting as Scribe for Marcelo Anderson MD.    Marcelo Anderson MD: The scribe's documentation has been prepared under my direction and personally reviewed by me in its entirety. I confirm that the note above accurately reflects all work, treatment, procedures, and medical decision making performed by me.

## 2017-12-30 NOTE — UC NOTE
After being seen by provider pt advised to be evaluated in the emergency department.  Report called to [facility] by Dr Julianne Bence

## 2017-12-30 NOTE — PROGRESS NOTES
Physical Therapy  Referral received, chart reviewed and consulted with nursing. Per conversation with nursing, patient has been up ab christopher demonstrating steady gait and nurse reports she has been performing her own ADLs and moving with no difficulty. Planned to speak with patient to confirm but RN requesting PT not wake patient as she is resting post bout with nausea. Will sign off. RN aware of plan to re-consult if PT needs arise.   Hector Galindo, PT, DPT

## 2017-12-30 NOTE — H&P
Hospitalist Admission Note    NAME: Wilberto Rouse   :  1980   MRN:  [id number]     Date/Time:  2017 6:31 AM    Patient PCP: Sivan Tabares MD  ________________________________________________________________________    Given the patient's current clinical presentation, I have a high level of concern for decompensation if discharged from the ED. Complex decision making was performed which includes reviewing the patient's available past medical records, laboratory results, and Xray films. I have also directly communicated my plan and discussed this case with the involved ED physician. My assessment of this patient's clinical condition and my plan of care is as follows:    Assessment / Plan:  Diabetic ketoacidosis  Pseudohyponatremia  Nausea with emesis causing Hypokalemia  constipation with intermittent diarrhea  Dehydration without renal injury  Hyperlipidemia  -IVF NS aggressively  -insulin gtt started by me in ED  -goal is <250 today  -will have endocrinology see and evaluate in IP setting today to help with insulin gtt wean and initiation of home medications for diabetes - she had insulin pump prior to pregnancy - would like to be back on the pump OP if able.  -sodium low due to hyperglycemia - sodium corrects to 137 with bs this high  -serial bmp to ensure corrections appropriate  -will attempt to resume home medicines to include hypercholesterol/hypertriglyceridemic medicines  -bringing bs down should help her elevated triglycerides  -zofran for n/v  -encourage PO intake  -DTC to see for continued recommendations  -replace K PO/IV as tolerated to bring to K >4  -miralax for constipation    I have personally reviewed the radiographs, laboratory data in Epic and decisions and statements above are based partially on this personal interpretation.     Code Status: Full Code  DVT Prophylaxis: Hep SQ  GI Prophylaxis: PPI        Subjective:   CHIEF COMPLAINT: \"i did not feel well\"    HISTORY OF PRESENT ILLNESS:     Angelita Li is a 40 y.o.  female with known history as listed below presents to ED with complaint noted above. Available records were reviewed at the time of H&P. Patient with known hyperlipidemia on treatment with Dr Vidal Cabot, pancreatitis and difficult to control DM2 whom was on insulin pump in past taken off due to pregnancy now on high doses of bid lantus and remains with elevated bs 250's per her account on average. She notes feeling poorly over last 24hrs further noting elevated blood sugars from her baseline. +dysuria maybe with flecks of blood in it as well as n/v of stomach contetns without hematemesis x 5. +mixed constipation with diarrhea over last week with last normal BM on . This is unusual for her. No f/c. No sick contacts. She has not missed medications. No new medications of recent. To ED with subjective chills. In ED noted no fevers but elevated bs to >580's. Low sodium. Lipase wnl. We were asked to admit for work up and evaluation of the above problems.      Past Medical History:   Diagnosis Date    Calculus of kidney     Diabetes ([facility] [address]     Hyperlipidemia     Hypertension     MI (myocardial infarction)     Other ill-defined conditions(799.89)     Neuropathies    Pancreatitis     due to hypertriglyceridemia    Vitamin D deficiency 2017      Past Surgical History:   Procedure Laterality Date    CARDIAC SURG PROCEDURE UNLIST      cath    CYSTOSCOPY      HX  SECTION      x 2    HX CHOLECYSTECTOMY      HX GYN      tubes clamped    HX TONSIL AND ADENOIDECTOMY      HX WISDOM TEETH EXTRACTION       Social History   Substance Use Topics    Smoking status: Former Smoker     Packs/day: 0.50     Quit date: 2017    Smokeless tobacco: Never Used    Alcohol use No      Comment: once per year      Family History   Problem Relation Age of Onset    Hypertension Mother     Stroke Mother     Diabetes Mother    Isra Nolanaria Mother 59 breast?    Heart Disease Father     Diabetes Father     Cancer Paternal Grandmother 79     ovarian        Allergies   Allergen Reactions    Toradol [Ketorolac Tromethamine] Unknown (comments)     Seizure like symptoms per pt.  Augmentin [Amoxicillin-Pot Clavulanate] Swelling    Demerol [Meperidine] Swelling    Heparin Hives    Ibuprofen Diarrhea and Nausea and Vomiting     Muscle tightness    Keflex [Cephalexin] Shortness of Breath    Morphine Swelling    Nubain [Nalbuphine] Hives    Pcn [Penicillins] Swelling    Sulfa (Sulfonamide Antibiotics) Unknown (comments)        Prior to Admission medications    Medication Sig Start Date End Date Taking? Authorizing Provider   insulin glargine (LANTUS SOLOSTAR) 100 unit/mL (3 mL) inpn Inject 40 units in the morning and 60 units at night and increase as directed up to a max of 150 units per day 11/30/17   Kristin Ramos MD   Insulin Needles, Disposable, (BD INSULIN PEN NEEDLE UF SHORT) 31 gauge x 5/16\" ndle Use as directed twice daily 11/30/17   Kristin Ramos MD   atorvastatin (LIPITOR) 40 mg tablet Take 1 Tab by mouth nightly. 11/30/17   Kristin Ramos MD   fenofibrate nanocrystallized (TRICOR) 145 mg tablet Take 1 Tab by mouth daily. 11/30/17   Kristin Ramos MD   omega-3 acid ethyl esters (LOVAZA) 1 gram capsule Take 2 Caps by mouth two (2) times a day. 11/30/17   Kristin Ramos MD   metFORMIN (GLUCOPHAGE) 1,000 mg tablet Take 1 Tab by mouth two (2) times daily (with meals). 11/30/17   Kristin Ramos MD   ergocalciferol (VITAMIN D2) 50,000 unit capsule Take 1 Cap by mouth every seven (7) days. 11/30/17   Kristin Ramos MD   lisinopril (PRINIVIL, ZESTRIL) 10 mg tablet Take 1 Tab by mouth daily. 11/30/17   Kristin Ramos MD   gabapentin (NEURONTIN) 300 mg capsule Take 2 Caps by mouth three (3) times daily.  11/30/17   Kristin Ramos MD   glucose blood VI test strips [facility]) strip 4 times/day 11/29/17   Christian Ca, MD   Blood-Glucose Meter (ONETOUCH VERIO FLEX) misc 4 times/day 11/29/17   Christian Ca MD   clindamycin (CLEOCIN) 300 mg capsule Take 1 Cap by mouth four (4) times daily. 11/28/17   Tanisha Camacho MD   famotidine (PEPCID) 20 mg tablet Take 1 Tab by mouth two (2) times a day. 10/10/17   Maggy Diaz MD   folic acid (FOLVITE) 1 mg tablet Take 1 Tab by mouth daily. 10/10/17   Maggy Diaz MD   ondansetron hcl (ZOFRAN, AS HYDROCHLORIDE,) 8 mg tablet Take 1 Tab by mouth every eight (8) hours as needed for Nausea. 10/10/17   Maggy Diaz MD   albuterol (PROAIR HFA) 90 mcg/actuation inhaler Take 2 Puffs by inhalation every four (4) hours as needed for Wheezing. Historical Provider   sertraline (ZOLOFT) 25 mg tablet Take 1 Tab by mouth nightly.  10/3/17   Christian Ca MD     REVIEW OF SYSTEMS:  See HPI for details  General: negative for fever, chills, sweats, ++ weakness, no weight loss  Eyes: negative for blurred vision, eye pain, loss of vision, diplopia  Ear Nose and Throat: negative for rhinorrhea, pharyngitis, otalgia, tinnitus, speech or swallowing difficulties  Respiratory:  negative for pleuritic pain, cough, sputum production, wheezing, SOB, VIRK  Cardiology:  negative for chest pain, palpitations, orthopnea, PND, edema, syncope   Gastrointestinal: +RUQ abdominal pain with N/V, +constipation with intermittent diarrhea, no dysphagia, NO bleeding  Genitourinary: negative for frequency, urgency, dysuria, hematuria, incontinence  Muskuloskeletal : negative for arthralgia, myalgia  Hematology: negative for easy bruising, bleeding, lymphadenopathy  Dermatological: negative for rash, ulceration, mole change, new lesion  Endocrine: negative for hot flashes or polydipsia  Neurological: negative for headache, dizziness, confusion, focal weakness, paresthesia, memory loss, gait disturbance  Psychological: negative for anxiety, depression, agitation    Objective:   VITALS:    Visit Vitals    /70 (BP Patient Position: At rest)    Pulse 72    Temp 97.6 °F (36.4 °C)    Resp 18    Ht 5' 8\" (1.727 m)    Wt 125.6 kg (277 lb)    SpO2 95%    BMI 42.12 kg/m2     PHYSICAL EXAM:     GENERAL:    WD y   WN y   Cachectic    Thin    Obese y   Disheveled y   Ill Appearing Critically    Ill Appearing Chronically y   Acute Distress y   Other      HEENT:    NC/AT/EOMI y   PERRLA y   Conjunctivae Pink    Conjunctivae Pale y   Moist Mucosa    Dry Mucosa y   Hearing intact to voice y   Other      NECK:    Supple y   Masses n   Thyroid Tender n   Other                   RESPIRATORY:    CTA bilaterally WITHOUT wheezing/rhonchi/rales or crackles y   Wheezing    Rhonchi    Crackles    Use of accessory muscles n   Other      CARDIAC:    regular rate and rhythm No murmurs/rubs/gallops y   Murmur    Rubs    Gallops    Rate Regular/Irregular reg   Carotid Bruit Left/Right n   Lower Extremity Edema n   JVP  n   Other Normal capillary refill     ABDOMEN:    Soft non distended non tender +bowel sounds no HSM    Rigid n   Tenderness y ruq   Hepatomegaly n   Splenomegaly n   Distended n   Increased girth due to habitus y   Normal/Hyper/Hypo Active Bowel Sounds hypo   Other      SKIN / MUSCULOSKELETAL:    Rashes n   Ecchymosis n   Ulcers    Tight to palpitation    Turgor Good/Poor poor   Cyanosis/Clubbing n   Amputation(s) n   Other      NEUROLOGY:    cranial nerves II-XII grossly intact y   Cranial Nerve Deficit    Facial Droop    Slurred Speech n   Aphasia    Strength Normal y   Weakness    Meningismus/Kernig's Sign/ Brudzinsky n   Follows Commands y   Other      PSYCHIATRIC:    AAOx3 in no acute distress y   Insight Poor    Insight Good y   Alert and Oriented to Person     Alert and Oriented to Place    Alert and Oriented toTime    Depressed    Anxious    Agitated n   Lethargic n   Stuporous n   Sedated    Other    _______________________________________________________________________  Care Plan discussed with:    Comments   Patient x Discussed with patient in room. POC outlined and Questions answered (22   Family      RN x    Care Manager                    Consultant:  ananda MASON MD 10   _______________________________________________________________________  Recommended Disposition:   Home with Family y   HH/PT/OT/RN    SNF/LTC    MARIAJOSE    ________________________________________________________________________  TOTAL TIME:  48 Minutes    Critical Care Provided     Minutes non procedure based      Comments   >50% of visit spent in counseling and coordination of care x Chart review  Discussion with patient and/or family and questions answered     ________________________________________________________________________  Signed: Loreta Sanford MD    This note will not be viewable in [address] Procedures: see electronic medical records for all procedures/Xrays and details which were not copied into this note but were reviewed prior to creation of Plan.     LAB DATA REVIEWED:    Recent Results (from the past 24 hour(s))   POC URINE MACROSCOPIC    Collection Time: 12/29/17  6:59 PM   Result Value Ref Range    Spec. gravity (POC) 1.010 1.003 - 1.030      pH, urine  (POC) 6.0 5.0 - 8.0      Protein (POC) NEGATIVE  NEG mg/dL    Glucose, urine (POC) 500 (A) NEG mg/dL    Ketones (POC) NEGATIVE  NEG mg/dL    Bilirubin (POC) NEGATIVE  NEG      Blood (POC) NEGATIVE  NEG      Urobilinogen (POC) 0.2 0.2 - 1.0 EU/dL    Nitrite (POC) NEGATIVE  NEG      Leukocyte esterase (POC) NEGATIVE  NEG     HCG URINE, QL. - POC    Collection Time: 12/29/17  6:59 PM   Result Value Ref Range    Pregnancy test,urine (POC) NEGATIVE  NEG     GLUCOSE, POC    Collection Time: 12/29/17  7:09 PM   Result Value Ref Range    Glucose (POC) 503 (H) 65 - 100 mg/dL    Performed by Cong Powell [address], POC    Collection Time: 12/29/17 10:22 PM   Result Value Ref Range    Glucose (POC) 487 (H) 65 - 100 mg/dL    Performed by [id number] Breen GAS    Collection Time: 12/29/17 10:30 PM   Result Value Ref Range    VENOUS PH 7.40 7.32 - 7.42      VENOUS PCO2 39 (L) 41 - 51 mmHg    VENOUS PO2 61 (H) 25 - 40 mmHg    VENOUS O2 SATURATION 92 (H) 65 - 88 %    VENOUS BICARBONATE 24 23 - 28 mmol/L    VENOUS BASE DEFICIT 0.6 mmol/L    O2 METHOD ROOM AIR      SPONTANEOUS RATE 18.0      Sample source VENOUS      SITE OTHER     CBC WITH AUTOMATED DIFF    Collection Time: 12/29/17 10:32 PM   Result Value Ref Range    WBC 8.7 3.6 - 11.0 K/uL    RBC 4.17 3.80 - 5.20 M/uL    HGB 11.3 (L) 11.5 - 16.0 g/dL    HCT 33.0 (L) 35.0 - 47.0 %    MCV 79.1 (L) 80.0 - 99.0 FL    MCH 27.1 26.0 - 34.0 PG    MCHC 34.2 30.0 - 36.5 g/dL    RDW 15.5 (H) 11.5 - 14.5 %    PLATELET 843 883 - 268 K/uL    NEUTROPHILS 94 (H) 32 - 75 %    LYMPHOCYTES 3 (L) 12 - 49 %    MONOCYTES 2 (L) 5 - 13 %    EOSINOPHILS 1 0 - 7 %    BASOPHILS 0 0 - 1 %    ABS. NEUTROPHILS 8.1 (H) 1.8 - 8.0 K/UL    ABS. LYMPHOCYTES 0.3 (L) 0.8 - 3.5 K/UL    ABS. MONOCYTES 0.2 0.0 - 1.0 K/UL    ABS. EOSINOPHILS 0.1 0.0 - 0.4 K/UL    ABS.  BASOPHILS 0.0 0.0 - 0.1 K/UL    DF SMEAR SCANNED      RBC COMMENTS NORMOCYTIC, NORMOCHROMIC     METABOLIC PANEL, COMPREHENSIVE    Collection Time: 12/29/17 10:32 PM   Result Value Ref Range    Sodium 123 (L) 136 - 145 mmol/L    Potassium 3.6 3.5 - 5.1 mmol/L    Chloride 88 (L) 97 - 108 mmol/L    CO2  21 - 32 mmol/L     UNABLE TO OBTAIN ACCURATE RESULTS DUE TO GROSS LIPEMIA    Anion gap Cannot be calculated 5 - 15 mmol/L    Glucose 631 (HH) 65 - 100 mg/dL    BUN  6 - 20 MG/DL     UNABLE TO OBTAIN ACCURATE RESULTS DUE TO GROSS LIPEMIA    Creatinine  0.55 - 1.02 MG/DL     UNABLE TO OBTAIN ACCURATE RESULTS DUE TO GROSS LIPEMIA    BUN/Creatinine ratio  12 - 20       UNABLE TO OBTAIN ACCURATE RESULTS DUE TO GROSS LIPEMIA    GFR est AA Cannot be calculated >60 ml/min/1.73m2    GFR est non-AA Cannot be calculated >60 ml/min/1.73m2    Calcium  8.5 - 10.1 MG/DL     UNABLE TO OBTAIN ACCURATE RESULTS DUE TO GROSS LIPEMIA    Bilirubin, total 0.2 - 1.0 MG/DL     UNABLE TO OBTAIN ACCURATE RESULTS DUE TO GROSS LIPEMIA    ALT (SGPT)  12 - 78 U/L     UNABLE TO OBTAIN ACCURATE RESULTS DUE TO GROSS LIPEMIA    AST (SGOT)  15 - 37 U/L     UNABLE TO OBTAIN ACCURATE RESULTS DUE TO GROSS LIPEMIA    Alk. phosphatase  45 - 117 U/L     UNABLE TO OBTAIN ACCURATE RESULTS DUE TO GROSS LIPEMIA    Protein, total  6.4 - 8.2 g/dL     UNABLE TO OBTAIN ACCURATE RESULTS DUE TO GROSS LIPEMIA    Albumin  3.5 - 5.0 g/dL     UNABLE TO OBTAIN ACCURATE RESULTS DUE TO GROSS LIPEMIA    Globulin Cannot be calculated 2.0 - 4.0 g/dL    A-G Ratio Cannot be calculated 1.1 - 2.2     URINALYSIS W/ REFLEX CULTURE    Collection Time: 12/29/17 10:32 PM   Result Value Ref Range    Color YELLOW/STRAW      Appearance CLEAR CLEAR      Specific gravity <1.005 1.003 - 1.030    pH (UA) 6.5 5.0 - 8.0      Protein TRACE (A) NEG mg/dL    Glucose >1000 (A) NEG mg/dL    Ketone NEGATIVE  NEG mg/dL    Bilirubin NEGATIVE  NEG      Blood NEGATIVE  NEG      Urobilinogen 0.2 0.2 - 1.0 EU/dL    Nitrites NEGATIVE  NEG      Leukocyte Esterase NEGATIVE  NEG      WBC 0-4 0 - 4 /hpf    RBC 0-5 0 - 5 /hpf    Epithelial cells FEW FEW /lpf    Bacteria NEGATIVE  NEG /hpf    UA:UC IF INDICATED CULTURE NOT INDICATED BY UA RESULT CNI     LACTIC ACID    Collection Time: 12/29/17 10:32 PM   Result Value Ref Range    Lactic acid  0.4 - 2.0 MMOL/L     UNABLE TO OBTAIN ACCURATE RESULTS DUE TO GROSS LIPEMIA   CULTURE, BLOOD, PAIRED    Collection Time: 12/29/17 10:32 PM   Result Value Ref Range    Special Requests: NO SPECIAL REQUESTS      Culture result:        ONE OF FOUR BOTTLES HAS BEEN FLAGGED POSITIVE BY INSTRUMENT. BOTTLE HAS BEEN SENT TO [facility] TO ASSESS FOR POSSIBLE GROWTH. No organisms seen on gram stain. Multiple subcultures are in progress.       Culture result: REMAINING BOTTLE(S) HAS/HAVE NO GROWTH SO FAR     NUCLEATED RBC    Collection Time: 12/29/17 10:32 PM   Result Value Ref Range    NRBC 0.6 (H) 0  WBC    ABSOLUTE NRBC 0.06 (H) 0.00 - 0.01 K/uL    WBC CORRECTED FOR NR ADJUSTED FOR NUCLEATED RBC'S     LACTIC ACID    Collection Time: 12/30/17 12:46 AM   Result Value Ref Range    Lactic acid  0.4 - 2.0 MMOL/L     UNABLE TO OBTAIN ACCURATE RESULTS DUE TO GROSS LIPEMIA   METABOLIC PANEL, COMPREHENSIVE    Collection Time: 12/30/17 12:46 AM   Result Value Ref Range    Sodium 125 (L) 136 - 145 mmol/L    Potassium 3.6 3.5 - 5.1 mmol/L    Chloride 92 (L) 97 - 108 mmol/L    CO2 17 (L) 21 - 32 mmol/L    Anion gap 16 (H) 5 - 15 mmol/L    Glucose 608 (HH) 65 - 100 mg/dL    BUN  6 - 20 MG/DL     UNABLE TO OBTAIN ACCURATE RESULTS DUE TO GROSS LIPEMIA    Creatinine 1.39 (H) 0.55 - 1.02 MG/DL    BUN/Creatinine ratio  12 - 20       UNABLE TO OBTAIN ACCURATE RESULTS DUE TO GROSS LIPEMIA    GFR est AA 52 (L) >60 ml/min/1.73m2    GFR est non-AA 43 (L) >60 ml/min/1.73m2    Calcium  8.5 - 10.1 MG/DL     UNABLE TO OBTAIN ACCURATE RESULTS DUE TO GROSS LIPEMIA    Bilirubin, total  0.2 - 1.0 MG/DL     UNABLE TO OBTAIN ACCURATE RESULTS DUE TO GROSS LIPEMIA    ALT (SGPT) 144 (H) 12 - 78 U/L    AST (SGOT) 352 (H) 15 - 37 U/L    Alk.  phosphatase  45 - 117 U/L     UNABLE TO OBTAIN ACCURATE RESULTS DUE TO GROSS LIPEMIA    Protein, total  6.4 - 8.2 g/dL     UNABLE TO OBTAIN ACCURATE RESULTS DUE TO GROSS LIPEMIA    Albumin  3.5 - 5.0 g/dL     UNABLE TO OBTAIN ACCURATE RESULTS DUE TO GROSS LIPEMIA    Globulin Cannot be calculated 2.0 - 4.0 g/dL    A-G Ratio Cannot be calculated 1.1 - 2.2     GLUCOSE, POC    Collection Time: 12/30/17  1:40 AM   Result Value Ref Range    Glucose (POC) 438 (H) 65 - 100 mg/dL    Performed by GELACIO CROSS    [facility] URINE, QL. - POC    Collection Time: 12/30/17  1:41 AM   Result Value Ref Range    Pregnancy test,urine (POC) NEGATIVE  NEG     METABOLIC PANEL, BASIC    Collection Time: 12/30/17  3:23 AM   Result Value Ref Range    Sodium 125 (L) 136 - 145 mmol/L    Potassium 3.8 3.5 - 5.1 mmol/L    Chloride 93 (L) 97 - 108 mmol/L    CO2 18 (L) 21 - 32 mmol/L    Anion gap 14 5 - 15 mmol/L    Glucose 587 (H) 65 - 100 mg/dL    BUN  6 - 20 MG/DL     UNABLE TO OBTAIN ACCURATE RESULTS DUE TO GROSS LIPEMIA    Creatinine 1.09 (H) 0.55 - 1.02 MG/DL    BUN/Creatinine ratio  12 - 20       UNABLE TO OBTAIN ACCURATE RESULTS DUE TO GROSS LIPEMIA    GFR est AA >60 >60 ml/min/1.73m2    GFR est non-AA 56 (L) >60 ml/min/1.73m2    Calcium  8.5 - 10.1 MG/DL     UNABLE TO OBTAIN ACCURATE RESULTS DUE TO GROSS LIPEMIA   LIPASE    Collection Time: 12/30/17  3:23 AM   Result Value Ref Range    Lipase 149 73 - 393 U/L   POC LACTIC ACID    Collection Time: 12/30/17  3:25 AM   Result Value Ref Range    Lactic Acid (POC) 1.5 0.4 - 2.0 mmol/L   GLUCOSE, POC    Collection Time: 12/30/17  5:13 AM   Result Value Ref Range    Glucose (POC) 439 (H) 65 - 100 mg/dL    Performed by Carli Salas    Collection Time: 12/30/17  5:35 AM   Result Value Ref Range    Glucose 439 mg/dL    Insulin order 7.6 units/hour    Insulin adminstered 7.6 units/hour    Multiplier 0.020     Low target 150 mg/dL    High target 250 mg/dL    D50 order 0.0 ml    D50 administered 0.00 ml    Minutes until next BG 60 min    Order initials ABBY     Administered initials ABBY     GLSCRYANNE Comments     GLUCOSE, POC    Collection Time: 12/30/17  6:49 AM   Result Value Ref Range    Glucose (POC) 360 (H) 65 - 100 mg/dL    Performed by Carli Salas    Collection Time: 12/30/17  6:50 AM   Result Value Ref Range    Glucose 360 mg/dL    Insulin order 9.0 units/hour    Insulin adminstered 9.0 units/hour    Multiplier 0.030     Low target 150 mg/dL    High target 250 mg/dL    D50 order 0.0 ml    D50 administered 0.00 ml    Minutes until next BG 60 min    Order initials ABBY     Administered initials ABBY     GLSCOM Comments

## 2017-12-30 NOTE — CONSULTS
Endocrinology Consult    Asked by Dr. Live Gurrola to see this clinic patient of mine for management of DKA. This consult is purely from chart review from home and from speaking with her nurse, Rodolfo Nuñez and from talking to patient over the phone. I just saw patient in the office on 11/30/17 and at that time she was dealing with a breast abscess and was seen by the breast clinic and put on abx and had I&D and she states her abscess has closed at this time aside from a very tiny area. She had increased her lantus to 40 units in the morning and 60 units at night for a week and then 50 units in the morning and 60 units at night but never went up higher than this. States her sugars were starting to come down on this regimen and on the metformin 1g bid but she has been under more stress over the holidays and hasn't been eating as well and thinks this is the reason her sugars started going up. She is not currently being treated for an infection and her WBC count was normal and blood and urine cultures are negative so far. She was started on the glucose stabilizer and her most recent sugar was 199 and this was with using a target of 150-250. I spoke with [facility] and told him to change her target to 140-180 to improve her control and decrease her risk of pancreatitis which she has had in the past.  Her lipase was normal at 149 while in the ER. Her A1c was 11.2% during this admission. Plan:  - continue insulin drip overnight but change target to 140-180  - check bmp now    Thanks for the consult. Please don't hesitate to call [phone] with further questions.     Sophie Smith MD

## 2017-12-30 NOTE — ED NOTES
Patient assisted to bathroom at this time; urine sample obtained and sent to lab at this time. POC urine pregnancy is negative.

## 2017-12-30 NOTE — ED NOTES
Bedside shift change report given to [address] [place] [address]. (oncoming nurse) by Bina Campos (offgoing nurse). Report included the following information SBAR, ED Summary, MAR and Recent Results.

## 2017-12-30 NOTE — UC PROVIDER NOTE
Patient is a 40 y.o. female presenting with abdominal pain. The history is provided by the patient. Abdominal Pain    This is a new problem. Episode onset: 3 days ago. The problem occurs constantly. The problem has been gradually worsening. The pain is located in the suprapubic region. The quality of the pain is aching. The pain is moderate. Associated symptoms include dysuria, frequency and hematuria. Pertinent negatives include no anorexia, no fever, no diarrhea, no nausea, no vomiting and no constipation. Past Medical History:   Diagnosis Date    Calculus of kidney     Diabetes ([address]     Hyperlipidemia     Hypertension     MI (myocardial infarction)     Other ill-defined conditions(799.89)     Neuropathies    Pancreatitis     due to hypertriglyceridemia    Vitamin D deficiency 2017        Past Surgical History:   Procedure Laterality Date    CARDIAC SURG PROCEDURE UNLIST      cath    CYSTOSCOPY      HX  SECTION      x 2    HX CHOLECYSTECTOMY      HX GYN      tubes clamped    HX TONSIL AND ADENOIDECTOMY      HX WISDOM TEETH EXTRACTION           Family History   Problem Relation Age of Onset    Hypertension Mother     Stroke Mother     Diabetes Mother     Cancer Mother 59     breast?    Heart Disease Father     Diabetes Father     Cancer Paternal Grandmother 79     ovarian        Social History     Social History    Marital status:      Spouse name: N/A    Number of children: N/A    Years of education: N/A     Occupational History    Unemployed      Social History Main Topics    Smoking status: Former Smoker     Packs/day: 0.50     Quit date: 2017    Smokeless tobacco: Never Used    Alcohol use No      Comment: once per year    Drug use: No    Sexual activity: Yes     Partners: Male     Other Topics Concern    Not on file     Social History Narrative    Patient is . ALLERGIES: Toradol [ketorolac tromethamine];  Augmentin [amoxicillin-pot clavulanate]; Demerol [meperidine]; Heparin; Ibuprofen; Keflex [cephalexin]; Morphine; Nubain [nalbuphine]; Pcn [penicillins]; and Sulfa (sulfonamide antibiotics)    Review of Systems   Constitutional: Negative for chills and fever. Respiratory: Negative for shortness of breath and wheezing. Gastrointestinal: Positive for abdominal pain. Negative for anorexia, constipation, diarrhea, nausea and vomiting. Genitourinary: Positive for dysuria, frequency and hematuria. Vitals:    12/29/17 1847   BP: 122/68   Pulse: 74   Resp: 16   Temp: 97.6 °F (36.4 °C)   SpO2: 99%   Weight: 125.6 kg (277 lb)   Height: 5' 8\" (1.727 m)       Physical Exam   Constitutional: She appears well-developed and well-nourished. No distress. Cardiovascular: Normal rate, regular rhythm and normal heart sounds. Pulmonary/Chest: Effort normal and breath sounds normal. No respiratory distress. She has no wheezes. She has no rales. Abdominal: Soft. Bowel sounds are normal. She exhibits no distension. There is tenderness in the right lower quadrant. There is guarding. There is no rigidity and no rebound. Neurological: She is alert. Psychiatric: She has a normal mood and affect. Her behavior is normal. Judgment and thought content normal.   Nursing note and vitals reviewed. MDM     Differential Diagnosis; Clinical Impression; Plan:     CLINICAL IMPRESSION:  Hyperglycemia  (primary encounter diagnosis)  Abdominal pain, right lower quadrant    Plan:  1. Referred to ER; ; R/o appendicitis  Amount and/or Complexity of Data Reviewed:   Clinical lab tests:  Ordered and reviewed  Risk of Significant Complications, Morbidity, and/or Mortality:   Presenting problems: Moderate  Diagnostic procedures: Moderate  Management options:   Moderate      Procedures

## 2017-12-30 NOTE — PROGRESS NOTES
Problem: Falls - Risk of  Goal: *Absence of Falls  Document Doug Fall Risk and appropriate interventions in the flowsheet.    Outcome: Progressing Towards Goal  Fall Risk Interventions:            Medication Interventions: Bed/chair exit alarm, Patient to call before getting OOB, Teach patient to arise slowly, Evaluate medications/consider consulting pharmacy

## 2017-12-31 LAB
ADMINISTERED INITIALS, ADMINIT: NORMAL
ANION GAP SERPL CALC-SCNC: 10 MMOL/L (ref 5–15)
BACTERIA SPEC CULT: NORMAL
BASOPHILS # BLD: 0.1 K/UL (ref 0–0.1)
BASOPHILS NFR BLD: 1 % (ref 0–1)
BUN SERPL-MCNC: 12 MG/DL (ref 6–20)
BUN/CREAT SERPL: 19 (ref 12–20)
CALCIUM SERPL-MCNC: ABNORMAL MG/DL (ref 8.5–10.1)
CC UR VC: NORMAL
CHLORIDE SERPL-SCNC: 103 MMOL/L (ref 97–108)
CO2 SERPL-SCNC: 20 MMOL/L (ref 21–32)
CREAT SERPL-MCNC: 0.64 MG/DL (ref 0.55–1.02)
D50 ADMINISTERED, D50ADM: 0 ML
D50 ORDER, D50ORD: 0 ML
DIFFERENTIAL METHOD BLD: ABNORMAL
EOSINOPHIL # BLD: 0 K/UL (ref 0–0.4)
EOSINOPHIL NFR BLD: 0 % (ref 0–7)
ERYTHROCYTE [DISTWIDTH] IN BLOOD BY AUTOMATED COUNT: 15.8 % (ref 11.5–14.5)
GLSCOM COMMENTS: NORMAL
GLUCOSE BLD STRIP.AUTO-MCNC: 143 MG/DL (ref 65–100)
GLUCOSE BLD STRIP.AUTO-MCNC: 149 MG/DL (ref 65–100)
GLUCOSE BLD STRIP.AUTO-MCNC: 153 MG/DL (ref 65–100)
GLUCOSE BLD STRIP.AUTO-MCNC: 153 MG/DL (ref 65–100)
GLUCOSE BLD STRIP.AUTO-MCNC: 154 MG/DL (ref 65–100)
GLUCOSE BLD STRIP.AUTO-MCNC: 158 MG/DL (ref 65–100)
GLUCOSE BLD STRIP.AUTO-MCNC: 159 MG/DL (ref 65–100)
GLUCOSE BLD STRIP.AUTO-MCNC: 204 MG/DL (ref 65–100)
GLUCOSE BLD STRIP.AUTO-MCNC: 253 MG/DL (ref 65–100)
GLUCOSE BLD STRIP.AUTO-MCNC: 268 MG/DL (ref 65–100)
GLUCOSE BLD STRIP.AUTO-MCNC: 280 MG/DL (ref 65–100)
GLUCOSE BLD STRIP.AUTO-MCNC: 322 MG/DL (ref 65–100)
GLUCOSE SERPL-MCNC: 253 MG/DL (ref 65–100)
GLUCOSE, GLC: 143 MG/DL
GLUCOSE, GLC: 149 MG/DL
GLUCOSE, GLC: 153 MG/DL
GLUCOSE, GLC: 153 MG/DL
GLUCOSE, GLC: 154 MG/DL
GLUCOSE, GLC: 158 MG/DL
GLUCOSE, GLC: 159 MG/DL
GLUCOSE, GLC: 253 MG/DL
GLUCOSE, GLC: 280 MG/DL
HCT VFR BLD AUTO: 32.3 % (ref 35–47)
HGB BLD-MCNC: 11.8 G/DL (ref 11.5–16)
HIGH TARGET, HITG: 180 MG/DL
INR PPP: 0.9 (ref 0.9–1.1)
INSULIN ADMINSTERED, INSADM: 18.9 UNITS/HOUR
INSULIN ADMINSTERED, INSADM: 19.4 UNITS/HOUR
INSULIN ADMINSTERED, INSADM: 7.3 UNITS/HOUR
INSULIN ADMINSTERED, INSADM: 7.8 UNITS/HOUR
INSULIN ADMINSTERED, INSADM: 8.2 UNITS/HOUR
INSULIN ADMINSTERED, INSADM: 8.2 UNITS/HOUR
INSULIN ADMINSTERED, INSADM: 8.3 UNITS/HOUR
INSULIN ADMINSTERED, INSADM: 8.6 UNITS/HOUR
INSULIN ADMINSTERED, INSADM: 8.7 UNITS/HOUR
INSULIN ORDER, INSORD: 18.9 UNITS/HOUR
INSULIN ORDER, INSORD: 19.4 UNITS/HOUR
INSULIN ORDER, INSORD: 7.3 UNITS/HOUR
INSULIN ORDER, INSORD: 7.8 UNITS/HOUR
INSULIN ORDER, INSORD: 8.2 UNITS/HOUR
INSULIN ORDER, INSORD: 8.2 UNITS/HOUR
INSULIN ORDER, INSORD: 8.3 UNITS/HOUR
INSULIN ORDER, INSORD: 8.6 UNITS/HOUR
INSULIN ORDER, INSORD: 8.7 UNITS/HOUR
LOW TARGET, LOT: 140 MG/DL
LYMPHOCYTES # BLD: 1.2 K/UL (ref 0.8–3.5)
LYMPHOCYTES NFR BLD: 23 % (ref 12–49)
MAGNESIUM SERPL-MCNC: 1.7 MG/DL (ref 1.6–2.4)
MCH RBC QN AUTO: 29.8 PG (ref 26–34)
MCHC RBC AUTO-ENTMCNC: 36.5 G/DL (ref 30–36.5)
MCV RBC AUTO: 81.6 FL (ref 80–99)
MINUTES UNTIL NEXT BG, NBG: 120 MIN
MINUTES UNTIL NEXT BG, NBG: 120 MIN
MINUTES UNTIL NEXT BG, NBG: 60 MIN
MONOCYTES # BLD: 0.4 K/UL (ref 0–1)
MONOCYTES NFR BLD: 7 % (ref 5–13)
MULTIPLIER, MUL: 0.09
MULTIPLIER, MUL: 0.1
NEUTS SEG # BLD: 3.3 K/UL (ref 1.8–8)
NEUTS SEG NFR BLD: 69 % (ref 32–75)
ORDER INITIALS, ORDINIT: NORMAL
PHOSPHATE SERPL-MCNC: 1.7 MG/DL (ref 2.6–4.7)
PLATELET # BLD AUTO: 119 K/UL (ref 150–400)
POTASSIUM SERPL-SCNC: 3.8 MMOL/L (ref 3.5–5.1)
PROTHROMBIN TIME: 9.6 SEC (ref 9–11.1)
RBC # BLD AUTO: 3.96 M/UL (ref 3.8–5.2)
RBC MORPH BLD: ABNORMAL
SERVICE CMNT-IMP: ABNORMAL
SERVICE CMNT-IMP: NORMAL
SODIUM SERPL-SCNC: 133 MMOL/L (ref 136–145)
WBC # BLD AUTO: 5 K/UL (ref 3.6–11)

## 2017-12-31 PROCEDURE — 83735 ASSAY OF MAGNESIUM: CPT | Performed by: INTERNAL MEDICINE

## 2017-12-31 PROCEDURE — 65660000000 HC RM CCU STEPDOWN

## 2017-12-31 PROCEDURE — 80048 BASIC METABOLIC PNL TOTAL CA: CPT | Performed by: INTERNAL MEDICINE

## 2017-12-31 PROCEDURE — 85025 COMPLETE CBC W/AUTO DIFF WBC: CPT | Performed by: INTERNAL MEDICINE

## 2017-12-31 PROCEDURE — 74011250637 HC RX REV CODE- 250/637: Performed by: INTERNAL MEDICINE

## 2017-12-31 PROCEDURE — 82962 GLUCOSE BLOOD TEST: CPT

## 2017-12-31 PROCEDURE — 36415 COLL VENOUS BLD VENIPUNCTURE: CPT | Performed by: INTERNAL MEDICINE

## 2017-12-31 PROCEDURE — 85610 PROTHROMBIN TIME: CPT | Performed by: INTERNAL MEDICINE

## 2017-12-31 PROCEDURE — 74011636637 HC RX REV CODE- 636/637: Performed by: INTERNAL MEDICINE

## 2017-12-31 PROCEDURE — 74011250636 HC RX REV CODE- 250/636: Performed by: INTERNAL MEDICINE

## 2017-12-31 PROCEDURE — 74011000258 HC RX REV CODE- 258: Performed by: INTERNAL MEDICINE

## 2017-12-31 PROCEDURE — 84100 ASSAY OF PHOSPHORUS: CPT | Performed by: INTERNAL MEDICINE

## 2017-12-31 RX ORDER — NYSTATIN 100000 [USP'U]/G
POWDER TOPICAL 2 TIMES DAILY
Status: DISCONTINUED | OUTPATIENT
Start: 2017-12-31 | End: 2018-01-01 | Stop reason: HOSPADM

## 2017-12-31 RX ORDER — INSULIN LISPRO 100 [IU]/ML
INJECTION, SOLUTION INTRAVENOUS; SUBCUTANEOUS
Status: DISCONTINUED | OUTPATIENT
Start: 2017-12-31 | End: 2018-01-01 | Stop reason: HOSPADM

## 2017-12-31 RX ORDER — MAGNESIUM SULFATE 100 %
4 CRYSTALS MISCELLANEOUS AS NEEDED
Status: DISCONTINUED | OUTPATIENT
Start: 2017-12-31 | End: 2018-01-01 | Stop reason: HOSPADM

## 2017-12-31 RX ORDER — INSULIN GLARGINE 100 [IU]/ML
100 INJECTION, SOLUTION SUBCUTANEOUS EVERY 12 HOURS
Status: DISCONTINUED | OUTPATIENT
Start: 2017-12-31 | End: 2018-01-01 | Stop reason: HOSPADM

## 2017-12-31 RX ORDER — DEXTROSE 50 % IN WATER (D50W) INTRAVENOUS SYRINGE
12.5-25 AS NEEDED
Status: DISCONTINUED | OUTPATIENT
Start: 2017-12-31 | End: 2018-01-01 | Stop reason: HOSPADM

## 2017-12-31 RX ADMIN — HYDROCODONE BITARTRATE AND ACETAMINOPHEN 2 TABLET: 5; 325 TABLET ORAL at 15:07

## 2017-12-31 RX ADMIN — INSULIN LISPRO 20 UNITS: 100 INJECTION, SOLUTION INTRAVENOUS; SUBCUTANEOUS at 21:17

## 2017-12-31 RX ADMIN — INSULIN GLARGINE 100 UNITS: 100 INJECTION, SOLUTION SUBCUTANEOUS at 21:18

## 2017-12-31 RX ADMIN — ONDANSETRON HYDROCHLORIDE 4 MG: 2 INJECTION, SOLUTION INTRAMUSCULAR; INTRAVENOUS at 15:07

## 2017-12-31 RX ADMIN — INSULIN GLARGINE 100 UNITS: 100 INJECTION, SOLUTION SUBCUTANEOUS at 09:10

## 2017-12-31 RX ADMIN — LISINOPRIL 10 MG: 5 TABLET ORAL at 09:11

## 2017-12-31 RX ADMIN — FAMOTIDINE 20 MG: 20 TABLET, FILM COATED ORAL at 18:33

## 2017-12-31 RX ADMIN — ATORVASTATIN CALCIUM 40 MG: 40 TABLET, FILM COATED ORAL at 21:16

## 2017-12-31 RX ADMIN — FOLIC ACID 1 MG: 1 TABLET ORAL at 09:11

## 2017-12-31 RX ADMIN — Medication 10 ML: at 05:26

## 2017-12-31 RX ADMIN — ONDANSETRON HYDROCHLORIDE 4 MG: 2 INJECTION, SOLUTION INTRAMUSCULAR; INTRAVENOUS at 09:10

## 2017-12-31 RX ADMIN — GABAPENTIN 600 MG: 300 CAPSULE ORAL at 21:16

## 2017-12-31 RX ADMIN — FENOFIBRATE 145 MG: 145 TABLET ORAL at 09:11

## 2017-12-31 RX ADMIN — SODIUM CHLORIDE 8.3 UNITS/HR: 900 INJECTION, SOLUTION INTRAVENOUS at 05:25

## 2017-12-31 RX ADMIN — INSULIN LISPRO 10 UNITS: 100 INJECTION, SOLUTION INTRAVENOUS; SUBCUTANEOUS at 12:12

## 2017-12-31 RX ADMIN — SERTRALINE HYDROCHLORIDE 25 MG: 50 TABLET ORAL at 21:16

## 2017-12-31 RX ADMIN — Medication 10 ML: at 15:08

## 2017-12-31 RX ADMIN — Medication 10 ML: at 21:26

## 2017-12-31 RX ADMIN — HYDROCODONE BITARTRATE AND ACETAMINOPHEN 2 TABLET: 5; 325 TABLET ORAL at 06:10

## 2017-12-31 RX ADMIN — METFORMIN HYDROCHLORIDE 1000 MG: 500 TABLET, FILM COATED ORAL at 09:11

## 2017-12-31 RX ADMIN — FAMOTIDINE 20 MG: 20 TABLET, FILM COATED ORAL at 09:11

## 2017-12-31 RX ADMIN — GABAPENTIN 600 MG: 300 CAPSULE ORAL at 15:07

## 2017-12-31 RX ADMIN — Medication 10 ML: at 09:11

## 2017-12-31 RX ADMIN — GABAPENTIN 600 MG: 300 CAPSULE ORAL at 09:11

## 2017-12-31 RX ADMIN — OMEGA-3 FATTY ACIDS CAP DELAYED RELEASE 1000 MG 1 CAPSULE: 1000 CAPSULE DELAYED RELEASE at 09:11

## 2017-12-31 RX ADMIN — METFORMIN HYDROCHLORIDE 1000 MG: 500 TABLET, FILM COATED ORAL at 16:16

## 2017-12-31 RX ADMIN — HYDROCODONE BITARTRATE AND ACETAMINOPHEN 2 TABLET: 5; 325 TABLET ORAL at 21:24

## 2017-12-31 RX ADMIN — ONDANSETRON HYDROCHLORIDE 4 MG: 2 INJECTION, SOLUTION INTRAMUSCULAR; INTRAVENOUS at 21:23

## 2017-12-31 RX ADMIN — NYSTATIN: 100000 POWDER TOPICAL at 23:32

## 2017-12-31 RX ADMIN — INSULIN LISPRO 15 UNITS: 100 INJECTION, SOLUTION INTRAVENOUS; SUBCUTANEOUS at 16:19

## 2017-12-31 NOTE — PROGRESS NOTES
Endocrinology Progress Note    Reviewed blood sugars from overnight remotely from home via [facility]. She remained on the insulin drip and D5 was stopped and changed to NS. Interestingly her blood sugars were in the 150s on POC for the glucose stabilizer but her sugar was 253 on bmp at 4 am this morning which makes me wonder about the accuracy of the fingersticks. Given she has been requiring about 8 units an hour on the insulin drip overnight, I will change her to lantus 100 units every 12 hours for basal insulin starting this morning and start her on correctional humalog 5 units for every 50 mg/dl > 150 and change her accu-checks to ac and hs. I will also stop her NS as she is eating and drinking well at this time. I communicated this plan to her nurse, Eve Lambert. Please don't hesitate to call [phone] with further questions.     Kong Chappell MD

## 2017-12-31 NOTE — PROGRESS NOTES
Problem: Falls - Risk of  Goal: *Absence of Falls  Document Doug Fall Risk and appropriate interventions in the flowsheet.    Outcome: Progressing Towards Goal  Fall Risk Interventions:            Medication Interventions: Bed/chair exit alarm

## 2017-12-31 NOTE — PROGRESS NOTES
PCU SHIFT NURSING NOTE      Bedside and Verbal shift change report given to Chante Tello RN (oncoming nurse) by Norbert Pete RN (offgoing nurse). Report included the following information SBAR, Kardex, ED Summary, Procedure Summary, Intake/Output, MAR, Recent Results, Med Rec Status, Cardiac Rhythm SB/NSR and Alarm Parameters . Shift Summary:         Admission Date 12/29/2017   Admission Diagnosis DKA (diabetic ketoacidoses) ([facility]   Consults IP CONSULT TO ENDOCRINOLOGY        Consults   []PT   []OT   []Speech   [x]Case Management      [] Palliative      Cardiac Monitoring Order   [x]Yes   []No     IV drips   [x]Yes    Drip:        Insulin gtt                    Dose:  titration  Drip:                            Dose:  Drip:                            Dose:   []No     GI Prophylaxis   [x]Yes   []No         DVT Prophylaxis   SCDs:             Romario stockings:         [] Medication   []Contraindicated   []None      Activity Level Activity Level: Up with Assistance     Activity Assistance: No assistance needed   Purposeful Rounding every 1-2 hour? [x]Yes   Mancia Score  Total Score: 1   Bed Alarm (If score 3 or >)   [x]Yes   [] Refused (See signed refusal form in chart)   Joel Score  Joel Score: 23   Joel Score (if score 14 or less)   []PMT consult   []Wound Care consult      []Specialty bed   [] Nutrition consult          Needs prior to discharge:   Home O2 required:    []Yes   [x]No    If yes, how much O2 required? Other:    Last Bowel Movement:        Influenza Vaccine Received Flu Vaccine for Current Season (usually Sept-March): Yes        Pneumonia Vaccine           Diet Active Orders   Diet    DIET CARDIAC Regular; 2 GM NA (House Low NA); Consistent Carb 1500-1600kcal; No Conc.  Sweets      LDAs               Peripheral IV 12/29/17 Left Antecubital (Active)   Site Assessment Clean, dry, & intact 12/31/2017  9:37 AM   Phlebitis Assessment 0 12/31/2017  9:37 AM   Infiltration Assessment 0 12/31/2017  9:37 AM   Dressing Status Clean, dry, & intact 12/31/2017  9:37 AM   Dressing Type Tape;Transparent 12/31/2017  9:37 AM   Hub Color/Line Status Pink; Infusing;Flushed 12/31/2017  9:37 AM   Action Taken Open ports on tubing capped 12/31/2017  9:37 AM   Alcohol Cap Used Yes 12/31/2017  9:37 AM       Peripheral IV 12/29/17 Right Wrist (Active)   Site Assessment Clean, dry, & intact 12/31/2017  9:37 AM   Phlebitis Assessment 0 12/31/2017  9:37 AM   Infiltration Assessment 0 12/31/2017  9:37 AM   Dressing Status Clean, dry, & intact 12/31/2017  9:37 AM   Dressing Type Tape;Transparent 12/31/2017  9:37 AM   Hub Color/Line Status Pink;Capped;Flushed 12/31/2017  9:37 AM   Action Taken Open ports on tubing capped 12/31/2017  9:37 AM   Alcohol Cap Used Yes 12/31/2017  9:37 AM                      Urinary Catheter      Intake & Output   Date 12/30/17 0700 - 12/31/17 0659 12/31/17 0700 - 01/01/18 0659   Shift 5652-5467 7490-6473 24 Hour Total 9138-4474 6504-3531 24 Hour Total   I  N  T  A  K  E   I.V.  (mL/kg/hr) 1624.1  (1.1) 933.2  (0.6) 2557.3  (0.8) 218.3  218.3      Insulin Volume 149.9 119.4 269.3 20.8  20.8      Volume (0.9% sodium chloride infusion) 587.5  587.5         Volume (sodium chloride 0.9 % bolus infusion 500 mL) 466.7  466.7         Volume (dextrose 5% - 0.9% NaCl with KCl 20 mEq/L infusion) 420  420         Volume (sodium chloride 0.9 % bolus infusion 500 mL) 0  0         Volume (0.9% sodium chloride with KCl 20 mEq/L infusion)  813.8 813.8 197.5  197.5    Shift Total  (mL/kg) 1624.1  (12.9) 933.2  (7.4) 2557.3  (20.4) 218.3  (1.7)  218.3  (1.7)   O  U  T  P  U  T   Urine  (mL/kg/hr) 1300  (0.9)  1300  (0.4) 600  600      Urine Voided 1300  1300 600  600      Urine Occurrence(s)    1 x  1 x    Stool            Stool Occurrence(s)    1 x  1 x    Shift Total  (mL/kg) 1300  (10.3)  1300  (10.3) 600  (4.8)  600  (4.8)   .1 933.2 1257.3 -381.7  -381. 7   Weight (kg) 125.6 125.6 125.6 125 125 125 Readmission Risk Assessment Tool Score Medium Risk            13       Total Score        3 Has Seen PCP in Last 6 Months (Yes=3, No=0)    2 . Living with Significant Other. Assisted Living. LTAC. SNF. or   Rehab    4 IP Visits Last 12 Months (1-3=4, 4=9, >4=11)    4 Pt.  Coverage (Medicare=5 , Medicaid, or Self-Pay=4)        Criteria that do not apply:    Patient Length of Stay (>5 days = 3)    Charlson Comorbidity Score (Age + Comorbid Conditions)       Expected Length of Stay - - -   Actual Length of Stay 1

## 2017-12-31 NOTE — PROGRESS NOTES
PCU SHIFT NURSING NOTE      Bedside and Verbal shift change report given to Bill Moon (oncoming nurse) by Clarence Garduno (offgoing nurse). Report included the following information SBAR, Kardex, MAR, Recent Results and Med Rec Status. Shift Summary:   1900 ; insulin drip at 16.7 Units/hr  2001 ; Insulin drip at 14.9  2103 ; Insulin drip at 12.4  2206 ; nsulin drip 9.6  2310 ; Insulin drip 6.9  0017 ; Insulin 8.2   0115 ; Insulin 7.3  0216 ; Insulin 8.2  0327 ; Insulin 8.6  0427 ; Insulin 8.7  0525 : Insulin 8.3             Bedside and Verbal shift change report given to Bill Moon (oncoming nurse) by Clarence Garduno (offgoing nurse). Report included the following information SBAR, Kardex, MAR, Recent Results and Med Rec Status. Admission Date 12/29/2017   Admission Diagnosis DKA (diabetic k[facility]acidoses[facility]   Consults IP CONSULT TO ENDOCRINOLOGY        Consults   []PT   []OT   []Speech   []Case Management      [] Palliative      Cardiac Monitoring Order   [x]Yes   []No     IV drips   [x]Yes    Drip:        insulin          Dose:  Drip:                            Dose:  Drip:                            Dose:   [x]No     GI Prophylaxis   []Yes   [x]No         DVT Prophylaxis   SCDs:             Romario stockings:         [] Medication   []Contraindicated   []None      Activity Level Activity Level: Up with Assistance     Activity Assistance: No assistance needed   Purposeful Rounding every 1-2 hour? [x]Yes   Mancia Score  Total Score: 1   Bed Alarm (If score 3 or >)   []Yes   [] Refused (See signed refusal form in chart)   Joel Score  Joel Score: 23   Joel Score (if score 14 or less)   []PMT consult   []Wound Care consult      []Specialty bed   [x] Nutrition consult          Needs prior to discharge:   Home O2 required:    []Yes   [x]No    If yes, how much O2 required?     Other:    Last Bowel Movement:        Influenza Vaccine Received Flu Vaccine for Current Season (usually Sept-March): Yes        Pneumonia Vaccine           Diet Active Orders   Diet    DIET CARDIAC Regular; 2 GM NA (House Low NA); Consistent Carb 1500-1600kcal; No Conc. Sweets      LDAs               Peripheral IV 12/29/17 Left Antecubital (Active)   Site Assessment Clean, dry, & intact 12/30/2017  7:43 PM   Phlebitis Assessment 0 12/30/2017  6:00 PM   Infiltration Assessment 0 12/30/2017  6:00 PM   Dressing Status Clean, dry, & intact 12/30/2017  6:00 PM   Dressing Type Tape;Transparent 12/30/2017  6:00 PM   Hub Color/Line Status Pink; Infusing;Flushed 12/30/2017  6:00 PM   Action Taken Open ports on tubing capped 12/30/2017  6:00 PM   Alcohol Cap Used Yes 12/30/2017  6:00 PM       Peripheral IV 12/29/17 Right Wrist (Active)   Site Assessment Clean, dry, & intact 12/30/2017  7:43 PM   Phlebitis Assessment 0 12/30/2017  6:00 PM   Infiltration Assessment 0 12/30/2017  6:00 PM   Dressing Status Clean, dry, & intact 12/30/2017  6:00 PM   Dressing Type Tape;Transparent 12/30/2017  6:00 PM   Hub Color/Line Status Pink;Capped;Flushed 12/30/2017  6:00 PM   Action Taken Open ports on tubing capped 12/30/2017  6:00 PM   Alcohol Cap Used Yes 12/30/2017  6:00 PM                      Urinary Catheter      Intake & Output   Date 12/29/17 1900 - 12/30/17 0659 12/30/17 0700 - 12/31/17 0659   Shift 9861-8385 24 Hour Total 2603-2910 3965-5649 24 Hour Total   I  N  T  A  K  E   I.V.  (mL/kg/hr)   1624.1  (1.1)  1624.1      Insulin Volume   149.9  149.9      Volume (0.9% sodium chloride infusion)   587.5  587.5      Volume (sodium chloride 0.9 % bolus infusion 500 mL)   466.7  466.7      Volume (dextrose 5% - 0.9% NaCl with KCl 20 mEq/L infusion)   420  420      Volume (sodium chloride 0.9 % bolus infusion 500 mL)   0  0    Shift Total  (mL/kg)   1624.1  (12.9)  1624.1  (12.9)   O  U  T  P  U  T   Urine  (mL/kg/hr)   1300  (0.9)  1300      Urine Voided   1300  1300    Shift Total  (mL/kg)   1300  (10.3)  1300  (10.3) NET   324.1  324.1   Weight (kg) 125.6 125.6 125.6 125.6 125.6         Readmission Risk Assessment Tool Score Medium Risk            13       Total Score        3 Has Seen PCP in Last 6 Months (Yes=3, No=0)    2 . Living with Significant Other. Assisted Living. LTAC. SNF. or   Rehab    4 IP Visits Last 12 Months (1-3=4, 4=9, >4=11)    4 Pt.  Coverage (Medicare=5 , Medicaid, or Self-Pay=4)        Criteria that do not apply:    Patient Length of Stay (>5 days = 3)    Charlson Comorbidity Score (Age + Comorbid Conditions)       Expected Length of Stay - - -   Actual Length of Stay 0

## 2017-12-31 NOTE — PROGRESS NOTES
Hospitalist Progress Note    NAME: Virgie Coon   :  1980   MRN:  [id number]   LOS:   1      Assessment / Plan:  Diabetic ketoacidosis  Pseudohyponatremia  -anion gap has closed  -transitioned to lantus and humalog per Dr. Jn Banerjee recommendations    Nausea with emesis causing Hypokalemia  Constipation with intermittent diarrhea  -hypokalemia resolved  -tolerating PO    Dehydration without renal injury  -improved after IV fluid resuscitation    Hypertriglyceridemia  -continue tricor, fish oil-omega fatty acids    Obesity  Body mass index is 42.12 kg/(m^2). Code status: Full  Prophylaxis: SCD's  Recommended Disposition: Home w/Family     Subjective:     Chief Complaint: hyperglycemia  Feeling improved    Objective:     VITALS:   Last 24hrs VS reviewed since prior progress note. Most recent are:  Patient Vitals for the past 24 hrs:   Temp Pulse Resp BP SpO2   17 0706 98.1 °F (36.7 °C) 73 18 133/77 97 %   17 0428 98.2 °F (36.8 °C) 72 18 137/73 94 %   17 0427 98.2 °F (36.8 °C) - - - -   17 2316 97.8 °F (36.6 °C) 69 18 98/68 96 %   17 2315 97.8 °F (36.6 °C) - - - -   17 1943 98.3 °F (36.8 °C) 67 18 105/63 95 %   17 1600 98.3 °F (36.8 °C) (!) 58 18 102/59 98 %   17 1300 98.2 °F (36.8 °C) 65 18 132/74 98 %   17 1134 98.2 °F (36.8 °C) 66 18 134/78 99 %       Intake/Output Summary (Last 24 hours) at 17 0943  Last data filed at 17 5549   Gross per 24 hour   Intake          2613.74 ml   Output             1500 ml   Net          1113.74 ml        PHYSICAL EXAM:  General: Alert, cooperative, no acute distress    EENT:  EOMI. Anicteric sclerae. Mucous membranes moist  Resp:  CTA bilaterally, no wheezing or rales. No accessory muscle use  CV:  Regular rhythm, no edema  GI:  Soft, non distended, non tender.  +Bowel sounds  Neurologic:  Alert and oriented X 3, normal speech  Psych:   Good insight, not anxious nor agitated  Skin:  No rashes, no jaundice  ________________________________________________________________________  Care Plan discussed with:    Comments   Patient x    Family      RN     Care Manager     Consultant                        Multidiciplinary team rounds were held today with , nursing, pharmacist and clinical coordinator. Patient's plan of care was discussed; medications were reviewed and discharge planning was addressed. ________________________________________________________________________  Total NON critical care TIME:  20   Minutes    >50% of visit spent in counseling and coordination of care       ________________________________________________________________________    Procedures: see electronic medical records for all procedures/Xrays and details which were not copied into this note but were reviewed prior to creation of Plan. LABS:  I reviewed today's most current labs and imaging studies. Pertinent labs include:  Recent Labs      12/31/17   0441  12/29/17   2232   WBC  5.0  8.7   HGB  11.8  11.3*   HCT  32.3*  33.0*   PLT  119*  175     Recent Labs      12/31/17   0441  12/30/17   1638  12/30/17   1203   12/30/17   0046  12/29/17   2232   NA  133*  129*  128*   < >  125*  123*   K  3.8  3.9  3.7   < >  3.6  3.6   CL  103  99  96*   < >  92*  88*   CO2  20*  17*  17*   < >  17*  UNABLE TO OBTAIN ACCURATE RESULTS DUE TO GROSS LIPEMIA   GLU  253*  328*  354*   < >  608*  631*   BUN  12  8  12   < >  UNABLE TO OBTAIN ACCURATE RESULTS DUE TO GROSS LIPEMIA  UNABLE TO OBTAIN ACCURATE RESULTS DUE TO GROSS LIPEMIA   CREA  0.64  SPECIMEN LIPEMIC, RESULTS VERIFIED BY DILUTION. 0.63   < >  1.39*  UNABLE TO OBTAIN ACCURATE RESULTS DUE TO GROSS LIPEMIA   CA  UNABLE TO OBTAIN ACCURATE RESULTS DUE TO GROSS LIPEMIA  SPECIMEN LIPEMIC, RESULTS VERIFIED BY DILUTION.   UNABLE TO OBTAIN ACCURATE RESULTS DUE TO GROSS LIPEMIA   < >  UNABLE TO OBTAIN ACCURATE RESULTS DUE TO GROSS LIPEMIA  UNABLE TO OBTAIN ACCURATE RESULTS DUE TO GROSS LIPEMIA   MG  1.7   --    --    --    --    --    PHOS  1.7*   --    --    --    --    --    ALB   --    --    --    --   UNABLE TO OBTAIN ACCURATE RESULTS DUE TO GROSS LIPEMIA  UNABLE TO OBTAIN ACCURATE RESULTS DUE TO GROSS LIPEMIA   TBILI   --    --    --    --   UNABLE TO OBTAIN ACCURATE RESULTS DUE TO GROSS LIPEMIA  UNABLE TO OBTAIN ACCURATE RESULTS DUE TO GROSS LIPEMIA   SGOT   --    --    --    --   352*  UNABLE TO OBTAIN ACCURATE RESULTS DUE TO GROSS LIPEMIA   ALT   --    --    --    --   144*  UNABLE TO OBTAIN ACCURATE RESULTS DUE TO GROSS LIPEMIA    < > = values in this interval not displayed.        Signed: Barb Garcia MD

## 2018-01-01 VITALS
SYSTOLIC BLOOD PRESSURE: 130 MMHG | HEIGHT: 68 IN | OXYGEN SATURATION: 100 % | HEART RATE: 67 BPM | RESPIRATION RATE: 15 BRPM | BODY MASS INDEX: 41.77 KG/M2 | DIASTOLIC BLOOD PRESSURE: 75 MMHG | WEIGHT: 275.6 LBS | TEMPERATURE: 98.4 F

## 2018-01-01 LAB
GLUCOSE BLD STRIP.AUTO-MCNC: 229 MG/DL (ref 65–100)
GLUCOSE BLD STRIP.AUTO-MCNC: 283 MG/DL (ref 65–100)
SERVICE CMNT-IMP: ABNORMAL
SERVICE CMNT-IMP: ABNORMAL

## 2018-01-01 PROCEDURE — 74011250637 HC RX REV CODE- 250/637: Performed by: INTERNAL MEDICINE

## 2018-01-01 PROCEDURE — 74011636637 HC RX REV CODE- 636/637: Performed by: INTERNAL MEDICINE

## 2018-01-01 PROCEDURE — 82962 GLUCOSE BLOOD TEST: CPT

## 2018-01-01 RX ORDER — INSULIN LISPRO 100 [IU]/ML
INJECTION, SOLUTION INTRAVENOUS; SUBCUTANEOUS
Qty: 1 PACKAGE | Refills: 2 | Status: SHIPPED | OUTPATIENT
Start: 2018-01-01 | End: 2018-01-11

## 2018-01-01 RX ORDER — BLOOD-GLUCOSE METER
EACH MISCELLANEOUS
Qty: 1 EACH | Refills: 0 | Status: SHIPPED | OUTPATIENT
Start: 2018-01-01 | End: 2018-05-17

## 2018-01-01 RX ORDER — MICONAZOLE NITRATE 2 %
1 CREAM WITH APPLICATOR VAGINAL EVERY EVENING
Status: DISCONTINUED | OUTPATIENT
Start: 2018-01-01 | End: 2018-01-01 | Stop reason: HOSPADM

## 2018-01-01 RX ORDER — MICONAZOLE NITRATE 2 %
1 CREAM WITH APPLICATOR VAGINAL EVERY EVENING
Qty: 1 TUBE | Refills: 0 | Status: SHIPPED | OUTPATIENT
Start: 2018-01-01 | End: 2018-01-06

## 2018-01-01 RX ORDER — INSULIN GLARGINE 100 [IU]/ML
INJECTION, SOLUTION SUBCUTANEOUS
Qty: 60 ML | Refills: 11 | Status: SHIPPED | OUTPATIENT
Start: 2018-01-01 | End: 2018-05-17 | Stop reason: DRUGHIGH

## 2018-01-01 RX ADMIN — FOLIC ACID 1 MG: 1 TABLET ORAL at 09:48

## 2018-01-01 RX ADMIN — INSULIN LISPRO 10 UNITS: 100 INJECTION, SOLUTION INTRAVENOUS; SUBCUTANEOUS at 09:48

## 2018-01-01 RX ADMIN — OMEGA-3 FATTY ACIDS CAP DELAYED RELEASE 1000 MG 1 CAPSULE: 1000 CAPSULE DELAYED RELEASE at 09:48

## 2018-01-01 RX ADMIN — FENOFIBRATE 145 MG: 145 TABLET ORAL at 09:48

## 2018-01-01 RX ADMIN — Medication 10 ML: at 06:31

## 2018-01-01 RX ADMIN — INSULIN GLARGINE 100 UNITS: 100 INJECTION, SOLUTION SUBCUTANEOUS at 09:48

## 2018-01-01 RX ADMIN — INSULIN LISPRO 15 UNITS: 100 INJECTION, SOLUTION INTRAVENOUS; SUBCUTANEOUS at 13:02

## 2018-01-01 RX ADMIN — METFORMIN HYDROCHLORIDE 1000 MG: 500 TABLET, FILM COATED ORAL at 09:48

## 2018-01-01 RX ADMIN — LISINOPRIL 10 MG: 5 TABLET ORAL at 09:48

## 2018-01-01 RX ADMIN — GABAPENTIN 600 MG: 300 CAPSULE ORAL at 09:48

## 2018-01-01 RX ADMIN — MICONAZOLE NITRATE 1 APPLICATOR: 20 CREAM VAGINAL at 13:03

## 2018-01-01 RX ADMIN — FAMOTIDINE 20 MG: 20 TABLET, FILM COATED ORAL at 09:48

## 2018-01-01 NOTE — PROGRESS NOTES
PCU SHIFT NURSING NOTE      Bedside shift change report given to [facility] (oncoming nurse) by Jr Cannon (offgoing nurse). Report included the following information SBAR, Kardex, Procedure Summary, Intake/Output, MAR and Recent Results. Shift Summary:   7133 - Reviewed discharge instructions with Pt. Verbalized understanding. Copy of discharge instructions, AVS and printed prescriptions provided in discharge booklet. Follow up appointments were made for patient. All questions were appropriate to content and were answered to pt's satisfaction. Telemetry and all IV's were removed. Pt assisted in getting dressed. Voices no c/o at this time. Awaiting pt's ride. 1603 - Pts ride arrived. Pt refused wheelchair to exit and left unit in foot with family member.

## 2018-01-01 NOTE — PROGRESS NOTES
Endocrinology Progress Note    Reviewed blood sugars from yesterday remotely from home via [facility]. Component       GLUCOSE,FAST - POC   Latest Ref Rng & Units       65 - 100 mg/dL   1/1/2018      11:41  (H)   1/1/2018      7:24  (H)   12/31/2017      8:45  (H)   12/31/2017      4:14  (H)   12/31/2017      12:06  (H)     Despite starting lantus 100 units every 12 hours, her blood sugars were still over 200 overnight and this morning. I spoke with Dr. Catalina Henriquez who told me that she will be discharging patient today and provided a prescription for humalog pens to continue the correction scale that I started yesterday. Called into patient's room and spoke with her before she was leaving and explained that I would like her to give 100 units of lantus as 2 smaller injections of 50 units back to back in different locations to improve absorption. She understands how to use the correction scale for humalog. I told her that she doesn't need to have any additional labs drawn prior to her visit with me on 1/11/18 and to bring some readings to that visit. she voiced understanding of this plan. Please don't hesitate to call [phone] with further questions.     Monica Jasso MD

## 2018-01-01 NOTE — PROGRESS NOTES
PCU SHIFT NURSING NOTE      Bedside shift change report given to Mark Barber RN (oncoming nurse) by Gale Iniguez RN (offgoing nurse). Report included the following information SBAR, Kardex, Intake/Output, MAR, Recent Results and Cardiac Rhythm NSR. Shift Summary:   2052: Pt had been on abx and was on difulcan for 7d for candidiasis. Pt c/o redness, bleeding to labia and perineum. Paged hospitalist.   2100: Spoke to Dr Uriah Pace and advised pt is c/o discomfort and asking for diflucan. MD states \"I'm not ordering that. She can have Nystatin powder\"  2120: Pt c/o abdominal pain 7/10 and states \"I'll need something for nausea too\" Administered PRN analgesia and antiemetic (see MAR)  0600: Pt slept through remainder of shift.     0700: Bedside shift change report given to [address] (oncoming nurse) by Mark Barber RN (offgoing nurse). Report included the following information SBAR, Kardex, Intake/Output, MAR and Cardiac Rhythm NSR. Admission Date 12/29/2017   Admission Diagnosis DKA (diabetic ketoacidoses) ([facility] [address]   Consults IP CONSULT TO ENDOCRINOLOGY        Consults   []PT   []OT   []Speech   []Case Management      [] Palliative      Cardiac Monitoring Order   []Yes   []No     IV drips   []Yes    Drip:                            Dose:  Drip:                            Dose:  Drip:                            Dose:   []No     GI Prophylaxis   []Yes   []No         DVT Prophylaxis   SCDs:             Romario stockings:         [] Medication   []Contraindicated   []None      Activity Level Activity Level: Up with Assistance     Activity Assistance: No assistance needed   Purposeful Rounding every 1-2 hour?    []Yes   Mancia Score  Total Score: 1   Bed Alarm (If score 3 or >)   []Yes   [] Refused (See signed refusal form in chart)   Joel Score  Joel Score: 22   Joel Score (if score 14 or less)   []PMT consult   []Wound Care consult      []Specialty bed   [] Nutrition consult          Needs prior to discharge:   Home O2 required:    []Yes   []No    If yes, how much O2 required? Other:    Last Bowel Movement: Last Bowel Movement Date: 12/31/17      Influenza Vaccine Received Flu Vaccine for Current Season (usually Sept-March): Yes        Pneumonia Vaccine           Diet Active Orders   Diet    DIET CARDIAC Regular; 2 GM NA (House Low NA); Consistent Carb 1500-1600kcal; No Conc. Sweets      LDAs               Peripheral IV 12/29/17 Left Antecubital (Active)   Site Assessment Clean, dry, & intact 12/31/2017  5:00 PM   Phlebitis Assessment 0 12/31/2017  5:00 PM   Infiltration Assessment 0 12/31/2017  5:00 PM   Dressing Status Clean, dry, & intact 12/31/2017  5:00 PM   Dressing Type Tape;Transparent 12/31/2017  5:00 PM   Hub Color/Line Status Pink; Infusing;Flushed 12/31/2017  5:00 PM   Action Taken Open ports on tubing capped 12/31/2017  5:00 PM   Alcohol Cap Used Yes 12/31/2017  5:00 PM       Peripheral IV 12/29/17 Right Wrist (Active)   Site Assessment Clean, dry, & intact 12/31/2017  5:00 PM   Phlebitis Assessment 0 12/31/2017  5:00 PM   Infiltration Assessment 0 12/31/2017  5:00 PM   Dressing Status Clean, dry, & intact 12/31/2017  5:00 PM   Dressing Type Tape;Transparent 12/31/2017  5:00 PM   Hub Color/Line Status Pink;Capped;Flushed 12/31/2017  5:00 PM   Action Taken Open ports on tubing capped 12/31/2017  5:00 PM   Alcohol Cap Used Yes 12/31/2017  5:00 PM                      Urinary Catheter      Intake & Output   Date 12/31/17 0700 - 01/01/18 0659 01/01/18 0700 - 01/02/18 0659   Shift 6443-9201 6880-2967 24 Hour Total 0700-1859 1900-0659 24 Hour Total   I  N  T  A  K  E   I.V.  (mL/kg/hr) 397.2  (0.3)  397.2         Insulin Volume 57.2  57.2         Volume (0.9% sodium chloride with KCl 20 mEq/L infusion) 340  340       Shift Total  (mL/kg) 397.2  (3.2)  397.2  (3.2)      O  U  T  P  U  T   Urine  (mL/kg/hr) 950  (0.6)  950         Urine Voided 950  950         Urine Occurrence(s) 3 x  3 x       Stool            Stool Occurrence(s) 1 x  1 x       Shift Total  (mL/kg) 950  (7.6)  950  (7.6)      NET -552.8  -552.8      Weight (kg) 125 125 125 125 125 125         Readmission Risk Assessment Tool Score Medium Risk            13       Total Score        3 Has Seen PCP in Last 6 Months (Yes=3, No=0)    2 . Living with Significant Other. Assisted Living. LTAC. SNF. or   Rehab    4 IP Visits Last 12 Months (1-3=4, 4=9, >4=11)    4 Pt.  Coverage (Medicare=5 , Medicaid, or Self-Pay=4)        Criteria that do not apply:    Patient Length of Stay (>5 days = 3)    Charlson Comorbidity Score (Age + Comorbid Conditions)       Expected Length of Stay - - -   Actual Length of Stay 2

## 2018-01-01 NOTE — PROGRESS NOTES
Pt is a 40 y.o  female admitted with Diabetic Ketoacidosis. Pt was alert, oriented and in no distress sitting in the chair. Demographic information verified and all is correct. Pt lives with her  in a 2 story home with 1 step to the entrance. Prior to admission, pt was independent with her ADL's and IADL's and was driving. Denies using DME or having home health and rehab. Preferred pharmacy is [facility]. Pt's family can transport pt home by car. CM consult noted and will continue to follow pt for discharge planning needs. Care Management Interventions  PCP Verified by CM: Yes  Mode of Transport at Discharge:  Other (see comment) (pt's family can transport by car)  Transition of Care Consult (CM Consult): Discharge Planning  Discharge Durable Medical Equipment: No  Physical Therapy Consult: Yes  Occupational Therapy Consult: No  Speech Therapy Consult: No  Current Support Network: Lives with Spouse, Own Home (lives in a 2 story home with 1 step to the entrance)  Confirm Follow Up Transport: Family  Discharge Location  Discharge Placement: [facility] [address]

## 2018-01-02 NOTE — DISCHARGE SUMMARY
Hospitalist Discharge Summary     Patient ID:  Shamar Osgood  [id number]  27 y.o.  1980    PCP on record: No primary care provider on file. Admit date: 12/29/2017  Discharge date: 1/1/2018      DISCHARGE DIAGNOSES  DISCHARGE SUMMARY/HOSPITAL COURSE: for full details see H&P, daily progress notes, labs, consult notes. Diabetic ketoacidosis  Pseudohyponatremia  -anion gap has closed transitioned to SC lantus at higher dose  -patient advised to continue lantus 100 units BID and gave prescription for sliding scale humalog and new glucometer   -patient to call Dr. Prabhjot Caba office for follow up  -discussed above with Dr. Nayeli Barbour over phone    Vaginal candidiasis  -continue miconazole cream     Nausea with emesis causing Hypokalemia  Constipation with intermittent diarrhea  -hypokalemia resolved  -tolerating PO     Dehydration without renal injury  -improved after IV fluid resuscitation     Hypertriglyceridemia  -continue tricor, fish oil-omega fatty acids     Obesity  Body mass index is 41.9 kg/(m^2).     Code status: Full      CONSULTATIONS:  IP CONSULT TO ENDOCRINOLOGY    Excerpted HPI from H&P of Gagan Rogers MD:  Erik Darling is a 40 y.o.  female with known history as listed below presents to ED with complaint noted above. Available records were reviewed at the time of H&P. Patient with known hyperlipidemia on treatment with Dr Nayeli Barbour, pancreatitis and difficult to control DM2 whom was on insulin pump in past taken off due to pregnancy now on high doses of bid lantus and remains with elevated bs 250's per her account on average. She notes feeling poorly over last 24hrs further noting elevated blood sugars from her baseline. +dysuria maybe with flecks of blood in it as well as n/v of stomach contetns without hematemesis x 5. +mixed constipation with diarrhea over last week with last normal BM on 12/29. This is unusual for her. No f/c. No sick contacts. She has not missed medications.  No new medications of recent. To ED with subjective chills. In ED noted no fevers but elevated bs to >580's. Low sodium. Lipase wnl.          _______________________________________________________________________  Patient seen and examined by me on discharge day. Pertinent Findings:  Gen:    Not in distress  Chest: Clear lungs  CVS:   Regular rhythm. No edema  Abd:  Soft, not distended, not tender  Neuro:  Alert, calm  _______________________________________________________________________  DISCHARGE MEDICATIONS:   Discharge Medication List as of 1/1/2018  1:27 PM      START taking these medications    Details   miconazole (MICOTIN) 2 % vaginal cream Insert 1 Applicator into vagina every evening for 5 days. , Disp-1 Tube, R-0, Print      insulin lispro (HUMALOG KWIKPEN) 100 unit/mL kwikpen Administer 3 times a day before meals, based your glucose levels as below          140-199=5 units            200-249=10 units  250-299=15 units  300-349=20 units  350-399=25 units  400-450=30 units, Print, Disp-1 Package, R-2         CONTINUE these medications which have CHANGED    Details   Blood-Glucose Meter (ONETOUCH VERIO FLEX) misc 4 times/day, Print, Disp-1 Each, R-0         CONTINUE these medications which have NOT CHANGED    Details   Insulin Needles, Disposable, (BD INSULIN PEN NEEDLE UF SHORT) 31 gauge x 5/16\" ndle Use as directed twice daily, Normal, Disp-100 Pen Needle, R-11      atorvastatin (LIPITOR) 40 mg tablet Take 1 Tab by mouth nightly., Normal, Disp-30 Tab, R-11      fenofibrate nanocrystallized (TRICOR) 145 mg tablet Take 1 Tab by mouth daily. , Normal, Disp-30 Tab, R-11      omega-3 acid ethyl esters (LOVAZA) 1 gram capsule Take 2 Caps by mouth two (2) times a day., Normal, Disp-120 Cap, R-11      metFORMIN (GLUCOPHAGE) 1,000 mg tablet Take 1 Tab by mouth two (2) times daily (with meals). , Normal, Disp-60 Tab, R-11      ergocalciferol (VITAMIN D2) 50,000 unit capsule Take 1 Cap by mouth every seven (7) days. , Normal, Disp-4 Cap, R-11      lisinopril (PRINIVIL, ZESTRIL) 10 mg tablet Take 1 Tab by mouth daily. , Normal, Disp-30 Tab, R-11      gabapentin (NEURONTIN) 300 mg capsule Take 2 Caps by mouth three (3) times daily. , Normal, Disp-180 Cap, R-11      insulin glargine (LANTUS SOLOSTAR) 100 unit/mL (3 mL) inpn Inject 40 units in the morning and 60 units at night and increase as directed up to a max of 150 units per day, Normal, Disp-45 mL, R-11      glucose blood VI test strips (ONETOUCH VERIO) strip 4 times/day, Normal, Disp-200 Strip, R-11      clindamycin (CLEOCIN) 300 mg capsule Take 1 Cap by mouth four (4) times daily. , Normal, Disp-40 Cap, R-0      famotidine (PEPCID) 20 mg tablet Take 1 Tab by mouth two (2) times a day., Print, Disp-60 Tab, R-1      folic acid (FOLVITE) 1 mg tablet Take 1 Tab by mouth daily. , Print, Disp-30 Tab, R-1      ondansetron hcl (ZOFRAN, AS HYDROCHLORIDE,) 8 mg tablet Take 1 Tab by mouth every eight (8) hours as needed for Nausea. , Print, Disp-20 Tab, R-0      albuterol (PROAIR HFA) 90 mcg/actuation inhaler Take 2 Puffs by inhalation every four (4) hours as needed for Wheezing., Historical Med      sertraline (ZOLOFT) 25 mg tablet Take 1 Tab by mouth nightly., Normal, Disp-30 Tab, R-2             My Recommended Diet, Activity, Wound Care, and follow-up labs are listed in the patient's Discharge Insturctions which I have personally completed and reviewed. _______________________________________________________________________  DISPOSITION:    Home with Family: x   Home with HH/PT/OT/RN:    SNF/LTC:    MARIAJOSE:    OTHER:        Condition at Discharge:  Stable  _______________________________________________________________________  Follow up with:   PCP : No primary care provider on file.   Follow-up Information     Follow up With Details Comments [address], [address] [place] [address] Drive [address]  [phone]                Total time in minutes spent coordinating this discharge (includes going over instructions, follow-up, prescriptions, and preparing report for sign off to her PCP) :  35 minutes    Signed:  Amanda Mayer MD

## 2018-01-05 LAB
BACTERIA SPEC CULT: NORMAL
SERVICE CMNT-IMP: NORMAL

## 2018-01-11 ENCOUNTER — OFFICE VISIT (OUTPATIENT)
Dept: ENDOCRINOLOGY | Age: 38
End: 2018-01-11

## 2018-01-11 VITALS
SYSTOLIC BLOOD PRESSURE: 127 MMHG | HEART RATE: 82 BPM | BODY MASS INDEX: 43.07 KG/M2 | DIASTOLIC BLOOD PRESSURE: 76 MMHG | WEIGHT: 284.2 LBS | HEIGHT: 68 IN

## 2018-01-11 DIAGNOSIS — E78.5 HYPERLIPIDEMIA LDL GOAL <100: ICD-10-CM

## 2018-01-11 DIAGNOSIS — I10 ESSENTIAL HYPERTENSION: ICD-10-CM

## 2018-01-11 DIAGNOSIS — E55.9 VITAMIN D DEFICIENCY: ICD-10-CM

## 2018-01-11 DIAGNOSIS — E66.01 OBESITY, MORBID (HCC): ICD-10-CM

## 2018-01-11 PROBLEM — E11.21 TYPE 2 DIABETES MELLITUS WITH NEPHROPATHY (HCC): Status: ACTIVE | Noted: 2018-01-11

## 2018-01-11 PROBLEM — F33.9 RECURRENT DEPRESSION (HCC): Status: ACTIVE | Noted: 2018-01-11

## 2018-01-11 PROBLEM — E11.40 TYPE 2 DIABETES MELLITUS WITH DIABETIC NEUROPATHY (HCC): Status: ACTIVE | Noted: 2018-01-11

## 2018-01-11 NOTE — PATIENT INSTRUCTIONS
1) Stop the lantus and humalog and change to U-500 insulin. You will take 120 units before breakfast and before dinner. Give this 4 days and if your morning sugar is still over 130, then go up by 10 units with each dose every 4 days until you get to the dose that keeps your sugar under 130 in the morning. 2) If the pharmacy doesn't have the U-500 today or they need to order it or it requires an authorization, then go home and take a now dose of 50 units of lantus and plan on increasing the lantus to 120 units twice daily as 2 separate shots of 60 units back to back along with the humalog scale until the U-500 is ready. 3) Please call [phone] to get help setting up your [employer] account. 4) Plan on going to the lab 2-3 days before your next visit and the order is already the system.

## 2018-01-11 NOTE — PROGRESS NOTES
Chief Complaint   Patient presents with    Diabetes     pcp and pharmacy confirmed    Other     eye exam is due     History of Present Illness: Nasim Suarez is a 40 y.o. female here for follow up of diabetes. Weight up 16 lbs since last visit in 11/17. She was just discharged from the hospital on 1/1/18 for DKA and has been taking lantus 100 units every 12 hours as 2 separate shots of 50 units back to back along with the humalog scale and her sugars are staying consistently in the 200-400s and was over 500 this morning despite not eating that many carbs for dinner last night. She is staying tired from the high sugars. Her breast infection has resolved. Previously she did well with U-500 and appears to be very insulin resistant again so we will change her to this now. Compliant with all oral meds below. Will be establishing with Dr. Romina Barbosa in 2/18. Current Outpatient Prescriptions   Medication Sig    insulin lispro (HUMALOG KWIKPEN) 100 unit/mL kwikpen Administer 3 times a day before meals, based your glucose levels as below          140-199=5 units            200-249=10 units  250-299=15 units  300-349=20 units  350-399=25 units  400-450=30 units    Blood-Glucose Meter (ONETOUCH VERIO FLEX) misc 4 times/day    insulin glargine (LANTUS SOLOSTAR) 100 unit/mL (3 mL) inpn Inject 100 units in the morning and 100 units at night    Insulin Needles, Disposable, (BD INSULIN PEN NEEDLE UF SHORT) 31 gauge x 5/16\" ndle Use as directed twice daily    atorvastatin (LIPITOR) 40 mg tablet Take 1 Tab by mouth nightly.  fenofibrate nanocrystallized (TRICOR) 145 mg tablet Take 1 Tab by mouth daily.  omega-3 acid ethyl esters (LOVAZA) 1 gram capsule Take 2 Caps by mouth two (2) times a day.  metFORMIN (GLUCOPHAGE) 1,000 mg tablet Take 1 Tab by mouth two (2) times daily (with meals).  ergocalciferol (VITAMIN D2) 50,000 unit capsule Take 1 Cap by mouth every seven (7) days.     lisinopril (PRINIVIL, ZESTRIL) 10 mg tablet Take 1 Tab by mouth daily.  gabapentin (NEURONTIN) 300 mg capsule Take 2 Caps by mouth three (3) times daily.  glucose blood VI test strips (ONETOUCH VERIO) strip 4 times/day    famotidine (PEPCID) 20 mg tablet Take 1 Tab by mouth two (2) times a day.  folic acid (FOLVITE) 1 mg tablet Take 1 Tab by mouth daily.  albuterol (PROAIR HFA) 90 mcg/actuation inhaler Take 2 Puffs by inhalation every four (4) hours as needed for Wheezing.  sertraline (ZOLOFT) 25 mg tablet Take 1 Tab by mouth nightly. No current facility-administered medications for this visit. Allergies   Allergen Reactions    Toradol [Ketorolac Tromethamine] Unknown (comments)     Seizure like symptoms per pt.     Augmentin [Amoxicillin-Pot Clavulanate] Swelling    Demerol [Meperidine] Swelling    Heparin Hives    Ibuprofen Diarrhea and Nausea and Vomiting     Muscle tightness    Keflex [Cephalexin] Shortness of Breath    Morphine Swelling    Nubain [Nalbuphine] Hives    Pcn [Penicillins] Swelling    Sulfa (Sulfonamide Antibiotics) Unknown (comments)     Review of Systems: Not asked today    Physical Examination:  Blood pressure 127/76, pulse 82, height 5' 8\" (1.727 m), weight 284 lb 3.2 oz (128.9 kg), last menstrual period 12/15/2017.  - General: pleasant, no distress, good eye contact   - Full exam not performed  - Psychiatric: normal mood and affect    Data Reviewed:   Component      Latest Ref Rng & Units 12/30/2017           3:23 AM   Sodium      136 - 145 mmol/L 125 (L)   Potassium      3.5 - 5.1 mmol/L 3.8   Chloride      97 - 108 mmol/L 93 (L)   CO2      21 - 32 mmol/L 18 (L)   Anion gap      5 - 15 mmol/L 14   Glucose      65 - 100 mg/dL 587 (H)   BUN      6 - 20 MG/DL UNABLE TO OBTAIN ACCURATE RESULTS DUE TO GROSS LIPEMIA   Creatinine      0.55 - 1.02 MG/DL 1.09 (H)   BUN/Creatinine ratio      12 - 20   UNABLE TO OBTAIN ACCURATE RESULTS DUE TO GROSS LIPEMIA   GFR est AA      >60 ml/min/1.73m2 >60   GFR est non-AA      >60 ml/min/1.73m2 56 (L)   Calcium      8.5 - 10.1 MG/DL UNABLE TO OBTAIN ACCURATE RESULTS DUE TO GROSS LIPEMIA     Component      Latest Ref Rng & Units 12/30/2017          12:46 AM   Hemoglobin A1c, (calculated)      4.2 - 6.3 % 11.2 (H)       Assessment/Plan:     1. Uncontrolled type 2 diabetes mellitus with diabetic polyneuropathy, with long-term current use of insulin ([facility] [address]): her most recent Hgb A1c was was 11.2% in 12/17 up from 10.9% in 10/17. She was very insulin resistant and did best while on U-500 in the past.  Will plan on avoiding GLP-1 and DPP-IV agents as these can induce pancreatitis and she has already had several episodes of pancreatitis from high TGs. Given she is already requiring nearly 300 units of insulin per day and her sugars are still uncontrolled, will go back to U-500 at this time and stop the lantus and humalog. We spent 25 minutes of face to face time together and > 50% of the time was spent in counseling on diabetes management and determining what changes to make to her insulin regimen.   - begin U-500 120 units before breakfast and dinner   - increase lantus to 120 units twice daily until she can get the U-500  - cont humalog 5:50 > 150 for correction until change to U-500  - cont metformin 1g bid   - check bs 4 times per day due to fluctuating sugars  - foot exam done 11/17  - due for eye exam  - check Hgb A1c, cmp, and microalbumin prior to next visit       2. Essential hypertension: her BP was at goal < 140/90  - cont lisinopril 10 mg daily        3. Hyperlipidemia LDL goal <100:  and TG > 4000 during inpatient stay in 10/17 when she require plasmapheresis and insulin drip to treat the high TGs. Part of her severely elevated TGs is relative deficiency of lipoprotein lipase which occurs with severe hyperglycemia as insulin is needed for this enzyme to work to cleave TGs. Therefore, with better blood sugar control, her TGs should improve.   - cont lipitor 40 mg daily  - cont fenofibrate 145 mg daily  - cont lovaza 2 caps bid  - check lipids prior to next visit          4. Vitamin D deficiency: recalls being told her level was very low in the past and needed supplementation with high dose vitamin D.  - begin ergocalciferol 50K units weekly  - check Vitamin D 25-OH level prior to next visit    5. Morbid Obesity: will screen for hypothyroidism again as previously she had a TSH of 4.28 in 12/10 during a hospital stay but was back down to 2.51 in 3/11 and hasn't been checked since then. May consider phentermine +/- topamax in the future. - check TSH prior to next visit    Patient Instructions   1) Stop the lantus and humalog and change to U-500 insulin. You will take 120 units before breakfast and before dinner. Give this 4 days and if your morning sugar is still over 130, then go up by 10 units with each dose every 4 days until you get to the dose that keeps your sugar under 130 in the morning. 2) If the pharmacy doesn't have the U-500 today or they need to order it or it requires an authorization, then go home and take a now dose of 50 units of lantus and plan on increasing the lantus to 120 units twice daily as 2 separate shots of 60 units back to back along with the humalog scale until the U-500 is ready. 3) Please call [phone] to get help setting up your [employer] account. 4) Plan on going to the lab 2-3 days before your next visit and the order is already the system.       Follow-up Disposition:  Return for 3/15/18 at 2:10pm.    Copy sent to:  Dr. Shamika Atkinson via [facility]

## 2018-01-15 ENCOUNTER — TELEPHONE (OUTPATIENT)
Dept: ENDOCRINOLOGY | Age: 38
End: 2018-01-15

## 2018-01-16 NOTE — TELEPHONE ENCOUNTER
Did you receive a fax that the U-500 requires a prior authorization? If so, please let me know when I can try to work on this to get her approved.

## 2018-01-17 NOTE — TELEPHONE ENCOUNTER
I submitted a prior authorization for U-500 through [employer] and am currently waiting a response from the system.

## 2018-01-19 NOTE — TELEPHONE ENCOUNTER
My request was approved and pt was notified today via [employer] by Mariela Cheatham and has already viewed Cit[employer]'s message.

## 2018-01-28 ENCOUNTER — HOSPITAL ENCOUNTER (EMERGENCY)
Age: 38
Discharge: HOME | End: 2018-01-29
Attending: EMERGENCY MEDICINE | Admitting: EMERGENCY MEDICINE
Payer: MEDICAID

## 2018-01-28 ENCOUNTER — APPOINTMENT (OUTPATIENT)
Dept: CT IMAGING | Age: 38
End: 2018-01-28
Attending: EMERGENCY MEDICINE
Payer: MEDICAID

## 2018-01-28 VITALS
OXYGEN SATURATION: 98 % | SYSTOLIC BLOOD PRESSURE: 117 MMHG | BODY MASS INDEX: 41.66 KG/M2 | RESPIRATION RATE: 20 BRPM | WEIGHT: 274 LBS | HEART RATE: 84 BPM | DIASTOLIC BLOOD PRESSURE: 65 MMHG | TEMPERATURE: 98.9 F

## 2018-01-28 DIAGNOSIS — R55 SYNCOPE AND COLLAPSE: Primary | ICD-10-CM

## 2018-01-28 DIAGNOSIS — J01.10 ACUTE NON-RECURRENT FRONTAL SINUSITIS: ICD-10-CM

## 2018-01-28 DIAGNOSIS — E86.0 DEHYDRATION: ICD-10-CM

## 2018-01-28 DIAGNOSIS — R73.9 HYPERGLYCEMIA: ICD-10-CM

## 2018-01-28 LAB
ALBUMIN SERPL-MCNC: 3.2 G/DL (ref 3.5–5)
ALBUMIN/GLOB SERPL: 0.9 {RATIO} (ref 1.1–2.2)
ALP SERPL-CCNC: 59 U/L (ref 45–117)
ALT SERPL-CCNC: 25 U/L (ref 12–78)
ANION GAP SERPL CALC-SCNC: 10 MMOL/L (ref 5–15)
APPEARANCE UR: ABNORMAL
AST SERPL-CCNC: 8 U/L (ref 15–37)
BACTERIA URNS QL MICRO: ABNORMAL /HPF
BASOPHILS # BLD: 0 K/UL (ref 0–0.1)
BASOPHILS NFR BLD: 0 % (ref 0–1)
BILIRUB SERPL-MCNC: 0.3 MG/DL (ref 0.2–1)
BILIRUB UR QL: NEGATIVE
BUN SERPL-MCNC: 26 MG/DL (ref 6–20)
BUN/CREAT SERPL: 25 (ref 12–20)
CALCIUM SERPL-MCNC: 8.3 MG/DL (ref 8.5–10.1)
CHLORIDE SERPL-SCNC: 104 MMOL/L (ref 97–108)
CK SERPL-CCNC: 38 U/L (ref 26–192)
CO2 SERPL-SCNC: 24 MMOL/L (ref 21–32)
COLOR UR: ABNORMAL
CREAT SERPL-MCNC: 1.03 MG/DL (ref 0.55–1.02)
DIFFERENTIAL METHOD BLD: ABNORMAL
EOSINOPHIL # BLD: 0.1 K/UL (ref 0–0.4)
EOSINOPHIL NFR BLD: 1 % (ref 0–7)
EPITH CASTS URNS QL MICRO: ABNORMAL /LPF
ERYTHROCYTE [DISTWIDTH] IN BLOOD BY AUTOMATED COUNT: 15.1 % (ref 11.5–14.5)
GLOBULIN SER CALC-MCNC: 3.6 G/DL (ref 2–4)
GLUCOSE BLD STRIP.AUTO-MCNC: 338 MG/DL (ref 65–100)
GLUCOSE SERPL-MCNC: 325 MG/DL (ref 65–100)
GLUCOSE UR STRIP.AUTO-MCNC: >1000 MG/DL
HCT VFR BLD AUTO: 34.3 % (ref 35–47)
HGB BLD-MCNC: 11.8 G/DL (ref 11.5–16)
HGB UR QL STRIP: ABNORMAL
IMM GRANULOCYTES # BLD: 0.1 K/UL (ref 0–0.04)
IMM GRANULOCYTES NFR BLD AUTO: 1 % (ref 0–0.5)
KETONES UR QL STRIP.AUTO: NEGATIVE MG/DL
LEUKOCYTE ESTERASE UR QL STRIP.AUTO: NEGATIVE
LYMPHOCYTES # BLD: 1.9 K/UL (ref 0.8–3.5)
LYMPHOCYTES NFR BLD: 26 % (ref 12–49)
MCH RBC QN AUTO: 27.8 PG (ref 26–34)
MCHC RBC AUTO-ENTMCNC: 34.4 G/DL (ref 30–36.5)
MCV RBC AUTO: 80.7 FL (ref 80–99)
MONOCYTES # BLD: 0.3 K/UL (ref 0–1)
MONOCYTES NFR BLD: 5 % (ref 5–13)
NEUTS SEG # BLD: 4.8 K/UL (ref 1.8–8)
NEUTS SEG NFR BLD: 67 % (ref 32–75)
NITRITE UR QL STRIP.AUTO: NEGATIVE
NRBC # BLD: 0 K/UL (ref 0–0.01)
NRBC BLD-RTO: 0 PER 100 WBC
PH UR STRIP: 5 [PH] (ref 5–8)
PLATELET # BLD AUTO: 159 K/UL (ref 150–400)
PMV BLD AUTO: 10.4 FL (ref 8.9–12.9)
POTASSIUM SERPL-SCNC: 4.3 MMOL/L (ref 3.5–5.1)
PROT SERPL-MCNC: 6.8 G/DL (ref 6.4–8.2)
PROT UR STRIP-MCNC: NEGATIVE MG/DL
RBC # BLD AUTO: 4.25 M/UL (ref 3.8–5.2)
RBC #/AREA URNS HPF: ABNORMAL /HPF (ref 0–5)
SERVICE CMNT-IMP: ABNORMAL
SODIUM SERPL-SCNC: 138 MMOL/L (ref 136–145)
SP GR UR REFRACTOMETRY: 1.03 (ref 1–1.03)
TROPONIN I SERPL-MCNC: <0.04 NG/ML
UA: UC IF INDICATED,UAUC: ABNORMAL
UROBILINOGEN UR QL STRIP.AUTO: 0.2 EU/DL (ref 0.2–1)
WBC # BLD AUTO: 7.2 K/UL (ref 3.6–11)
WBC URNS QL MICRO: ABNORMAL /HPF (ref 0–4)

## 2018-01-28 PROCEDURE — 74011250636 HC RX REV CODE- 250/636: Performed by: EMERGENCY MEDICINE

## 2018-01-28 PROCEDURE — 70450 CT HEAD/BRAIN W/O DYE: CPT

## 2018-01-28 PROCEDURE — 99285 EMERGENCY DEPT VISIT HI MDM: CPT

## 2018-01-28 PROCEDURE — 36415 COLL VENOUS BLD VENIPUNCTURE: CPT | Performed by: EMERGENCY MEDICINE

## 2018-01-28 PROCEDURE — 82550 ASSAY OF CK (CPK): CPT | Performed by: EMERGENCY MEDICINE

## 2018-01-28 PROCEDURE — 74176 CT ABD & PELVIS W/O CONTRAST: CPT

## 2018-01-28 PROCEDURE — 81001 URINALYSIS AUTO W/SCOPE: CPT | Performed by: EMERGENCY MEDICINE

## 2018-01-28 PROCEDURE — 96374 THER/PROPH/DIAG INJ IV PUSH: CPT

## 2018-01-28 PROCEDURE — 87086 URINE CULTURE/COLONY COUNT: CPT | Performed by: EMERGENCY MEDICINE

## 2018-01-28 PROCEDURE — 80053 COMPREHEN METABOLIC PANEL: CPT | Performed by: EMERGENCY MEDICINE

## 2018-01-28 PROCEDURE — 96361 HYDRATE IV INFUSION ADD-ON: CPT

## 2018-01-28 PROCEDURE — 96375 TX/PRO/DX INJ NEW DRUG ADDON: CPT

## 2018-01-28 PROCEDURE — 82962 GLUCOSE BLOOD TEST: CPT

## 2018-01-28 PROCEDURE — 85025 COMPLETE CBC W/AUTO DIFF WBC: CPT | Performed by: EMERGENCY MEDICINE

## 2018-01-28 PROCEDURE — 93005 ELECTROCARDIOGRAM TRACING: CPT

## 2018-01-28 PROCEDURE — 84484 ASSAY OF TROPONIN QUANT: CPT | Performed by: EMERGENCY MEDICINE

## 2018-01-28 RX ORDER — ONDANSETRON 2 MG/ML
4 INJECTION INTRAMUSCULAR; INTRAVENOUS
Status: COMPLETED | OUTPATIENT
Start: 2018-01-28 | End: 2018-01-28

## 2018-01-28 RX ORDER — DOXYCYCLINE HYCLATE 100 MG
100 TABLET ORAL 2 TIMES DAILY
Qty: 20 TAB | Refills: 0 | Status: SHIPPED | OUTPATIENT
Start: 2018-01-28 | End: 2018-02-07

## 2018-01-28 RX ORDER — DOXYCYCLINE HYCLATE 100 MG
100 TABLET ORAL 2 TIMES DAILY
Qty: 20 TAB | Refills: 0 | Status: SHIPPED | OUTPATIENT
Start: 2018-01-28 | End: 2018-01-28

## 2018-01-28 RX ORDER — FENTANYL CITRATE 50 UG/ML
100 INJECTION, SOLUTION INTRAMUSCULAR; INTRAVENOUS
Status: COMPLETED | OUTPATIENT
Start: 2018-01-28 | End: 2018-01-28

## 2018-01-28 RX ORDER — DOXYCYCLINE HYCLATE 100 MG
100 TABLET ORAL
Status: COMPLETED | OUTPATIENT
Start: 2018-01-28 | End: 2018-01-29

## 2018-01-28 RX ORDER — FLUCONAZOLE 100 MG/1
100 TABLET ORAL DAILY
Qty: 7 TAB | Refills: 0 | Status: SHIPPED | OUTPATIENT
Start: 2018-01-28 | End: 2018-02-04

## 2018-01-28 RX ORDER — FLUCONAZOLE 100 MG/1
100 TABLET ORAL DAILY
Qty: 7 TAB | Refills: 0 | Status: SHIPPED | OUTPATIENT
Start: 2018-01-28 | End: 2018-01-28

## 2018-01-28 RX ADMIN — FENTANYL CITRATE 100 MCG: 50 INJECTION, SOLUTION INTRAMUSCULAR; INTRAVENOUS at 21:37

## 2018-01-28 RX ADMIN — ONDANSETRON HYDROCHLORIDE 4 MG: 2 INJECTION, SOLUTION INTRAMUSCULAR; INTRAVENOUS at 21:37

## 2018-01-28 RX ADMIN — SODIUM CHLORIDE 2000 ML: 900 INJECTION, SOLUTION INTRAVENOUS at 21:37

## 2018-01-29 LAB
ATRIAL RATE: 87 BPM
CALCULATED P AXIS, ECG09: 39 DEGREES
CALCULATED R AXIS, ECG10: 1 DEGREES
CALCULATED T AXIS, ECG11: 38 DEGREES
DIAGNOSIS, 93000: NORMAL
P-R INTERVAL, ECG05: 136 MS
Q-T INTERVAL, ECG07: 372 MS
QRS DURATION, ECG06: 88 MS
QTC CALCULATION (BEZET), ECG08: 447 MS
VENTRICULAR RATE, ECG03: 87 BPM

## 2018-01-29 PROCEDURE — 74011250637 HC RX REV CODE- 250/637: Performed by: EMERGENCY MEDICINE

## 2018-01-29 RX ADMIN — DOXYCYCLINE HYCLATE 100 MG: 100 TABLET, COATED ORAL at 00:02

## 2018-01-29 NOTE — ED NOTES
All discharge paperwork reviewed with patient and MD and she denies any need for further explanation regarding these instructions. Patient states that she \"feels like I have been hit by a eulogio truck\". She is waiting for her ride, approx 20 minutes out.

## 2018-01-29 NOTE — ED NOTES
Assumed care from EMS. Patient was walking from outside because she became to feel funny and walked in the door and fell. Patient is unaware if she hit her head, but family states that she had seizure like activity. Patient states that her head is bothering her and her right elbow is bothering her as well.

## 2018-01-29 NOTE — DISCHARGE INSTRUCTIONS
Dehydration: Care Instructions  Your Care Instructions  Dehydration happens when your body loses too much fluid. This might happen when you do not drink enough water or you lose large amounts of fluids from your body because of diarrhea, vomiting, or sweating. Severe dehydration can be life-threatening. Water and minerals called electrolytes help put your body fluids back in balance. Learn the early signs of fluid loss, and drink more fluids to prevent dehydration. Follow-up care is a key part of your treatment and safety. Be sure to make and go to all appointments, and call your doctor if you are having problems. It's also a good idea to know your test results and keep a list of the medicines you take. How can you care for yourself at home? · To prevent dehydration, drink plenty of fluids, enough so that your urine is light yellow or clear like water. Choose water and other caffeine-free clear liquids until you feel better. If you have kidney, heart, or liver disease and have to limit fluids, talk with your doctor before you increase the amount of fluids you drink. · If you do not feel like eating or drinking, try taking small sips of water, sports drinks, or other rehydration drinks. · Get plenty of rest.  To prevent dehydration  · Add more fluids to your diet and daily routine, unless your doctor has told you not to. · During hot weather, drink more fluids. Drink even more fluids if you exercise a lot. Stay away from drinks with alcohol or caffeine. · Watch for the symptoms of dehydration. These include:  ¨ A dry, sticky mouth. ¨ Dark yellow urine, and not much of it. ¨ Dry and sunken eyes. ¨ Feeling very tired. · Learn what problems can lead to dehydration. These include:  ¨ Diarrhea, fever, and vomiting. ¨ Any illness with a fever, such as pneumonia or the flu. ¨ Activities that cause heavy sweating, such as endurance races and heavy outdoor work in hot or humid weather.   ¨ Alcohol or drug abuse or withdrawal.  ¨ Certain medicines, such as cold and allergy pills (antihistamines), diet pills (diuretics), and laxatives. ¨ Certain diseases, such as diabetes, cancer, and heart or kidney disease. When should you call for help? Call 911 anytime you think you may need emergency care. For example, call if:  ? · You passed out (lost consciousness). ?Call your doctor now or seek immediate medical care if:  ? · You are confused and cannot think clearly. ? · You are dizzy or lightheaded, or you feel like you may faint. ? · You have signs of needing more fluids. You have sunken eyes and a dry mouth, and you pass only a little dark urine. ? · You cannot keep fluids down. ? Watch closely for changes in your health, and be sure to contact your doctor if:  ? · You are not making tears. ? · Your skin is very dry and sags slowly back into place after you pinch it. ? · Your mouth and eyes are very dry. Where can you learn more? Go to http://ish-irish.info/. Enter S492 in the search box to learn more about \"Dehydration: Care Instructions. \"  Current as of: March 20, 2017  Content Version: 11.4  © 5169-9377 [employer]. Care instructions adapted under license by [employer] (which disclaims liability or warranty for this information). If you have questions about a medical condition or this instruction, always ask your healthcare professional. [place] [address] any warranty or liability for your use of this information. Learning About High Blood Sugar  What is high blood sugar? Your body turns the food you eat into glucose (sugar), which it uses for energy. But if your body isn't able to use the sugar right away, it can build up in your blood and lead to high blood sugar. When the amount of sugar in your blood stays too high for too much of the time, you may have diabetes.  Diabetes is a disease that can cause serious health problems. The good news is that lifestyle changes may help you get your blood sugar back to normal and avoid or delay diabetes. What causes high blood sugar? Sugar (glucose) can build up in your blood if you:  · Are overweight. · Have a family history of diabetes. · Take certain medicines, such as steroids. What are the symptoms? Having high blood sugar may not cause any symptoms at all. Or it may make you feel very thirsty or very hungry. You may also urinate more often than usual, have blurry vision, or lose weight without trying. How is high blood sugar treated? You can take steps to lower your blood sugar level if you understand what makes it get higher. Your doctor may want you to learn how to test your blood sugar level at home. Then you can see how illness, stress, or different kinds of food or medicine raise or lower your blood sugar level. Other tests may be needed to see if you have diabetes. How can you prevent high blood sugar? · Watch your weight. If you're overweight, losing just a small amount of weight may help. Reducing fat around your waist is most important. · Limit the amount of calories, sweets, and unhealthy fat you eat. Ask your doctor if a dietitian can help you. A registered dietitian can help you create meal plans that fit your lifestyle. · Get at least 30 minutes of exercise on most days of the week. Exercise helps control your blood sugar. It also helps you maintain a healthy weight. Walking is a good choice. You also may want to do other activities, such as running, swimming, cycling, or playing tennis or team sports. · If your doctor prescribed medicines, take them exactly as prescribed. Call your doctor if you think you are having a problem with your medicine. You will get more details on the specific medicines your doctor prescribes. Follow-up care is a key part of your treatment and safety.  Be sure to make and go to all appointments, and call your doctor if you are having problems. It's also a good idea to know your test results and keep a list of the medicines you take. Where can you learn more? Go to http://ish-irish.info/. Enter O108 in the search box to learn more about \"Learning About High Blood Sugar. \"  Current as of: March 13, 2017  Content Version: 11.4  © 5532-2089 [employer]. Care instructions adapted under license by [employer] (which disclaims liability or warranty for this information). If you have questions about a medical condition or this instruction, always ask your healthcare professional. [place] [address] any warranty or liability for your use of this information. Saline Nasal Washes: Care Instructions  Your Care Instructions  Saline nasal washes help keep the nasal passages open by washing out thick or dried mucus. This simple remedy can help relieve symptoms of allergies, sinusitis, and colds. It also can make the nose feel more comfortable by keeping the mucous membranes moist. You may notice a little burning sensation in your nose the first few times you use the solution, but this usually gets better in a few days. Follow-up care is a key part of your treatment and safety. Be sure to make and go to all appointments, and call your doctor if you are having problems. It's also a good idea to know your test results and keep a list of the medicines you take. How can you care for yourself at home? · You can buy premixed saline solution in a squeeze bottle or other sinus rinse products at a drugstore. Read and follow the instructions on the label. · You also can make your own saline solution by adding 1 teaspoon of salt and 1 teaspoon of baking soda to 2 cups of distilled water. · If you use a homemade solution, pour a small amount into a clean bowl. Using a rubber bulb syringe, squeeze the syringe and place the tip in the salt water.  Pull a small amount of the salt water into the syringe by relaxing your hand. · Sit down with your head tilted slightly back. Do not lie down. Put the tip of the bulb syringe or the squeeze bottle a little way into one of your nostrils. Gently drip or squirt a few drops into the nostril. Repeat with the other nostril. Some sneezing and gagging are normal at first.  · Gently blow your nose. · Wipe the syringe or bottle tip clean after each use. · Repeat this 2 or 3 times a day. · Use nasal washes gently if you have nosebleeds often. When should you call for help? Watch closely for changes in your health, and be sure to contact your doctor if:  ? · You often get nosebleeds. ? · You have problems doing the nasal washes. Where can you learn more? Go to http://ish-irish.info/. Enter [id number] in the search box to learn more about \"Saline Nasal Washes: Care Instructions. \"  Current as of: May 12, 2017  Content Version: 11.4  © 8050-3781 [employer]. Care instructions adapted under license by [employer] (which disclaims liability or warranty for this information). If you have questions about a medical condition or this instruction, always ask your healthcare professional. [place] [address] any warranty or liability for your use of this information. Sinusitis: Care Instructions  Your Care Instructions    Sinusitis is an infection of the lining of the sinus cavities in your head. Sinusitis often follows a cold. It causes pain and pressure in your head and face. In most cases, sinusitis gets better on its own in 1 to 2 weeks. But some mild symptoms may last for several weeks. Sometimes antibiotics are needed. Follow-up care is a key part of your treatment and safety. Be sure to make and go to all appointments, and call your doctor if you are having problems. It's also a good idea to know your test results and keep a list of the medicines you take.   How can you care for yourself at home?  · Take an over-the-counter pain medicine, such as acetaminophen (Tylenol), ibuprofen (Advil, Motrin), or naproxen (Aleve). Read and follow all instructions on the label. · If the doctor prescribed antibiotics, take them as directed. Do not stop taking them just because you feel better. You need to take the full course of antibiotics. · Be careful when taking over-the-counter cold or flu medicines and Tylenol at the same time. Many of these medicines have acetaminophen, which is Tylenol. Read the labels to make sure that you are not taking more than the recommended dose. Too much acetaminophen (Tylenol) can be harmful. · Breathe warm, moist air from a steamy shower, a hot bath, or a sink filled with hot water. Avoid cold, dry air. Using a humidifier in your home may help. Follow the directions for cleaning the machine. · Use saline (saltwater) nasal washes to help keep your nasal passages open and wash out mucus and bacteria. You can buy saline nose drops at a grocery store or drugstore. Or you can make your own at home by adding 1 teaspoon of salt and 1 teaspoon of baking soda to 2 cups of distilled water. If you make your own, fill a bulb syringe with the solution, insert the tip into your nostril, and squeeze gently. Garnetta Christin your nose. · Put a hot, wet towel or a warm gel pack on your face 3 or 4 times a day for 5 to 10 minutes each time. · Try a decongestant nasal spray like oxymetazoline (Afrin). Do not use it for more than 3 days in a row. Using it for more than 3 days can make your congestion worse. When should you call for help? Call your doctor now or seek immediate medical care if:  ? · You have new or worse swelling or redness in your face or around your eyes. ? · You have a new or higher fever. ? Watch closely for changes in your health, and be sure to contact your doctor if:  ? · You have new or worse facial pain.    ? · The mucus from your nose becomes thicker (like pus) or has new blood in it.   ? · You are not getting better as expected. Where can you learn more? Go to http://ish-irish.info/. Enter K112 in the search box to learn more about \"Sinusitis: Care Instructions. \"  Current as of: May 12, 2017  Content Version: 11.4  © 5573-0707 [employer]. Care instructions adapted under license by [employer] (which disclaims liability or warranty for this information). If you have questions about a medical condition or this instruction, always ask your healthcare professional. [address] any warranty or liability for your use of this information.

## 2018-01-29 NOTE — ED NOTES
Bedside and Verbal shift change report given to [address] (oncoming nurse) by [employer] RN (offgoing nurse). Report included the following information SBAR, Kardex, ED Summary, Intake/Output, MAR, Recent Results and Med Rec Status.

## 2018-01-29 NOTE — ED NOTES
Assumed care of patient from [address] [place], [place] [address]Natalia. Patient is ambulating in hallway with tech at this time.

## 2018-01-30 LAB
BACTERIA SPEC CULT: NORMAL
CC UR VC: NORMAL
SERVICE CMNT-IMP: NORMAL

## 2018-02-19 PROBLEM — K85.90 PANCREATITIS: Status: RESOLVED | Noted: 2017-10-05 | Resolved: 2018-02-19

## 2018-02-19 PROBLEM — E11.10 DKA (DIABETIC KETOACIDOSES): Status: RESOLVED | Noted: 2017-12-30 | Resolved: 2018-02-19

## 2018-05-17 ENCOUNTER — APPOINTMENT (OUTPATIENT)
Dept: CT IMAGING | Age: 38
DRG: 518 | End: 2018-05-17
Attending: EMERGENCY MEDICINE
Payer: MEDICAID

## 2018-05-17 ENCOUNTER — APPOINTMENT (OUTPATIENT)
Dept: GENERAL RADIOLOGY | Age: 38
DRG: 518 | End: 2018-05-17
Attending: PHYSICIAN ASSISTANT
Payer: MEDICAID

## 2018-05-17 ENCOUNTER — ANESTHESIA (OUTPATIENT)
Dept: SURGERY | Age: 38
DRG: 518 | End: 2018-05-17
Payer: MEDICAID

## 2018-05-17 ENCOUNTER — ANESTHESIA EVENT (OUTPATIENT)
Dept: SURGERY | Age: 38
DRG: 518 | End: 2018-05-17
Payer: MEDICAID

## 2018-05-17 ENCOUNTER — HOSPITAL ENCOUNTER (INPATIENT)
Age: 38
LOS: 5 days | Discharge: HOME | DRG: 518 | End: 2018-05-22
Attending: EMERGENCY MEDICINE | Admitting: OBSTETRICS & GYNECOLOGY
Payer: MEDICAID

## 2018-05-17 DIAGNOSIS — R73.9 HYPERGLYCEMIA: ICD-10-CM

## 2018-05-17 DIAGNOSIS — A41.9 SEPSIS, DUE TO UNSPECIFIED ORGANISM: Primary | ICD-10-CM

## 2018-05-17 DIAGNOSIS — N76.4 VULVAR ABSCESS: ICD-10-CM

## 2018-05-17 LAB
ALBUMIN SERPL-MCNC: 3.8 G/DL (ref 3.5–5)
ALBUMIN/GLOB SERPL: 0.8 {RATIO} (ref 1.1–2.2)
ALP SERPL-CCNC: 92 U/L (ref 45–117)
ALT SERPL-CCNC: 13 U/L (ref 12–78)
ANION GAP SERPL CALC-SCNC: 8 MMOL/L (ref 5–15)
APPEARANCE UR: ABNORMAL
AST SERPL-CCNC: <3 U/L (ref 15–37)
ATRIAL RATE: 83 BPM
BACTERIA URNS QL MICRO: NEGATIVE /HPF
BASOPHILS # BLD: 0 K/UL (ref 0–0.1)
BASOPHILS NFR BLD: 0 % (ref 0–1)
BILIRUB SERPL-MCNC: 0.6 MG/DL (ref 0.2–1)
BILIRUB UR QL: NEGATIVE
BUN SERPL-MCNC: 30 MG/DL (ref 6–20)
BUN/CREAT SERPL: 28 (ref 12–20)
CALCIUM SERPL-MCNC: 9.2 MG/DL (ref 8.5–10.1)
CALCULATED P AXIS, ECG09: 27 DEGREES
CALCULATED R AXIS, ECG10: 17 DEGREES
CALCULATED T AXIS, ECG11: 49 DEGREES
CHLORIDE SERPL-SCNC: 100 MMOL/L (ref 97–108)
CO2 SERPL-SCNC: 22 MMOL/L (ref 21–32)
COLOR UR: ABNORMAL
CREAT SERPL-MCNC: 1.06 MG/DL (ref 0.55–1.02)
DIAGNOSIS, 93000: NORMAL
DIFFERENTIAL METHOD BLD: ABNORMAL
EOSINOPHIL # BLD: 0.1 K/UL (ref 0–0.4)
EOSINOPHIL NFR BLD: 1 % (ref 0–7)
EPITH CASTS URNS QL MICRO: ABNORMAL /LPF
ERYTHROCYTE [DISTWIDTH] IN BLOOD BY AUTOMATED COUNT: 16.3 % (ref 11.5–14.5)
GLOBULIN SER CALC-MCNC: 4.8 G/DL (ref 2–4)
GLUCOSE BLD STRIP.AUTO-MCNC: 260 MG/DL (ref 65–100)
GLUCOSE BLD STRIP.AUTO-MCNC: 338 MG/DL (ref 65–100)
GLUCOSE SERPL-MCNC: 398 MG/DL (ref 65–100)
GLUCOSE UR STRIP.AUTO-MCNC: 500 MG/DL
HCG UR QL: NEGATIVE
HCT VFR BLD AUTO: 34.5 % (ref 35–47)
HGB BLD-MCNC: 11.5 G/DL (ref 11.5–16)
HGB UR QL STRIP: ABNORMAL
IMM GRANULOCYTES # BLD: 0.1 K/UL (ref 0–0.04)
IMM GRANULOCYTES NFR BLD AUTO: 1 % (ref 0–0.5)
KETONES UR QL STRIP.AUTO: ABNORMAL MG/DL
LACTATE SERPL-SCNC: 1.5 MMOL/L (ref 0.4–2)
LACTATE SERPL-SCNC: 1.9 MMOL/L (ref 0.4–2)
LEUKOCYTE ESTERASE UR QL STRIP.AUTO: ABNORMAL
LYMPHOCYTES # BLD: 1.5 K/UL (ref 0.8–3.5)
LYMPHOCYTES NFR BLD: 12 % (ref 12–49)
MCH RBC QN AUTO: 27.4 PG (ref 26–34)
MCHC RBC AUTO-ENTMCNC: 33.3 G/DL (ref 30–36.5)
MCV RBC AUTO: 82.3 FL (ref 80–99)
MONOCYTES # BLD: 0.6 K/UL (ref 0–1)
MONOCYTES NFR BLD: 4 % (ref 5–13)
NEUTS SEG # BLD: 10.3 K/UL (ref 1.8–8)
NEUTS SEG NFR BLD: 82 % (ref 32–75)
NITRITE UR QL STRIP.AUTO: POSITIVE
NRBC # BLD: 0 K/UL (ref 0–0.01)
NRBC BLD-RTO: 0 PER 100 WBC
P-R INTERVAL, ECG05: 114 MS
PH UR STRIP: 5 [PH] (ref 5–8)
PLATELET # BLD AUTO: 206 K/UL (ref 150–400)
PMV BLD AUTO: 10.2 FL (ref 8.9–12.9)
POTASSIUM SERPL-SCNC: 4.6 MMOL/L (ref 3.5–5.1)
PROT SERPL-MCNC: 8.6 G/DL (ref 6.4–8.2)
PROT UR STRIP-MCNC: 100 MG/DL
Q-T INTERVAL, ECG07: 368 MS
QRS DURATION, ECG06: 84 MS
QTC CALCULATION (BEZET), ECG08: 432 MS
RBC # BLD AUTO: 4.19 M/UL (ref 3.8–5.2)
RBC #/AREA URNS HPF: >100 /HPF (ref 0–5)
SERVICE CMNT-IMP: ABNORMAL
SERVICE CMNT-IMP: ABNORMAL
SODIUM SERPL-SCNC: 130 MMOL/L (ref 136–145)
SP GR UR REFRACTOMETRY: >1.03 (ref 1–1.03)
UA: UC IF INDICATED,UAUC: ABNORMAL
UROBILINOGEN UR QL STRIP.AUTO: 1 EU/DL (ref 0.2–1)
VENTRICULAR RATE, ECG03: 83 BPM
WBC # BLD AUTO: 12.6 K/UL (ref 3.6–11)
WBC URNS QL MICRO: ABNORMAL /HPF (ref 0–4)

## 2018-05-17 PROCEDURE — 71045 X-RAY EXAM CHEST 1 VIEW: CPT

## 2018-05-17 PROCEDURE — 83036 HEMOGLOBIN GLYCOSYLATED A1C: CPT | Performed by: INTERNAL MEDICINE

## 2018-05-17 PROCEDURE — 87075 CULTR BACTERIA EXCEPT BLOOD: CPT | Performed by: EMERGENCY MEDICINE

## 2018-05-17 PROCEDURE — 74011250636 HC RX REV CODE- 250/636: Performed by: INTERNAL MEDICINE

## 2018-05-17 PROCEDURE — 87205 SMEAR GRAM STAIN: CPT | Performed by: EMERGENCY MEDICINE

## 2018-05-17 PROCEDURE — 80053 COMPREHEN METABOLIC PANEL: CPT | Performed by: EMERGENCY MEDICINE

## 2018-05-17 PROCEDURE — 82962 GLUCOSE BLOOD TEST: CPT

## 2018-05-17 PROCEDURE — 36415 COLL VENOUS BLD VENIPUNCTURE: CPT | Performed by: EMERGENCY MEDICINE

## 2018-05-17 PROCEDURE — 74177 CT ABD & PELVIS W/CONTRAST: CPT

## 2018-05-17 PROCEDURE — 83605 ASSAY OF LACTIC ACID: CPT | Performed by: PHYSICIAN ASSISTANT

## 2018-05-17 PROCEDURE — 96374 THER/PROPH/DIAG INJ IV PUSH: CPT

## 2018-05-17 PROCEDURE — 93005 ELECTROCARDIOGRAM TRACING: CPT

## 2018-05-17 PROCEDURE — 81025 URINE PREGNANCY TEST: CPT

## 2018-05-17 PROCEDURE — 83605 ASSAY OF LACTIC ACID: CPT | Performed by: EMERGENCY MEDICINE

## 2018-05-17 PROCEDURE — 99284 EMERGENCY DEPT VISIT MOD MDM: CPT

## 2018-05-17 PROCEDURE — 76210000017 HC OR PH I REC 1.5 TO 2 HR: Performed by: OBSTETRICS & GYNECOLOGY

## 2018-05-17 PROCEDURE — 81001 URINALYSIS AUTO W/SCOPE: CPT | Performed by: EMERGENCY MEDICINE

## 2018-05-17 PROCEDURE — 74011636637 HC RX REV CODE- 636/637: Performed by: EMERGENCY MEDICINE

## 2018-05-17 PROCEDURE — 77030026438 HC STYL ET INTUB CARD -A: Performed by: ANESTHESIOLOGY

## 2018-05-17 PROCEDURE — 74011250636 HC RX REV CODE- 250/636: Performed by: EMERGENCY MEDICINE

## 2018-05-17 PROCEDURE — 77030008684 HC TU ET CUF COVD -B: Performed by: ANESTHESIOLOGY

## 2018-05-17 PROCEDURE — 77030031139 HC SUT VCRL2 J&J -A: Performed by: OBSTETRICS & GYNECOLOGY

## 2018-05-17 PROCEDURE — 74011250636 HC RX REV CODE- 250/636

## 2018-05-17 PROCEDURE — 74011000250 HC RX REV CODE- 250

## 2018-05-17 PROCEDURE — 77030019908 HC STETH ESOPH SIMS -A: Performed by: ANESTHESIOLOGY

## 2018-05-17 PROCEDURE — 96361 HYDRATE IV INFUSION ADD-ON: CPT

## 2018-05-17 PROCEDURE — 0U9M00Z DRAINAGE OF VULVA WITH DRAINAGE DEVICE, OPEN APPROACH: ICD-10-PCS | Performed by: OBSTETRICS & GYNECOLOGY

## 2018-05-17 PROCEDURE — 85025 COMPLETE CBC W/AUTO DIFF WBC: CPT | Performed by: EMERGENCY MEDICINE

## 2018-05-17 PROCEDURE — 77030019895 HC PCKNG STRP IODO -A: Performed by: OBSTETRICS & GYNECOLOGY

## 2018-05-17 PROCEDURE — 74011000250 HC RX REV CODE- 250: Performed by: EMERGENCY MEDICINE

## 2018-05-17 PROCEDURE — 74011636320 HC RX REV CODE- 636/320: Performed by: EMERGENCY MEDICINE

## 2018-05-17 PROCEDURE — 76060000032 HC ANESTHESIA 0.5 TO 1 HR: Performed by: OBSTETRICS & GYNECOLOGY

## 2018-05-17 PROCEDURE — 74011636637 HC RX REV CODE- 636/637: Performed by: INTERNAL MEDICINE

## 2018-05-17 PROCEDURE — 74011250636 HC RX REV CODE- 250/636: Performed by: ANESTHESIOLOGY

## 2018-05-17 PROCEDURE — 77030011640 HC PAD GRND REM COVD -A: Performed by: OBSTETRICS & GYNECOLOGY

## 2018-05-17 PROCEDURE — 87040 BLOOD CULTURE FOR BACTERIA: CPT | Performed by: PHYSICIAN ASSISTANT

## 2018-05-17 PROCEDURE — 76010000138 HC OR TIME 0.5 TO 1 HR: Performed by: OBSTETRICS & GYNECOLOGY

## 2018-05-17 PROCEDURE — 77030018836 HC SOL IRR NACL ICUM -A: Performed by: OBSTETRICS & GYNECOLOGY

## 2018-05-17 RX ORDER — LEVOFLOXACIN 5 MG/ML
750 INJECTION, SOLUTION INTRAVENOUS EVERY 24 HOURS
Status: DISCONTINUED | OUTPATIENT
Start: 2018-05-18 | End: 2018-05-21

## 2018-05-17 RX ORDER — MIDAZOLAM HYDROCHLORIDE 1 MG/ML
INJECTION, SOLUTION INTRAMUSCULAR; INTRAVENOUS AS NEEDED
Status: DISCONTINUED | OUTPATIENT
Start: 2018-05-17 | End: 2018-05-17 | Stop reason: HOSPADM

## 2018-05-17 RX ORDER — SODIUM CHLORIDE, SODIUM LACTATE, POTASSIUM CHLORIDE, CALCIUM CHLORIDE 600; 310; 30; 20 MG/100ML; MG/100ML; MG/100ML; MG/100ML
INJECTION, SOLUTION INTRAVENOUS
Status: DISCONTINUED | OUTPATIENT
Start: 2018-05-17 | End: 2018-05-17 | Stop reason: HOSPADM

## 2018-05-17 RX ORDER — ROCURONIUM BROMIDE 10 MG/ML
INJECTION, SOLUTION INTRAVENOUS AS NEEDED
Status: DISCONTINUED | OUTPATIENT
Start: 2018-05-17 | End: 2018-05-17 | Stop reason: HOSPADM

## 2018-05-17 RX ORDER — SODIUM CHLORIDE 0.9 % (FLUSH) 0.9 %
5-10 SYRINGE (ML) INJECTION AS NEEDED
Status: DISCONTINUED | OUTPATIENT
Start: 2018-05-17 | End: 2018-05-22 | Stop reason: HOSPADM

## 2018-05-17 RX ORDER — SODIUM CHLORIDE 0.9 % (FLUSH) 0.9 %
5-10 SYRINGE (ML) INJECTION EVERY 8 HOURS
Status: DISCONTINUED | OUTPATIENT
Start: 2018-05-17 | End: 2018-05-17 | Stop reason: HOSPADM

## 2018-05-17 RX ORDER — SODIUM CHLORIDE, SODIUM LACTATE, POTASSIUM CHLORIDE, CALCIUM CHLORIDE 600; 310; 30; 20 MG/100ML; MG/100ML; MG/100ML; MG/100ML
25 INJECTION, SOLUTION INTRAVENOUS CONTINUOUS
Status: DISCONTINUED | OUTPATIENT
Start: 2018-05-17 | End: 2018-05-18 | Stop reason: HOSPADM

## 2018-05-17 RX ORDER — ONDANSETRON 2 MG/ML
INJECTION INTRAMUSCULAR; INTRAVENOUS AS NEEDED
Status: DISCONTINUED | OUTPATIENT
Start: 2018-05-17 | End: 2018-05-17 | Stop reason: HOSPADM

## 2018-05-17 RX ORDER — SODIUM CHLORIDE 9 MG/ML
50 INJECTION, SOLUTION INTRAVENOUS
Status: COMPLETED | OUTPATIENT
Start: 2018-05-17 | End: 2018-05-17

## 2018-05-17 RX ORDER — SODIUM CHLORIDE 0.9 % (FLUSH) 0.9 %
5-10 SYRINGE (ML) INJECTION AS NEEDED
Status: DISCONTINUED | OUTPATIENT
Start: 2018-05-17 | End: 2018-05-17

## 2018-05-17 RX ORDER — VANCOMYCIN/0.9 % SOD CHLORIDE 1.5G/250ML
1500 PLASTIC BAG, INJECTION (ML) INTRAVENOUS EVERY 12 HOURS
Status: DISCONTINUED | OUTPATIENT
Start: 2018-05-18 | End: 2018-05-21

## 2018-05-17 RX ORDER — SODIUM CHLORIDE 0.9 % (FLUSH) 0.9 %
5-10 SYRINGE (ML) INJECTION EVERY 8 HOURS
Status: DISCONTINUED | OUTPATIENT
Start: 2018-05-17 | End: 2018-05-22 | Stop reason: HOSPADM

## 2018-05-17 RX ORDER — LEVOFLOXACIN 5 MG/ML
750 INJECTION, SOLUTION INTRAVENOUS
Status: COMPLETED | OUTPATIENT
Start: 2018-05-17 | End: 2018-05-17

## 2018-05-17 RX ORDER — VANCOMYCIN 2 GRAM/500 ML IN 0.9 % SODIUM CHLORIDE INTRAVENOUS
2000 ONCE
Status: COMPLETED | OUTPATIENT
Start: 2018-05-17 | End: 2018-05-18

## 2018-05-17 RX ORDER — FENTANYL CITRATE 50 UG/ML
INJECTION, SOLUTION INTRAMUSCULAR; INTRAVENOUS AS NEEDED
Status: DISCONTINUED | OUTPATIENT
Start: 2018-05-17 | End: 2018-05-17 | Stop reason: HOSPADM

## 2018-05-17 RX ORDER — FENTANYL CITRATE 50 UG/ML
25 INJECTION, SOLUTION INTRAMUSCULAR; INTRAVENOUS
Status: COMPLETED | OUTPATIENT
Start: 2018-05-17 | End: 2018-05-17

## 2018-05-17 RX ORDER — DEXTROSE 50 % IN WATER (D50W) INTRAVENOUS SYRINGE
12.5-25 AS NEEDED
Status: DISCONTINUED | OUTPATIENT
Start: 2018-05-17 | End: 2018-05-22 | Stop reason: HOSPADM

## 2018-05-17 RX ORDER — FENTANYL CITRATE 50 UG/ML
25 INJECTION, SOLUTION INTRAMUSCULAR; INTRAVENOUS
Status: DISCONTINUED | OUTPATIENT
Start: 2018-05-17 | End: 2018-05-17

## 2018-05-17 RX ORDER — HYDROMORPHONE HYDROCHLORIDE 2 MG/ML
INJECTION, SOLUTION INTRAMUSCULAR; INTRAVENOUS; SUBCUTANEOUS AS NEEDED
Status: DISCONTINUED | OUTPATIENT
Start: 2018-05-17 | End: 2018-05-17 | Stop reason: HOSPADM

## 2018-05-17 RX ORDER — ACETAMINOPHEN 10 MG/ML
INJECTION, SOLUTION INTRAVENOUS
Status: DISPENSED
Start: 2018-05-17 | End: 2018-05-18

## 2018-05-17 RX ORDER — LIDOCAINE HYDROCHLORIDE 20 MG/ML
INJECTION, SOLUTION EPIDURAL; INFILTRATION; INTRACAUDAL; PERINEURAL AS NEEDED
Status: DISCONTINUED | OUTPATIENT
Start: 2018-05-17 | End: 2018-05-17 | Stop reason: HOSPADM

## 2018-05-17 RX ORDER — GLYCOPYRROLATE 0.2 MG/ML
INJECTION INTRAMUSCULAR; INTRAVENOUS AS NEEDED
Status: DISCONTINUED | OUTPATIENT
Start: 2018-05-17 | End: 2018-05-17 | Stop reason: HOSPADM

## 2018-05-17 RX ORDER — SODIUM CHLORIDE 0.9 % (FLUSH) 0.9 %
5-10 SYRINGE (ML) INJECTION AS NEEDED
Status: DISCONTINUED | OUTPATIENT
Start: 2018-05-17 | End: 2018-05-17 | Stop reason: HOSPADM

## 2018-05-17 RX ORDER — LIDOCAINE HYDROCHLORIDE 10 MG/ML
0.1 INJECTION, SOLUTION EPIDURAL; INFILTRATION; INTRACAUDAL; PERINEURAL AS NEEDED
Status: DISCONTINUED | OUTPATIENT
Start: 2018-05-17 | End: 2018-05-17 | Stop reason: HOSPADM

## 2018-05-17 RX ORDER — SUCCINYLCHOLINE CHLORIDE 20 MG/ML
INJECTION INTRAMUSCULAR; INTRAVENOUS AS NEEDED
Status: DISCONTINUED | OUTPATIENT
Start: 2018-05-17 | End: 2018-05-17 | Stop reason: HOSPADM

## 2018-05-17 RX ORDER — DIPHENHYDRAMINE HYDROCHLORIDE 50 MG/ML
12.5 INJECTION, SOLUTION INTRAMUSCULAR; INTRAVENOUS AS NEEDED
Status: ACTIVE | OUTPATIENT
Start: 2018-05-17 | End: 2018-05-17

## 2018-05-17 RX ORDER — PROCHLORPERAZINE EDISYLATE 5 MG/ML
INJECTION INTRAMUSCULAR; INTRAVENOUS
Status: DISPENSED
Start: 2018-05-17 | End: 2018-05-18

## 2018-05-17 RX ORDER — INSULIN LISPRO 100 [IU]/ML
10 INJECTION, SOLUTION INTRAVENOUS; SUBCUTANEOUS ONCE
Status: COMPLETED | OUTPATIENT
Start: 2018-05-17 | End: 2018-05-17

## 2018-05-17 RX ORDER — ACETAMINOPHEN 500 MG
1000-2500 TABLET ORAL
COMMUNITY

## 2018-05-17 RX ORDER — MAGNESIUM SULFATE 100 %
4 CRYSTALS MISCELLANEOUS AS NEEDED
Status: DISCONTINUED | OUTPATIENT
Start: 2018-05-17 | End: 2018-05-22 | Stop reason: HOSPADM

## 2018-05-17 RX ORDER — FENTANYL CITRATE 50 UG/ML
25 INJECTION, SOLUTION INTRAMUSCULAR; INTRAVENOUS
Status: DISCONTINUED | OUTPATIENT
Start: 2018-05-17 | End: 2018-05-18 | Stop reason: HOSPADM

## 2018-05-17 RX ORDER — ACETAMINOPHEN 10 MG/ML
INJECTION, SOLUTION INTRAVENOUS AS NEEDED
Status: DISCONTINUED | OUTPATIENT
Start: 2018-05-17 | End: 2018-05-17 | Stop reason: HOSPADM

## 2018-05-17 RX ORDER — SODIUM CHLORIDE, SODIUM LACTATE, POTASSIUM CHLORIDE, CALCIUM CHLORIDE 600; 310; 30; 20 MG/100ML; MG/100ML; MG/100ML; MG/100ML
25 INJECTION, SOLUTION INTRAVENOUS CONTINUOUS
Status: DISCONTINUED | OUTPATIENT
Start: 2018-05-17 | End: 2018-05-17 | Stop reason: HOSPADM

## 2018-05-17 RX ORDER — METRONIDAZOLE 500 MG/100ML
500 INJECTION, SOLUTION INTRAVENOUS EVERY 8 HOURS
Status: DISCONTINUED | OUTPATIENT
Start: 2018-05-17 | End: 2018-05-21

## 2018-05-17 RX ORDER — INSULIN LISPRO 100 [IU]/ML
INJECTION, SOLUTION INTRAVENOUS; SUBCUTANEOUS
Status: DISCONTINUED | OUTPATIENT
Start: 2018-05-18 | End: 2018-05-19

## 2018-05-17 RX ORDER — SODIUM CHLORIDE 0.9 % (FLUSH) 0.9 %
5-10 SYRINGE (ML) INJECTION AS NEEDED
Status: DISCONTINUED | OUTPATIENT
Start: 2018-05-17 | End: 2018-05-18 | Stop reason: HOSPADM

## 2018-05-17 RX ORDER — NEOSTIGMINE METHYLSULFATE 1 MG/ML
INJECTION INTRAVENOUS AS NEEDED
Status: DISCONTINUED | OUTPATIENT
Start: 2018-05-17 | End: 2018-05-17 | Stop reason: HOSPADM

## 2018-05-17 RX ORDER — SODIUM CHLORIDE 0.9 % (FLUSH) 0.9 %
10 SYRINGE (ML) INJECTION
Status: COMPLETED | OUTPATIENT
Start: 2018-05-17 | End: 2018-05-17

## 2018-05-17 RX ORDER — LEVOFLOXACIN 5 MG/ML
750 INJECTION, SOLUTION INTRAVENOUS EVERY 24 HOURS
Status: DISCONTINUED | OUTPATIENT
Start: 2018-05-18 | End: 2018-05-17 | Stop reason: SDUPTHER

## 2018-05-17 RX ORDER — INSULIN GLARGINE 100 [IU]/ML
65 INJECTION, SOLUTION SUBCUTANEOUS
Status: ON HOLD | COMMUNITY
End: 2018-07-09 | Stop reason: SDUPTHER

## 2018-05-17 RX ORDER — HYDROMORPHONE HYDROCHLORIDE 1 MG/ML
0.2 INJECTION, SOLUTION INTRAMUSCULAR; INTRAVENOUS; SUBCUTANEOUS
Status: DISCONTINUED | OUTPATIENT
Start: 2018-05-17 | End: 2018-05-18 | Stop reason: HOSPADM

## 2018-05-17 RX ORDER — PROPOFOL 10 MG/ML
INJECTION, EMULSION INTRAVENOUS AS NEEDED
Status: DISCONTINUED | OUTPATIENT
Start: 2018-05-17 | End: 2018-05-17 | Stop reason: HOSPADM

## 2018-05-17 RX ORDER — INSULIN GLARGINE 100 [IU]/ML
65 INJECTION, SOLUTION SUBCUTANEOUS DAILY
Status: DISCONTINUED | OUTPATIENT
Start: 2018-05-18 | End: 2018-05-19

## 2018-05-17 RX ORDER — METRONIDAZOLE 500 MG/100ML
500 INJECTION, SOLUTION INTRAVENOUS EVERY 6 HOURS
Status: DISCONTINUED | OUTPATIENT
Start: 2018-05-18 | End: 2018-05-17 | Stop reason: SDUPTHER

## 2018-05-17 RX ADMIN — NEOSTIGMINE METHYLSULFATE 3 MG: 1 INJECTION INTRAVENOUS at 21:42

## 2018-05-17 RX ADMIN — ROCURONIUM BROMIDE 10 MG: 10 INJECTION, SOLUTION INTRAVENOUS at 21:27

## 2018-05-17 RX ADMIN — ACETAMINOPHEN 1000 MG: 10 INJECTION, SOLUTION INTRAVENOUS at 22:00

## 2018-05-17 RX ADMIN — VANCOMYCIN HYDROCHLORIDE 2000 MG: 10 INJECTION, POWDER, LYOPHILIZED, FOR SOLUTION INTRAVENOUS at 22:29

## 2018-05-17 RX ADMIN — Medication 10 ML: at 17:34

## 2018-05-17 RX ADMIN — ROCURONIUM BROMIDE 10 MG: 10 INJECTION, SOLUTION INTRAVENOUS at 20:59

## 2018-05-17 RX ADMIN — SODIUM CHLORIDE 1000 ML: 900 INJECTION, SOLUTION INTRAVENOUS at 16:43

## 2018-05-17 RX ADMIN — INSULIN LISPRO 10 UNITS: 100 INJECTION, SOLUTION INTRAVENOUS; SUBCUTANEOUS at 19:44

## 2018-05-17 RX ADMIN — ROCURONIUM BROMIDE 20 MG: 10 INJECTION, SOLUTION INTRAVENOUS at 21:05

## 2018-05-17 RX ADMIN — FENTANYL CITRATE 25 MCG: 50 INJECTION, SOLUTION INTRAMUSCULAR; INTRAVENOUS at 22:22

## 2018-05-17 RX ADMIN — Medication 10 ML: at 23:01

## 2018-05-17 RX ADMIN — PROPOFOL 200 MG: 10 INJECTION, EMULSION INTRAVENOUS at 20:59

## 2018-05-17 RX ADMIN — LIDOCAINE HYDROCHLORIDE 100 MG: 20 INJECTION, SOLUTION EPIDURAL; INFILTRATION; INTRACAUDAL; PERINEURAL at 20:59

## 2018-05-17 RX ADMIN — SODIUM CHLORIDE 50 ML/HR: 900 INJECTION, SOLUTION INTRAVENOUS at 17:34

## 2018-05-17 RX ADMIN — FENTANYL CITRATE 50 MCG: 50 INJECTION, SOLUTION INTRAMUSCULAR; INTRAVENOUS at 20:57

## 2018-05-17 RX ADMIN — GLYCOPYRROLATE 0.5 MG: 0.2 INJECTION INTRAMUSCULAR; INTRAVENOUS at 21:42

## 2018-05-17 RX ADMIN — Medication 10 ML: at 19:08

## 2018-05-17 RX ADMIN — FENTANYL CITRATE 50 MCG: 50 INJECTION, SOLUTION INTRAMUSCULAR; INTRAVENOUS at 20:59

## 2018-05-17 RX ADMIN — PROCHLORPERAZINE EDISYLATE 5 MG: 5 INJECTION INTRAMUSCULAR; INTRAVENOUS at 23:02

## 2018-05-17 RX ADMIN — FENTANYL CITRATE 25 MCG: 50 INJECTION, SOLUTION INTRAMUSCULAR; INTRAVENOUS at 19:06

## 2018-05-17 RX ADMIN — LEVOFLOXACIN 750 MG: 5 INJECTION, SOLUTION INTRAVENOUS at 19:15

## 2018-05-17 RX ADMIN — ONDANSETRON 4 MG: 2 INJECTION INTRAMUSCULAR; INTRAVENOUS at 21:13

## 2018-05-17 RX ADMIN — MIDAZOLAM HYDROCHLORIDE 2 MG: 1 INJECTION, SOLUTION INTRAMUSCULAR; INTRAVENOUS at 20:55

## 2018-05-17 RX ADMIN — SODIUM CHLORIDE, SODIUM LACTATE, POTASSIUM CHLORIDE, CALCIUM CHLORIDE: 600; 310; 30; 20 INJECTION, SOLUTION INTRAVENOUS at 20:55

## 2018-05-17 RX ADMIN — INSULIN HUMAN 6 UNITS: 100 INJECTION, SOLUTION PARENTERAL at 16:43

## 2018-05-17 RX ADMIN — SUCCINYLCHOLINE CHLORIDE 140 MG: 20 INJECTION INTRAMUSCULAR; INTRAVENOUS at 20:59

## 2018-05-17 RX ADMIN — METRONIDAZOLE 500 MG: 500 INJECTION, SOLUTION INTRAVENOUS at 21:15

## 2018-05-17 RX ADMIN — FENTANYL CITRATE 25 MCG: 50 INJECTION, SOLUTION INTRAMUSCULAR; INTRAVENOUS at 22:26

## 2018-05-17 RX ADMIN — IOPAMIDOL 100 ML: 755 INJECTION, SOLUTION INTRAVENOUS at 17:34

## 2018-05-17 RX ADMIN — HYDROMORPHONE HYDROCHLORIDE 2 MG: 2 INJECTION, SOLUTION INTRAMUSCULAR; INTRAVENOUS; SUBCUTANEOUS at 21:15

## 2018-05-17 NOTE — IP AVS SNAPSHOT
[address] [facility] 
[phone] Patient: Chuy Valle MRN: [id number] [facility]:[birth year] About your hospitalization You were admitted on:  May 17, 2018 You last received care in the:  [facility] 2 GENERAL SURGERY You were discharged on:  May 22, 2018 Why you were hospitalized Your primary diagnosis was:  Not on File Your diagnoses also included:  Vulvar Abscess Follow-up Information Follow up With Details Comments Contact [address] Nath On 5/21/2018 APPOINTMENT TIME: 8:45AM [address] [facility] [address]   [address] [address] 
[phone] Your Scheduled Appointments Friday May 25, 2018  8:45 AM EDT  
WOUND CARE NEW PATIENT with Sharon Garcia MD, [facility] WOUND CARE 3  
[facility] OP WOUND CARE ([address]) [id number] [address] [address]  
[phone] Discharge Orders None A check jenise indicates which time of day the medication should be taken. My Medications START taking these medications Instructions Each Dose to Equal  
 Morning Noon Evening Bedtime  
 fluconazole 150 mg tablet Commonly known as:  DIFLUCAN Your last dose was: Your next dose is: Take 1 Tab by mouth daily for 1 day. Repeat in 3 days prn itching. Indications: PREVENTION OF VULVOVAGINAL CANDIDIASIS  
 150 mg HYDROmorphone 2 mg tablet Commonly known as:  DILAUDID Your last dose was: Your next dose is: Take 0.5 Tabs by mouth every four (4) hours as needed. Max Daily Amount: 6 mg.  
 1 mg  
    
   
   
   
  
 linezolid 600 mg tablet Commonly known as:  Oma Hummer Your last dose was: Your next dose is: Take 1 Tab by mouth every twelve (12) hours for 9 days. 600 mg  
    
   
   
   
  
 promethazine 25 mg tablet Commonly known as:  PHENERGAN Your last dose was: Your next dose is: Take 1 Tab by mouth every eight (8) hours as needed. Indications: PREVENTION OF POST-OPERATIVE NAUSEA AND VOMITING  
 25 mg CHANGE how you take these medications Instructions Each Dose to Equal  
 Morning Noon Evening Bedtime  
 ergocalciferol 50,000 unit capsule Commonly known as:  VITAMIN D2 What changed:  additional instructions Your last dose was: Your next dose is: Take 1 Cap by mouth every seven (7) days. [address] Units LANTUS U-100 INSULIN 100 unit/mL injection Generic drug:  insulin glargine What changed:  Another medication with the same name was removed. Continue taking this medication, and follow the directions you see here. Your last dose was: Your next dose is:    
   
   
 65 Units by SubCUTAneous route nightly. 65 Units CONTINUE taking these medications Instructions Each Dose to Equal  
 Morning Noon Evening Bedtime  
 acetaminophen 500 mg tablet Commonly known as:  TYLENOL Your last dose was: Your next dose is: Take 1,000-2,500 mg by mouth every six (6) hours as needed for Pain. 9007-8434 mg  
    
   
   
   
  
 atorvastatin 40 mg tablet Commonly known as:  LIPITOR Your last dose was: Your next dose is: Take 1 Tab by mouth nightly. 40 mg  
    
   
   
   
  
 fenofibrate nanocrystallized 145 mg tablet Commonly known as:  [employer] Your last dose was: Your next dose is: Take 1 Tab by mouth daily. 145 mg  
    
   
   
   
  
 gabapentin 300 mg capsule Commonly known as:  NEURONTIN Your last dose was: Your next dose is: Take 2 Caps by mouth three (3) times daily. 600 mg HumuLIN R Regular U-100 Insuln 100 unit/mL injection Generic drug:  insulin regular Your last dose was: Your next dose is:    
   
   
 12 Units by SubCUTAneous route Before breakfast, lunch, and dinner. Plus sliding scale 12 Units  
    
   
   
   
  
 metFORMIN 1,000 mg tablet Commonly known as:  GLUCOPHAGE Your last dose was: Your next dose is: Take 1 Tab by mouth two (2) times daily (with meals). 1000 mg  
    
   
   
   
  
 omega-3 acid ethyl esters 1 gram capsule Commonly known as:  Errol Islass Your last dose was: Your next dose is: Take 2 Caps by mouth two (2) times a day. 2 g PROAIR HFA 90 mcg/actuation inhaler Generic drug:  albuterol Your last dose was: Your next dose is: Take 2 Puffs by inhalation every four (4) hours as needed for Wheezing. 2 Puff Where to Get Your Medications Information on where to get these meds will be given to you by the nurse or doctor. ! Ask your nurse or doctor about these medications  
  fluconazole 150 mg tablet HYDROmorphone 2 mg tablet  
 linezolid 600 mg tablet  
 promethazine 25 mg tablet Opioid Education Prescription Opioids: What You Need to Know: 
 
Prescription opioids can be used to help relieve moderate-to-severe pain and are often prescribed following a surgery or injury, or for certain health conditions. These medications can be an important part of treatment but also come with serious risks. Opioids are strong pain medicines. Examples include hydrocodone, oxycodone, fentanyl, and morphine. Heroin is an example of an illegal opioid. It is important to work with your health care provider to make sure you are getting the safest, most effective care. WHAT ARE THE RISKS AND SIDE EFFECTS OF OPIOID USE?  
Prescription opioids carry serious risks of addiction and overdose, especially with prolonged use. An opioid overdose, often marked by slow breathing, can cause sudden death. The use of prescription opioids can have a number of side effects as well, even when taken as directed. · Tolerance-meaning you might need to take more of a medication for the same pain relief · Physical dependence-meaning you have symptoms of withdrawal when the medication is stopped. Withdrawal symptoms can include nausea, sweating, chills, diarrhea, stomach cramps, and muscle aches. Withdrawal can last up to several weeks, depending on which drug you took and how long you took it. · Increased sensitivity to pain · Constipation · Nausea, vomiting, and dry mouth · Sleepiness and dizziness · Confusion · Depression · Low levels of testosterone that can result in lower sex drive, energy, and strength · Itching and sweating RISKS ARE GREATER WITH:      
· History of drug misuse, substance use disorder, or overdose · Mental health conditions (such as depression or anxiety) · Sleep apnea · Older age (72 years or older) · Pregnancy Avoid alcohol while taking prescription opioids. Also, unless specifically advised by your health care provider, medications to avoid include: · Benzodiazepines (such as Xanax or Valium) · Muscle relaxants (such as Soma or Flexeril) · Hypnotics (such as Ambien or Lunesta) · Other prescription opioids KNOW YOUR OPTIONS Talk to your health care provider about ways to manage your pain that don't involve prescription opioids. Some of these options may actually work better and have fewer risks and side effects. Options may include: 
· Pain relievers such as acetaminophen, ibuprofen, and naproxen · Some medications that are also used for depression or seizures · Physical therapy and exercise · Counseling to help patients learn how to cope better with triggers of pain and stress. · Application of heat or cold compress · Massage therapy · Relaxation techniques Be Informed Make sure you know the name of your medication, how much and how often to take it, and its potential risks & side effects. IF YOU ARE PRESCRIBED OPIOIDS FOR PAIN: 
· Never take opioids in greater amounts or more often than prescribed. Remember the goal is not to be pain-free but to manage your pain at a tolerable level. · Follow up with your primary care provider to: · Work together to create a plan on how to manage your pain. · Talk about ways to help manage your pain that don't involve prescription opioids. · Talk about any and all concerns and side effects. · Help prevent misuse and abuse. · Never sell or share prescription opioids · Help prevent misuse and abuse. · Store prescription opioids in a secure place and out of reach of others (this may include visitors, children, friends, and family). · Safely dispose of unused/unwanted prescription opioids: Find your community drug take-back program or your pharmacy mail-back program, or flush them down the toilet, following guidance from the Food and Drug Administration (www.fda.gov/Drugs/ResourcesForYou). · Visit www.cdc.gov/drugoverdose to learn about the risks of opioid abuse and overdose. · If you believe you may be struggling with addiction, tell your health care provider and ask for guidance or call [employer] at 0-602-917-IQEW. Discharge Instructions Learning About High Blood Sugar What is high blood sugar? Your body turns the food you eat into glucose (sugar), which it uses for energy. But if your body isn't able to use the sugar right away, it can build up in your blood and lead to high blood sugar. When the amount of sugar in your blood stays too high for too much of the time, you may have diabetes. Diabetes is a disease that can cause serious health problems. The good news is that lifestyle changes may help you get your blood sugar back to normal and avoid or delay diabetes. What causes high blood sugar? Sugar (glucose) can build up in your blood if you: · Are overweight. · Have a family history of diabetes. · Take certain medicines, such as steroids. What are the symptoms? Having high blood sugar may not cause any symptoms at all. Or it may make you feel very thirsty or very hungry. You may also urinate more often than usual, have blurry vision, or lose weight without trying. How is high blood sugar treated? You can take steps to lower your blood sugar level if you understand what makes it get higher. Your doctor may want you to learn how to test your blood sugar level at home. Then you can see how illness, stress, or different kinds of food or medicine raise or lower your blood sugar level. Other tests may be needed to see if you have diabetes. How can you prevent high blood sugar? · Watch your weight. If you're overweight, losing just a small amount of weight may help. Reducing fat around your waist is most important. · Limit the amount of calories, sweets, and unhealthy fat you eat. Ask your doctor if a dietitian can help you. A registered dietitian can help you create meal plans that fit your lifestyle. · Get at least 30 minutes of exercise on most days of the week. Exercise helps control your blood sugar. It also helps you maintain a healthy weight. Walking is a good choice. You also may want to do other activities, such as running, swimming, cycling, or playing tennis or team sports. · If your doctor prescribed medicines, take them exactly as prescribed. Call your doctor if you think you are having a problem with your medicine. You will get more details on the specific medicines your doctor prescribes. Follow-up care is a key part of your treatment and safety.  Be sure to make and go to all appointments, and call your doctor if you are having problems. It's also a good idea to know your test results and keep a list of the medicines you take. Where can you learn more? Go to http://ish-irish.info/. Enter O108 in the search box to learn more about \"Learning About High Blood Sugar. \" Current as of: March 13, 2017 Content Version: 11.4 © 2099-1134 [employer]. Care instructions adapted under license by [employer] (which disclaims liability or warranty for this information). If you have questions about a medical condition or this instruction, always ask your healthcare professional. [place] [address] any warranty or liability for your use of this information. Zones Announcement We are excited to announce that we are making your provider's discharge notes available to you in Zones. You will see these notes when they are completed and signed by the physician that discharged you from your recent hospital stay. If you have any questions or concerns about any information you see in Zones, please call the Health Information Department where you were seen or reach out to your Primary Care Provider for more information about your plan of care. Introducing [facility]! Dear Camilo Porras: Thank you for requesting a Zones account. Our records indicate that you already have an active Zones account. You can access your account anytime at [email or web address] Did you know that you can access your hospital and ER discharge instructions at any time in Zones? You can also review all of your test results from your hospital stay or ER visit. Additional Information If you have questions, please visit the Frequently Asked Questions section of the Zones website at [email or web address] Remember, Zones is NOT to be used for urgent needs. For medical emergencies, dial 911. Now available from your iPhone and Android! Introducing Alex Moreno As a [facility] patient, I wanted to make you aware of our electronic visit tool called Alex Moreno. [facility] 24/7 allows you to connect within minutes with a medical provider 24 hours a day, seven days a week via a mobile device or tablet or logging into a secure website from your computer. You can access Alex Moreno from anywhere in the [place]. A virtual visit might be right for you when you have a simple condition and feel like you just dont want to get out of bed, or cant get away from work for an appointment, when your regular [facility] provider is not available (evenings, weekends or holidays), or when youre out of town and need minor care. Electronic visits cost only $49 and if the [facility] 24/7 provider determines a prescription is needed to treat your condition, one can be electronically transmitted to a nearby pharmacy*. Please take a moment to enroll today if you have not already done so. The enrollment process is free and takes just a few minutes. To enroll, please download the [employer] [facility] 24/7 dominik to your tablet or phone, or visit [email or web address] to enroll on your computer. And, as an [address] [place] [address] patient with a [employer] account, the results of your visits will be scanned into your electronic medical record and your primary care provider will be able to view the scanned results. We urge you to continue to see your regular [facility] provider for your ongoing medical care. And while your primary care provider may not be the one available when you seek a Alex Moreno virtual visit, the peace of mind you get from getting a real diagnosis real time can be priceless. For more information on Alex Moreno, view our Frequently Asked Questions (FAQs) at [email or web address]. Sincerely, 
 
Beth Nickerson MD 
Chief Medical Officer Hilda[address] Rey *:  certain medications cannot be prescribed via Alex Moreno Unresulted Labs-Please follow up with your PCP about these lab tests Order Current Status CULTURE, BLOOD Preliminary result CULTURE, BLOOD Preliminary result Providers Seen During Your Hospitalization Provider Specialty Primary office phone April Idania Gonzales MD Emergency Medicine [phone] Figueroa Ortiz MD Internal Medicine [phone] Radha Dillard MD Obstetrics & Gynecology [phone] Your Primary Care Physician (PCP) Primary Care Physician Office Phone Office Fax NONE ** None ** ** None ** You are allergic to the following Allergen Reactions Toradol (Ketorolac Tromethamine) Unknown (comments) Seizure like symptoms per pt. Augmentin (Amoxicillin-Pot Clavulanate) Swelling Demerol (Meperidine) Swelling Heparin Hives Ibuprofen Diarrhea Nausea and Vomiting Muscle tightness Keflex (Cephalexin) Shortness of Breath Morphine Swelling Nubain (Nalbuphine) Hives Pcn (Penicillins) Swelling Sulfa (Sulfonamide Antibiotics) Unknown (comments) Recent Documentation Height Weight Breastfeeding? BMI OB Status Smoking Status 1.727 m 120 kg No 40.22 kg/m2 Having regular periods Current Some Day Smoker Emergency Contacts Name Discharge Info Relation Home Work Mobile Angeles Palma N/A  AT THIS TIME [6] Other Relative [6] [phone] Kang Palma N/A  AT THIS TIME [6] Boyfriend [17] [phone] Patient Belongings The following personal items are in your possession at time of discharge: 
  Dental Appliances: None  Visual Aid: None Please provide this summary of care documentation to your next provider. Signatures-by signing, you are acknowledging that this After Visit Summary has been reviewed with you and you have received a copy. Patient Signature:  ____________________________________________________________ Date:  ____________________________________________________________  
  
Cecil Snipe Provider Signature:  ____________________________________________________________ Date:  ____________________________________________________________

## 2018-05-17 NOTE — H&P
Gynecology History and Physical    Name: Chloe Alas MRN: [id number] SSN: [id number]    YOB: 1980  Age: 40 y.o. Sex: female       Subjective:      Chief complaint:  Right vulvar/Mons abscess    Tahir Li is a 40 y.o.  female with a history of Left vulvar abscess and cellulitis with poorly controlled DM for which she had a large 15 cm I&D of her left labia majora on ,debridement on , was on IV Ab of aztreonam,flagyl, and vancomycin, and discharged on 5/15 from a  [facility]. She has had subjective fevers,sweats, nausea, and emesis x 3 days. She has noticed swelling and pain on her right Mons for several days. PMH is significant for obesity,H/O MI,CHTN, and poorly controled DM(Hgb A1c was 12 at [place] admission). She is admitted for Procedure(s) (LRB):  INCISION AND DRAINAGE VULVA ABSCESS (N/A). The current method of family planning is tubal ligation. OB History      Para Term  AB Living    0         SAB TAB Ectopic Molar Multiple Live Births                 Obstetric Comments    Menarche: 15   LMP:11/15/2017  # of Children: 2. Age at Delivery of First Child 35. Hysterectomy/oophorectomy:  No/no. Breast Bx: no Hx of Breast Feeding: no  BCP: tubal ligation. Hormone therapy:none.          Past Medical History:   Diagnosis Date    Calculus of kidney     Diabetes ([address]     Hyperlipidemia     Hypertension     MI (myocardial infarction) ([facility] [place] [address]     Other ill-defined conditions(799.89)     Neuropathies    Pancreatitis     due to hypertriglyceridemia    Vitamin D deficiency 2017     Past Surgical History:   Procedure Laterality Date    CARDIAC SURG PROCEDURE UNLIST      cath    CYSTOSCOPY      HX  SECTION      x 2    HX CHOLECYSTECTOMY      HX GYN      tubes clamped    HX TONSIL AND ADENOIDECTOMY      HX WISDOM TEETH EXTRACTION       Social History     Occupational History    Unemployed      Social History Main Topics    Smoking status: Current Some Day Smoker     Packs/day: 0.50    Smokeless tobacco: Never Used    Alcohol use No      Comment: once per year    Drug use: No    Sexual activity: Yes     Partners: Male     Family History   Problem Relation Age of Onset    Hypertension Mother     Stroke Mother     Diabetes Mother     Cancer Mother 59     breast?    Heart Disease Father     Diabetes Father     Cancer Paternal Grandmother 79     ovarian        Allergies   Allergen Reactions    Toradol [Ketorolac Tromethamine] Unknown (comments)     Seizure like symptoms per pt.  Augmentin [Amoxicillin-Pot Clavulanate] Swelling    Demerol [Meperidine] Swelling    Heparin Hives    Ibuprofen Diarrhea and Nausea and Vomiting     Muscle tightness    Keflex [Cephalexin] Shortness of Breath    Morphine Swelling    Nubain [Nalbuphine] Hives    Pcn [Penicillins] Swelling    Sulfa (Sulfonamide Antibiotics) Unknown (comments)     Prior to Admission medications    Medication Sig Start Date End Date Taking? Authorizing Provider   atorvastatin (LIPITOR) 40 mg tablet Take 1 Tab by mouth nightly. 11/30/17   Diane Tran MD   fenofibrate nanocrystallized (TRICOR) 145 mg tablet Take 1 Tab by mouth daily. 11/30/17   Diane Tran MD   omega-3 acid ethyl esters (LOVAZA) 1 gram capsule Take 2 Caps by mouth two (2) times a day. 11/30/17   Diane Tran MD   metFORMIN (GLUCOPHAGE) 1,000 mg tablet Take 1 Tab by mouth two (2) times daily (with meals). 11/30/17   Diane Tran MD   ergocalciferol (VITAMIN D2) 50,000 unit capsule Take 1 Cap by mouth every seven (7) days. 11/30/17   Diane Tran MD   gabapentin (NEURONTIN) 300 mg capsule Take 2 Caps by mouth three (3) times daily. 11/30/17   Diane Tran MD   albuterol (PROAIR HFA) 90 mcg/actuation inhaler Take 2 Puffs by inhalation every four (4) hours as needed for Wheezing.     Historical Provider        Review of Systems:  Constitutional: positive for fevers and sweats, negative for chills and fatigue  Respiratory: negative for cough, sputum, hemoptysis, wheezing or dyspnea on exertion  Cardiovascular: negative for chest pain, chest pressure/discomfort, dyspnea, palpitations, irregular heart beats, near-syncope  Gastrointestinal: positive for nausea and vomiting, negative for constipation and abdominal pain  Genitourinary:negative for frequency, dysuria, nocturia and hematuria  Musculoskeletal:negative for myalgias, arthralgias, neck pain and back pain  Neurological: negative for headaches and dizziness     Objective:     Vitals:    05/17/18 1240 05/17/18 1803 05/17/18 1814   BP: 157/80 139/84    Pulse: 95 92    Resp: 21 14    Temp: 98.2 °F (36.8 °C) 100.2 °F (37.9 °C)    SpO2: 100% 100%    Weight: 120 kg (264 lb 8.8 oz)     Height:   5' 8\" (1.727 m)       Physical Exam:  Patient without distress. Heart: Regular rate and rhythm, S1S2 present or without murmur or extra heart sounds  Lung: clear to auscultation throughout lung fields, no wheezes, no rales, no rhonchi and normal respiratory effort  Back: costovertebral angle tenderness absent  Abdomen: soft, nontender, nondistended, without guarding, without rebound, no hepatosplenomegaly  External Genitalia: 7 cm right vulvar/mons abscess--very tender, nothing distinctly palpated on left. Open 15 cm incision on left labia minora w/o erythema  Urinary system: urethral meatus normal and on menses  Vagina: normal mucosa without prolapse or lesions and palpably normal  Ext: NT calves, no edema    Assessment:     Active Problems:    * No active hospital problems.  *     Right Vulvar/Mons abscess with subjective fevers,slight elevation in WBC, and lactic acid of 1.9  Multiple Medical problems    Plan:     Procedure(s) (LRB):  INCISION AND DRAINAGE VULVA ABSCESS (N/A)  Discussed the risks of surgery including the risks of bleeding, infection, deep vein thrombosis, and surgical injuries to internal organs including but not limited to the bowels, bladder, rectum, and female reproductive organs. The patient understands the risks; any and all questions were answered to the patient's satisfaction. Internal Medicine Hospitalist consult and continued management. IV Ab.  EKG as per ED MD.    Signed By:  Travon Alcala MD     May 17, 2018

## 2018-05-17 NOTE — ED NOTES
Consent witnessed and signed. Pain medicine and levoquin started. Flagyl sent with patient to the OR. Insulin given as ordered. Unable to find family prior to the OR. Valuable sent to the OR including phone and , clothes and wallet.

## 2018-05-17 NOTE — PROGRESS NOTES
Pharmacy Clarification of Prior to Admission Medication Regimen     The patient was interviewed regarding clarification of the prior to admission medication regimen and was questioned regarding use of any other inhalers, topical products, over the counter medications, herbal medications, vitamin products or ophthalmic/nasal/otic medication use. Information Obtained From: Patient    Pertinent Pharmacy Findings: NONE    PTA medication list was corrected to the following:     Prior to Admission Medications   Prescriptions Last Dose Informant Patient Reported? Taking?   acetaminophen (TYLENOL) 500 mg tablet 2018 at 1030 Self Yes Yes   Sig: Take 1,000-2,500 mg by mouth every six (6) hours as needed for Pain. albuterol (PROAIR HFA) 90 mcg/actuation inhaler 5/10/2018 at Unknown time Self Yes Yes   Sig: Take 2 Puffs by inhalation every four (4) hours as needed for Wheezing. atorvastatin (LIPITOR) 40 mg tablet 2018 at Unknown time Self No Yes   Sig: Take 1 Tab by mouth nightly.   ergocalciferol (VITAMIN D2) 50,000 unit capsule 2018 at Unknown time Self No Yes   Sig: Take 1 Cap by mouth every seven (7) days. Patient taking differently: Take 50,000 Units by mouth every seven (7) days.    fenofibrate nanocrystallized (TRICOR) 145 mg tablet 2018 at Unknown time Self No Yes   Sig: Take 1 Tab by mouth daily. gabapentin (NEURONTIN) 300 mg capsule 2018 at Unknown time Self No Yes   Sig: Take 2 Caps by mouth three (3) times daily. insulin glargine (LANTUS U-100 INSULIN) 100 unit/mL injection 2018 at Unknown time Self Yes Yes   Si Units by SubCUTAneous route nightly. insulin regular (HUMULIN R REGULAR U-100 INSULN) 100 unit/mL injection 2018 at Unknown time Self Yes Yes   Si Units by SubCUTAneous route Before breakfast, lunch, and dinner.  Plus sliding scale   metFORMIN (GLUCOPHAGE) 1,000 mg tablet 2018 at Unknown time Self No Yes   Sig: Take 1 Tab by mouth two (2) times daily (with meals). omega-3 acid ethyl esters (LOVAZA) 1 gram capsule 4/17/2018 at Unknown time Self No Yes   Sig: Take 2 Caps by mouth two (2) times a day.       Facility-Administered Medications: None          Thank you,  Rosalie Lobato CPhT  Medication History Pharmacy Technician

## 2018-05-17 NOTE — PROGRESS NOTES
Pharmacy Automatic Renal Dosing Protocol - Antimicrobials    Indication for Antimicrobials: Abscesses, hx of abscesses    Current Regimen of Each Antimicrobial:  Vancomycin (Start Date ; Day # 1)  Levofloxacin 750mg IV q24h; start ; Day #1  Metronidazole 500mg IV q8h; start ; Day #1    Previous Antimicrobial Therapy:        Goal Level: VANCOMYCIN TROUGH GOAL RANGE    Vancomycin Trough: 15 - 20 mcg/mL    Date Dose & Interval Measured (mcg/mL) Extrapolated (mcg/mL)                       Significant Cultures:   : Blood = (pending)    Radiology / Imaging results: (X-ray, CT scan or MRI):   : CT Abd: Acute right mons 7 cm abscess, Acute left mons 2 cm abscess. : CXR: No evidence of PNA    Paralysis, amputations, malnutrition:     Labs:  Recent Labs      18   1401   CREA  1.06*   BUN  30*   WBC  12.6*     Temp (24hrs), Av.2 °F (37.3 °C), Min:98.2 °F (36.8 °C), Max:100.2 °F (37.9 °C)    Creatinine Clearance (mL/min) or Dialysis: 73 ml/min    Impression/Plan:   · CT Abd w/ abscesses  · Vancomycin 3gm IV x1, then 1.5gm IV q12h for Trough Range 15-20  · Levofloxacin and Metronidazole are dosed appropriately  · Daily BMP  · Antimicrobial stop date TBD     Pharmacy will follow daily and adjust medications as appropriate for renal function and/or serum levels. Thank you,  Jackelyn Bautista [facility]    Recommended duration of therapy  http://[email or web address]/[place]/[facility]/Pharmacy/Clinical%20Companion/Duration%20of%20ABX%20therapy. docx    Renal Dosing  [email or web address]/[place]/[facility]/Pharmacy/Clinical%20Companion/Renal%[id number]. pdf

## 2018-05-17 NOTE — CONSULTS
Hospitalist Consultation Note    NAME:  Ilya Díaz   :   1980   MRN:   [id number]     ATTENDING: No admitting provider for patient encounter. PCP:  None    Date/Time:  2018 6:59 PM      Recommendations/Plan:     Pre-op cardiac risk assessment:  Pt evaluated using revised cardiac risk index and is felt to be (low) cardiovascular risk for (Low) risk surgery with a (0.4, ) risk for major complications based on these criteria. Problem List:    MONS  Abcesses, POA  SIRS, ( Temp of 100.2 abd Leukocytosis)  IDDM  Morbid Obesity  Hyperlipedemia, not taking meds  Hx of HTN, no longer on Lisinopril  Homelessness    PLAN    Patient is for I& D to OR tonight  Continue Lantus at 65 units starting in Am and cover with Lispro SSI  Follow BS q 6 hours  Check A1c to see how controlled BS is. This may be contributing todifficulty of treatment of infection  Blood cultures x 2  Empiric antibiotic with Vanco, Flagyl, Levoquin ( PCN allergy)  Pending C/S  Watch and trend BP  Prior to initiating meds  Patient has MEDICAID to cover meds. Will consult CM towards end of hospital stay for any future needs. Lost apt while in hospital in [place] so currently living with [facility]        Dr. Helena Bravo will be consulting Hospitalist for Medical Issues. JOHNNIE Palma    Code Status:FUll Code  DVT Prophylaxis:  Lovenox post OP/ Per surgeon          Subjective:   REQUESTING PHYSICIAN: Dr. Blanca Baird:     Pre-op clearance and Medical Manangement in the hospital  Shi Combs is a 40 y.o.  female who I was asked to see for above. Patient is long term IDDM who developed a Mons Pubis abcess   And admitted to a [facility] about 1 month ago. She states that the abcess accured after she shaved  Her mons.   She stayed in the hospital for almost 3 weeks, had 2 Incision and   Drainage surgeries, completed a total of 2 weeks of IV antibiotics one of which was Vanco ( but  Was never told she had MRSA no contact isolation) and ? A 2nd agent. She also had a WOund Vac  That  Was planned for her to be discharged with however, she lost her residence while in the hospital . She came to  Her Mother inlaw's house to recuperate here in [place] after discharge last Friday. She states that since her discharge, her BS has been well controlled in the 130's to 150;s.    1 day POA, she started feeling weak and having fevers today with worsening tenderness over Mons Pubils. She presented to the ED where a CT scan Showed 2 new acute abcesses. She is about to go to the OR for I&D.    ROS; denies and CP, no VIRK, no PND, . Review of PMX shows Hx of MI.per patient . REVIEWED CARDIOLOGY NOTE KT LECHUGA.   CATH in [facility] in  shows no  CAD              Dobutamine stress ECHO done at [facility]  In  , NORMAL, EF is 65%    EKG shows no Old Q's   NSR no ST T changes noted            Past Medical History:   Diagnosis Date    Calculus of kidney     Diabetes ([address]     Hyperlipidemia     Hypertension     MI [facility] infarction) [address] [address]     Other ill-defined conditions(799.89)     Neuropathies    Pancreatitis     due to hypertriglyceridemia    Vitamin D deficiency 2017      Past Surgical History:   Procedure Laterality Date    CARDIAC SURG PROCEDURE UNLIST      cath    CYSTOSCOPY      HX  SECTION      x 2    HX CHOLECYSTECTOMY      HX GYN      tubes clamped    HX TONSIL AND ADENOIDECTOMY      HX WISDOM TEETH EXTRACTION       Social History   Substance Use Topics    Smoking status: Current Some Day Smoker     Packs/day: 0.50    Smokeless tobacco: Never Used    Alcohol use No      Comment: once per year      Family History   Problem Relation Age of Onset    Hypertension Mother     Stroke Mother     Diabetes Mother     Cancer Mother 59     breast?    Heart Disease Father     Diabetes Father     Cancer Paternal Grandmother 79     ovarian Allergies   Allergen Reactions    Toradol [Ketorolac Tromethamine] Unknown (comments)     Seizure like symptoms per pt.  Augmentin [Amoxicillin-Pot Clavulanate] Swelling    Demerol [Meperidine] Swelling    Heparin Hives    Ibuprofen Diarrhea and Nausea and Vomiting     Muscle tightness    Keflex [Cephalexin] Shortness of Breath    Morphine Swelling    Nubain [Nalbuphine] Hives    Pcn [Penicillins] Swelling    Sulfa (Sulfonamide Antibiotics) Unknown (comments)      Prior to Admission medications    Medication Sig Start Date End Date Taking? Authorizing Provider   insulin U-500 CONCENTRATED regular (HUMULIN R U-500, CONC, KWIKPEN) 500 unit/mL (3 mL) inpn subQ pen 120 Units by SubCUTAneous route two (2) times a day. And increase as directed to a max of 400 units per day 1/11/18   Joshua Schultz MD   Blood-Glucose Meter (ONETOUCH VERIO FLEX) misc 4 times/day 1/1/18   Haider Hernandes MD   insulin glargine (LANTUS SOLOSTAR) 100 unit/mL (3 mL) inpn Inject 100 units in the morning and 100 units at night 1/1/18   Joshua Schultz MD   Insulin Needles, Disposable, (BD INSULIN PEN NEEDLE UF SHORT) 31 gauge x 5/16\" ndle Use as directed twice daily 11/30/17   Joshua Schultz MD   atorvastatin (LIPITOR) 40 mg tablet Take 1 Tab by mouth nightly. 11/30/17   Joshua Schultz MD   fenofibrate nanocrystallized (TRICOR) 145 mg tablet Take 1 Tab by mouth daily. 11/30/17   Joshua Schultz MD   omega-3 acid ethyl esters (LOVAZA) 1 gram capsule Take 2 Caps by mouth two (2) times a day. 11/30/17   Joshua Schultz MD   metFORMIN (GLUCOPHAGE) 1,000 mg tablet Take 1 Tab by mouth two (2) times daily (with meals). 11/30/17   Joshua Schultz MD   ergocalciferol (VITAMIN D2) 50,000 unit capsule Take 1 Cap by mouth every seven (7) days. 11/30/17   Joshua Schultz MD   lisinopril (PRINIVIL, ZESTRIL) 10 mg tablet Take 1 Tab by mouth daily.  11/30/17   Joshua Schultz MD   gabapentin (NEURONTIN) 300 mg capsule Take 2 Caps by mouth three (3) times daily. 11/30/17   Alissa Balderas MD   glucose blood VI test strips (ONETOUCH VERIO) strip 4 times/day 11/29/17   Christian Ca MD   famotidine (PEPCID) 20 mg tablet Take 1 Tab by mouth two (2) times a day. 10/10/17   Bipin Mao MD   folic acid (FOLVITE) 1 mg tablet Take 1 Tab by mouth daily. 10/10/17   Bipin Mao MD   albuterol (PROAIR HFA) 90 mcg/actuation inhaler Take 2 Puffs by inhalation every four (4) hours as needed for Wheezing. Historical Provider   sertraline (ZOLOFT) 25 mg tablet Take 1 Tab by mouth nightly. 10/3/17   Christian Ca MD       REVIEW OF SYSTEMS:     Total of 12 systems reviewed as follows:   I am not able to complete the review of systems because:    The patient is intubated and sedated    The patient has altered mental status due to his acute medical problems    The patient has baseline aphasia from prior stroke(s)    The patient has baseline dementia and is not reliable historian                 POSITIVE= underlined text  Negative = text not underlined  General:  fever, chills, sweats, generalized weakness, weight loss/gain,      loss of appetite   Eyes:    blurred vision, eye pain, loss of vision, double vision  ENT:    rhinorrhea, pharyngitis   Respiratory:   cough, sputum production, SOB, wheezing, VIRK, pleuritic pain   Cardiology:   chest pain, palpitations, orthopnea, PND, edema, syncope   Gastrointestinal:  abdominal pain , N/V, dysphagia, diarrhea, constipation, bleeding   Genitourinary:  frequency, urgency, dysuria, hematuria, incontinence   Muskuloskeletal :  arthralgia, myalgia   Hematology:  easy bruising, nose or gum bleeding, lymphadenopathy   Dermatological: rash, ulceration, pruritis   Endocrine:   hot flashes or polydipsia   Neurological:  headache, dizziness, confusion, focal weakness, paresthesia,     Speech difficulties, memory loss, gait disturbance  Psychological: Feelings of anxiety, depression, agitation    Objective:   VITALS:    Visit Vitals    /84 (BP 1 Location: Right arm, BP Patient Position: Supine)    Pulse 92    Temp 100.2 °F (37.9 °C)    Resp 14    Ht 5' 8\" (1.727 m)  Comment: 18    Wt 120 kg (264 lb 8.8 oz)    LMP 2018    SpO2 100%    BMI 40.22 kg/m2     Temp (24hrs), Av.2 °F (37.3 °C), Min:98.2 °F (36.8 °C), Max:100.2 °F (37.9 °C)      PHYSICAL EXAM:   General:    Alert, Obese, cooperative, no distress, appears stated age. HEENT: Atraumatic, anicteric sclerae, pink conjunctivae     No oral ulcers, mucosa moist, throat clear  Neck:  Supple, symmetrical,  thyroid: non tender  Lungs:   Clear to auscultation bilaterally. No Wheezing or Rhonchi. No rales. Chest wall:  No tenderness  No Accessory muscle use. Heart:   Regular  rhythm,  No  murmur   No edema  Abdomen:   Soft, non-tender. Not distended. Bowel sounds normal,  Swollen and very tender Mons Pubis not cellulitic, with 2 areas of  Opening with some drainage but NO drain  Extremities: No cyanosis. No clubbing  Skin:     Not pale. Not Jaundiced  No rashes   Psych:  Good insight. Not depressed. Not anxious or agitated. Neurologic: EOMs intact. No facial asymmetry. No aphasia or slurred speech.  Symmetrical strength, Alert and oriented X 4.     _______________________________________________________________________  Care Plan discussed with:    Comments   Patient y    Family      RN y bedside   Care Manager                    Consultant:   attending   ____________________________________________________________________  TOTAL TIME:     50 mins    Comments     Reviewed previous records   >50% of visit spent in counseling and coordination of care  Discussion with patient and/or family and questions answered       Critical Care Provided     Minutes non procedure based  ________________________________________________________________________  Signed: Sydnee Chavez MD      Procedures: see electronic medical records for all procedures/Xrays and details which were not copied into this note but were reviewed prior to creation of Plan. LAB DATA REVIEWED:    Recent Results (from the past 24 hour(s))   CBC WITH AUTOMATED DIFF    Collection Time: 05/17/18  2:01 PM   Result Value Ref Range    WBC 12.6 (H) 3.6 - 11.0 K/uL    RBC 4.19 3.80 - 5.20 M/uL    HGB 11.5 11.5 - 16.0 g/dL    HCT 34.5 (L) 35.0 - 47.0 %    MCV 82.3 80.0 - 99.0 FL    MCH 27.4 26.0 - 34.0 PG    MCHC 33.3 30.0 - 36.5 g/dL    RDW 16.3 (H) 11.5 - 14.5 %    PLATELET 934 886 - 156 K/uL    MPV 10.2 8.9 - 12.9 FL    NRBC 0.0 0  WBC    ABSOLUTE NRBC 0.00 0.00 - 0.01 K/uL    NEUTROPHILS 82 (H) 32 - 75 %    LYMPHOCYTES 12 12 - 49 %    MONOCYTES 4 (L) 5 - 13 %    EOSINOPHILS 1 0 - 7 %    BASOPHILS 0 0 - 1 %    IMMATURE GRANULOCYTES 1 (H) 0.0 - 0.5 %    ABS. NEUTROPHILS 10.3 (H) 1.8 - 8.0 K/UL    ABS. LYMPHOCYTES 1.5 0.8 - 3.5 K/UL    ABS. MONOCYTES 0.6 0.0 - 1.0 K/UL    ABS. EOSINOPHILS 0.1 0.0 - 0.4 K/UL    ABS. BASOPHILS 0.0 0.0 - 0.1 K/UL    ABS. IMM. GRANS. 0.1 (H) 0.00 - 0.04 K/UL    DF AUTOMATED     METABOLIC PANEL, COMPREHENSIVE    Collection Time: 05/17/18  2:01 PM   Result Value Ref Range    Sodium 130 (L) 136 - 145 mmol/L    Potassium 4.6 3.5 - 5.1 mmol/L    Chloride 100 97 - 108 mmol/L    CO2 22 21 - 32 mmol/L    Anion gap 8 5 - 15 mmol/L    Glucose 398 (H) 65 - 100 mg/dL    BUN 30 (H) 6 - 20 MG/DL    Creatinine 1.06 (H) 0.55 - 1.02 MG/DL    BUN/Creatinine ratio 28 (H) 12 - 20      GFR est AA >60 >60 ml/min/1.73m2    GFR est non-AA 58 (L) >60 ml/min/1.73m2    Calcium 9.2 8.5 - 10.1 MG/DL    Bilirubin, total 0.6 0.2 - 1.0 MG/DL    ALT (SGPT) 13 12 - 78 U/L    AST (SGOT) <3 (L) 15 - 37 U/L    Alk.  phosphatase 92 45 - 117 U/L    Protein, total 8.6 (H) 6.4 - 8.2 g/dL    Albumin 3.8 3.5 - 5.0 g/dL    Globulin 4.8 (H) 2.0 - 4.0 g/dL    A-G Ratio 0.8 (L) 1.1 - 2.2     LACTIC ACID    Collection Time: 05/17/18  2:01 PM   Result Value Ref Range    Lactic acid 1.9 0.4 - 2.0 MMOL/L   EKG, 12 LEAD, INITIAL    Collection Time: 05/17/18  2:40 PM   Result Value Ref Range    Ventricular Rate 83 BPM    Atrial Rate 83 BPM    P-R Interval 114 ms    QRS Duration 84 ms    Q-T Interval 368 ms    QTC Calculation (Bezet) 432 ms    Calculated P Axis 27 degrees    Calculated R Axis 17 degrees    Calculated T Axis 49 degrees    Diagnosis       ** Poor data quality, interpretation may be adversely affected  Normal sinus rhythm    When compared with ECG of 28-JAN-2018 20:23,  No significant change was found  Confirmed by Nikolas Sung MD, Quinn Carlos ([id number]) on 5/17/2018 2:49:54 PM     URINALYSIS W/ REFLEX CULTURE    Collection Time: 05/17/18  4:45 PM   Result Value Ref Range    Color RED      Appearance CLOUDY (A) CLEAR      Specific gravity >1.030 (H) 1.003 - 1.030    pH (UA) 5.0 5.0 - 8.0      Protein 100 (A) NEG mg/dL    Glucose 500 (A) NEG mg/dL    Ketone TRACE (A) NEG mg/dL    Bilirubin NEGATIVE  NEG      Blood LARGE (A) NEG      Urobilinogen 1.0 0.2 - 1.0 EU/dL    Nitrites POSITIVE (A) NEG      Leukocyte Esterase TRACE (A) NEG      WBC 0-4 0 - 4 /hpf    RBC >100 (H) 0 - 5 /hpf    Epithelial cells FEW FEW /lpf    Bacteria NEGATIVE  NEG /hpf    UA:UC IF INDICATED CULTURE NOT INDICATED BY UA RESULT CNI     HCG URINE, QL. - POC    Collection Time: 05/17/18  4:53 PM   Result Value Ref Range    Pregnancy test,urine (POC) NEGATIVE  NEG         _____________________________  Hospitalist: Jesús Ayon MD

## 2018-05-17 NOTE — ED NOTES
TRANSFER - OUT REPORT:    Verbal report given to PALAK Villeda on Sherrod Schilder  being transferred to OR for ordered procedure       Report consisted of patients Situation, Background, Assessment and   Recommendations(SBAR). Information from the following report(s) SBAR, Kardex, ED Summary, [facility] and Recent Results was reviewed with the receiving nurse. Lines:   Peripheral IV 05/17/18 Left Antecubital (Active)   Site Assessment Clean, dry, & intact 5/17/2018  4:35 PM   Phlebitis Assessment 0 5/17/2018  4:35 PM   Infiltration Assessment 0 5/17/2018  4:35 PM   Dressing Status Clean, dry, & intact 5/17/2018  4:35 PM   Dressing Type Transparent 5/17/2018  4:35 PM   Hub Color/Line Status Pink 5/17/2018  4:35 PM       Peripheral IV 05/17/18 Right Antecubital (Active)   Site Assessment Clean, dry, & intact 5/17/2018  7:41 PM   Phlebitis Assessment 0 5/17/2018  7:41 PM   Infiltration Assessment 0 5/17/2018  7:41 PM   Dressing Status Clean, dry, & intact 5/17/2018  7:41 PM   Dressing Type Transparent 5/17/2018  7:41 PM   Hub Color/Line Status Pink 5/17/2018  7:41 PM   Action Taken Catheter retaped 5/17/2018  7:41 PM        Opportunity for questions and clarification was provided.       Patient transported with:   [place]

## 2018-05-17 NOTE — IP AVS SNAPSHOT
Summary of Care Report The Summary of Care report has been created to help improve care coordination. Users with access to [employer] or [facility] (Web-based application) may access additional patient information including the Discharge Summary. If you are not currently a [address] user and need more information, please call the number listed below in the [place] [address] section and ask to be connected with Medical Records. Facility Information Name Address Phone [address] [address] [phone] Patient Information Patient Name Sex  Nemo Holbrook ([id number]) Female 1980 Discharge Information Admitting Provider Service Area Unit Marco Antonio Aguirre MD / [phone] [facility] 2 General Surgery / [phone] Discharge Provider Discharge Date/Time Discharge Disposition Destination (none) 2018 Midday (Pending) HH (none) Patient Language Language ENGLISH [13] Hospital Problems as of 2018  Reviewed: 2018  7:19 PM by Nancy Mccani MD  
  
  
  
 Class Noted - Resolved Last Modified POA Active Problems Vulvar abscess  2018 - Present 2018 by Marco Antonio Aguirre MD Unknown Entered by Marco Antonio Aguirre MD  
  
Non-Hospital Problems as of 2018  Reviewed: 2018  7:19 PM by Nancy Mccain MD  
  
  
  
 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   
  Essential hypertension  10/3/2017 - Present 2018 by Cahpis Rivers MD  
  Entered by Jana Webster MD  
  Uncontrolled type 2 diabetes mellitus with diabetic polyneuropathy, with long-term current use of insulin ([facility] [address]  10/3/2017 - Present 2018 by Yolette Ponce MD  
  Entered by Karl Melton MD  
  Hyperlipidemia LDL goal <100  10/3/2017 - Present 2/19/2018 by Yolette Ponce MD  
  Entered by Karl Melton MD  
  Obesity, Class III, BMI 40-49.9 (morbid obesity) ([facility]  11/30/2017 - Present 2/19/2018 by Yolette Ponce MD  
  Entered by Valencia Zee MD  
  Vitamin D deficiency  11/30/2017 - Present 11/30/2017 by Valencia Zee MD  
  Entered by Valencia Zee MD  
  Breast abscess of female  12/5/2017 - Present 12/5/2017 by Faviola Bustamante MD  
  Entered by Faviola Bustamante MD  
  Recurrent depression ([facility]  1/11/2018 - Present 2/19/2018 by Yolette Ponce MD  
  Entered by Valencia Zee MD  
  
You are allergic to the following Allergen Reactions Toradol (Ketorolac Tromethamine) Unknown (comments) Seizure like symptoms per pt. Augmentin (Amoxicillin-Pot Clavulanate) Swelling Demerol (Meperidine) Swelling Heparin Hives Ibuprofen Diarrhea Nausea and Vomiting Muscle tightness Keflex (Cephalexin) Shortness of Breath Morphine Swelling Nubain (Nalbuphine) Hives Pcn (Penicillins) Swelling Sulfa (Sulfonamide Antibiotics) Unknown (comments) Current Discharge Medication List  
  
START taking these medications Dose & Instructions Dispensing Information Comments  
 fluconazole 150 mg tablet Commonly known as:  DIFLUCAN Dose:  150 mg Take 1 Tab by mouth daily for 1 day. Repeat in 3 days prn itching. Indications: PREVENTION OF VULVOVAGINAL CANDIDIASIS Quantity:  3 Tab Refills:  0 HYDROmorphone 2 mg tablet Commonly known as:  DILAUDID Dose:  1 mg Take 0.5 Tabs by mouth every four (4) hours as needed. Max Daily Amount: 6 mg. Quantity:  30 Tab Refills:  0  
   
 linezolid 600 mg tablet Commonly known as:  Tamera Robertson Dose:  600 mg Take 1 Tab by mouth every twelve (12) hours for 9 days. Quantity:  18 Tab Refills:  0  
   
 promethazine 25 mg tablet Commonly known as:  PHENERGAN Dose:  25 mg Take 1 Tab by mouth every eight (8) hours as needed. Indications: PREVENTION OF POST-OPERATIVE NAUSEA AND VOMITING Quantity:  20 Tab Refills:  0 CONTINUE these medications which have CHANGED Dose & Instructions Dispensing Information Comments  
 ergocalciferol 50,000 unit capsule Commonly known as:  VITAMIN D2 What changed:  additional instructions Dose:  33224 Units Take 1 Cap by mouth every seven (7) days. Quantity:  4 Cap Refills:  11 LANTUS U-100 INSULIN 100 unit/mL injection Generic drug:  insulin glargine What changed:  Another medication with the same name was removed. Continue taking this medication, and follow the directions you see here. Dose:  65 Units 65 Units by SubCUTAneous route nightly. Refills:  0 CONTINUE these medications which have NOT CHANGED Dose & Instructions Dispensing Information Comments  
 acetaminophen 500 mg tablet Commonly known as:  TYLENOL Dose:  7256-0620 mg Take 1,000-2,500 mg by mouth every six (6) hours as needed for Pain. Refills:  0  
   
 atorvastatin 40 mg tablet Commonly known as:  LIPITOR Dose:  40 mg Take 1 Tab by mouth nightly. Quantity:  30 Tab Refills:  11  
   
 fenofibrate nanocrystallized 145 mg tablet Commonly known as:  [employer] Dose:  145 mg Take 1 Tab by mouth daily. Quantity:  30 Tab Refills:  11  
   
 gabapentin 300 mg capsule Commonly known as:  NEURONTIN Dose:  600 mg Take 2 Caps by mouth three (3) times daily. Quantity:  180 Cap Refills:  11  
   
 HumuLIN R Regular U-100 Insuln 100 unit/mL injection Generic drug:  insulin regular Dose:  12 Units 12 Units by SubCUTAneous route Before breakfast, lunch, and dinner. Plus sliding scale Refills:  0  
   
 metFORMIN 1,000 mg tablet Commonly known as:  GLUCOPHAGE  Dose:  1000 mg  
 Take 1 Tab by mouth two (2) times daily (with meals). Quantity:  60 Tab Refills:  11  
   
 omega-3 acid ethyl esters 1 gram capsule Commonly known as:  Pepe Messing Dose:  2 g Take 2 Caps by mouth two (2) times a day. Quantity:  120 Cap Refills:  11 PROAIR HFA 90 mcg/actuation inhaler Generic drug:  albuterol Dose:  2 Puff Take 2 Puffs by inhalation every four (4) hours as needed for Wheezing. Refills:  0 Current Immunizations Name Date Influenza Vaccine (Quad) PF 10/3/2017 Influenza Vaccine Split 12/14/2010 ZZZ-RETIRED (DO NOT USE) Pneumococcal Vaccine (Unspecified Type) 10/1/2007 Surgery Information ID Date/Time Status Primary Surgeon All Procedures Location [id number] 5/17/2018 [address] [place] [address] Exeland Lometa, MD INCISION AND DRAINAGE VULVA ABSCESS [facility] Follow-up Information Follow up With Details Comments Contact [address] Nath On 5/21/2018 APPOINTMENT TIME: 8:45AM [address] [address] 
[phone] Discharge Instructions Learning About High Blood Sugar What is high blood sugar? Your body turns the food you eat into glucose (sugar), which it uses for energy. But if your body isn't able to use the sugar right away, it can build up in your blood and lead to high blood sugar. When the amount of sugar in your blood stays too high for too much of the time, you may have diabetes. Diabetes is a disease that can cause serious health problems. The good news is that lifestyle changes may help you get your blood sugar back to normal and avoid or delay diabetes. What causes high blood sugar? Sugar (glucose) can build up in your blood if you: · Are overweight. · Have a family history of diabetes. · Take certain medicines, such as steroids. What are the symptoms? Having high blood sugar may not cause any symptoms at all.  Or it may make you feel very thirsty or very hungry. You may also urinate more often than usual, have blurry vision, or lose weight without trying. How is high blood sugar treated? You can take steps to lower your blood sugar level if you understand what makes it get higher. Your doctor may want you to learn how to test your blood sugar level at home. Then you can see how illness, stress, or different kinds of food or medicine raise or lower your blood sugar level. Other tests may be needed to see if you have diabetes. How can you prevent high blood sugar? · Watch your weight. If you're overweight, losing just a small amount of weight may help. Reducing fat around your waist is most important. · Limit the amount of calories, sweets, and unhealthy fat you eat. Ask your doctor if a dietitian can help you. A registered dietitian can help you create meal plans that fit your lifestyle. · Get at least 30 minutes of exercise on most days of the week. Exercise helps control your blood sugar. It also helps you maintain a healthy weight. Walking is a good choice. You also may want to do other activities, such as running, swimming, cycling, or playing tennis or team sports. · If your doctor prescribed medicines, take them exactly as prescribed. Call your doctor if you think you are having a problem with your medicine. You will get more details on the specific medicines your doctor prescribes. Follow-up care is a key part of your treatment and safety. Be sure to make and go to all appointments, and call your doctor if you are having problems. It's also a good idea to know your test results and keep a list of the medicines you take. Where can you learn more? Go to http://ish-irish.info/. Enter O108 in the search box to learn more about \"Learning About High Blood Sugar. \" Current as of: March 13, 2017 Content Version: 11.4 © 8409-6878 [employer].  Care instructions adapted under license by [employer] (which disclaims liability or warranty for this information). If you have questions about a medical condition or this instruction, always ask your healthcare professional. [address] any warranty or liability for your use of this information. Chart Review Routing History Recipient Method Report Sent By Javier Diez MD  
Phone: [phone] In Basket IP Auto Routed Notes Shima Medeiros MD [[id number]] 10/10/2017  9:35 AM 10/10/2017 Serenity Diez MD  
Phone: [phone] In Basket Notes Report Luis Cornelius MD [[id number]] 11/30/2017  6:19 PM 11/30/2017 Venus Boas., MD  
Phone: [phone] In Basket Notes Report Luis Cornelius MD [[id number]] 11/30/2017  6:19 PM 11/30/2017 Wade Nguyen MD  
Phone: [phone] In Basket IP Auto Routed Notes Shantell Fernandez MD [[id number]] 12/30/2017  8:03 AM 12/30/2017 Wade Nguyen MD  
Phone: [phone] In Basket Notes Report Luis Cornelius MD [[id number]] 1/11/2018  1:52 PM 1/11/2018

## 2018-05-17 NOTE — ED PROVIDER NOTES
EMERGENCY DEPARTMENT HISTORY AND PHYSICAL EXAM      Date: 5/17/2018  Patient Name: Chloe Alas    History of Presenting Illness     Chief Complaint   Patient presents with    Fever     Pt states she was discahrged from the hospital last Friday after being admitted for 3 weeks for sepsis d/t diabetic abscess on her \"female parts\". Pt states her fever started back x  2 days ago and she is concerned bc there is a knot next to her incision site and \"it feels like somethingis pulling. something just is not right\"       History Provided By: Patient    HPI: Chloe Alas, 40 y.o. female with PMHx significant for DM, MI, HTN, hyperlipidemia, pancreatitis, presents ambulatory to the ED with cc of a subjective fever x 3 days. She reports associated symptoms of chills, decrease in appetite, moderate R groin pain, nausea, and diarrhea last night. Pt was seen in [facility] (located in [address] [place] [address]) and admitted for 3 weeks to have surgery on a groin abscess. She states she was placed on Vancomycin during her stay for 2 weeks. At the time, she had a fever and noted her \"diabetic abscess\" to her groin had \"doubled overnight. \" The abscess was lanced, but she noted she still had fevers and chills. She was being treated for her sugar at the time of her stay as well. Pt was discharged from the hospital on 5/11/18. She noted despite being in [facility] 73 weather outside, she would require the use of a hoodie to stay warm. She endorses the drainage from the site of incision had improved, but note it is still draining. She notes she self packs the area and had last packed it yesterday. She is currently on her menses noting she had started x 3 days ago. Pt denies having a regular PCP. She denies any antibiotic use. Pt denies vomiting, black stool, or blood in her stool.     Chief Complaint: fever  Duration: 3 Days  Timing:  N/A  Location: N/A  Quality: N/A  Severity: N/A  Modifying Factors: N/A  Associated Symptoms: chills, decrease in appetite, nausea, diarhea, R groin pain    There are no other complaints, changes, or physical findings at this time. PCP: None    Current Facility-Administered Medications   Medication Dose Route Frequency Provider Last Rate Last Dose    sodium chloride (NS) flush 5-10 mL  5-10 mL IntraVENous Q8H Anel Bose MD        sodium chloride (NS) flush 5-10 mL  5-10 mL IntraVENous PRN Anel Arizmendi MD        levoFLOXacin (LEVAQUIN) 750 mg in D5W IVPB  750 mg IntraVENous NOW Anel Arizmendi MD        metroNIDAZOLE (FLAGYL) IVPB premix 500 mg  500 mg IntraVENous [facility] Anel Bose MD        [START ON 5/18/2018] levoFLOXacin (LEVAQUIN) 750 mg in D5W IVPB  750 mg IntraVENous Q24H Anel Arizmendi MD        vancomycin [facility]) 3,000 mg in 0.9% sodium chloride 500 mL IVPB  3,000 mg IntraVENous NOW Anel Bose MD        [START ON 5/18/2018] vancomycin (VANCOCIN) 1500 mg in  ml infusion  1,500 mg IntraVENous Q12H Anel Arizmendi MD         Current Outpatient Prescriptions   Medication Sig Dispense Refill    atorvastatin (LIPITOR) 40 mg tablet Take 1 Tab by mouth nightly. 30 Tab 11    fenofibrate nanocrystallized (TRICOR) 145 mg tablet Take 1 Tab by mouth daily. 30 Tab 11    omega-3 acid ethyl esters (LOVAZA) 1 gram capsule Take 2 Caps by mouth two (2) times a day. 120 Cap 11    metFORMIN (GLUCOPHAGE) 1,000 mg tablet Take 1 Tab by mouth two (2) times daily (with meals). 60 Tab 11    ergocalciferol (VITAMIN D2) 50,000 unit capsule Take 1 Cap by mouth every seven (7) days. 4 Cap 11    gabapentin (NEURONTIN) 300 mg capsule Take 2 Caps by mouth three (3) times daily. 180 Cap 11    albuterol (PROAIR HFA) 90 mcg/actuation inhaler Take 2 Puffs by inhalation every four (4) hours as needed for Wheezing.  sertraline (ZOLOFT) 25 mg tablet Take 1 Tab by mouth nightly.  30 Tab 2       Past History     Past Medical History:  Past Medical History:   Diagnosis Date    Calculus of kidney     Diabetes ([facility] [address]     Hyperlipidemia     Hypertension     MI (myocardial infarction) ([facility] 2008    Other ill-defined conditions(799.89)     Neuropathies    Pancreatitis     due to hypertriglyceridemia    Vitamin D deficiency 2017       Past Surgical History:  Past Surgical History:   Procedure Laterality Date    CARDIAC SURG PROCEDURE UNLIST      cath    CYSTOSCOPY      HX  SECTION      x 2    HX CHOLECYSTECTOMY      HX GYN      tubes clamped    HX TONSIL AND ADENOIDECTOMY      HX WISDOM TEETH EXTRACTION         Family History:  Family History   Problem Relation Age of Onset    Hypertension Mother     Stroke Mother     Diabetes Mother     Cancer Mother 59     breast?    Heart Disease Father     Diabetes Father     Cancer Paternal Grandmother 79     ovarian       Social History:  Social History   Substance Use Topics    Smoking status: Current Some Day Smoker     Packs/day: 0.50    Smokeless tobacco: Never Used    Alcohol use No      Comment: once per year       Allergies: Allergies   Allergen Reactions    Toradol [Ketorolac Tromethamine] Unknown (comments)     Seizure like symptoms per pt.  Augmentin [Amoxicillin-Pot Clavulanate] Swelling    Demerol [Meperidine] Swelling    Heparin Hives    Ibuprofen Diarrhea and Nausea and Vomiting     Muscle tightness    Keflex [Cephalexin] Shortness of Breath    Morphine Swelling    Nubain [Nalbuphine] Hives    Pcn [Penicillins] Swelling    Sulfa (Sulfonamide Antibiotics) Unknown (comments)     Review of Systems   Review of Systems   Constitutional: Positive for appetite change (+decrease), chills and fever. HENT: Negative. Eyes: Negative. Respiratory: Negative. Cardiovascular: Negative. Gastrointestinal: Positive for diarrhea and nausea. Negative for blood in stool and vomiting. Endocrine: Negative. Genitourinary: Negative.         +R groin pain   Musculoskeletal: Negative. Skin: Negative. Allergic/Immunologic: Negative. Neurological: Negative. Hematological: Negative. Psychiatric/Behavioral: Negative. Physical Exam   Physical Exam  General appearance - morbidly obese, well appearing, and in no distress  Eyes - pupils equal and reactive, extraocular eye movements intact  ENT - mucous membranes moist, pharynx normal without lesions  Neck - supple, no significant adenopathy; non-tender to palpation  Chest - clear to auscultation, no wheezes, rales or rhonchi; non-tender to palpation  Heart - normal rate and regular rhythm, S1 and S2 normal, no murmurs noted  Abdomen - soft, nontender, nondistended, no masses or organomegaly  Musculoskeletal - no joint tenderness, deformity or swelling; normal ROM  Extremities - peripheral pulses normal, no pedal edema   - incision that is open from previous I&D on L groin, tender, swollen, indurated with mild erythema over lower pannus/mons pubis on R  Skin - normal coloration and turgor, no rashes  Neurological - alert, oriented x3, normal speech, no focal findings or movement disorder noted    Diagnostic Study Results     Labs -  Recent Results (from the past 12 hour(s))   CBC WITH AUTOMATED DIFF    Collection Time: 05/17/18  2:01 PM   Result Value Ref Range    WBC 12.6 (H) 3.6 - 11.0 K/uL    RBC 4.19 3.80 - 5.20 M/uL    HGB 11.5 11.5 - 16.0 g/dL    HCT 34.5 (L) 35.0 - 47.0 %    MCV 82.3 80.0 - 99.0 FL    MCH 27.4 26.0 - 34.0 PG    MCHC 33.3 30.0 - 36.5 g/dL    RDW 16.3 (H) 11.5 - 14.5 %    PLATELET 435 282 - 717 K/uL    MPV 10.2 8.9 - 12.9 FL    NRBC 0.0 0  WBC    ABSOLUTE NRBC 0.00 0.00 - 0.01 K/uL    NEUTROPHILS 82 (H) 32 - 75 %    LYMPHOCYTES 12 12 - 49 %    MONOCYTES 4 (L) 5 - 13 %    EOSINOPHILS 1 0 - 7 %    BASOPHILS 0 0 - 1 %    IMMATURE GRANULOCYTES 1 (H) 0.0 - 0.5 %    ABS. NEUTROPHILS 10.3 (H) 1.8 - 8.0 K/UL    ABS. LYMPHOCYTES 1.5 0.8 - 3.5 K/UL    ABS. MONOCYTES 0.6 0.0 - 1.0 K/UL    ABS. EOSINOPHILS 0.1 0.0 - 0.4 K/UL    ABS. BASOPHILS 0.0 0.0 - 0.1 K/UL    ABS. IMM. GRANS. 0.1 (H) 0.00 - 0.04 K/UL    DF AUTOMATED     METABOLIC PANEL, COMPREHENSIVE    Collection Time: 05/17/18  2:01 PM   Result Value Ref Range    Sodium 130 (L) 136 - 145 mmol/L    Potassium 4.6 3.5 - 5.1 mmol/L    Chloride 100 97 - 108 mmol/L    CO2 22 21 - 32 mmol/L    Anion gap 8 5 - 15 mmol/L    Glucose 398 (H) 65 - 100 mg/dL    BUN 30 (H) 6 - 20 MG/DL    Creatinine 1.06 (H) 0.55 - 1.02 MG/DL    BUN/Creatinine ratio 28 (H) 12 - 20      GFR est AA >60 >60 ml/min/1.73m2    GFR est non-AA 58 (L) >60 ml/min/1.73m2    Calcium 9.2 8.5 - 10.1 MG/DL    Bilirubin, total 0.6 0.2 - 1.0 MG/DL    ALT (SGPT) 13 12 - 78 U/L    AST (SGOT) <3 (L) 15 - 37 U/L    Alk.  phosphatase 92 45 - 117 U/L    Protein, total 8.6 (H) 6.4 - 8.2 g/dL    Albumin 3.8 3.5 - 5.0 g/dL    Globulin 4.8 (H) 2.0 - 4.0 g/dL    A-G Ratio 0.8 (L) 1.1 - 2.2     LACTIC ACID    Collection Time: 05/17/18  2:01 PM   Result Value Ref Range    Lactic acid 1.9 0.4 - 2.0 MMOL/L   EKG, 12 LEAD, INITIAL    Collection Time: 05/17/18  2:40 PM   Result Value Ref Range    Ventricular Rate 83 BPM    Atrial Rate 83 BPM    P-R Interval 114 ms    QRS Duration 84 ms    Q-T Interval 368 ms    QTC Calculation (Bezet) 432 ms    Calculated P Axis 27 degrees    Calculated R Axis 17 degrees    Calculated T Axis 49 degrees    Diagnosis       ** Poor data quality, interpretation may be adversely affected  Normal sinus rhythm    When compared with ECG of 28-JAN-2018 20:23,  No significant change was found  Confirmed by Rajinder Delgado MD, Reshma Umaña ([id number]) on 5/17/2018 2:49:54 PM     URINALYSIS W/ REFLEX CULTURE    Collection Time: 05/17/18  4:45 PM   Result Value Ref Range    Color RED      Appearance CLOUDY (A) CLEAR      Specific gravity >1.030 (H) 1.003 - 1.030    pH (UA) 5.0 5.0 - 8.0      Protein 100 (A) NEG mg/dL    Glucose 500 (A) NEG mg/dL    Ketone TRACE (A) NEG mg/dL    Bilirubin NEGATIVE  NEG      Blood LARGE (A) NEG      Urobilinogen 1.0 0.2 - 1.0 EU/dL    Nitrites POSITIVE (A) NEG      Leukocyte Esterase TRACE (A) NEG      WBC 0-4 0 - 4 /hpf    RBC >100 (H) 0 - 5 /hpf    Epithelial cells FEW FEW /lpf    Bacteria NEGATIVE  NEG /hpf    UA:UC IF INDICATED CULTURE NOT INDICATED BY UA RESULT CNI     HCG URINE, QL. - POC    Collection Time: 05/17/18  4:53 PM   Result Value Ref Range    Pregnancy test,urine (POC) NEGATIVE  NEG         Radiologic Studies -   CT Results  (Last 48 hours)               05/17/18 1734  CT ABD PELV W CONT Final result    Impression:  IMPRESSION:   Acute right mons 7 cm abscess   Acute left mons 2 cm abscess. This result was verbally relayed by myself to Dr. Paulette Francis at 1802 hours   [id number]           Narrative:  EXAM:  CT ABD PELV W CONT       INDICATION: Evaluation for Abscess, abdomen, soft tissue; hx i+d groin, DM,?   hugo's, tenderness, swelling over right lower pannus/mons       Comparison: 1/28/2018       CONTRAST:  100 mL of Isovue-370. TECHNIQUE:    Following the uneventful intravenous administration of contrast, thin axial   images were obtained through the abdomen and pelvis. Coronal and sagittal   reconstructions were generated. Oral contrast was not administered. CT dose   reduction was achieved through use of a standardized protocol tailored for this   examination and automatic exposure control for dose modulation. FINDINGS:    LUNG BASES: Clear. INCIDENTALLY IMAGED HEART AND MEDIASTINUM: Unremarkable. LIVER: No mass or biliary dilatation. GALLBLADDER: Prior cholecystectomy   SPLEEN: No mass. PANCREAS: No mass or ductal dilatation. ADRENALS: Unremarkable. KIDNEYS: No mass, calculus, or hydronephrosis. STOMACH: Unremarkable. SMALL BOWEL: No dilatation or wall thickening. COLON: No dilatation or wall thickening. APPENDIX: Unremarkable. Axial image 74   PERITONEUM: No ascites or pneumoperitoneum. RETROPERITONEUM: No lymphadenopathy or aortic aneurysm.    REPRODUCTIVE ORGANS: Anteverted uterus. Bilateral tubal ligation clips. Left   ovary contains a cyst (image 79). URINARY BLADDER: No mass or calculus. BONES: No destructive bone lesion. ADDITIONAL COMMENTS:       New right mons macrolobulated abscess that measures 7 x 5.2 x 6.5 cm and is   associated with soft tissue swelling. This abscess contains mostly fluid but a   little bit of air. New left mons abscess that contains only fluid and measures 2   x 2.1 x 1.9 cm (axial image 103 coronal image 61). CXR Results  (Last 48 hours)               05/17/18 1552  XR CHEST PORT Final result    Impression:  IMPRESSION:    No evidence of pneumonia. Narrative:  EXAM:  XR CHEST PORT       INDICATION:  Sepsis. Diabetic abscess. COMPARISON:  10/6/2017. FINDINGS:     An AP portable view was obtained of the chest.     The heart is normal in size. .     The lungs are clear. No consolidation, pulmonary edema or pleural effusion is evident. The regional osseous structures are unremarkable. Medical Decision Making   I am the first provider for this patient. I reviewed the vital signs, available nursing notes, past medical history, past surgical history, family history and social history. Vital Signs-Reviewed the patient's vital signs. Patient Vitals for the past 12 hrs:   Temp Pulse Resp BP SpO2   05/17/18 1803 100.2 °F (37.9 °C) 92 14 139/84 100 %   05/17/18 1240 98.2 °F (36.8 °C) 95 21 157/80 100 %     Pulse Oximetry Analysis - 100% on RA    Cardiac Monitor:   Rate: 92 bpm  Rhythm: Normal Sinus Rhythm     EKG interpretation: (Preliminary) 1440  Rhythm: normal sinus rhythm. Rate (approx.): 83 bpm; Axis: normal; Normal CA, QRS, QTc intervals; Other findings: no ischemic changes.   Written by MARLON Kincaid, as dictated by Los Lehman MD     Records Reviewed: Nursing Notes, Old Medical Records, Previous Radiology Studies and Previous Laboratory Studies    Provider Notes (Medical Decision Making):   DDx: Sepsis, UTI, hugo's gangrene, cellulitis, abscess    ED Course:   Initial assessment performed. The patients presenting problems have been discussed, and they are in agreement with the care plan formulated and outlined with them. I have encouraged them to ask questions as they arise throughout their visit. PROGRESS NOTE:  6:05 PM  Was informed by CT the pt has multiple abscesses. CONSULT NOTE:  6:11 PM  Anel Harkins MD spoke with Dr. Doe Samaniego,  Specialty: OB/GYN  Discussed patient's hx, disposition, and available diagnostic and imaging results. Reviewed care plans. Consultant agrees with plans as outlined. Consultant will see the pt. CONSULT NOTE:   6:28 PM  Anel Harkins MD spoke with Dr. Jose A León,   Specialty: Hospitalist  Discussed pt's hx, disposition, and available diagnostic and imaging results. Reviewed care plans. Consultant will evaluate pt for admission. Written by Scar Pyle ED Scribe, as dictated by Rigoberto White MD.    PROGRESS NOTE:  6:59 PM  Dr. Doe Samaniego had evaluated the pt and is taking her to the OR now. Critical Care Time:   0    Disposition:  PLAN:  1. Admit    ADMIT NOTE:  6:34 PM  Patient is being admitted to the hospital by Dr. Jose A León. The results of their tests and reasons for their admission have been discussed with them and/or available family. They convey agreement and understanding for the need to be admitted and for their admission diagnosis. Consultation has been made with the inpatient physician specialist for hospitalization. Diagnosis     Clinical Impression:   1. Sepsis, due to unspecified organism ([address]    2. Vulvar abscess    3. Hyperglycemia        Attestations: This note is prepared by Scar Pyle, acting as Scribe for Anel Harkins MD.    Rigoberto White MD: The scribe's documentation has been prepared under my direction and personally reviewed by me in its entirety.  I confirm that the note above accurately reflects all work, treatment, procedures, and medical decision making performed by me.

## 2018-05-18 LAB
ANION GAP SERPL CALC-SCNC: 6 MMOL/L (ref 5–15)
ATRIAL RATE: 94 BPM
BUN SERPL-MCNC: 30 MG/DL (ref 6–20)
BUN/CREAT SERPL: 32 (ref 12–20)
CALCIUM SERPL-MCNC: 8.4 MG/DL (ref 8.5–10.1)
CALCULATED P AXIS, ECG09: 40 DEGREES
CALCULATED R AXIS, ECG10: -3 DEGREES
CALCULATED T AXIS, ECG11: 34 DEGREES
CHLORIDE SERPL-SCNC: 104 MMOL/L (ref 97–108)
CO2 SERPL-SCNC: 24 MMOL/L (ref 21–32)
CREAT SERPL-MCNC: 0.93 MG/DL (ref 0.55–1.02)
DIAGNOSIS, 93000: NORMAL
ERYTHROCYTE [DISTWIDTH] IN BLOOD BY AUTOMATED COUNT: 16.5 % (ref 11.5–14.5)
EST. AVERAGE GLUCOSE BLD GHB EST-MCNC: 209 MG/DL
GLUCOSE BLD STRIP.AUTO-MCNC: 303 MG/DL (ref 65–100)
GLUCOSE BLD STRIP.AUTO-MCNC: 317 MG/DL (ref 65–100)
GLUCOSE BLD STRIP.AUTO-MCNC: 340 MG/DL (ref 65–100)
GLUCOSE BLD STRIP.AUTO-MCNC: 351 MG/DL (ref 65–100)
GLUCOSE SERPL-MCNC: 311 MG/DL (ref 65–100)
HBA1C MFR BLD: 8.9 % (ref 4.2–6.3)
HCT VFR BLD AUTO: 29.8 % (ref 35–47)
HGB BLD-MCNC: 9.7 G/DL (ref 11.5–16)
LACTATE SERPL-SCNC: 0.6 MMOL/L (ref 0.4–2)
MCH RBC QN AUTO: 27.1 PG (ref 26–34)
MCHC RBC AUTO-ENTMCNC: 32.6 G/DL (ref 30–36.5)
MCV RBC AUTO: 83.2 FL (ref 80–99)
NRBC # BLD: 0 K/UL (ref 0–0.01)
NRBC BLD-RTO: 0 PER 100 WBC
P-R INTERVAL, ECG05: 128 MS
PLATELET # BLD AUTO: 164 K/UL (ref 150–400)
PMV BLD AUTO: 10.6 FL (ref 8.9–12.9)
POTASSIUM SERPL-SCNC: 4.4 MMOL/L (ref 3.5–5.1)
Q-T INTERVAL, ECG07: 364 MS
QRS DURATION, ECG06: 84 MS
QTC CALCULATION (BEZET), ECG08: 455 MS
RBC # BLD AUTO: 3.58 M/UL (ref 3.8–5.2)
SERVICE CMNT-IMP: ABNORMAL
SODIUM SERPL-SCNC: 134 MMOL/L (ref 136–145)
VENTRICULAR RATE, ECG03: 94 BPM
WBC # BLD AUTO: 9.5 K/UL (ref 3.6–11)

## 2018-05-18 PROCEDURE — 74011636637 HC RX REV CODE- 636/637: Performed by: INTERNAL MEDICINE

## 2018-05-18 PROCEDURE — 77030011256 HC DRSG MEPILEX <16IN NO BORD MOLN -A

## 2018-05-18 PROCEDURE — 36415 COLL VENOUS BLD VENIPUNCTURE: CPT | Performed by: OBSTETRICS & GYNECOLOGY

## 2018-05-18 PROCEDURE — 85027 COMPLETE CBC AUTOMATED: CPT | Performed by: OBSTETRICS & GYNECOLOGY

## 2018-05-18 PROCEDURE — 74011250637 HC RX REV CODE- 250/637: Performed by: OBSTETRICS & GYNECOLOGY

## 2018-05-18 PROCEDURE — 74011250636 HC RX REV CODE- 250/636: Performed by: OBSTETRICS & GYNECOLOGY

## 2018-05-18 PROCEDURE — 80048 BASIC METABOLIC PNL TOTAL CA: CPT | Performed by: EMERGENCY MEDICINE

## 2018-05-18 PROCEDURE — 82962 GLUCOSE BLOOD TEST: CPT

## 2018-05-18 PROCEDURE — 77010033678 HC OXYGEN DAILY

## 2018-05-18 PROCEDURE — 74011000250 HC RX REV CODE- 250: Performed by: EMERGENCY MEDICINE

## 2018-05-18 PROCEDURE — 65270000029 HC RM PRIVATE

## 2018-05-18 PROCEDURE — 74011250636 HC RX REV CODE- 250/636: Performed by: EMERGENCY MEDICINE

## 2018-05-18 RX ORDER — ONDANSETRON 2 MG/ML
4 INJECTION INTRAMUSCULAR; INTRAVENOUS
Status: DISCONTINUED | OUTPATIENT
Start: 2018-05-18 | End: 2018-05-18

## 2018-05-18 RX ORDER — SODIUM CHLORIDE 0.9 % (FLUSH) 0.9 %
5-10 SYRINGE (ML) INJECTION AS NEEDED
Status: DISCONTINUED | OUTPATIENT
Start: 2018-05-18 | End: 2018-05-22 | Stop reason: HOSPADM

## 2018-05-18 RX ORDER — INSULIN LISPRO 100 [IU]/ML
5 INJECTION, SOLUTION INTRAVENOUS; SUBCUTANEOUS
Status: DISCONTINUED | OUTPATIENT
Start: 2018-05-18 | End: 2018-05-19

## 2018-05-18 RX ORDER — SODIUM CHLORIDE 0.9 % (FLUSH) 0.9 %
5-10 SYRINGE (ML) INJECTION EVERY 8 HOURS
Status: DISCONTINUED | OUTPATIENT
Start: 2018-05-18 | End: 2018-05-22 | Stop reason: HOSPADM

## 2018-05-18 RX ORDER — LIDOCAINE HYDROCHLORIDE 20 MG/ML
JELLY TOPICAL AS NEEDED
Status: DISCONTINUED | OUTPATIENT
Start: 2018-05-18 | End: 2018-05-22 | Stop reason: HOSPADM

## 2018-05-18 RX ORDER — LIDOCAINE HYDROCHLORIDE 20 MG/ML
JELLY TOPICAL AS NEEDED
Status: DISCONTINUED | OUTPATIENT
Start: 2018-05-18 | End: 2018-05-18 | Stop reason: SDUPTHER

## 2018-05-18 RX ORDER — ONDANSETRON 4 MG/1
4 TABLET, ORALLY DISINTEGRATING ORAL
Status: DISCONTINUED | OUTPATIENT
Start: 2018-05-18 | End: 2018-05-22 | Stop reason: HOSPADM

## 2018-05-18 RX ORDER — HYDROMORPHONE HYDROCHLORIDE 2 MG/ML
1 INJECTION, SOLUTION INTRAMUSCULAR; INTRAVENOUS; SUBCUTANEOUS
Status: DISPENSED | OUTPATIENT
Start: 2018-05-18 | End: 2018-05-19

## 2018-05-18 RX ADMIN — Medication 10 ML: at 12:49

## 2018-05-18 RX ADMIN — HYDROMORPHONE HYDROCHLORIDE 1 MG: 2 INJECTION INTRAMUSCULAR; INTRAVENOUS; SUBCUTANEOUS at 12:50

## 2018-05-18 RX ADMIN — INSULIN LISPRO 7 UNITS: 100 INJECTION, SOLUTION INTRAVENOUS; SUBCUTANEOUS at 08:38

## 2018-05-18 RX ADMIN — METRONIDAZOLE 500 MG: 500 INJECTION, SOLUTION INTRAVENOUS at 21:30

## 2018-05-18 RX ADMIN — INSULIN GLARGINE 65 UNITS: 100 INJECTION, SOLUTION SUBCUTANEOUS at 08:39

## 2018-05-18 RX ADMIN — VANCOMYCIN HYDROCHLORIDE 1500 MG: 10 INJECTION, POWDER, LYOPHILIZED, FOR SOLUTION INTRAVENOUS at 21:30

## 2018-05-18 RX ADMIN — ONDANSETRON 4 MG: 4 TABLET, ORALLY DISINTEGRATING ORAL at 23:56

## 2018-05-18 RX ADMIN — HYDROMORPHONE HYDROCHLORIDE 1 MG: 2 INJECTION INTRAMUSCULAR; INTRAVENOUS; SUBCUTANEOUS at 03:27

## 2018-05-18 RX ADMIN — INSULIN LISPRO 5 UNITS: 100 INJECTION, SOLUTION INTRAVENOUS; SUBCUTANEOUS at 12:48

## 2018-05-18 RX ADMIN — INSULIN LISPRO 4 UNITS: 100 INJECTION, SOLUTION INTRAVENOUS; SUBCUTANEOUS at 21:56

## 2018-05-18 RX ADMIN — ONDANSETRON 4 MG: 2 INJECTION INTRAMUSCULAR; INTRAVENOUS at 03:27

## 2018-05-18 RX ADMIN — Medication 10 ML: at 21:30

## 2018-05-18 RX ADMIN — HYDROMORPHONE HYDROCHLORIDE 1 MG: 2 INJECTION INTRAMUSCULAR; INTRAVENOUS; SUBCUTANEOUS at 21:30

## 2018-05-18 RX ADMIN — HYDROMORPHONE HYDROCHLORIDE 1 MG: 2 INJECTION INTRAMUSCULAR; INTRAVENOUS; SUBCUTANEOUS at 17:08

## 2018-05-18 RX ADMIN — INSULIN LISPRO 7 UNITS: 100 INJECTION, SOLUTION INTRAVENOUS; SUBCUTANEOUS at 12:48

## 2018-05-18 RX ADMIN — METRONIDAZOLE 500 MG: 500 INJECTION, SOLUTION INTRAVENOUS at 12:49

## 2018-05-18 RX ADMIN — INSULIN LISPRO 5 UNITS: 100 INJECTION, SOLUTION INTRAVENOUS; SUBCUTANEOUS at 17:08

## 2018-05-18 RX ADMIN — VANCOMYCIN HYDROCHLORIDE 1500 MG: 10 INJECTION, POWDER, LYOPHILIZED, FOR SOLUTION INTRAVENOUS at 08:39

## 2018-05-18 RX ADMIN — INSULIN LISPRO 7 UNITS: 100 INJECTION, SOLUTION INTRAVENOUS; SUBCUTANEOUS at 17:07

## 2018-05-18 RX ADMIN — METRONIDAZOLE 500 MG: 500 INJECTION, SOLUTION INTRAVENOUS at 03:27

## 2018-05-18 RX ADMIN — LEVOFLOXACIN 750 MG: 5 INJECTION, SOLUTION INTRAVENOUS at 17:18

## 2018-05-18 RX ADMIN — INSULIN LISPRO 5 UNITS: 100 INJECTION, SOLUTION INTRAVENOUS; SUBCUTANEOUS at 08:39

## 2018-05-18 RX ADMIN — HYDROMORPHONE HYDROCHLORIDE 1 MG: 2 INJECTION INTRAMUSCULAR; INTRAVENOUS; SUBCUTANEOUS at 08:27

## 2018-05-18 NOTE — ANESTHESIA PREPROCEDURE EVALUATION
Anesthetic History   No history of anesthetic complications            Review of Systems / Medical History  Patient summary reviewed, nursing notes reviewed and pertinent labs reviewed    Pulmonary  Within defined limits                 Neuro/Psych     seizures    Psychiatric history     Cardiovascular    Hypertension          CAD    Exercise tolerance: >4 METS     GI/Hepatic/Renal  Within defined limits              Endo/Other    Diabetes    Morbid obesity     Other Findings   Comments: Vulvar abscess            Physical Exam    Airway  Mallampati: III  TM Distance: 4 - 6 cm  Neck ROM: normal range of motion   Mouth opening: Normal     Cardiovascular  Regular rate and rhythm,  S1 and S2 normal,  no murmur, click, rub, or gallop             Dental  No notable dental hx       Pulmonary  Breath sounds clear to auscultation               Abdominal  GI exam deferred       Other Findings            Anesthetic Plan    ASA: 3, emergent  Anesthesia type: general    Monitoring Plan: BIS      Induction: Intravenous  Anesthetic plan and risks discussed with: Patient

## 2018-05-18 NOTE — DIABETES MGMT
DTC Progress Note    Recommendations/ Comments: Pt with elevated  - 338 mg/dL yesterday, 338 mg/dL this am. Pt sees endorcrinologist Dr. Prabhu Lopez (last see 01/2018). Lantus started this am. Pt may require increase in basal insulin if fasting BG > 180 mg/dL over the weekend. Pt also may require increase in meal-time insulin given pt takes 12 units regular insulin with meals at home. Current hospital DM medication: Lantus 65 units, lispro with meals 5 units, lispro correction - normal sensitivity     Chart reviewed on Roselia Elder. Patient is a 40 y.o. female with known DM on metformin 1000 mg BID, lantus 65 units nightly, regular insulin 12 units with meals. A1c:   Lab Results   Component Value Date/Time    Hemoglobin A1c 8.9 (H) 05/17/2018 11:25 PM    Hemoglobin A1c 11.2 (H) 12/30/2017 12:46 AM       Recent Glucose Results: Lab Results   Component Value Date/Time     (H) 05/18/2018 06:22 AM     (H) 05/17/2018 02:01 PM    GLUCPOC 351 (H) 05/18/2018 08:23 AM    GLUCPOC 260 (H) 05/17/2018 10:06 PM    GLUCPOC 338 (H) 05/17/2018 07:21 PM        Lab Results   Component Value Date/Time    Creatinine 0.93 05/18/2018 06:22 AM     Estimated Creatinine Clearance: 112.8 mL/min (based on Cr of 0.93). Active Orders   Diet    DIET DIABETIC CONSISTENT CARB Regular        PO intake: Patient Vitals for the past 72 hrs:   % Diet Eaten   05/18/18 1055 100 %       Will continue to follow as needed.     Thank you    Key Her, [facility]    [phone]

## 2018-05-18 NOTE — PERIOP NOTES
TRANSFER - OUT REPORT:    Verbal report given to [employer] (name) on Chloe Fly  being transferred to 2121(unit) for routine post - op       Report consisted of patients Situation, Background, Assessment and   Recommendations(SBAR). Information from the following report(s) SBAR, Kardex, OR Summary, Intake/Output, MAR, Recent Results and Cardiac Rhythm SR was reviewed with the receiving nurse. Opportunity for questions and clarification was provided.       Patient transported with:   Monitor  O2 @ 2 liters  Registered Nurse

## 2018-05-18 NOTE — CONSULTS
Infectious Disease Consult Note    Reason for Consult: Abscess ( Labia majora)   Date of Consultation: May 18, 2018  Date of Admission: 5/17/2018  Referring Physician: Disha Rodríguez      HPI:     Ms Barbra Araya is a 40year old lady with hx of DM, HTN, Hyperlipidemia who is admitted and S/P I And D of Right and Left Labia majora on 5/17/18. From discussion with her and chart review,  She presented in [place] to possibly an ER [facility] ) with similar findings and was given Doxycycline (date of this unclear). The presented to [facility] in [place], [place] around 4/18 wt hyperglycemia in the setting of inability to take insulin/meds and found to have abscess in L labia majora. Notes indicate she was initially on Vancomycin, and Clindamycin and then changed to Vancomycin, Aztreonam and Flagyl. She underwent I and D on 4/23 and cultures from speaking to microbiology staff at [facility] + for S epidermidis and E faecalis. Notes indicate, antibiotics adjusted to Vancomycin and continued for 2 weeks. She denies any PO antibiotics on discharge. She says that she is \"estranged\"  from her  and has 2 children and came over to [place] and landed here. Her social situation seems complex and she has not been able to take her medications properly. She says that when she was given DM sugars, her levels were good. She admits to occasional skin lesions such as boils on her skin. Per her , episode in April was first time she had abscesses in genital area. Says she was sexually active in April before her admission at [place] with \"another \"  Male. To her knowledge, he did not have any boils, skin lesions. She admits to fevers and chills. Says she just got \"periods\" today as well. She denies a hx of any other STI. So far this admission, she is noted to be febrile up to 102 yesterday.   Leukocytosis up to 12 on admission, Lactate was 1.9 ( max), UA 0-4 WBC with positive nitrite, trace leukocyte esterase, negative urine pregnancy and normal LFT and renal function. Urine tox  + for cannabinoids. She says that she does not use any illicit drugs but is around her  who smokes \"marijuana\". Hep B S ag negative in past screening. CT A/P with 7 by 5 by 6.5 cm abscess on R labia and 2 cm abscess on L labia. She underwent I and D on  and notes indicate \" A 7 x 6 cm abscess was palpated being largest on the right side of the midline in the [place] but tracking past the midline to the left side of the Mons tapering as it transverses to the left\" . \"It was then entered bluntly with the operators finger and approximately 150 cc of pus was obtained\"    Blood cultures have been sent this admission as well. Blood cx from [place] negative per discussion per micro staff at the other facility. Past Medical History:  Past Medical History:   Diagnosis Date    Calculus of kidney     Diabetes ([facility] [address]     Hyperlipidemia     Hypertension     MI (myocardial infarction) ([facility] [address] [facility]     Other ill-defined conditions(799.89)     Neuropathies    Pancreatitis     due to hypertriglyceridemia    Vitamin D deficiency 2017         Surgical History:  Past Surgical History:   Procedure Laterality Date    CARDIAC SURG PROCEDURE UNLIST      cath    CYSTOSCOPY      HX  SECTION      x 2    HX CHOLECYSTECTOMY      HX GYN      tubes clamped    HX TONSIL AND ADENOIDECTOMY      HX WISDOM TEETH EXTRACTION           Family History:   Family History   Problem Relation Age of Onset    Hypertension Mother     Stroke Mother     Diabetes Mother     Cancer Mother 59     breast?    Heart Disease Father     Diabetes Father     Cancer Paternal Grandmother 79     ovarian         Social History:     · Living Situation: complex, she says \"up in the air\" and came from [place], says \"estraged from \".  Has 2 children per her  · Tobacco:restarted smoking cigarettes   · Alcohol:denied   · Illicit Drugs:denied   · Sexual History: active last in April ( with \"another\" male)   · Travel History: came from [place]   · Exposures:   · Outdoor/Hiking: denied  · Animal/Pet: denied   · Construction: denied  · Hot Tub: denied   · Brackish/stagnant exposure: denied  · TB exposure: denied  · Sick Contacts: denied     Allergies: Allergies   Allergen Reactions    Toradol [Ketorolac Tromethamine] Unknown (comments)     Seizure like symptoms per pt.  Augmentin [Amoxicillin-Pot Clavulanate] Swelling    Demerol [Meperidine] Swelling    Heparin Hives    Ibuprofen Diarrhea and Nausea and Vomiting     Muscle tightness    Keflex [Cephalexin] Shortness of Breath    Morphine Swelling    Nubain [Nalbuphine] Hives    Pcn [Penicillins] Swelling    Sulfa (Sulfonamide Antibiotics) Unknown (comments)         Review of Systems:     Gen: + fever, and chills    HEENT: Negative for headache, vision changes, ear ache or discharge, tingling,  nasal discharge, swelling, lumps in neck, sores on tongue   CV:  Negative for chest pain, dyspnea on exertion, leg edema   Lungs: Negative for shortness of breath, cough, wheezing   Abdomen: Negative for abdominal pain, nausea, vomiting, diarrhea, constipation   Genitourinary: Negative for genital pain or genital discharge     Neuro: Negative for headache, numbness, tingling, extremity weakness,  syncope, seizures    Skin + abscess in genital area    Musculoskeletal: Negative for joint pain, joint swelling, joint erythema    Endocrine: Negative for high or low blood sugars, heat or cold intolerance    Psych: Negative for manic behavior     Medications:  No current facility-administered medications on file prior to encounter. Current Outpatient Prescriptions on File Prior to Encounter   Medication Sig Dispense Refill    atorvastatin (LIPITOR) 40 mg tablet Take 1 Tab by mouth nightly. 30 Tab 11    fenofibrate nanocrystallized (TRICOR) 145 mg tablet Take 1 Tab by mouth daily.  30 Tab 11    omega-3 acid ethyl esters (LOVAZA) 1 gram capsule Take 2 Caps by mouth two (2) times a day. 120 Cap 11    metFORMIN (GLUCOPHAGE) 1,000 mg tablet Take 1 Tab by mouth two (2) times daily (with meals). 60 Tab 11    ergocalciferol (VITAMIN D2) 50,000 unit capsule Take 1 Cap by mouth every seven (7) days. (Patient taking differently: Take 50,000 Units by mouth every seven (7) days. Saturdays) 4 Cap 11    gabapentin (NEURONTIN) 300 mg capsule Take 2 Caps by mouth three (3) times daily. 180 Cap 11    albuterol (PROAIR HFA) 90 mcg/actuation inhaler Take 2 Puffs by inhalation every four (4) hours as needed for Wheezing.              Physical Exam:    Vitals: Patient Vitals for the past 24 hrs:   Temp Pulse Resp BP SpO2   05/18/18 0815 98.3 °F (36.8 °C) 78 16 120/66 98 %   05/18/18 0408 97.8 °F (36.6 °C) 75 14 118/72 99 %   05/18/18 0058 97.3 °F (36.3 °C) 73 14 115/64 99 %   05/17/18 2345 98.2 °F (36.8 °C) 77 15 107/59 98 %   05/17/18 2330 - 81 16 115/60 98 %   05/17/18 2315 - 77 16 110/62 96 %   05/17/18 2300 98.5 °F (36.9 °C) 78 16 118/58 97 %   05/17/18 2245 - 81 16 118/62 97 %   05/17/18 2230 - 84 15 132/69 97 %   05/17/18 2215 - 91 18 130/70 97 %   05/17/18 2210 - 95 10 134/69 95 %   05/17/18 2205 - 100 21 133/75 93 %   05/17/18 2200 (!) 102.1 °F (38.9 °C) (!) 106 22 133/77 93 %   05/17/18 2158 - - - 139/68 94 %   05/17/18 1945 - - - 131/80 99 %   05/17/18 1943 - - - 144/88 99 %   05/17/18 1942 (!) 101.1 °F (38.4 °C) 95 16 125/78 100 %   05/17/18 1918 - - - 140/78 99 %   05/17/18 1803 100.2 °F (37.9 °C) 92 14 139/84 100 %   05/17/18 1800 - - - - 99 %   05/17/18 1635 - - - - 100 %   ·   · GEN: NAD  · HEENT: Normocephalic, atraumatic,  no scleral icterus,  no cervical  lymphadenopathy, no sinus tenderness, no thrush, dentition fair   · CV: S1, S2 heard regularly  · Lungs: Clear to auscultation bilaterally  · Abdomen: soft, non distended, non tender,  no CVA tenderness   · Genitourinary: + labia major examined, no vesicles, no erythema +packing noted R major where I and D done, has an area of induration near site of I and  That is very tender to touch, no increased warmth, + I and D site noted L labia noted, nontender, no discharge from either sites during my exam,  no zepeda  · Extremities: no edema  · Neuro: Alert, oriented to self, place and situation, moves all extremities to commands, verbal   · Skin: no rash  · Psych: good affect, good eye contact, non tearful   · Lines: peripheral       Labs:   Recent Results (from the past 24 hour(s))   CBC WITH AUTOMATED DIFF    Collection Time: 05/17/18  2:01 PM   Result Value Ref Range    WBC 12.6 (H) 3.6 - 11.0 K/uL    RBC 4.19 3.80 - 5.20 M/uL    HGB 11.5 11.5 - 16.0 g/dL    HCT 34.5 (L) 35.0 - 47.0 %    MCV 82.3 80.0 - 99.0 FL    MCH 27.4 26.0 - 34.0 PG    MCHC 33.3 30.0 - 36.5 g/dL    RDW 16.3 (H) 11.5 - 14.5 %    PLATELET 674 208 - 419 K/uL    MPV 10.2 8.9 - 12.9 FL    NRBC 0.0 0  WBC    ABSOLUTE NRBC 0.00 0.00 - 0.01 K/uL    NEUTROPHILS 82 (H) 32 - 75 %    LYMPHOCYTES 12 12 - 49 %    MONOCYTES 4 (L) 5 - 13 %    EOSINOPHILS 1 0 - 7 %    BASOPHILS 0 0 - 1 %    IMMATURE GRANULOCYTES 1 (H) 0.0 - 0.5 %    ABS. NEUTROPHILS 10.3 (H) 1.8 - 8.0 K/UL    ABS. LYMPHOCYTES 1.5 0.8 - 3.5 K/UL    ABS. MONOCYTES 0.6 0.0 - 1.0 K/UL    ABS. EOSINOPHILS 0.1 0.0 - 0.4 K/UL    ABS. BASOPHILS 0.0 0.0 - 0.1 K/UL    ABS. IMM.  GRANS. 0.1 (H) 0.00 - 0.04 K/UL    DF AUTOMATED     METABOLIC PANEL, COMPREHENSIVE    Collection Time: 05/17/18  2:01 PM   Result Value Ref Range    Sodium 130 (L) 136 - 145 mmol/L    Potassium 4.6 3.5 - 5.1 mmol/L    Chloride 100 97 - 108 mmol/L    CO2 22 21 - 32 mmol/L    Anion gap 8 5 - 15 mmol/L    Glucose 398 (H) 65 - 100 mg/dL    BUN 30 (H) 6 - 20 MG/DL    Creatinine 1.06 (H) 0.55 - 1.02 MG/DL    BUN/Creatinine ratio 28 (H) 12 - 20      GFR est AA >60 >60 ml/min/1.73m2    GFR est non-AA 58 (L) >60 ml/min/1.73m2    Calcium 9.2 8.5 - 10.1 MG/DL    Bilirubin, total 0.6 0.2 - 1.0 MG/DL    ALT (SGPT) 13 12 - 78 U/L    AST (SGOT) <3 (L) 15 - 37 U/L    Alk.  phosphatase 92 45 - 117 U/L    Protein, total 8.6 (H) 6.4 - 8.2 g/dL    Albumin 3.8 3.5 - 5.0 g/dL    Globulin 4.8 (H) 2.0 - 4.0 g/dL    A-G Ratio 0.8 (L) 1.1 - 2.2     CULTURE, BLOOD    Collection Time: 05/17/18  2:01 PM   Result Value Ref Range    Special Requests: NO SPECIAL REQUESTS      Culture result: NO GROWTH AFTER 16 HOURS     LACTIC ACID    Collection Time: 05/17/18  2:01 PM   Result Value Ref Range    Lactic acid 1.9 0.4 - 2.0 MMOL/L   CULTURE, BLOOD    Collection Time: 05/17/18  2:01 PM   Result Value Ref Range    Special Requests: NO SPECIAL REQUESTS      Culture result: NO GROWTH AFTER 16 HOURS     EKG, 12 LEAD, INITIAL    Collection Time: 05/17/18  2:40 PM   Result Value Ref Range    Ventricular Rate 83 BPM    Atrial Rate 83 BPM    P-R Interval 114 ms    QRS Duration 84 ms    Q-T Interval 368 ms    QTC Calculation (Bezet) 432 ms    Calculated P Axis 27 degrees    Calculated R Axis 17 degrees    Calculated T Axis 49 degrees    Diagnosis       ** Poor data quality, interpretation may be adversely affected  Normal sinus rhythm    When compared with ECG of 28-JAN-2018 20:23,  No significant change was found  Confirmed by Galina Dean MD, Astrid Ludwig ([id number]) on 5/17/2018 2:49:54 PM     URINALYSIS W/ REFLEX CULTURE    Collection Time: 05/17/18  4:45 PM   Result Value Ref Range    Color RED      Appearance CLOUDY (A) CLEAR      Specific gravity >1.030 (H) 1.003 - 1.030    pH (UA) 5.0 5.0 - 8.0      Protein 100 (A) NEG mg/dL    Glucose 500 (A) NEG mg/dL    Ketone TRACE (A) NEG mg/dL    Bilirubin NEGATIVE  NEG      Blood LARGE (A) NEG      Urobilinogen 1.0 0.2 - 1.0 EU/dL    Nitrites POSITIVE (A) NEG      Leukocyte Esterase TRACE (A) NEG      WBC 0-4 0 - 4 /hpf    RBC >100 (H) 0 - 5 /hpf    Epithelial cells FEW FEW /lpf    Bacteria NEGATIVE  NEG /hpf    UA:UC IF INDICATED CULTURE NOT INDICATED BY UA RESULT CNI     HCG URINE, QL. - POC    Collection Time: 05/17/18  4:53 PM   Result Value Ref Range    Pregnancy test,urine (POC) NEGATIVE  NEG     GLUCOSE, POC    Collection Time: 05/17/18  7:21 PM   Result Value Ref Range    Glucose (POC) 338 (H) 65 - 100 mg/dL    Performed by Unkown     EKG, 12 LEAD, INITIAL    Collection Time: 05/17/18  7:22 PM   Result Value Ref Range    Ventricular Rate 94 BPM    Atrial Rate 94 BPM    P-R Interval 128 ms    QRS Duration 84 ms    Q-T Interval 364 ms    QTC Calculation (Bezet) 455 ms    Calculated P Axis 40 degrees    Calculated R Axis -3 degrees    Calculated T Axis 34 degrees    Diagnosis       Normal sinus rhythm  Normal ECG  When compared with ECG of 17-MAY-2018 14:40,  No significant change was found  Confirmed by Trevor Smith ([id number]) on 5/18/2018 11:21:10 AM     CULTURE, BLOOD, PAIRED    Collection Time: 05/17/18  7:32 PM   Result Value Ref Range    Special Requests: NO SPECIAL REQUESTS      Culture result: NO GROWTH AFTER 10 HOURS     LACTIC ACID    Collection Time: 05/17/18  7:36 PM   Result Value Ref Range    Lactic acid 1.5 0.4 - 2.0 MMOL/L   CULTURE, WOUND W GRAM STAIN    Collection Time: 05/17/18  9:29 PM   Result Value Ref Range    Special Requests: NO SPECIAL REQUESTS      GRAM STAIN FEW WBCS SEEN      GRAM STAIN NO DEFINITE ORGANISM SEEN      Culture result: PENDING    GLUCOSE, POC    Collection Time: 05/17/18 10:06 PM   Result Value Ref Range    Glucose (POC) 260 (H) 65 - 100 mg/dL    Performed by Patt Keyes    LACTIC ACID    Collection Time: 05/17/18 11:25 PM   Result Value Ref Range    Lactic acid 0.6 0.4 - 2.0 MMOL/L   HEMOGLOBIN A1C WITH EAG    Collection Time: 05/17/18 11:25 PM   Result Value Ref Range    Hemoglobin A1c 8.9 (H) 4.2 - 6.3 %    Est. average glucose 679 mg/dL   METABOLIC PANEL, BASIC    Collection Time: 05/18/18  6:22 AM   Result Value Ref Range    Sodium 134 (L) 136 - 145 mmol/L    Potassium 4.4 3.5 - 5.1 mmol/L    Chloride 104 97 - 108 mmol/L    CO2 24 21 - 32 mmol/L    Anion gap 6 5 - 15 mmol/L    Glucose 311 (H) 65 - 100 mg/dL    BUN 30 (H) 6 - 20 MG/DL    Creatinine 0.93 0.55 - 1.02 MG/DL    BUN/Creatinine ratio 32 (H) 12 - 20      GFR est AA >60 >60 ml/min/1.73m2    GFR est non-AA >60 >60 ml/min/1.73m2    Calcium 8.4 (L) 8.5 - 10.1 MG/DL   CBC W/O DIFF    Collection Time: 05/18/18  6:22 AM   Result Value Ref Range    WBC 9.5 3.6 - 11.0 K/uL    RBC 3.58 (L) 3.80 - 5.20 M/uL    HGB 9.7 (L) 11.5 - 16.0 g/dL    HCT 29.8 (L) 35.0 - 47.0 %    MCV 83.2 80.0 - 99.0 FL    MCH 27.1 26.0 - 34.0 PG    MCHC 32.6 30.0 - 36.5 g/dL    RDW 16.5 (H) 11.5 - 14.5 %    PLATELET 173 012 - 580 K/uL    MPV 10.6 8.9 - 12.9 FL    NRBC 0.0 0  WBC    ABSOLUTE NRBC 0.00 0.00 - 0.01 K/uL   GLUCOSE, POC    Collection Time: 05/18/18  8:23 AM   Result Value Ref Range    Glucose (POC) 351 (H) 65 - 100 mg/dL    Performed by Atiya Verdin (PCT)    GLUCOSE, POC    Collection Time: 05/18/18 12:36 PM   Result Value Ref Range    Glucose (POC) 340 (H) 65 - 100 mg/dL    Performed by [id number] [facility]        Microbiology Data:       Blood: pending   Abscess 5/17 pending      Cultures obtained from [facility]    Wound abscess vulva 4/23/18     Heavy Staphylococcus epidermidis      Clindamycin resistant     Daptomycin Sensitive     Vancomycin ALMA 3 susceptible    Enterococcus faecalis      Ampicillin ALMA 1 susceptible      Daptomycin 2 susceptible      Vancomycin 2 susceptible      Gentamicin synergy < 500 susceptible      Streptomycin synergy < 1000 susceptible       Blood cx 4/26 negative       Imaging:     CT A/P  FINDINGS:   LUNG BASES: Clear. INCIDENTALLY IMAGED HEART AND MEDIASTINUM: Unremarkable. LIVER: No mass or biliary dilatation. GALLBLADDER: Prior cholecystectomy  SPLEEN: No mass. PANCREAS: No mass or ductal dilatation. ADRENALS: Unremarkable. KIDNEYS: No mass, calculus, or hydronephrosis. STOMACH: Unremarkable. SMALL BOWEL: No dilatation or wall thickening.   COLON: No dilatation or wall thickening. APPENDIX: Unremarkable. Axial image 74  PERITONEUM: No ascites or pneumoperitoneum. RETROPERITONEUM: No lymphadenopathy or aortic aneurysm. REPRODUCTIVE ORGANS: Anteverted uterus. Bilateral tubal ligation clips. Left  ovary contains a cyst (image 79). URINARY BLADDER: No mass or calculus. BONES: No destructive bone lesion. ADDITIONAL COMMENTS:     New right mons macrolobulated abscess that measures 7 x 5.2 x 6.5 cm and is  associated with soft tissue swelling. This abscess contains mostly fluid but a  little bit of air. New left mons abscess that contains only fluid and measures 2  x 2.1 x 1.9 cm (axial image 103 coronal image 61).    IMPRESSION  IMPRESSION:  Acute right mons 7 cm abscess  Acute left mons 2 cm abscess. This result was verbally relayed by myself to Dr. Beverly An at 1802 hours  789       Procedures: I and D labia major abscess 5/17     Assessment / Plan:     Ms Comfort Bhat is a 40year old lady with hx of DM, HTN, Hyperlipidemia who is admitted and S/P I And D of Right and Left Labia majora on 5/17/18. From discussion with her and chart review,  She presented in [place] to possibly an ER [facility] ) with similar findings and was given Doxycycline (date of this unclear). The presented to [facility] in [place], [place] around 4/18 wt hyperglycemia in the setting of inability to take insulin/meds and found to have abscess in L labia majora. Notes indicate she was initially on Vancomycin, and Clindamycin and then changed to Vancomycin, Aztreonam and Flagyl. She underwent I and D on 4/23 and cultures from speaking to microbiology staff at [facility] + for S epidermidis and E faecalis. Notes indicate, antibiotics adjusted to Vancomycin and continued for 2 weeks. She denies any PO antibiotics on discharge. She says that she is \"estranged\"  from her  and has 2 children and came over to [place] and landed here.   Her social situation seems complex and she has not been able to take her medications properly. She says that when she was given DM sugars, her levels were good. She admits to occasional skin lesions such as boils on her skin. Per her , episode in April was first time she had abscesses in genital area. Says she was sexually active in April before her admission at [place] with \"another \"  Male. To her knowledge, he did not have any boils, skin lesions. She admits to fevers and chills. Says she just got \"periods\" today as well. She denies a hx of any other STI. So far this admission, she is noted to be febrile up to 102 yesterday. Leukocytosis up to 12 on admission, Lactate was 1.9 ( max), UA 0-4 WBC with positive nitrite, trace leukocyte esterase, negative urine pregnancy and normal LFT and renal function. Urine tox 2010 + for cannabinoids. She says that she does not use any illicit drugs but is around her  who smokes \"marijuana\". Hep B S ag negative in past screening. CT A/P with 7 by 5 by 6.5 cm abscess on R labia and 2 cm abscess on L labia. She underwent I and D on 5/17 and notes indicate \" A 7 x 6 cm abscess was palpated being largest on the right side of the midline in the [place] but tracking past the midline to the left side of the [place] tapering as it transverses to the left\" .  \"It was then entered bluntly with the operators finger and approximately 150 cc of pus was obtained\"    Cultures obtained from [facility]    Wound abscess vulva 4/23/18     Heavy Staphylococcus epidermidis      Clindamycin resistant     Daptomycin Sensitive     Vancomycin ALMA 3 susceptible    Enterococcus faecalis      Ampicillin ALMA 1 susceptible      Daptomycin 2 susceptible      Vancomycin 2 susceptible      Gentamicin synergy < 500 susceptible      Streptomycin synergy < 1000 susceptible        Blood cx 4/26 negative     1) Labia major abscess:   Previous cx 4/23 at [facility] Heavy Staphylococcus epidermidis  And Ampicillin sensitive E faecalis S/P I and D 4/23 and 4/25 in [place] , S/P tx wt Vancomycin for 2 weeks   S/P I and D 5/17  Unclear if worsening if due to insufficient drainage previously. Risk factors for recurrent infection also inadequately managed DM. Await surgical cultures from 5/17 and blood cultures   Agree with continuing current regimen with Vancomycin IV, Levaquin and flagyl for now  Based on culture results, will adjust  antimicrobials   If source control has been achieved with aggressive drainage and she still spikes fevers , may need to change Vancomycin to IV Daptomyin at 6mg/KG with baseline CK monitoring. If Daptomycin given, hold statins due to drug interactions. PCN allergy complicates treatment as well   Social situation seems very complex that complicates care in regards to PO antibiotics after DC   Given her that she has required 3 I and D's would continue IV therapy as you are doing and await cultures this weekend   Discussed with her regarding screening for STI including gonorrhea, chlamydia, syphillis and HIV and she consented to all and agrees to have screening done   Risks of antibiotics including  GI, renal, hematological toxicities and CDI risk explained. Also antibiotics can be associated with seizures, which she says she has had when she received Toradol    2) DM A1C 8.9  Per primary team     3) Allergies:  PCN : \"breathing \" trouble per patient   Keflex \" breathing\" trouble per patient   Sulfa unclear reaction fully     4) DVT px per primary team     5) Screening : urine pregnancy negative     6) Smoking cessation emphasized as it complicates wound healing and infection  Care     Discussed with Dr Cassandra Johnston today. Dr Guerrero Later covering over weekend. Discussed with nursing as well today           Thank for the opportunity to participate in the care of this patient. Please contact with questions or concerns.      Milly Boogie,   1:33 PM

## 2018-05-18 NOTE — CDMP QUERY
1 of 2    Dr. Tenzin Duff :  Please clarify if this patient is (was) being treated/managed for:     => Sepsis POA in setting of failed outpatient  treatment,   =>SIRS response in setting of vulva/mons abscess.  => Other explanation of clinical findings  => Clinically Undetermined (no explanation for clinical findings)    The medical record reflects the following clinical findings, treatment, and risk factors. Risk Factors:  presents for Right vulvar/Mons abscess, discharged on 5/115/18. Clinical Indicators:  wbc 12-9, left shift, fever 102.1-100.2, pulse 106-100-05, respiratory 21-14,  Treatment: flagyl iv; compazine; levaquin, vancomycin, [email or web address], 1liter bolus    Please clarify and document your clinical opinion in the progress notes and discharge summary including the definitive and/or presumptive diagnosis, (suspected or probable), related to the above clinical findings. Please include clinical findings supporting your diagnosis. Thank Jyotsna Mccullough  [address]; [facility];[id number]

## 2018-05-18 NOTE — PROGRESS NOTES
Gynecology Progress Note    Ilya Díaz    Assessment/Plan: POD1 s/p I&D of vulvar (mons) abscess    Recurrent vulvar abscesses s/p recent 3 week hospital admission in [place] (will have records scanned into EMR) including I&D of left labia majora I&D in ED followed 2 days later by debridement and wound vac placement; now POD1  - dressing removed this morning, packing left in place  - wound culture pending  - continue Vancomycin/Levaquin/Flagyl  - consult ID for further assistance - greatly appreciate the help    Medical co-morbidities (IDDM, HTN, recent REN, class 3 obesity) per IM - greatly appreciate the help      Slept well overnight. Some nausea this morning but good appetite. Pain controlled with dilaudid. Has yet to void since procedure last night. SCDs in place.      Orders/Charges: High    Vitals:  Visit Vitals    /66    Pulse 78    Temp 98.3 °F (36.8 °C)    Resp 16    Ht 5' 8\" (1.727 m)  Comment: 18    Wt 120 kg (264 lb 8.8 oz)    LMP 2018    SpO2 98%    Breastfeeding No    BMI 40.22 kg/m2     Temp (24hrs), Av.1 °F (37.3 °C), Min:97.3 °F (36.3 °C), Max:102.1 °F (38.9 °C)      Last 24hr Input/Output:    Intake/Output Summary (Last 24 hours) at 18 0954  Last data filed at 18 2351   Gross per 24 hour   Intake             1400 ml   Output                0 ml   Net             1400 ml          Exam:  General: alert, cooperative, no distress, appears stated age, morbidly obese     Lung: clear to auscultation bilaterally     Heart: regular rate and rhythm, S1, S2 normal, no murmur, click, rub or gallop     Abdomen: soft, obese     Vulva: dressing removed, wound clean and dry without any evidence of surrounding infection, packing in place, left labial incision well-healed without evidence of infection     Extremities: extremities normal, atraumatic, no cyanosis or edema, SCDs in place      Labs: Lab Results   Component Value Date/Time    WBC 9.5 2018 06:22 AM WBC 12.6 (H) 05/17/2018 02:01 PM    WBC 7.2 01/28/2018 08:20 PM    WBC 5.0 12/31/2017 04:41 AM    WBC 8.7 12/29/2017 10:32 PM    WBC 9.9 11/28/2017 06:21 PM    WBC 5.0 10/10/2017 03:25 AM    WBC 4.1 10/09/2017 03:28 AM    WBC 4.9 10/08/2017 03:43 AM    WBC 6.8 10/07/2017 12:37 AM    WBC 9.1 10/05/2017 08:07 PM    WBC 8.7 10/05/2017 04:10 PM    WBC 7.2 10/01/2017 10:58 PM    WBC 6.9 04/17/2011 06:00 AM    WBC 6.8 04/16/2011 07:25 AM    WBC 11.3 (H) 04/15/2011 01:30 AM    WBC 9.3 03/10/2011 03:20 AM    WBC 9.1 03/09/2011 03:45 AM    WBC 8.9 03/08/2011 03:35 AM    WBC 10.0 03/07/2011 11:30 PM    WBC 10.7 03/07/2011 09:21 PM    WBC 8.5 01/26/2011 04:15 AM    WBC 9.0 01/25/2011 02:00 AM    WBC 9.1 12/14/2010 03:35 AM    WBC 9.4 12/12/2010 11:30 PM    HGB 9.7 (L) 05/18/2018 06:22 AM    HGB 11.5 05/17/2018 02:01 PM    HGB 11.8 01/28/2018 08:20 PM    HGB 11.8 12/31/2017 04:41 AM    HGB 11.3 (L) 12/29/2017 10:32 PM    HGB 12.0 11/28/2017 06:21 PM    HGB 9.6 (L) 10/10/2017 03:25 AM    HGB 9.9 (L) 10/09/2017 03:28 AM    HGB 10.3 (L) 10/08/2017 03:43 AM    HGB 11.8 10/07/2017 12:37 AM    HGB  10/05/2017 08:07 PM     UNABLE TO OBTAIN ACCURATE RESULTS DUE TO GROSS LIPEMIA    HGB  10/05/2017 04:10 PM     UNABLE TO OBTAIN ACCURATE RESULTS DUE TO GROSS LIPEMIA    HGB 11.8 10/01/2017 10:58 PM    HGB 11.6 04/17/2011 06:00 AM    HGB 11.5 04/16/2011 07:25 AM    HGB 13.2 04/15/2011 01:30 AM    HGB 13.9 03/10/2011 03:20 AM    HGB 11.0 (L) 03/09/2011 03:45 AM    HGB 11.8 03/08/2011 03:35 AM    HGB 11.8 03/07/2011 11:30 PM    HGB 12.1 03/07/2011 09:21 PM    HGB 11.9 01/26/2011 04:15 AM    HGB 13.6 01/25/2011 02:00 AM    HGB 13.1 12/14/2010 03:35 AM    HGB 15.2 12/12/2010 11:30 PM    HCT 29.8 (L) 05/18/2018 06:22 AM    HCT 34.5 (L) 05/17/2018 02:01 PM    HCT 34.3 (L) 01/28/2018 08:20 PM    HCT 32.3 (L) 12/31/2017 04:41 AM    HCT 33.0 (L) 12/29/2017 10:32 PM    HCT 35.0 11/28/2017 06:21 PM    HCT 29.5 (L) 10/10/2017 03:25 AM    HCT 30.5 (L) 10/09/2017 03:28 AM    HCT 31.5 (L) 10/08/2017 03:43 AM    HCT 34.0 (L) 10/07/2017 12:37 AM    HCT 36.1 10/05/2017 08:07 PM    HCT 37.2 10/05/2017 04:10 PM    HCT 34.8 (L) 10/01/2017 10:58 PM    HCT 33.0 (L) 04/17/2011 06:00 AM    HCT 31.5 (L) 04/16/2011 07:25 AM    HCT 39.6 04/15/2011 01:30 AM    HCT 37.7 03/10/2011 03:20 AM    HCT 33.0 (L) 03/09/2011 03:45 AM    HCT 33.6 (L) 03/08/2011 03:35 AM    HCT 34.0 (L) 03/07/2011 11:30 PM    HCT 34.7 (L) 03/07/2011 09:21 PM    HCT 34.4 (L) 01/26/2011 04:15 AM    HCT 37.7 01/25/2011 02:00 AM    HCT 37.4 12/14/2010 03:35 AM    HCT 39.2 12/12/2010 11:30 PM    PLATELET 402 76/75/1761 06:22 AM    PLATELET 798 76/85/5928 02:01 PM    PLATELET 406 49/66/0007 08:20 PM    PLATELET 143 (L) 33/32/0618 04:41 AM    PLATELET 988 22/06/2209 10:32 PM    PLATELET 309 (L) 06/16/2583 06:21 PM    PLATELET 92 (L) 74/01/3614 03:25 AM    PLATELET 72 (L) 80/80/5407 03:28 AM    PLATELET 75 (L) 87/14/2111 03:43 AM    PLATELET 95 (L) 72/68/2933 12:37 AM    PLATELET 446 58/68/3759 08:07 PM    PLATELET 185 (L) 02/58/9212 04:10 PM    PLATELET 014 (L) 35/89/3122 10:58 PM    PLATELET 875 (L) 64/38/8607 06:00 AM    PLATELET 333 (L) 29/11/3595 07:25 AM    PLATELET 281 02/22/0043 01:30 AM    PLATELET 137 72/28/7686 03:20 AM    PLATELET 939 (L) 22/26/8794 03:45 AM    PLATELET 320 22/65/8208 03:35 AM    PLATELET 315 31/06/3551 11:30 PM    PLATELET 865 00/28/2275 09:21 PM    PLATELET 401 05/48/4664 04:15 AM    PLATELET 870 14/38/6203 02:00 AM    PLATELET 145 76/62/2721 03:35 AM    PLATELET 435 52/10/3077 11:30 PM       Recent Results (from the past 24 hour(s))   CBC WITH AUTOMATED DIFF    Collection Time: 05/17/18  2:01 PM   Result Value Ref Range    WBC 12.6 (H) 3.6 - 11.0 K/uL    RBC 4.19 3.80 - 5.20 M/uL    HGB 11.5 11.5 - 16.0 g/dL    HCT 34.5 (L) 35.0 - 47.0 %    MCV 82.3 80.0 - 99.0 FL    MCH 27.4 26.0 - 34.0 PG    MCHC 33.3 30.0 - 36.5 g/dL    RDW 16.3 (H) 11.5 - 14.5 %    PLATELET 501 981 - 022 K/uL MPV 10.2 8.9 - 12.9 FL    NRBC 0.0 0  WBC    ABSOLUTE NRBC 0.00 0.00 - 0.01 K/uL    NEUTROPHILS 82 (H) 32 - 75 %    LYMPHOCYTES 12 12 - 49 %    MONOCYTES 4 (L) 5 - 13 %    EOSINOPHILS 1 0 - 7 %    BASOPHILS 0 0 - 1 %    IMMATURE GRANULOCYTES 1 (H) 0.0 - 0.5 %    ABS. NEUTROPHILS 10.3 (H) 1.8 - 8.0 K/UL    ABS. LYMPHOCYTES 1.5 0.8 - 3.5 K/UL    ABS. MONOCYTES 0.6 0.0 - 1.0 K/UL    ABS. EOSINOPHILS 0.1 0.0 - 0.4 K/UL    ABS. BASOPHILS 0.0 0.0 - 0.1 K/UL    ABS. IMM. GRANS. 0.1 (H) 0.00 - 0.04 K/UL    DF AUTOMATED     METABOLIC PANEL, COMPREHENSIVE    Collection Time: 05/17/18  2:01 PM   Result Value Ref Range    Sodium 130 (L) 136 - 145 mmol/L    Potassium 4.6 3.5 - 5.1 mmol/L    Chloride 100 97 - 108 mmol/L    CO2 22 21 - 32 mmol/L    Anion gap 8 5 - 15 mmol/L    Glucose 398 (H) 65 - 100 mg/dL    BUN 30 (H) 6 - 20 MG/DL    Creatinine 1.06 (H) 0.55 - 1.02 MG/DL    BUN/Creatinine ratio 28 (H) 12 - 20      GFR est AA >60 >60 ml/min/1.73m2    GFR est non-AA 58 (L) >60 ml/min/1.73m2    Calcium 9.2 8.5 - 10.1 MG/DL    Bilirubin, total 0.6 0.2 - 1.0 MG/DL    ALT (SGPT) 13 12 - 78 U/L    AST (SGOT) <3 (L) 15 - 37 U/L    Alk.  phosphatase 92 45 - 117 U/L    Protein, total 8.6 (H) 6.4 - 8.2 g/dL    Albumin 3.8 3.5 - 5.0 g/dL    Globulin 4.8 (H) 2.0 - 4.0 g/dL    A-G Ratio 0.8 (L) 1.1 - 2.2     CULTURE, BLOOD    Collection Time: 05/17/18  2:01 PM   Result Value Ref Range    Special Requests: NO SPECIAL REQUESTS      Culture result: NO GROWTH AFTER 16 HOURS     LACTIC ACID    Collection Time: 05/17/18  2:01 PM   Result Value Ref Range    Lactic acid 1.9 0.4 - 2.0 MMOL/L   CULTURE, BLOOD    Collection Time: 05/17/18  2:01 PM   Result Value Ref Range    Special Requests: NO SPECIAL REQUESTS      Culture result: NO GROWTH AFTER 16 HOURS     EKG, 12 LEAD, INITIAL    Collection Time: 05/17/18  2:40 PM   Result Value Ref Range    Ventricular Rate 83 BPM    Atrial Rate 83 BPM    P-R Interval 114 ms    QRS Duration 84 ms    Q-T Interval 368 ms    QTC Calculation (Bezet) 432 ms    Calculated P Axis 27 degrees    Calculated R Axis 17 degrees    Calculated T Axis 49 degrees    Diagnosis       ** Poor data quality, interpretation may be adversely affected  Normal sinus rhythm    When compared with ECG of 28-JAN-2018 20:23,  No significant change was found  Confirmed by Lisa Cheng MD, Manuel Saldivar ([id number]) on 5/17/2018 2:49:54 PM     URINALYSIS W/ REFLEX CULTURE    Collection Time: 05/17/18  4:45 PM   Result Value Ref Range    Color RED      Appearance CLOUDY (A) CLEAR      Specific gravity >1.030 (H) 1.003 - 1.030    pH (UA) 5.0 5.0 - 8.0      Protein 100 (A) NEG mg/dL    Glucose 500 (A) NEG mg/dL    Ketone TRACE (A) NEG mg/dL    Bilirubin NEGATIVE  NEG      Blood LARGE (A) NEG      Urobilinogen 1.0 0.2 - 1.0 EU/dL    Nitrites POSITIVE (A) NEG      Leukocyte Esterase TRACE (A) NEG      WBC 0-4 0 - 4 /hpf    RBC >100 (H) 0 - 5 /hpf    Epithelial cells FEW FEW /lpf    Bacteria NEGATIVE  NEG /hpf    UA:UC IF INDICATED CULTURE NOT INDICATED BY UA RESULT CNI     HCG URINE, QL. - POC    Collection Time: 05/17/18  4:53 PM   Result Value Ref Range    Pregnancy test,urine (POC) NEGATIVE  NEG     GLUCOSE, POC    Collection Time: 05/17/18  7:21 PM   Result Value Ref Range    Glucose (POC) 338 (H) 65 - 100 mg/dL    Performed by Unkown     EKG, 12 LEAD, INITIAL    Collection Time: 05/17/18  7:22 PM   Result Value Ref Range    Ventricular Rate 94 BPM    Atrial Rate 94 BPM    P-R Interval 128 ms    QRS Duration 84 ms    Q-T Interval 364 ms    QTC Calculation (Bezet) 455 ms    Calculated P Axis 40 degrees    Calculated R Axis -3 degrees    Calculated T Axis 34 degrees    Diagnosis       Normal sinus rhythm  Normal ECG  When compared with ECG of 17-MAY-2018 14:40,  No significant change was found     CULTURE, BLOOD, PAIRED    Collection Time: 05/17/18  7:32 PM   Result Value Ref Range    Special Requests: NO SPECIAL REQUESTS      Culture result: NO GROWTH AFTER 10 HOURS     LACTIC ACID    Collection Time: 05/17/18  7:36 PM   Result Value Ref Range    Lactic acid 1.5 0.4 - 2.0 MMOL/L   CULTURE, WOUND W GRAM STAIN    Collection Time: 05/17/18  9:29 PM   Result Value Ref Range    Special Requests: NO SPECIAL REQUESTS      GRAM STAIN FEW WBCS SEEN      GRAM STAIN NO DEFINITE ORGANISM SEEN      Culture result: PENDING    GLUCOSE, POC    Collection Time: 05/17/18 10:06 PM   Result Value Ref Range    Glucose (POC) 260 (H) 65 - 100 mg/dL    Performed by Deshaun Ruiz    LACTIC ACID    Collection Time: 05/17/18 11:25 PM   Result Value Ref Range    Lactic acid 0.6 0.4 - 2.0 MMOL/L   HEMOGLOBIN A1C WITH EAG    Collection Time: 05/17/18 11:25 PM   Result Value Ref Range    Hemoglobin A1c 8.9 (H) 4.2 - 6.3 %    Est. average glucose 905 mg/dL   METABOLIC PANEL, BASIC    Collection Time: 05/18/18  6:22 AM   Result Value Ref Range    Sodium 134 (L) 136 - 145 mmol/L    Potassium 4.4 3.5 - 5.1 mmol/L    Chloride 104 97 - 108 mmol/L    CO2 24 21 - 32 mmol/L    Anion gap 6 5 - 15 mmol/L    Glucose 311 (H) 65 - 100 mg/dL    BUN 30 (H) 6 - 20 MG/DL    Creatinine 0.93 0.55 - 1.02 MG/DL    BUN/Creatinine ratio 32 (H) 12 - 20      GFR est AA >60 >60 ml/min/1.73m2    GFR est non-AA >60 >60 ml/min/1.73m2    Calcium 8.4 (L) 8.5 - 10.1 MG/DL   CBC W/O DIFF    Collection Time: 05/18/18  6:22 AM   Result Value Ref Range    WBC 9.5 3.6 - 11.0 K/uL    RBC 3.58 (L) 3.80 - 5.20 M/uL    HGB 9.7 (L) 11.5 - 16.0 g/dL    HCT 29.8 (L) 35.0 - 47.0 %    MCV 83.2 80.0 - 99.0 FL    MCH 27.1 26.0 - 34.0 PG    MCHC 32.6 30.0 - 36.5 g/dL    RDW 16.5 (H) 11.5 - 14.5 %    PLATELET 772 485 - 830 K/uL    MPV 10.6 8.9 - 12.9 FL    NRBC 0.0 0  WBC    ABSOLUTE NRBC 0.00 0.00 - 0.01 K/uL   GLUCOSE, POC    Collection Time: 05/18/18  8:23 AM   Result Value Ref Range    Glucose (POC) 351 (H) 65 - 100 mg/dL    Performed by Muna Hager (PCT)

## 2018-05-18 NOTE — PERIOP NOTES
Handoff Report from Operating Room to PACU    Report received from [address] [place] [address] [employer]  and Coral Roe CRNA  regarding Brandon Ray. Surgeon(s):  Sonja Mckeon MD  And Procedure(s) (LRB):  INCISION AND DRAINAGE VULVA ABSCESS (N/A)  confirmed   with allergies and dressings discussed. Anesthesia type, drugs, patient history, complications, estimated blood loss, vital signs, intake and output, and last pain medication and reversal medications were reviewed.

## 2018-05-18 NOTE — PROGRESS NOTES
Wound Care Consult: Chart reviewed and patient assessed for an acute wound in her Mons Pubis that is post op day # 1. Pt. Also has an open wound in her left labia majora from an older infection (incision, drainage and wound vac several weeks ago). Pt. Has recently moved back to [place] after staying in [place] in a hospital there for her labial wound. She began to c/o pain in her [place] area prior to leaving that hospital but it was thought to be just left over healing to occur. The wound was getting worse each day after discharge and she ended up here with her current illness / infection. She is also on the 5th day of her menstrual cycle. Lidocaine jelly ordered for her wound care to provide topical pain relief until this is less inflamed. Two wounds described separately in Assessment below:     WOUND POA CONDITIONS    Wound Pelvis Mid (Active) (Mons Pubis)   DRESSING STATUS Removed 5/18/2018  4:02 PM   DRESSING TYPE Packing 5/18/2018  4:02 PM   Non-Pressure Injury Full thickness (subcut/muscle) 5/18/2018  4:02 PM   Wound Length (cm) 3.5 cm 5/18/2018  4:02 PM   Wound Width (cm) 1.2 cm 5/18/2018  4:02 PM   Wound Depth (cm) 5.7 5/18/2018  4:02 PM   Wound Surface area (cm^2) 4.2 cm^2 5/18/2018  4:02 PM   Condition of Base Pink; Other (comment) 5/18/2018  4:02 PM   Condition of Edges Open 5/18/2018  4:02 PM   Tissue Type Pink 5/18/2018  4:02 PM   Tissue Type Percent Pink 100 5/18/2018  4:02 PM   Depth of Undermining (cm) 2 5/18/2018  4:02 PM   Drainage Amount  Moderate 5/18/2018  4:02 PM   Drainage Color Serosanguinous 5/18/2018  4:02 PM   Wound Odor None 5/18/2018  4:02 PM   Periwound Skin Condition Indurated; Intact 5/18/2018  4:02 PM   Cleansing and Cleansing Agents  Dermal wound cleanser;Irrigation 5/18/2018  4:02 PM   Dressing Type Applied Foam;Gauze;Packing 5/18/2018  4:02 PM   Procedure Tolerated Fair 5/18/2018  4:02 PM      New Acute Mons Pubis wound is open and measures, in cm,  3.5 x 1.2 x 5.7 and undermines 2 cm. Post op day 1 from I & D where 150 ml of pus was extracted from the opening. The mons wound is now clean but requires packing for a few days and then we can consider a wound vac on Monday. The drainage is serosanguinous without any odor. The Mons wound was cleansed with Caraklenz spray by irrigating the wound using a syringe and a filter straw inserted into the wound x 2 cleansings. Packed with about 1/2 as much packing strip as during the I & D. Covered with Kerlix gauze and the a foam dressing. Wound Perineum Left (Active)   DRESSING TYPE Open to air 5/18/2018  4:02 PM   Non-Pressure Injury Full thickness (subcut/muscle) 5/18/2018  4:02 PM   Wound Length (cm) 5.3 cm 5/18/2018  4:02 PM   Wound Width (cm) 1 cm 5/18/2018  4:02 PM   Wound Depth (cm) 1 5/18/2018  4:02 PM   Wound Surface area (cm^2) 5.3 cm^2 5/18/2018  4:02 PM   Condition of Base Pink;Granulation 5/18/2018  4:02 PM   Condition of Edges Rolled/curled 5/18/2018  4:02 PM   Epithelialization (%) 0 5/18/2018  4:02 PM   Tissue Type Pink 5/18/2018  4:02 PM   Tissue Type Percent Pink 100 5/18/2018  4:02 PM   Drainage Amount  Scant 5/18/2018  4:02 PM   Drainage Color Serosanguinous 5/18/2018  4:02 PM   Wound Odor None 5/18/2018  4:02 PM   Periwound Skin Condition Intact 5/18/2018  4:02 PM   Cleansing and Cleansing Agents  Dermal wound cleanser 5/18/2018  4:02 PM   Dressing Type Applied Packing 5/18/2018  4:02 PM   Procedure Tolerated Well 5/18/2018  4:02 PM      The left labia / Perineum wound is several weeks old and is granulating well but still requires some packing for the wound bed. The wound bed is pink and clean. It was cleansed with Caraklenz spray and wiped with gauze and packed with iodoform packing strip. The rolls of skin hold the packing in the wound without any top dressing. Wound Care Recommendation: Will follow up on Monday for this wound and possibly consider a Wound Vac at that time.  Need to make sure that the wound is cleansed daily for a few days with packing / wicking first. Unknown whether patient can qualify for home care or the wound vac.    Janis Kendrick RN, BSN, [employer]

## 2018-05-18 NOTE — ANESTHESIA POSTPROCEDURE EVALUATION
Post-Anesthesia Evaluation and Assessment    Patient: Ariana Escobar MRN: [id number]  SSN: [id number]    YOB: 1980  Age: 40 y.o. Sex: female       Cardiovascular Function/Vital Signs  Visit Vitals    /58    Pulse 78    Temp 36.9 °C (98.5 °F)    Resp 16    Ht 5' 8\" (1.727 m)    Wt 120 kg (264 lb 8.8 oz)    SpO2 97%    BMI 40.22 kg/m2       Patient is status post general anesthesia for Procedure(s):  INCISION AND DRAINAGE VULVA ABSCESS. Nausea/Vomiting: None    Postoperative hydration reviewed and adequate. Pain:  Pain Scale 1: Numeric (0 - 10) (05/17/18 2300)  Pain Intensity 1: 0 (05/17/18 2300)   Managed    Neurological Status:   Neuro (WDL): Within Defined Limits (05/17/18 2200)   At baseline    Mental Status and Level of Consciousness: Arousable    Pulmonary Status:   O2 Device: Nasal cannula (05/17/18 2200)   Adequate oxygenation and airway patent    Complications related to anesthesia: None    Post-anesthesia assessment completed.  No concerns    Signed By: Gilda Nice MD     May 17, 2018

## 2018-05-18 NOTE — PROGRESS NOTES
Hospitalist Progress Note    NAME: Chuy Valle   :  1980   MRN:  [id number]       Assessment / Plan:  Recurrent vulvar abscess status post incision and drainage  Patient had a recent 3 week hospitalization in [place] with similar complaints  Wound culture sent  On vancomycin, Levaquin and Flagyl  ID service consulted  Follow culture results  Pain control    Diabetes mellitus uncontrolled with hyperglycemia  Last A1c is 8.9  Continue home Lantus 65 units in a.m. Start insulin lispro 5 units 3 times daily  Continue sliding scale  We will gradually increase doses once her appetite picks up    Hypertension  Dyslipidemia  Diabetic neuropathy  Resume home statin, gabapentin    Obesity      Body mass index is 40.22 kg/(m^2). Code status: Full  Prophylaxis: Per Primary team       Subjective:     Chief Complaint / Reason for Physician Visit  Reports pain that is well controlled. Mild nausea but no vomiting. No fever. Fever yesterday. Review of Systems:  Symptom Y/N Comments  Symptom Y/N Comments   Fever/Chills    Chest Pain n    Poor Appetite y   Edema     Cough    Abdominal Pain y Groin pain    Sputum    Joint Pain     SOB/VIRK    Pruritis/Rash     Nausea/vomit    Tolerating PT/OT     Diarrhea    Tolerating Diet     Constipation    Other       Could NOT obtain due to:      Objective:     VITALS:   Last 24hrs VS reviewed since prior progress note.  Most recent are:  Patient Vitals for the past 24 hrs:   Temp Pulse Resp BP SpO2   18 1304 98.6 °F (37 °C) 79 18 124/72 100 %   18 0815 98.3 °F (36.8 °C) 78 16 120/66 98 %   18 0408 97.8 °F (36.6 °C) 75 14 118/72 99 %   18 0058 97.3 °F (36.3 °C) 73 14 115/64 99 %   18 2345 98.2 °F (36.8 °C) 77 15 107/59 98 %   18 2330 - 81 16 115/60 98 %   18 2315 - 77 16 110/62 96 %   18 2300 98.5 °F (36.9 °C) 78 16 118/58 97 %   18 5 - 81 16 118/62 97 %   18 0 - 84 15 132/69 97 %   18 5 -  18 130/70 97 %   05/17/18 2210 - 95 10 134/69 95 %   05/17/18 2205 - 100 21 133/75 93 %   05/17/18 2200 (!) 102.1 °F (38.9 °C) (!) 106 22 133/77 93 %   05/17/18 2158 - - - 139/68 94 %   05/17/18 1945 - - - 131/80 99 %   05/17/18 1943 - - - 144/88 99 %   05/17/18 1942 (!) 101.1 °F (38.4 °C) 95 16 125/78 100 %   05/17/18 1918 - - - 140/78 99 %   05/17/18 1803 100.2 °F (37.9 °C) 92 14 139/84 100 %   05/17/18 1800 - - - - 99 %   05/17/18 1635 - - - - 100 %       Intake/Output Summary (Last 24 hours) at 05/18/18 1318  Last data filed at 05/18/18 1055   Gross per 24 hour   Intake             1640 ml   Output                0 ml   Net             1640 ml        PHYSICAL EXAM:  General: cooperative, no acute distress    EENT:  EOMI. Anicteric sclerae. MMM  Resp:  CTA bilaterally, no wheezing or rales. No accessory muscle use  CV:  Regular  rhythm,  No edema  GI:  Soft, Non distended, mild tenderness of vulvar area, packing +   Neurologic:  Alert and oriented X 3, normal speech,   Psych:   Not anxious nor agitated  Skin:  No rashes.   No jaundice    Reviewed most current lab test results and cultures  YES  Reviewed most current radiology test results   YES  Review and summation of old records today    NO  Reviewed patient's current orders and MAR    YES  PMH/SH reviewed - no change compared to H&P          Current Facility-Administered Medications:     sodium chloride (NS) flush 5-10 mL, 5-10 mL, IntraVENous, Q8H, Penny Montes MD, 10 mL at 05/18/18 1249    sodium chloride (NS) flush 5-10 mL, 5-10 mL, IntraVENous, PRN, Penny Montes MD    HYDROmorphone (DILAUDID) injection 1 mg, 1 mg, IntraVENous, Q4H PRN, Penny Montes MD, 1 mg at 05/18/18 1250    insulin lispro (HUMALOG) injection 5 Units, 5 Units, SubCUTAneous, TIDAC, Marika Aaron MD, 5 Units at 05/18/18 1248    ondansetron (ZOFRAN ODT) tablet 4 mg, 4 mg, Oral, Q8H PRN, Agnes Bonner MD    sodium chloride (NS) flush 5-10 mL, 5-10 mL, IntraVENous, Q8H, April N O'Bier, MD, 10 mL at 05/18/18 1249    sodium chloride (NS) flush 5-10 mL, 5-10 mL, IntraVENous, PRN, April SEVERINO Arizmendi MD    metroNIDAZOLE (FLAGYL) IVPB premix 500 mg, 500 mg, IntraVENous, Q8H, April Nathaniel Osei MD, Last Rate: 100 mL/hr at 05/18/18 1249, 500 mg at 05/18/18 1249    levoFLOXacin (LEVAQUIN) 750 mg in D5W IVPB, 750 mg, IntraVENous, Q24H, April SEVERINO Arizmendi MD    vancomycin [facility]) 1500 mg in  ml infusion, 1,500 mg, IntraVENous, Q12H, April Nathaniel Osei MD, Last Rate: 250 mL/hr at 05/18/18 0839, 1,500 mg at 05/18/18 0839    insulin glargine (LANTUS) injection 65 Units, 65 Units, SubCUTAneous, DAILY, Randy Alcantara MD, 65 Units at 05/18/18 0839    insulin lispro (HUMALOG) injection, , SubCUTAneous, AC&HS, Randy Alcantara MD, 7 Units at 05/18/18 1248    glucose chewable tablet 16 g, 4 Tab, Oral, PRN, Randy Alcantara MD    dextrose (D50W) injection syrg 12.5-25 g, 12.5-25 g, IntraVENous, PRN, Randy Alcantara MD    glucagon (GLUCAGEN) injection 1 mg, 1 mg, IntraMUSCular, PRN, Randy Alcantara MD  ________________________________________________________________________  Care Plan discussed with:    Comments   Patient y    Family      RN y    Care Manager     Consultant  elisabet Mccloud team rounds were held today with , nursing, pharmacist and clinical coordinator. Patient's plan of care was discussed; medications were reviewed and discharge planning was addressed.      ________________________________________________________________________  Total NON critical care TIME:  35    Minutes    Total CRITICAL CARE TIME Spent:   Minutes non procedure based      Comments   >50% of visit spent in counseling and coordination of care     ________________________________________________________________________  Debbie Coon MD     Procedures: see electronic medical records for all procedures/Xrays and details which were not copied into this note but were reviewed prior to creation of Plan. LABS:  I reviewed today's most current labs and imaging studies.   Pertinent labs include:  Recent Labs      05/18/18   0622 05/17/18   1401   WBC  9.5  12.6*   HGB  9.7*  11.5   HCT  29.8*  34.5*   PLT  164  206     Recent Labs      05/18/18   0622 05/17/18   1401   NA  134*  130*   K  4.4  4.6   CL  104  100   CO2  24  22   GLU  311*  398*   BUN  30*  30*   CREA  0.93  1.06*   CA  8.4*  9.2   ALB   --   3.8   TBILI   --   0.6   SGOT   --   <3*   ALT   --   13       Signed: Jayy Martinez MD

## 2018-05-18 NOTE — PROCEDURES
The patient was identified and taken to the OR where a time out was preformed. Pt underwent GETA and was placed in the low dorsal lithotomy position. She was prepped in the usual fashion and an EUA was preformed. A 7 x 6 cm abscess was palpated being largest on the right side of the midline in the [place] but tracking past the midline to the left side of the Mons tapering as it transverses to the left. A vertical 5 cm incision was made over the mass just to the right of the midline on the [place]. It was deepened by using francois clamps to bluntly dissect down to the abscess. It was then entered bluntly with the operators finger and approximately 150 cc of pus was obtained. The cavity tracted as described above and a few loculations were broken up. Cultures x 2 were obtained. Copious irrigation was preformed. The cavity was packed with an entire contiguous 1/2 inch Iodoform packing. EBL was 25 cc. No complications. The patient tolerated the procedure well. All sponge and instrument counts were correct. The patient was extubated and taken to the recovery room in good condition.

## 2018-05-18 NOTE — CDMP QUERY
Dr Rolando Lujan  Please clarify if this patient is (was) being treated/managed for:     => Hypo-osmolality and hyponatremia  => Other explanation of clinical findings  => Clinically Undetermined (no explanation for clinical findings)    The medical record reflects the following clinical findings, treatment, and risk factors. Risk Factors:  resents for abscess  Clinical Indicators:  Na 130-134, subjective fevers,sweats, nausea, and emesis x 3 days. Treatment: [email or web address], 1 liter bolus NS, serial labs. Please clarify and document your clinical opinion in the progress notes and discharge summary including the definitive and/or presumptive diagnosis, (suspected or probable), related to the above clinical findings. Please include clinical findings supporting your diagnosis. Thank Jyotsna Mccullough  [address]; JOSY;[id number]

## 2018-05-19 LAB
ANION GAP SERPL CALC-SCNC: 6 MMOL/L (ref 5–15)
BASOPHILS # BLD: 0 K/UL (ref 0–0.1)
BASOPHILS NFR BLD: 0 % (ref 0–1)
BUN SERPL-MCNC: 25 MG/DL (ref 6–20)
BUN/CREAT SERPL: 33 (ref 12–20)
CALCIUM SERPL-MCNC: 8.2 MG/DL (ref 8.5–10.1)
CHLORIDE SERPL-SCNC: 108 MMOL/L (ref 97–108)
CO2 SERPL-SCNC: 22 MMOL/L (ref 21–32)
CREAT SERPL-MCNC: 0.75 MG/DL (ref 0.55–1.02)
DIFFERENTIAL METHOD BLD: ABNORMAL
EOSINOPHIL # BLD: 0.2 K/UL (ref 0–0.4)
EOSINOPHIL NFR BLD: 3 % (ref 0–7)
ERYTHROCYTE [DISTWIDTH] IN BLOOD BY AUTOMATED COUNT: 16.3 % (ref 11.5–14.5)
GLUCOSE BLD STRIP.AUTO-MCNC: 216 MG/DL (ref 65–100)
GLUCOSE BLD STRIP.AUTO-MCNC: 274 MG/DL (ref 65–100)
GLUCOSE BLD STRIP.AUTO-MCNC: 330 MG/DL (ref 65–100)
GLUCOSE BLD STRIP.AUTO-MCNC: 339 MG/DL (ref 65–100)
GLUCOSE SERPL-MCNC: 269 MG/DL (ref 65–100)
HCT VFR BLD AUTO: 27.4 % (ref 35–47)
HGB BLD-MCNC: 8.8 G/DL (ref 11.5–16)
HIV 1+2 AB+HIV1 P24 AG SERPL QL IA: NONREACTIVE
HIV12 RESULT COMMENT, HHIVC: NORMAL
IMM GRANULOCYTES # BLD: 0.1 K/UL (ref 0–0.04)
IMM GRANULOCYTES NFR BLD AUTO: 1 % (ref 0–0.5)
LYMPHOCYTES # BLD: 1.2 K/UL (ref 0.8–3.5)
LYMPHOCYTES NFR BLD: 20 % (ref 12–49)
MCH RBC QN AUTO: 27.1 PG (ref 26–34)
MCHC RBC AUTO-ENTMCNC: 32.1 G/DL (ref 30–36.5)
MCV RBC AUTO: 84.3 FL (ref 80–99)
MONOCYTES # BLD: 0.4 K/UL (ref 0–1)
MONOCYTES NFR BLD: 6 % (ref 5–13)
NEUTS SEG # BLD: 4.5 K/UL (ref 1.8–8)
NEUTS SEG NFR BLD: 71 % (ref 32–75)
NRBC # BLD: 0 K/UL (ref 0–0.01)
NRBC BLD-RTO: 0 PER 100 WBC
PLATELET # BLD AUTO: 150 K/UL (ref 150–400)
PMV BLD AUTO: 10.3 FL (ref 8.9–12.9)
POTASSIUM SERPL-SCNC: 4.1 MMOL/L (ref 3.5–5.1)
RBC # BLD AUTO: 3.25 M/UL (ref 3.8–5.2)
SERVICE CMNT-IMP: ABNORMAL
SODIUM SERPL-SCNC: 136 MMOL/L (ref 136–145)
WBC # BLD AUTO: 6.3 K/UL (ref 3.6–11)

## 2018-05-19 PROCEDURE — 82962 GLUCOSE BLOOD TEST: CPT

## 2018-05-19 PROCEDURE — 74011250636 HC RX REV CODE- 250/636: Performed by: OBSTETRICS & GYNECOLOGY

## 2018-05-19 PROCEDURE — 74011250636 HC RX REV CODE- 250/636: Performed by: EMERGENCY MEDICINE

## 2018-05-19 PROCEDURE — 65270000029 HC RM PRIVATE

## 2018-05-19 PROCEDURE — 74011000250 HC RX REV CODE- 250: Performed by: INTERNAL MEDICINE

## 2018-05-19 PROCEDURE — 74011000250 HC RX REV CODE- 250: Performed by: EMERGENCY MEDICINE

## 2018-05-19 PROCEDURE — 77030037878 HC DRSG MEPILEX >48IN BORD MOLN -B

## 2018-05-19 PROCEDURE — 74011250637 HC RX REV CODE- 250/637: Performed by: OBSTETRICS & GYNECOLOGY

## 2018-05-19 PROCEDURE — 85025 COMPLETE CBC W/AUTO DIFF WBC: CPT | Performed by: INTERNAL MEDICINE

## 2018-05-19 PROCEDURE — 80048 BASIC METABOLIC PNL TOTAL CA: CPT | Performed by: EMERGENCY MEDICINE

## 2018-05-19 PROCEDURE — 86592 SYPHILIS TEST NON-TREP QUAL: CPT | Performed by: INTERNAL MEDICINE

## 2018-05-19 PROCEDURE — 74011636637 HC RX REV CODE- 636/637: Performed by: INTERNAL MEDICINE

## 2018-05-19 PROCEDURE — 80202 ASSAY OF VANCOMYCIN: CPT | Performed by: OBSTETRICS & GYNECOLOGY

## 2018-05-19 PROCEDURE — 36415 COLL VENOUS BLD VENIPUNCTURE: CPT | Performed by: INTERNAL MEDICINE

## 2018-05-19 PROCEDURE — 87389 HIV-1 AG W/HIV-1&-2 AB AG IA: CPT | Performed by: INTERNAL MEDICINE

## 2018-05-19 RX ORDER — INSULIN LISPRO 100 [IU]/ML
12 INJECTION, SOLUTION INTRAVENOUS; SUBCUTANEOUS
Status: DISCONTINUED | OUTPATIENT
Start: 2018-05-19 | End: 2018-05-22 | Stop reason: HOSPADM

## 2018-05-19 RX ORDER — HYDROMORPHONE HYDROCHLORIDE 2 MG/1
1 TABLET ORAL
Status: DISCONTINUED | OUTPATIENT
Start: 2018-05-19 | End: 2018-05-22 | Stop reason: HOSPADM

## 2018-05-19 RX ORDER — ACETAMINOPHEN 325 MG/1
650 TABLET ORAL
Status: DISCONTINUED | OUTPATIENT
Start: 2018-05-19 | End: 2018-05-22 | Stop reason: HOSPADM

## 2018-05-19 RX ORDER — DEXTROSE 50 % IN WATER (D50W) INTRAVENOUS SYRINGE
12.5-25 AS NEEDED
Status: DISCONTINUED | OUTPATIENT
Start: 2018-05-19 | End: 2018-05-19 | Stop reason: SDUPTHER

## 2018-05-19 RX ORDER — MAGNESIUM SULFATE 100 %
4 CRYSTALS MISCELLANEOUS AS NEEDED
Status: DISCONTINUED | OUTPATIENT
Start: 2018-05-19 | End: 2018-05-19 | Stop reason: SDUPTHER

## 2018-05-19 RX ORDER — HYDROMORPHONE HYDROCHLORIDE 2 MG/ML
0.5 INJECTION, SOLUTION INTRAMUSCULAR; INTRAVENOUS; SUBCUTANEOUS
Status: DISCONTINUED | OUTPATIENT
Start: 2018-05-19 | End: 2018-05-22 | Stop reason: HOSPADM

## 2018-05-19 RX ORDER — INSULIN GLARGINE 100 [IU]/ML
69 INJECTION, SOLUTION SUBCUTANEOUS DAILY
Status: DISCONTINUED | OUTPATIENT
Start: 2018-05-19 | End: 2018-05-22 | Stop reason: HOSPADM

## 2018-05-19 RX ORDER — INSULIN LISPRO 100 [IU]/ML
INJECTION, SOLUTION INTRAVENOUS; SUBCUTANEOUS
Status: DISCONTINUED | OUTPATIENT
Start: 2018-05-19 | End: 2018-05-22 | Stop reason: HOSPADM

## 2018-05-19 RX ORDER — PROMETHAZINE HYDROCHLORIDE 25 MG/1
25 TABLET ORAL
Status: DISCONTINUED | OUTPATIENT
Start: 2018-05-19 | End: 2018-05-22 | Stop reason: HOSPADM

## 2018-05-19 RX ADMIN — Medication 10 ML: at 05:16

## 2018-05-19 RX ADMIN — METRONIDAZOLE 500 MG: 500 INJECTION, SOLUTION INTRAVENOUS at 12:27

## 2018-05-19 RX ADMIN — INSULIN LISPRO 10 UNITS: 100 INJECTION, SOLUTION INTRAVENOUS; SUBCUTANEOUS at 12:28

## 2018-05-19 RX ADMIN — HYDROMORPHONE HYDROCHLORIDE 1 MG: 2 INJECTION INTRAMUSCULAR; INTRAVENOUS; SUBCUTANEOUS at 01:35

## 2018-05-19 RX ADMIN — LEVOFLOXACIN 750 MG: 5 INJECTION, SOLUTION INTRAVENOUS at 17:00

## 2018-05-19 RX ADMIN — HYDROMORPHONE HYDROCHLORIDE 1 MG: 2 TABLET ORAL at 07:43

## 2018-05-19 RX ADMIN — INSULIN LISPRO 12 UNITS: 100 INJECTION, SOLUTION INTRAVENOUS; SUBCUTANEOUS at 12:27

## 2018-05-19 RX ADMIN — PROMETHAZINE HYDROCHLORIDE 25 MG: 25 TABLET ORAL at 22:26

## 2018-05-19 RX ADMIN — Medication 10 ML: at 21:27

## 2018-05-19 RX ADMIN — VANCOMYCIN HYDROCHLORIDE 1500 MG: 10 INJECTION, POWDER, LYOPHILIZED, FOR SOLUTION INTRAVENOUS at 21:39

## 2018-05-19 RX ADMIN — INSULIN LISPRO 7 UNITS: 100 INJECTION, SOLUTION INTRAVENOUS; SUBCUTANEOUS at 09:11

## 2018-05-19 RX ADMIN — INSULIN GLARGINE 69 UNITS: 100 INJECTION, SOLUTION SUBCUTANEOUS at 09:10

## 2018-05-19 RX ADMIN — INSULIN LISPRO 5 UNITS: 100 INJECTION, SOLUTION INTRAVENOUS; SUBCUTANEOUS at 09:10

## 2018-05-19 RX ADMIN — INSULIN LISPRO 12 UNITS: 100 INJECTION, SOLUTION INTRAVENOUS; SUBCUTANEOUS at 16:59

## 2018-05-19 RX ADMIN — INSULIN LISPRO 2 UNITS: 100 INJECTION, SOLUTION INTRAVENOUS; SUBCUTANEOUS at 21:44

## 2018-05-19 RX ADMIN — METRONIDAZOLE 500 MG: 500 INJECTION, SOLUTION INTRAVENOUS at 21:27

## 2018-05-19 RX ADMIN — LIDOCAINE HYDROCHLORIDE: 20 JELLY TOPICAL at 13:20

## 2018-05-19 RX ADMIN — METRONIDAZOLE 500 MG: 500 INJECTION, SOLUTION INTRAVENOUS at 05:16

## 2018-05-19 RX ADMIN — Medication 10 ML: at 13:18

## 2018-05-19 RX ADMIN — VANCOMYCIN HYDROCHLORIDE 1500 MG: 10 INJECTION, POWDER, LYOPHILIZED, FOR SOLUTION INTRAVENOUS at 09:09

## 2018-05-19 RX ADMIN — HYDROMORPHONE HYDROCHLORIDE 0.5 MG: 2 INJECTION INTRAMUSCULAR; INTRAVENOUS; SUBCUTANEOUS at 12:27

## 2018-05-19 RX ADMIN — INSULIN LISPRO 7 UNITS: 100 INJECTION, SOLUTION INTRAVENOUS; SUBCUTANEOUS at 16:59

## 2018-05-19 RX ADMIN — HYDROMORPHONE HYDROCHLORIDE 1 MG: 2 TABLET ORAL at 21:27

## 2018-05-19 RX ADMIN — HYDROMORPHONE HYDROCHLORIDE 0.5 MG: 2 INJECTION INTRAMUSCULAR; INTRAVENOUS; SUBCUTANEOUS at 16:58

## 2018-05-19 NOTE — PROGRESS NOTES
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 Code status: Full  Prophylaxis: Per Primary team       Subjective:     Chief Complaint / Reason for Physician Visit  She still report some pain in the vulvar area  Blood sugars are higher  No nausea or vomiting. Review of Systems:  Symptom Y/N Comments  Symptom Y/N Comments   Fever/Chills    Chest Pain n    Poor Appetite n   Edema     Cough    Abdominal Pain y Vulvar pain     Sputum    Joint Pain     SOB/VIRK    Pruritis/Rash     Nausea/vomit    Tolerating PT/OT     Diarrhea    Tolerating Diet     Constipation    Other       Could NOT obtain due to:      Objective:     VITALS:   Last 24hrs VS reviewed since prior progress note.  Most recent are:  Patient Vitals for the past 24 hrs:   Temp Pulse Resp BP SpO2   18 0815 98 °F (36.7 °C) 68 18 112/71 98 %   18 0444 97.8 °F (36.6 °C) 73 18 113/65 99 %   18 2333 99.1 °F (37.3 °C) 84 18 124/71 99 %   18 2011 99 °F (37.2 °C) 89 16 123/76 99 %   18 1601 97.3 °F (36.3 °C) 84 18 123/76 99 %   18 1304 98.6 °F (37 °C) 79 18 124/72 100 %       Intake/Output Summary (Last 24 hours) at 18 1142  Last data filed at 18 1011   Gross per 24 hour   Intake             2270 ml Output             1375 ml   Net              895 ml        PHYSICAL EXAM:  General: cooperative, no acute distress    EENT:  EOMI. Anicteric sclerae. MMM  Resp:  CTA bilaterally, no wheezing or rales. No accessory muscle use  CV:  Regular  rhythm,  No edema  GI:  Soft, Non distended, mild tenderness of vulvar area, packing +   Neurologic:  Alert and oriented X 3, normal speech,   Psych:   Not anxious nor agitated  Skin:  No rashes.   No jaundice    Reviewed most current lab test results and cultures  YES  Reviewed most current radiology test results   YES  Review and summation of old records today    NO  Reviewed patient's current orders and MAR    YES  PMH/SH reviewed - no change compared to H&P          Current Facility-Administered Medications:     HYDROmorphone (DILAUDID) tablet 1 mg, 1 mg, Oral, Q4H PRN, Elle Quiroz MD, 1 mg at 05/19/18 0743    promethazine (PHENERGAN) tablet 25 mg, 25 mg, Oral, Q6H PRN, Elle Quiroz MD    insulin lispro (HUMALOG) injection 12 Units, 12 Units, SubCUTAneous, TIDAC, Phill Pete MD    insulin glargine (LANTUS) injection 69 Units, 69 Units, SubCUTAneous, DAILY, Phill Pete MD, 69 Units at 05/19/18 0910    insulin lispro (HUMALOG) injection, , SubCUTAneous, AC&HS, Phill Pete MD    acetaminophen (TYLENOL) tablet 650 mg, 650 mg, Oral, Q4H PRN, Jodi Morrison MD    HYDROmorphone (DILAUDID) injection 0.5 mg, 0.5 mg, IntraVENous, Q6H PRN, Jodi Morrison MD    Vancomycin trough reminder note, 1 Each, Other, ONCE, Elle Quiroz MD    sodium chloride (NS) flush 5-10 mL, 5-10 mL, IntraVENous, Q8H, Elle Quiroz MD, 10 mL at 05/19/18 0516    sodium chloride (NS) flush 5-10 mL, 5-10 mL, IntraVENous, PRN, Elle Quiroz MD    ondansetron (ZOFRAN ODT) tablet 4 mg, 4 mg, Oral, Q8H PRN, Jodi Morrison MD, 4 mg at 05/18/18 7857    lidocaine (URO-JET) 2 % jelly, , Urethral, PRN, Phill Pete MD    sodium chloride (NS) flush 5-10 mL, 5-10 mL, IntraVENous, Cassidy Gary MD, 10 mL at 05/19/18 0516    sodium chloride (NS) flush 5-10 mL, 5-10 mL, IntraVENous, PRN, Anel Arizmendi MD    metroNIDAZOLE (FLAGYL) IVPB premix 500 mg, 500 mg, IntraVENous, Q8H, Anel Collins MD, Last Rate: 100 mL/hr at 05/19/18 0516, 500 mg at 05/19/18 0516    levoFLOXacin (LEVAQUIN) 750 mg in D5W IVPB, 750 mg, IntraVENous, Q24H, Anel Collins MD, Last Rate: 100 mL/hr at 05/18/18 1718, 750 mg at 05/18/18 1718    vancomycin (VANCOCIN) 1500 mg in  ml infusion, 1,500 mg, IntraVENous, Q12H, Anel Arizmendi MD, Last Rate: 250 mL/hr at 05/19/18 0909, 1,500 mg at 05/19/18 0909    glucose chewable tablet 16 g, 4 Tab, Oral, PRN, Kimani Garner MD    dextrose (D50W) injection syrg 12.5-25 g, 12.5-25 g, IntraVENous, PRN, Kimani Garner MD    glucagon (GLUCAGEN) injection 1 mg, 1 mg, IntraMUSCular, PRN, Kimani Garner MD  ________________________________________________________________________  Care Plan discussed with:    Comments   Patient y    Family      RN y    Care Manager     Consultant                        Multidiciplinary team rounds were held today with , nursing, pharmacist and clinical coordinator. Patient's plan of care was discussed; medications were reviewed and discharge planning was addressed. ________________________________________________________________________  Total NON critical care TIME:  35    Minutes    Total CRITICAL CARE TIME Spent:   Minutes non procedure based      Comments   >50% of visit spent in counseling and coordination of care     ________________________________________________________________________  Blossom Ybarra MD     Procedures: see electronic medical records for all procedures/Xrays and details which were not copied into this note but were reviewed prior to creation of Plan. LABS:  I reviewed today's most current labs and imaging studies.   Pertinent labs include:  Recent Labs      05/19/18   0216  05/18/18 0622  05/17/18   1401   WBC  6.3  9.5  12.6*   HGB  8.8*  9.7*  11.5   HCT  27.4*  29.8*  34.5*   PLT  150  164  206     Recent Labs      05/19/18   0216  05/18/18   0622  05/17/18   1401   NA  136  134*  130*   K  4.1  4.4  4.6   CL  108  104  100   CO2  22  24  22   GLU  269*  311*  398*   BUN  25*  30*  30*   CREA  0.75  0.93  1.06*   CA  8.2*  8.4*  9.2   ALB   --    --   3.8   TBILI   --    --   0.6   SGOT   --    --   <3*   ALT   --    --   13       Signed: Valdo Muse MD

## 2018-05-19 NOTE — PROGRESS NOTES
General Surgery End of Shift Nursing Note    Bedside shift change report given to Arslan Blanco (oncoming nurse) by Sanjuana Wilkins (offgoing nurse). Report included the following information SBAR, Kardex, OR Summary, Intake/Output, MAR and Recent Results. Shift worked:   7 am to 7 pm   Summary of shift:    Pt tolerating diet. Nausea improved over the course of shift. Pain meds given as requested. Wound care completed by wound care RN. Voiding appropriately. Issues for physician to address:   none     Number times ambulated in hallway past shift: 0    Number of times OOB to chair past shift: 1    Pain Management:  Current medication: dilaudid  Patient states pain is manageable on current pain medication: YES    GI:    Current diet:  DIET DIABETIC CONSISTENT CARB Regular    Tolerating current diet: YES  Passing flatus: YES  Last Bowel Movement: yesterday    Respiratory:    Incentive Spirometer at bedside: YES  Patient instructed on use: YES    Patient Safety:    Falls Score: 2  Bed Alarm On? No  Sitter?  No    Tia Howard

## 2018-05-19 NOTE — PROGRESS NOTES
General Surgery End of Shift Nursing Note    Bedside shift change report given to Pedro Pablo (oncoming nurse) by Lubna Beyer (offgoing nurse). Report included the following information SBAR, Kardex, OR Summary, Intake/Output, MAR and Recent Results. Shift worked:   7 am to 7 pm   Summary of shift:  Blood sugar values have decreased from yesterday. Pt tolerating diet. One bowel movement today. Voiding appropriately. Pain meds given as requested. Wound care completed. .    Issues for physician to address:   none     Number times ambulated in hallway past shift: 0    Number of times OOB to chair past shift: 3    Pain Management:  Current medication: dilaudid  Patient states pain is manageable on current pain medication: YES    GI:    Current diet:  DIET DIABETIC CONSISTENT CARB Regular    Tolerating current diet: YES  Passing flatus: YES  Last Bowel Movement: today    Respiratory:    Incentive Spirometer at bedside: YES  Patient instructed on use: YES    Patient Safety:    Falls Score: 1  Bed Alarm On? No  Sitter?  No    Nadiya Sang

## 2018-05-19 NOTE — PROGRESS NOTES
Gynecology Post Operative Note    Sherrod Schilder    Assessment/Plan: POD2 s/p I&D of vulvar (mons) abscess     Recurrent vulvar abscesses s/p recent 3 week hospital admission in [place] (records scanned into EMR) including I&D of left labia majora in ED followed 2 days later by debridement and wound vac placement, likely related to poor healing (suboptimal IDDM control) in setting of microabrasions from shaving  - no evidence of sepsis - fever and WBC normalized s/p I&D  - dressing/packing changes per wound care team - help much appreciated  - wound culture pending  - continue Vancomycin/Levaquin/Flagyl; appreciate ID input     Medical co-morbidities (IDDM, HTN, recent REN, class 3 obesity, ) per IM - greatly appreciate the help  - hyponatremia resolved s/p NS          Slept well overnight. Some nausea minimally relieved with zofran. NO emesis. Voiding without difficulty. OOB to chair. Pain controlled with dilaudid.      Orders/Charges: Medium     Vitals:  Visit Vitals    /71    Pulse 68    Temp 98 °F (36.7 °C)    Resp 18    Ht 5' 8\" (1.727 m)  Comment: 18    Wt 120 kg (264 lb 8.8 oz)    LMP 2018    SpO2 98%    Breastfeeding No    BMI 40.22 kg/m2     Temp (24hrs), Av.3 °F (36.8 °C), Min:97.3 °F (36.3 °C), Max:99.1 °F (37.3 °C)      Last 24hr Input/Output:    Intake/Output Summary (Last 24 hours) at 18 0830  Last data filed at 18 1859   Gross per 24 hour   Intake             1190 ml   Output             1175 ml   Net               15 ml          Exam:  Patient without distress. Lungs clear               Abdomen soft, bowel sounds present, expected tenderness. Mons dressing intact, no surrounding cellulitis seen     Left labial wound healing with packing in place                Lower extremities are negative for swelling, cords, or tenderness.     Labs: Lab Results   Component Value Date/Time    WBC 6.3 2018 02:16 AM    WBC 9.5 2018 06:22 AM WBC 12.6 (H) 05/17/2018 02:01 PM    WBC 7.2 01/28/2018 08:20 PM    WBC 5.0 12/31/2017 04:41 AM    WBC 8.7 12/29/2017 10:32 PM    WBC 9.9 11/28/2017 06:21 PM    WBC 5.0 10/10/2017 03:25 AM    WBC 4.1 10/09/2017 03:28 AM    WBC 4.9 10/08/2017 03:43 AM    WBC 6.8 10/07/2017 12:37 AM    WBC 9.1 10/05/2017 08:07 PM    WBC 8.7 10/05/2017 04:10 PM    WBC 7.2 10/01/2017 10:58 PM    WBC 6.9 04/17/2011 06:00 AM    WBC 6.8 04/16/2011 07:25 AM    WBC 11.3 (H) 04/15/2011 01:30 AM    WBC 9.3 03/10/2011 03:20 AM    WBC 9.1 03/09/2011 03:45 AM    WBC 8.9 03/08/2011 03:35 AM    WBC 10.0 03/07/2011 11:30 PM    WBC 10.7 03/07/2011 09:21 PM    WBC 8.5 01/26/2011 04:15 AM    WBC 9.0 01/25/2011 02:00 AM    WBC 9.1 12/14/2010 03:35 AM    WBC 9.4 12/12/2010 11:30 PM    HGB 8.8 (L) 05/19/2018 02:16 AM    HGB 9.7 (L) 05/18/2018 06:22 AM    HGB 11.5 05/17/2018 02:01 PM    HGB 11.8 01/28/2018 08:20 PM    HGB 11.8 12/31/2017 04:41 AM    HGB 11.3 (L) 12/29/2017 10:32 PM    HGB 12.0 11/28/2017 06:21 PM    HGB 9.6 (L) 10/10/2017 03:25 AM    HGB 9.9 (L) 10/09/2017 03:28 AM    HGB 10.3 (L) 10/08/2017 03:43 AM    HGB 11.8 10/07/2017 12:37 AM    HGB  10/05/2017 08:07 PM     UNABLE TO OBTAIN ACCURATE RESULTS DUE TO GROSS LIPEMIA    HGB  10/05/2017 04:10 PM     UNABLE TO OBTAIN ACCURATE RESULTS DUE TO GROSS LIPEMIA    HGB 11.8 10/01/2017 10:58 PM    HGB 11.6 04/17/2011 06:00 AM    HGB 11.5 04/16/2011 07:25 AM    HGB 13.2 04/15/2011 01:30 AM    HGB 13.9 03/10/2011 03:20 AM    HGB 11.0 (L) 03/09/2011 03:45 AM    HGB 11.8 03/08/2011 03:35 AM    HGB 11.8 03/07/2011 11:30 PM    HGB 12.1 03/07/2011 09:21 PM    HGB 11.9 01/26/2011 04:15 AM    HGB 13.6 01/25/2011 02:00 AM    HGB 13.1 12/14/2010 03:35 AM    HGB 15.2 12/12/2010 11:30 PM    HCT 27.4 (L) 05/19/2018 02:16 AM    HCT 29.8 (L) 05/18/2018 06:22 AM    HCT 34.5 (L) 05/17/2018 02:01 PM    HCT 34.3 (L) 01/28/2018 08:20 PM    HCT 32.3 (L) 12/31/2017 04:41 AM    HCT 33.0 (L) 12/29/2017 10:32 PM    HCT 35.0 11/28/2017 06:21 PM    HCT 29.5 (L) 10/10/2017 03:25 AM    HCT 30.5 (L) 10/09/2017 03:28 AM    HCT 31.5 (L) 10/08/2017 03:43 AM    HCT 34.0 (L) 10/07/2017 12:37 AM    HCT 36.1 10/05/2017 08:07 PM    HCT 37.2 10/05/2017 04:10 PM    HCT 34.8 (L) 10/01/2017 10:58 PM    HCT 33.0 (L) 04/17/2011 06:00 AM    HCT 31.5 (L) 04/16/2011 07:25 AM    HCT 39.6 04/15/2011 01:30 AM    HCT 37.7 03/10/2011 03:20 AM    HCT 33.0 (L) 03/09/2011 03:45 AM    HCT 33.6 (L) 03/08/2011 03:35 AM    HCT 34.0 (L) 03/07/2011 11:30 PM    HCT 34.7 (L) 03/07/2011 09:21 PM    HCT 34.4 (L) 01/26/2011 04:15 AM    HCT 37.7 01/25/2011 02:00 AM    HCT 37.4 12/14/2010 03:35 AM    HCT 39.2 12/12/2010 11:30 PM    PLATELET 460 00/01/9155 02:16 AM    PLATELET 537 33/53/8087 06:22 AM    PLATELET 942 54/41/6189 02:01 PM    PLATELET 215 51/52/2947 08:20 PM    PLATELET 730 (L) 69/79/2515 04:41 AM    PLATELET 219 06/19/0040 10:32 PM    PLATELET 174 (L) 80/84/4966 06:21 PM    PLATELET 92 (L) 30/13/6968 03:25 AM    PLATELET 72 (L) 20/06/5815 03:28 AM    PLATELET 75 (L) 94/65/4462 03:43 AM    PLATELET 95 (L) 10/13/3640 12:37 AM    PLATELET 199 99/80/7496 08:07 PM    PLATELET 666 (L) 87/54/1928 04:10 PM    PLATELET 780 (L) 53/13/9308 10:58 PM    PLATELET 304 (L) 19/67/1228 06:00 AM    PLATELET 183 (L) 49/17/8982 07:25 AM    PLATELET 346 97/14/8575 01:30 AM    PLATELET 375 11/50/8921 03:20 AM    PLATELET 724 (L) 62/89/4352 03:45 AM    PLATELET 540 00/16/5744 03:35 AM    PLATELET 977 03/88/3876 11:30 PM    PLATELET 630 19/52/1322 09:21 PM    PLATELET 681 48/08/4117 04:15 AM    PLATELET 910 11/69/6388 02:00 AM    PLATELET 273 54/49/8709 03:35 AM    PLATELET 209 98/03/0633 11:30 PM       Recent Results (from the past 24 hour(s))   GLUCOSE, POC    Collection Time: 05/18/18 12:36 PM   Result Value Ref Range    Glucose (POC) 340 (H) 65 - 100 mg/dL    Performed by Lety Bains POC    Collection Time: 05/18/18  4:49 PM   Result Value Ref Range    Glucose (POC) 303 (H) 65 - 100 mg/dL    Performed by Malinda Mendes (PCT)    GLUCOSE, POC    Collection Time: 05/18/18  9:39 PM   Result Value Ref Range    Glucose (POC) 317 (H) 65 - 100 mg/dL    Performed by Zane Ruiz (PCT)    METABOLIC PANEL, BASIC    Collection Time: 05/19/18  2:16 AM   Result Value Ref Range    Sodium 136 136 - 145 mmol/L    Potassium 4.1 3.5 - 5.1 mmol/L    Chloride 108 97 - 108 mmol/L    CO2 22 21 - 32 mmol/L    Anion gap 6 5 - 15 mmol/L    Glucose 269 (H) 65 - 100 mg/dL    BUN 25 (H) 6 - 20 MG/DL    Creatinine 0.75 0.55 - 1.02 MG/DL    BUN/Creatinine ratio 33 (H) 12 - 20      GFR est AA >60 >60 ml/min/1.73m2    GFR est non-AA >60 >60 ml/min/1.73m2    Calcium 8.2 (L) 8.5 - 10.1 MG/DL   CBC WITH AUTOMATED DIFF    Collection Time: 05/19/18  2:16 AM   Result Value Ref Range    WBC 6.3 3.6 - 11.0 K/uL    RBC 3.25 (L) 3.80 - 5.20 M/uL    HGB 8.8 (L) 11.5 - 16.0 g/dL    HCT 27.4 (L) 35.0 - 47.0 %    MCV 84.3 80.0 - 99.0 FL    MCH 27.1 26.0 - 34.0 PG    MCHC 32.1 30.0 - 36.5 g/dL    RDW 16.3 (H) 11.5 - 14.5 %    PLATELET 356 043 - 209 K/uL    MPV 10.3 8.9 - 12.9 FL    NRBC 0.0 0  WBC    ABSOLUTE NRBC 0.00 0.00 - 0.01 K/uL    NEUTROPHILS 71 32 - 75 %    LYMPHOCYTES 20 12 - 49 %    MONOCYTES 6 5 - 13 %    EOSINOPHILS 3 0 - 7 %    BASOPHILS 0 0 - 1 %    IMMATURE GRANULOCYTES 1 (H) 0.0 - 0.5 %    ABS. NEUTROPHILS 4.5 1.8 - 8.0 K/UL    ABS. LYMPHOCYTES 1.2 0.8 - 3.5 K/UL    ABS. MONOCYTES 0.4 0.0 - 1.0 K/UL    ABS. EOSINOPHILS 0.2 0.0 - 0.4 K/UL    ABS. BASOPHILS 0.0 0.0 - 0.1 K/UL    ABS. IMM.  GRANS. 0.1 (H) 0.00 - 0.04 K/UL    DF AUTOMATED     GLUCOSE, POC    Collection Time: 05/19/18  8:17 AM   Result Value Ref Range    Glucose (POC) 330 (H) 65 - 100 mg/dL    Performed by Haseeb Wagner*

## 2018-05-20 LAB
ANION GAP SERPL CALC-SCNC: 7 MMOL/L (ref 5–15)
BACTERIA SPEC CULT: ABNORMAL
BACTERIA SPEC CULT: ABNORMAL
BACTERIA SPEC CULT: NORMAL
BUN SERPL-MCNC: 19 MG/DL (ref 6–20)
BUN/CREAT SERPL: 27 (ref 12–20)
CALCIUM SERPL-MCNC: 8.6 MG/DL (ref 8.5–10.1)
CHLORIDE SERPL-SCNC: 111 MMOL/L (ref 97–108)
CO2 SERPL-SCNC: 23 MMOL/L (ref 21–32)
CREAT SERPL-MCNC: 0.7 MG/DL (ref 0.55–1.02)
DATE LAST DOSE: ABNORMAL
ERYTHROCYTE [DISTWIDTH] IN BLOOD BY AUTOMATED COUNT: 15.9 % (ref 11.5–14.5)
GLUCOSE BLD STRIP.AUTO-MCNC: 164 MG/DL (ref 65–100)
GLUCOSE BLD STRIP.AUTO-MCNC: 237 MG/DL (ref 65–100)
GLUCOSE BLD STRIP.AUTO-MCNC: 283 MG/DL (ref 65–100)
GLUCOSE BLD STRIP.AUTO-MCNC: 319 MG/DL (ref 65–100)
GLUCOSE SERPL-MCNC: 144 MG/DL (ref 65–100)
GRAM STN SPEC: ABNORMAL
GRAM STN SPEC: ABNORMAL
HCT VFR BLD AUTO: 27.2 % (ref 35–47)
HGB BLD-MCNC: 8.8 G/DL (ref 11.5–16)
MCH RBC QN AUTO: 27.2 PG (ref 26–34)
MCHC RBC AUTO-ENTMCNC: 32.4 G/DL (ref 30–36.5)
MCV RBC AUTO: 84 FL (ref 80–99)
NRBC # BLD: 0 K/UL (ref 0–0.01)
NRBC BLD-RTO: 0 PER 100 WBC
PLATELET # BLD AUTO: 162 K/UL (ref 150–400)
PMV BLD AUTO: 10.1 FL (ref 8.9–12.9)
POTASSIUM SERPL-SCNC: 3.9 MMOL/L (ref 3.5–5.1)
RBC # BLD AUTO: 3.24 M/UL (ref 3.8–5.2)
REPORTED DOSE,DOSE: ABNORMAL UNITS
REPORTED DOSE/TIME,TMG: 2000
SERVICE CMNT-IMP: ABNORMAL
SERVICE CMNT-IMP: NORMAL
SODIUM SERPL-SCNC: 141 MMOL/L (ref 136–145)
VANCOMYCIN TROUGH SERPL-MCNC: 11.3 UG/ML (ref 5–10)
WBC # BLD AUTO: 5.5 K/UL (ref 3.6–11)

## 2018-05-20 PROCEDURE — 85027 COMPLETE CBC AUTOMATED: CPT | Performed by: INTERNAL MEDICINE

## 2018-05-20 PROCEDURE — 74011636637 HC RX REV CODE- 636/637: Performed by: INTERNAL MEDICINE

## 2018-05-20 PROCEDURE — 74011250637 HC RX REV CODE- 250/637: Performed by: OBSTETRICS & GYNECOLOGY

## 2018-05-20 PROCEDURE — 74011250636 HC RX REV CODE- 250/636: Performed by: EMERGENCY MEDICINE

## 2018-05-20 PROCEDURE — 36415 COLL VENOUS BLD VENIPUNCTURE: CPT | Performed by: EMERGENCY MEDICINE

## 2018-05-20 PROCEDURE — 74011250636 HC RX REV CODE- 250/636: Performed by: OBSTETRICS & GYNECOLOGY

## 2018-05-20 PROCEDURE — 74011000250 HC RX REV CODE- 250: Performed by: EMERGENCY MEDICINE

## 2018-05-20 PROCEDURE — 80048 BASIC METABOLIC PNL TOTAL CA: CPT | Performed by: EMERGENCY MEDICINE

## 2018-05-20 PROCEDURE — 65270000029 HC RM PRIVATE

## 2018-05-20 PROCEDURE — 82962 GLUCOSE BLOOD TEST: CPT

## 2018-05-20 RX ADMIN — Medication 5 ML: at 14:00

## 2018-05-20 RX ADMIN — INSULIN LISPRO 12 UNITS: 100 INJECTION, SOLUTION INTRAVENOUS; SUBCUTANEOUS at 17:01

## 2018-05-20 RX ADMIN — INSULIN GLARGINE 69 UNITS: 100 INJECTION, SOLUTION SUBCUTANEOUS at 11:16

## 2018-05-20 RX ADMIN — Medication 10 ML: at 06:43

## 2018-05-20 RX ADMIN — METRONIDAZOLE 500 MG: 500 INJECTION, SOLUTION INTRAVENOUS at 11:21

## 2018-05-20 RX ADMIN — INSULIN LISPRO 7 UNITS: 100 INJECTION, SOLUTION INTRAVENOUS; SUBCUTANEOUS at 17:02

## 2018-05-20 RX ADMIN — HYDROMORPHONE HYDROCHLORIDE 1 MG: 2 TABLET ORAL at 17:11

## 2018-05-20 RX ADMIN — HYDROMORPHONE HYDROCHLORIDE 1 MG: 2 TABLET ORAL at 22:05

## 2018-05-20 RX ADMIN — VANCOMYCIN HYDROCHLORIDE 1500 MG: 10 INJECTION, POWDER, LYOPHILIZED, FOR SOLUTION INTRAVENOUS at 13:20

## 2018-05-20 RX ADMIN — HYDROMORPHONE HYDROCHLORIDE 1 MG: 2 TABLET ORAL at 11:15

## 2018-05-20 RX ADMIN — ONDANSETRON 4 MG: 4 TABLET, ORALLY DISINTEGRATING ORAL at 14:48

## 2018-05-20 RX ADMIN — VANCOMYCIN HYDROCHLORIDE 1500 MG: 10 INJECTION, POWDER, LYOPHILIZED, FOR SOLUTION INTRAVENOUS at 21:01

## 2018-05-20 RX ADMIN — METRONIDAZOLE 500 MG: 500 INJECTION, SOLUTION INTRAVENOUS at 04:30

## 2018-05-20 RX ADMIN — LEVOFLOXACIN 750 MG: 5 INJECTION, SOLUTION INTRAVENOUS at 18:05

## 2018-05-20 RX ADMIN — INSULIN LISPRO 2 UNITS: 100 INJECTION, SOLUTION INTRAVENOUS; SUBCUTANEOUS at 22:01

## 2018-05-20 RX ADMIN — INSULIN LISPRO 10 UNITS: 100 INJECTION, SOLUTION INTRAVENOUS; SUBCUTANEOUS at 11:59

## 2018-05-20 RX ADMIN — Medication 10 ML: at 21:04

## 2018-05-20 RX ADMIN — HYDROMORPHONE HYDROCHLORIDE 0.5 MG: 2 INJECTION INTRAMUSCULAR; INTRAVENOUS; SUBCUTANEOUS at 12:12

## 2018-05-20 RX ADMIN — METRONIDAZOLE 500 MG: 500 INJECTION, SOLUTION INTRAVENOUS at 19:40

## 2018-05-20 RX ADMIN — HYDROMORPHONE HYDROCHLORIDE 1 MG: 2 TABLET ORAL at 01:28

## 2018-05-20 RX ADMIN — INSULIN LISPRO 12 UNITS: 100 INJECTION, SOLUTION INTRAVENOUS; SUBCUTANEOUS at 11:59

## 2018-05-20 RX ADMIN — ONDANSETRON 4 MG: 4 TABLET, ORALLY DISINTEGRATING ORAL at 01:27

## 2018-05-20 NOTE — PROGRESS NOTES
Hospitalist Progress Note  Tameka Omer MD      NAME:  Dimitrios Lima  :  1980  MRN:  [id number]  PCP:   None  Date of Service:  2018    Assessment & Plan:     Diabetes mellitus uncontrolled with hyperglycemia with neuropathy  Last A1c is 8.9  Diabetic management with accuchecks with ISS and basal coverage. 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  Patient had a recent 3 week hospitalization in [place] with similar complaints  Wound culture NGTD  On vancomycin, Levaquin and Flagyl  ID service following  Follow culture results  Pain control  Mgmt deferred to OB and ID     Abnormal UA  Urine cx was not sent on admission  On Levaquin  ID following    Hypertension  Dyslipidemia  Stable  Continue with statin    Body mass index is 40.22 kg/(m^2).: 40 or above Morbid obesity     Code status: Full  Prophylaxis: Per Primary team  Disposition: Per primary team       Reason for visit:   Recheck DM & HTN    Interval history / Subjective:   Patient seen and examined. Resting comfortably. No new complaints. Review of Systems:     Symptom Y/N Comments   Symptom Y/N Comments   Fever/Chills n     Chest Pain n     Poor Appetite       Edema       Cough       Abdominal Pain n     Sputum       Joint Pain       SOB/VIRK n     Pruritis/Rash       Nausea/vomit       Tolerating PT/OT       Diarrhea      Tolerating Diet       Constipation       Other          NOT obtained      due to     Physical Examination:   Last 24hrs VS reviewed since prior progress note.  Most recent are:  Patient Vitals for the past 12 hrs:   Temp Pulse Resp BP SpO2   18 0410 98.7 °F (37.1 °C) 77 21 108/63 100 %   18 0017 98.2 °F (36.8 °C) 74 18 135/80 97 %       Intake/Output Summary (Last 24 hours) at 18 0931  Last data filed at 18 2353   Gross per 24 hour   Intake             2790 ml   Output                0 ml   Net             2790 ml      General appearance: sleeping but woke up, cooperative, NAD  Head: Normocephalic, atraumatic  Eyes: PERRL and EOMI sclera clear  Throat: Lips, mucosa, and tongue normal.   Neck: supple, no tenderness  Lungs: CTA with good BS and normal effort  Heart: S1-S2, RRR, No MGR  Abdomen: SNTBS(+), No HSM  Extremities: extremities normal, atraumatic, no cyanosis, no edema   Pulses: 2+ and symmetric  Skin: Skin color, texture, turgor normal. No rashes or lesions  Neurologic: Alert and oriented X 4.  Psychiatric: Not anxious, cooperative, normal affect    Data Review:   Review and/or order of clinical lab test    No results found. Echo Results  (Last 48 hours)    None        Labs:     Recent Labs      05/20/18   0302  05/19/18   0216   WBC  5.5  6.3   HGB  8.8*  8.8*   HCT  27.2*  27.4*   PLT  162  150     Recent Labs      05/20/18   0302  05/19/18   0216  05/18/18   0622   NA  141  136  134*   K  3.9  4.1  4.4   CL  111*  108  104   CO2  23  22  24   BUN  19  25*  30*   CREA  0.70  0.75  0.93   GLU  144*  269*  311*   CA  8.6  8.2*  8.4*     Recent Labs      05/17/18   1401   SGOT  <3*   ALT  13   AP  92   TBILI  0.6   TP  8.6*   ALB  3.8   GLOB  4.8*     No results for input(s): INR, PTP, APTT in the last 72 hours. No lab exists for component: INREXT   No results for input(s): PH, PCO2, PO2 in the last 72 hours.   Lab Results   Component Value Date/Time    Cholesterol, total 758 (H) 10/06/2017 12:05 AM    HDL Cholesterol 74 10/06/2017 12:05 AM    LDL,Direct 306 (H) 10/06/2017 12:05 AM    LDL, calculated Not calculated due to elevated triglyceride level 10/06/2017 12:05 AM    Triglyceride 550 (H) 10/09/2017 03:28 AM    CHOL/HDL Ratio 10.2 (H) 10/06/2017 12:05 AM     Lab Results   Component Value Date/Time    Glucose (POC) 164 (H) 05/20/2018 07:19 AM    Glucose (POC) 216 (H) 05/19/2018 09:37 PM    Glucose (POC) 274 (H) 05/19/2018 04:38 PM    Glucose (POC) 339 (H) 05/19/2018 12:21 PM    Glucose (POC) 330 (H) 05/19/2018 08:17 AM     Lab Results   Component Value Date/Time    Color RED 05/17/2018 04:45 PM    Appearance CLOUDY (A) 05/17/2018 04:45 PM    Specific gravity >1.030 (H) 05/17/2018 04:45 PM    Specific gravity <1.005 12/29/2017 10:32 PM    pH (UA) 5.0 05/17/2018 04:45 PM    Protein 100 (A) 05/17/2018 04:45 PM    Glucose 500 (A) 05/17/2018 04:45 PM    Ketone TRACE (A) 05/17/2018 04:45 PM    Bilirubin NEGATIVE  05/17/2018 04:45 PM    Urobilinogen 1.0 05/17/2018 04:45 PM    Nitrites POSITIVE (A) 05/17/2018 04:45 PM    Leukocyte Esterase TRACE (A) 05/17/2018 04:45 PM    Epithelial cells FEW 05/17/2018 04:45 PM    Bacteria NEGATIVE  05/17/2018 04:45 PM    WBC 0-4 05/17/2018 04:45 PM    RBC >100 (H) 05/17/2018 04:45 PM     All Micro Results     Procedure Component Value Units Date/Time    CULTURE, BLOOD, PAIRED [[id number]] Collected:  05/17/18 1932    Order Status:  Completed Specimen:  Blood Updated:  05/20/18 0733     Special Requests: NO SPECIAL REQUESTS        Culture result: NO GROWTH 3 DAYS       CULTURE, BLOOD [[id number]] Collected:  05/17/18 1401    Order Status:  Completed Specimen:  Blood from Blood Updated:  05/20/18 0733     Special Requests: NO SPECIAL REQUESTS        Culture result: NO GROWTH 3 DAYS       CULTURE, BLOOD [[id number]] Collected:  05/17/18 1401    Order Status:  Completed Specimen:  Blood from Blood Updated:  05/20/18 0733     Special Requests: NO SPECIAL REQUESTS        Culture result: NO GROWTH 3 DAYS       CULTURE, WOUND [place] STAIN [[id number]]  (Abnormal) Collected:  05/17/18 2129    Order Status:  Completed Specimen:  Abscess Updated:  05/19/18 1301     Special Requests: NO SPECIAL REQUESTS        GRAM STAIN FEW WBCS SEEN         NO DEFINITE ORGANISM SEEN        Culture result: NO GROWTH 1 DAY ON SOLID MEDIA              GRAM POSITIVE COCCI SEEN ON GRAM STAIN OF THE THIO BROTH (A)    CULTURE, ANAEROBIC [[id number]] Collected:  05/17/18 2129    Order Status:  Completed Updated:  05/18/18 0607    CULTURE, BLOOD [[id number]] Collected: 05/17/18 1815    Order Status:  Canceled Specimen:  Blood from Blood         Medications Reviewed:     Current Facility-Administered Medications:     HYDROmorphone (DILAUDID) tablet 1 mg, 1 mg, Oral, Q4H PRN, Cynthia Ledbetter MD, 1 mg at 05/20/18 0128    promethazine (PHENERGAN) tablet 25 mg, 25 mg, Oral, Q6H PRN, Cynthia Ledbetter MD, 25 mg at 05/19/18 2226    insulin lispro (HUMALOG) injection 12 Units, 12 Units, SubCUTAneous, TIDAC, Marcy Zaldivar MD, 12 Units at 05/19/18 1659    insulin glargine (LANTUS) injection 69 Units, 69 Units, SubCUTAneous, DAILY, Marcy Zaldivar MD, 69 Units at 05/19/18 0910    insulin lispro (HUMALOG) injection, , SubCUTAneous, AC&HS, Marcy Zaldivar MD, 2 Units at 05/19/18 2144    acetaminophen (TYLENOL) tablet 650 mg, 650 mg, Oral, Q4H PRN, Megan Linares MD    HYDROmorphone (DILAUDID) injection 0.5 mg, 0.5 mg, IntraVENous, Q6H PRN, Megan Linares MD, 0.5 mg at 05/19/18 1658    sodium chloride (NS) flush 5-10 mL, 5-10 mL, IntraVENous, Q8H, Cynthia Ledbetter MD, 10 mL at 05/20/18 6670    sodium chloride (NS) flush 5-10 mL, 5-10 mL, IntraVENous, PRN, Cynthia Ledbetter MD    ondansetron (ZOFRAN ODT) tablet 4 mg, 4 mg, Oral, Q8H PRN, Megan Linares MD, 4 mg at 05/20/18 0127    lidocaine (URO-JET) 2 % jelly, , Urethral, PRN, Marcy Zaldivar MD    sodium chloride (NS) flush 5-10 mL, 5-10 mL, IntraVENous, Q8H, Anel Mckeon MD, 10 mL at 05/20/18 7416    sodium chloride (NS) flush 5-10 mL, 5-10 mL, IntraVENous, PRN, Anel Arizmendi MD    metroNIDAZOLE (FLAGYL) IVPB premix 500 mg, 500 mg, IntraVENous, Q8H, April SEVERINO Arizmendi MD, Last Rate: 100 mL/hr at 05/20/18 0430, 500 mg at 05/20/18 0430    levoFLOXacin (LEVAQUIN) 750 mg in D5W IVPB, 750 mg, IntraVENous, Q24H, Anel Mckeon MD, Last Rate: 100 mL/hr at 05/19/18 1700, 750 mg at 05/19/18 1700    vancomycin (VANCOCIN) 1500 mg in  ml infusion, 1,500 mg, IntraVENous, Q12H, April SEVERINO Arizmendi MD, Last Rate: 250 mL/hr at 05/19/18 2139, 1,500 mg at 05/19/18 2139    glucose chewable tablet 16 g, 4 Tab, Oral, Scarlett WALL MD    dextrose (D50W) injection syrg 12.5-25 g, 12.5-25 g, IntraVENous, Scarlett WALL MD    glucagon (GLUCAGEN) injection 1 mg, 1 mg, IntraMUSCular, Scarlett WALL MD    ______________________________________________________________________  EXPECTED LENGTH OF STAY: 3d 14h  ACTUAL LENGTH OF STAY:          3                 John Pineda MD

## 2018-05-20 NOTE — PROGRESS NOTES
Gynecology Post Operative Note    Cori Hugo      Post-op day 3 from Procedure(s):  INCISION AND DRAINAGE VULVA ABSCESS  Patient states pain adequately controlled on current medication. Voiding without difficulty. Patient is passing flatus. She is is tolerating her diet. Vitals:  Visit Vitals    /63    Pulse 77    Temp 98.7 °F (37.1 °C)    Resp 21    Ht 5' 8\" (1.727 m)  Comment: 18    Wt 120 kg (264 lb 8.8 oz)    LMP 2018    SpO2 100%    Breastfeeding No    BMI 40.22 kg/m2     Temp (24hrs), Av.4 °F (36.9 °C), Min:97.9 °F (36.6 °C), Max:98.9 °F (37.2 °C)      Last 24hr Input/Output:    Intake/Output Summary (Last 24 hours) at 18 0825  Last data filed at 18 2353   Gross per 24 hour   Intake             2790 ml   Output                0 ml   Net             2790 ml          Exam:  Patient without distress. Abdomen soft, bowel sounds present,    Mons pubis dressing intact with appropriate tenderness. Left vulvar incision with packing appears without erythema, induration              Lower extremities are negative for swelling, cords, or tenderness.         Recent Results (from the past 24 hour(s))   GLUCOSE, POC    Collection Time: 18 12:21 PM   Result Value Ref Range    Glucose (POC) 339 (H) 65 - 100 mg/dL    Performed by [address], POC    Collection Time: 18  4:38 PM   Result Value Ref Range    Glucose (POC) 274 (H) 65 - 100 mg/dL    Performed by [address], POC    Collection Time: 18  9:37 PM   Result Value Ref Range    Glucose (POC) 216 (H) 65 - 100 mg/dL    Performed by [facility]    METABOLIC PANEL, BASIC    Collection Time: 18  3:02 AM   Result Value Ref Range    Sodium 141 136 - 145 mmol/L    Potassium 3.9 3.5 - 5.1 mmol/L    Chloride 111 (H) 97 - 108 mmol/L    CO2 23 21 - 32 mmol/L    Anion gap 7 5 - 15 mmol/L    Glucose 144 (H) 65 - 100 mg/dL    BUN 19 6 - 20 MG/DL    Creatinine 0.70 0.55 - 1.02 MG/DL    BUN/Creatinine ratio 27 (H) 12 - 20      GFR est AA >60 >60 ml/min/1.73m2    GFR est non-AA >60 >60 ml/min/1.73m2    Calcium 8.6 8.5 - 10.1 MG/DL   CBC W/O DIFF    Collection Time: 05/20/18  3:02 AM   Result Value Ref Range    WBC 5.5 3.6 - 11.0 K/uL    RBC 3.24 (L) 3.80 - 5.20 M/uL    HGB 8.8 (L) 11.5 - 16.0 g/dL    HCT 27.2 (L) 35.0 - 47.0 %    MCV 84.0 80.0 - 99.0 FL    MCH 27.2 26.0 - 34.0 PG    MCHC 32.4 30.0 - 36.5 g/dL    RDW 15.9 (H) 11.5 - 14.5 %    PLATELET 840 252 - 992 K/uL    MPV 10.1 8.9 - 12.9 FL    NRBC 0.0 0  WBC    ABSOLUTE NRBC 0.00 0.00 - 0.01 K/uL   GLUCOSE, POC    Collection Time: 05/20/18  7:19 AM   Result Value Ref Range    Glucose (POC) 164 (H) 65 - 100 mg/dL    Performed by Antonietta Rao (PCT)        Assessment: Progressive improvement. Plan: Continue post-op care plan     Wound care per wound care management   Also appreciate IM support with non-surgical issues.

## 2018-05-20 NOTE — PROGRESS NOTES
0730: Report received from [address] [place] [address] Rd, [facility], [facility] 23, ED SUMMARY, MAR, RECENT RESULTS were discussed.     Mark Snow RN

## 2018-05-20 NOTE — PROGRESS NOTES
Pharmacy Automatic Renal Dosing Protocol - Antimicrobials    Indication for Antimicrobials: vulvar abscesses, hx of abscesses    Current Regimen of Each Antimicrobial:  Vancomycin 1.5g IV q12h - started  Day 4  Levofloxacin 750mg IV q24h - started  Day 4  Metronidazole 500mg IV q8h - started  Day 4    Previous Antimicrobial Therapy:   Doxycycline date? ? From ED in [place], [place]  Vancomycin + Clindamycin then Vanc+Aztreonam+Flagyl in [place], [place] hospital around 18  Vancomycin trough goal level:  15-20    Date Dose & Interval Measured (mcg/mL) Extrapolated (mcg/mL)    21:15 1500mg IV q12h 11.3 10.8                 Significant Cultures:    19:32 PBCx - pending   14:01 PBCx - pending   wound- Staph species with 2 different colony types isolated from broth only - pending    anaerobic-non isolated   HIV non reactive   Chlamydia/GC PCR - pending    Radiology / Imaging results: (X-ray, CT scan or MRI):   : CT Abd: Acute right mons 7 cm abscess, Acute left mons 2 cm abscess. : CXR: No evidence of PNA    Paralysis, amputations, malnutrition:  none  Labs:  Recent Labs      18   0302  18   0216  18   0622   CREA  0.70  0.75  0.93   BUN  19  25*  30*   WBC  5.5  6.3  9.5     Temp (24hrs), Av.4 °F (36.9 °C), Min:97.9 °F (36.6 °C), Max:98.9 °F (37.2 °C)    Creatinine Clearance (mL/min) or Dialysis:   Estimated Creatinine Clearance: 149.9 mL/min (based on Cr of 0.7). Estimated Creatinine Clearance (using IBW):111.0 mL/min    Impression/Plan:   · Afebrile, WBCs < 10k, renal stable  · S/p incision and drainage vulva abscess  · Clinically improved per GYN note; ID Consulted (Keagan)  · Vancomycin actual trough level 10.8 is within therapeutic goal range of 10-15.   · Continue Vancomycin, Metronidazole and Levofloxacin regimens  · Daily BMP + CBC  · Antimicrobial stop date - pending     Pharmacy will follow daily and adjust medications as appropriate for renal function and/or serum levels.

## 2018-05-21 LAB
ANION GAP SERPL CALC-SCNC: 6 MMOL/L (ref 5–15)
BASOPHILS # BLD: 0 K/UL (ref 0–0.1)
BASOPHILS NFR BLD: 0 % (ref 0–1)
BUN SERPL-MCNC: 16 MG/DL (ref 6–20)
BUN/CREAT SERPL: 24 (ref 12–20)
CALCIUM SERPL-MCNC: 8.6 MG/DL (ref 8.5–10.1)
CHLORIDE SERPL-SCNC: 110 MMOL/L (ref 97–108)
CO2 SERPL-SCNC: 22 MMOL/L (ref 21–32)
CREAT SERPL-MCNC: 0.66 MG/DL (ref 0.55–1.02)
DIFFERENTIAL METHOD BLD: ABNORMAL
EOSINOPHIL # BLD: 0.2 K/UL (ref 0–0.4)
EOSINOPHIL NFR BLD: 3 % (ref 0–7)
ERYTHROCYTE [DISTWIDTH] IN BLOOD BY AUTOMATED COUNT: 15.8 % (ref 11.5–14.5)
GLUCOSE BLD STRIP.AUTO-MCNC: 160 MG/DL (ref 65–100)
GLUCOSE BLD STRIP.AUTO-MCNC: 171 MG/DL (ref 65–100)
GLUCOSE BLD STRIP.AUTO-MCNC: 174 MG/DL (ref 65–100)
GLUCOSE BLD STRIP.AUTO-MCNC: 214 MG/DL (ref 65–100)
GLUCOSE SERPL-MCNC: 152 MG/DL (ref 65–100)
HCT VFR BLD AUTO: 25.7 % (ref 35–47)
HGB BLD-MCNC: 8.4 G/DL (ref 11.5–16)
IMM GRANULOCYTES # BLD: 0.1 K/UL (ref 0–0.04)
IMM GRANULOCYTES NFR BLD AUTO: 1 % (ref 0–0.5)
LYMPHOCYTES # BLD: 1.7 K/UL (ref 0.8–3.5)
LYMPHOCYTES NFR BLD: 28 % (ref 12–49)
MCH RBC QN AUTO: 27.1 PG (ref 26–34)
MCHC RBC AUTO-ENTMCNC: 32.7 G/DL (ref 30–36.5)
MCV RBC AUTO: 82.9 FL (ref 80–99)
MONOCYTES # BLD: 0.4 K/UL (ref 0–1)
MONOCYTES NFR BLD: 6 % (ref 5–13)
NEUTS SEG # BLD: 3.9 K/UL (ref 1.8–8)
NEUTS SEG NFR BLD: 62 % (ref 32–75)
NRBC # BLD: 0 K/UL (ref 0–0.01)
NRBC BLD-RTO: 0 PER 100 WBC
PLATELET # BLD AUTO: 169 K/UL (ref 150–400)
PMV BLD AUTO: 10.5 FL (ref 8.9–12.9)
POTASSIUM SERPL-SCNC: 4.1 MMOL/L (ref 3.5–5.1)
RBC # BLD AUTO: 3.1 M/UL (ref 3.8–5.2)
RPR SER QL: NONREACTIVE
SERVICE CMNT-IMP: ABNORMAL
SODIUM SERPL-SCNC: 138 MMOL/L (ref 136–145)
WBC # BLD AUTO: 6.3 K/UL (ref 3.6–11)

## 2018-05-21 PROCEDURE — 74011000250 HC RX REV CODE- 250: Performed by: EMERGENCY MEDICINE

## 2018-05-21 PROCEDURE — 74011250636 HC RX REV CODE- 250/636: Performed by: OBSTETRICS & GYNECOLOGY

## 2018-05-21 PROCEDURE — 80048 BASIC METABOLIC PNL TOTAL CA: CPT | Performed by: OBSTETRICS & GYNECOLOGY

## 2018-05-21 PROCEDURE — 77030011256 HC DRSG MEPILEX <16IN NO BORD MOLN -A

## 2018-05-21 PROCEDURE — 74011250637 HC RX REV CODE- 250/637: Performed by: OBSTETRICS & GYNECOLOGY

## 2018-05-21 PROCEDURE — 82962 GLUCOSE BLOOD TEST: CPT

## 2018-05-21 PROCEDURE — 74011250637 HC RX REV CODE- 250/637: Performed by: INTERNAL MEDICINE

## 2018-05-21 PROCEDURE — 77030037878 HC DRSG MEPILEX >48IN BORD MOLN -B

## 2018-05-21 PROCEDURE — 74011250636 HC RX REV CODE- 250/636: Performed by: EMERGENCY MEDICINE

## 2018-05-21 PROCEDURE — 85025 COMPLETE CBC W/AUTO DIFF WBC: CPT | Performed by: OBSTETRICS & GYNECOLOGY

## 2018-05-21 PROCEDURE — 74011000250 HC RX REV CODE- 250: Performed by: OBSTETRICS & GYNECOLOGY

## 2018-05-21 PROCEDURE — 74011000250 HC RX REV CODE- 250: Performed by: INTERNAL MEDICINE

## 2018-05-21 PROCEDURE — 36415 COLL VENOUS BLD VENIPUNCTURE: CPT | Performed by: OBSTETRICS & GYNECOLOGY

## 2018-05-21 PROCEDURE — 74011636637 HC RX REV CODE- 636/637: Performed by: INTERNAL MEDICINE

## 2018-05-21 PROCEDURE — 65270000029 HC RM PRIVATE

## 2018-05-21 RX ORDER — LINEZOLID 600 MG/1
600 TABLET, FILM COATED ORAL EVERY 12 HOURS
Status: DISCONTINUED | OUTPATIENT
Start: 2018-05-21 | End: 2018-05-22 | Stop reason: HOSPADM

## 2018-05-21 RX ADMIN — INSULIN LISPRO 12 UNITS: 100 INJECTION, SOLUTION INTRAVENOUS; SUBCUTANEOUS at 08:33

## 2018-05-21 RX ADMIN — Medication 10 ML: at 14:43

## 2018-05-21 RX ADMIN — INSULIN LISPRO 3 UNITS: 100 INJECTION, SOLUTION INTRAVENOUS; SUBCUTANEOUS at 08:32

## 2018-05-21 RX ADMIN — INSULIN GLARGINE 69 UNITS: 100 INJECTION, SOLUTION SUBCUTANEOUS at 08:33

## 2018-05-21 RX ADMIN — LINEZOLID 600 MG: 600 TABLET, FILM COATED ORAL at 14:52

## 2018-05-21 RX ADMIN — INSULIN LISPRO 4 UNITS: 100 INJECTION, SOLUTION INTRAVENOUS; SUBCUTANEOUS at 16:36

## 2018-05-21 RX ADMIN — HYDROMORPHONE HYDROCHLORIDE 0.5 MG: 2 INJECTION INTRAMUSCULAR; INTRAVENOUS; SUBCUTANEOUS at 10:54

## 2018-05-21 RX ADMIN — VANCOMYCIN HYDROCHLORIDE 1500 MG: 10 INJECTION, POWDER, LYOPHILIZED, FOR SOLUTION INTRAVENOUS at 10:54

## 2018-05-21 RX ADMIN — HYDROMORPHONE HYDROCHLORIDE 0.5 MG: 2 INJECTION INTRAMUSCULAR; INTRAVENOUS; SUBCUTANEOUS at 19:02

## 2018-05-21 RX ADMIN — LIDOCAINE HYDROCHLORIDE: 20 JELLY TOPICAL at 16:13

## 2018-05-21 RX ADMIN — DAKIN'S SOLUTION 0.125% (QUARTER STRENGTH): 0.12 SOLUTION at 15:50

## 2018-05-21 RX ADMIN — ONDANSETRON 4 MG: 4 TABLET, ORALLY DISINTEGRATING ORAL at 02:15

## 2018-05-21 RX ADMIN — LINEZOLID 600 MG: 600 TABLET, FILM COATED ORAL at 22:31

## 2018-05-21 RX ADMIN — HYDROMORPHONE HYDROCHLORIDE 0.5 MG: 2 INJECTION INTRAMUSCULAR; INTRAVENOUS; SUBCUTANEOUS at 02:14

## 2018-05-21 RX ADMIN — INSULIN LISPRO 3 UNITS: 100 INJECTION, SOLUTION INTRAVENOUS; SUBCUTANEOUS at 12:32

## 2018-05-21 RX ADMIN — METRONIDAZOLE 500 MG: 500 INJECTION, SOLUTION INTRAVENOUS at 04:26

## 2018-05-21 RX ADMIN — INSULIN LISPRO 12 UNITS: 100 INJECTION, SOLUTION INTRAVENOUS; SUBCUTANEOUS at 16:36

## 2018-05-21 RX ADMIN — HYDROMORPHONE HYDROCHLORIDE 1 MG: 2 TABLET ORAL at 14:52

## 2018-05-21 RX ADMIN — INSULIN LISPRO 12 UNITS: 100 INJECTION, SOLUTION INTRAVENOUS; SUBCUTANEOUS at 12:31

## 2018-05-21 NOTE — PROGRESS NOTES
Gynecology Post Operative Note    Slava Roles    Assessment/Plan: POD4 s/p I&D of vulvar (mons) abscess      Recurrent vulvar abscesses s/p recent 3 week hospital admission in [place] (records scanned into EMR) including I&D of left labia majora in ED followed 2 days later by debridement and wound vac placement, likely related to poor healing (suboptimal IDDM control) in setting of microabrasions from shaving  - no evidence of sepsis - temp and WBC remain normal s/p I&D, blood cxs negative  - dressing/packing changes per wound care team - help much appreciated  - await wound care recommendations for outpatient management  - continue Vancomycin/Levaquin/Flagyl; appreciate ID input  - await ID antibiotic recommendations for outpatient management prn      Medical co-morbidities (IDDM, HTN, recent REN, class 3 obesity, ) per IM - greatly appreciate the help  - hyponatremia resolved s/p NS      Slept well overnight. Pain controlled with PO dilaudid. Overdue for dose and felt chills while washing hands (attributes the chills to poor pain control). Voiding and (+)BM. Tolerating diabetic diet. Orders/Charges: Medium    Vitals:  Visit Vitals    /77 (BP 1 Location: Right arm)    Pulse 67    Temp 97.5 °F (36.4 °C)    Resp 18    Ht 5' 8\" (1.727 m)  Comment: 18    Wt 120 kg (264 lb 8.8 oz)    LMP 2018    SpO2 100%    Breastfeeding No    BMI 40.22 kg/m2     Temp (24hrs), Av.2 °F (36.8 °C), Min:97.5 °F (36.4 °C), Max:98.8 °F (37.1 °C)      Last 24hr Input/Output:    Intake/Output Summary (Last 24 hours) at 18 1014  Last data filed at 18 2101   Gross per 24 hour   Intake              480 ml   Output                0 ml   Net              480 ml          Exam:  Patient without distress.    Abdomen obese, soft/NT, bowel sounds present              Mons appropriately tender with dressing intact, no surrounding erythema or induration              Left labia majora wound healing well by secondary intention with packing in place, no erythema or induration              Lower extremities are negative for swelling, cords, or tenderness.     Labs: Lab Results   Component Value Date/Time    WBC 6.3 05/21/2018 06:22 AM    WBC 5.5 05/20/2018 03:02 AM    WBC 6.3 05/19/2018 02:16 AM    WBC 9.5 05/18/2018 06:22 AM    WBC 12.6 (H) 05/17/2018 02:01 PM    WBC 7.2 01/28/2018 08:20 PM    WBC 5.0 12/31/2017 04:41 AM    WBC 8.7 12/29/2017 10:32 PM    WBC 9.9 11/28/2017 06:21 PM    WBC 5.0 10/10/2017 03:25 AM    WBC 4.1 10/09/2017 03:28 AM    WBC 4.9 10/08/2017 03:43 AM    WBC 6.8 10/07/2017 12:37 AM    WBC 9.1 10/05/2017 08:07 PM    WBC 8.7 10/05/2017 04:10 PM    WBC 7.2 10/01/2017 10:58 PM    WBC 6.9 04/17/2011 06:00 AM    WBC 6.8 04/16/2011 07:25 AM    WBC 11.3 (H) 04/15/2011 01:30 AM    WBC 9.3 03/10/2011 03:20 AM    WBC 9.1 03/09/2011 03:45 AM    WBC 8.9 03/08/2011 03:35 AM    WBC 10.0 03/07/2011 11:30 PM    WBC 10.7 03/07/2011 09:21 PM    WBC 8.5 01/26/2011 04:15 AM    WBC 9.0 01/25/2011 02:00 AM    WBC 9.1 12/14/2010 03:35 AM    WBC 9.4 12/12/2010 11:30 PM    HGB 8.4 (L) 05/21/2018 06:22 AM    HGB 8.8 (L) 05/20/2018 03:02 AM    HGB 8.8 (L) 05/19/2018 02:16 AM    HGB 9.7 (L) 05/18/2018 06:22 AM    HGB 11.5 05/17/2018 02:01 PM    HGB 11.8 01/28/2018 08:20 PM    HGB 11.8 12/31/2017 04:41 AM    HGB 11.3 (L) 12/29/2017 10:32 PM    HGB 12.0 11/28/2017 06:21 PM    HGB 9.6 (L) 10/10/2017 03:25 AM    HGB 9.9 (L) 10/09/2017 03:28 AM    HGB 10.3 (L) 10/08/2017 03:43 AM    HGB 11.8 10/07/2017 12:37 AM    HGB  10/05/2017 08:07 PM     UNABLE TO OBTAIN ACCURATE RESULTS DUE TO GROSS LIPEMIA    HGB  10/05/2017 04:10 PM     UNABLE TO OBTAIN ACCURATE RESULTS DUE TO GROSS LIPEMIA    HGB 11.8 10/01/2017 10:58 PM    HGB 11.6 04/17/2011 06:00 AM    HGB 11.5 04/16/2011 07:25 AM    HGB 13.2 04/15/2011 01:30 AM    HGB 13.9 03/10/2011 03:20 AM    HGB 11.0 (L) 03/09/2011 03:45 AM    HGB 11.8 03/08/2011 03:35 AM    HGB 11.8 03/07/2011 11:30 PM    HGB 12.1 03/07/2011 09:21 PM    HGB 11.9 01/26/2011 04:15 AM    HGB 13.6 01/25/2011 02:00 AM    HGB 13.1 12/14/2010 03:35 AM    HGB 15.2 12/12/2010 11:30 PM    HCT 25.7 (L) 05/21/2018 06:22 AM    HCT 27.2 (L) 05/20/2018 03:02 AM    HCT 27.4 (L) 05/19/2018 02:16 AM    HCT 29.8 (L) 05/18/2018 06:22 AM    HCT 34.5 (L) 05/17/2018 02:01 PM    HCT 34.3 (L) 01/28/2018 08:20 PM    HCT 32.3 (L) 12/31/2017 04:41 AM    HCT 33.0 (L) 12/29/2017 10:32 PM    HCT 35.0 11/28/2017 06:21 PM    HCT 29.5 (L) 10/10/2017 03:25 AM    HCT 30.5 (L) 10/09/2017 03:28 AM    HCT 31.5 (L) 10/08/2017 03:43 AM    HCT 34.0 (L) 10/07/2017 12:37 AM    HCT 36.1 10/05/2017 08:07 PM    HCT 37.2 10/05/2017 04:10 PM    HCT 34.8 (L) 10/01/2017 10:58 PM    HCT 33.0 (L) 04/17/2011 06:00 AM    HCT 31.5 (L) 04/16/2011 07:25 AM    HCT 39.6 04/15/2011 01:30 AM    HCT 37.7 03/10/2011 03:20 AM    HCT 33.0 (L) 03/09/2011 03:45 AM    HCT 33.6 (L) 03/08/2011 03:35 AM    HCT 34.0 (L) 03/07/2011 11:30 PM    HCT 34.7 (L) 03/07/2011 09:21 PM    HCT 34.4 (L) 01/26/2011 04:15 AM    HCT 37.7 01/25/2011 02:00 AM    HCT 37.4 12/14/2010 03:35 AM    HCT 39.2 12/12/2010 11:30 PM    PLATELET 436 65/64/7406 06:22 AM    PLATELET 882 86/68/2936 03:02 AM    PLATELET 448 67/21/6272 02:16 AM    PLATELET 710 32/07/9397 06:22 AM    PLATELET 072 28/73/7919 02:01 PM    PLATELET 384 36/64/4931 08:20 PM    PLATELET 637 (L) 02/66/1118 04:41 AM    PLATELET 512 77/56/3875 10:32 PM    PLATELET 934 (L) 18/99/2868 06:21 PM    PLATELET 92 (L) 50/55/2021 03:25 AM    PLATELET 72 (L) 31/81/9189 03:28 AM    PLATELET 75 (L) 25/98/4263 03:43 AM    PLATELET 95 (L) 60/93/9092 12:37 AM    PLATELET 833 47/13/1943 08:07 PM    PLATELET 732 (L) 92/74/5954 04:10 PM    PLATELET 167 (L) 30/11/4599 10:58 PM    PLATELET 859 (L) 20/43/9683 06:00 AM    PLATELET 581 (L) 13/65/3700 07:25 AM    PLATELET 356 63/34/5033 01:30 AM    PLATELET 228 38/56/1840 03:20 AM    PLATELET 309 (L) 54/54/1060 03:45 AM PLATELET 701 89/00/5134 03:35 AM    PLATELET 465 89/66/3714 11:30 PM    PLATELET 592 03/54/4409 09:21 PM    PLATELET 610 28/40/4771 04:15 AM    PLATELET 816 12/26/0009 02:00 AM    PLATELET 619 45/39/0272 03:35 AM    PLATELET 129 95/23/8547 11:30 PM       Recent Results (from the past 24 hour(s))   GLUCOSE, POC    Collection Time: 05/20/18 12:13 PM   Result Value Ref Range    Glucose (POC) 319 (H) 65 - 100 mg/dL    Performed by Hero Albright    GLUCOSE, POC    Collection Time: 05/20/18  4:52 PM   Result Value Ref Range    Glucose (POC) 283 (H) 65 - 100 mg/dL    Performed by Shima Araujo    GLUCOSE, POC    Collection Time: 05/20/18  9:17 PM   Result Value Ref Range    Glucose (POC) 237 (H) 65 - 100 mg/dL    Performed by Hero Albright    CBC WITH AUTOMATED DIFF    Collection Time: 05/21/18  6:22 AM   Result Value Ref Range    WBC 6.3 3.6 - 11.0 K/uL    RBC 3.10 (L) 3.80 - 5.20 M/uL    HGB 8.4 (L) 11.5 - 16.0 g/dL    HCT 25.7 (L) 35.0 - 47.0 %    MCV 82.9 80.0 - 99.0 FL    MCH 27.1 26.0 - 34.0 PG    MCHC 32.7 30.0 - 36.5 g/dL    RDW 15.8 (H) 11.5 - 14.5 %    PLATELET 331 158 - 689 K/uL    MPV 10.5 8.9 - 12.9 FL    NRBC 0.0 0  WBC    ABSOLUTE NRBC 0.00 0.00 - 0.01 K/uL    NEUTROPHILS 62 32 - 75 %    LYMPHOCYTES 28 12 - 49 %    MONOCYTES 6 5 - 13 %    EOSINOPHILS 3 0 - 7 %    BASOPHILS 0 0 - 1 %    IMMATURE GRANULOCYTES 1 (H) 0.0 - 0.5 %    ABS. NEUTROPHILS 3.9 1.8 - 8.0 K/UL    ABS. LYMPHOCYTES 1.7 0.8 - 3.5 K/UL    ABS. MONOCYTES 0.4 0.0 - 1.0 K/UL    ABS. EOSINOPHILS 0.2 0.0 - 0.4 K/UL    ABS. BASOPHILS 0.0 0.0 - 0.1 K/UL    ABS. IMM.  GRANS. 0.1 (H) 0.00 - 0.04 K/UL    DF AUTOMATED     METABOLIC PANEL, BASIC    Collection Time: 05/21/18  6:22 AM   Result Value Ref Range    Sodium 138 136 - 145 mmol/L    Potassium 4.1 3.5 - 5.1 mmol/L    Chloride 110 (H) 97 - 108 mmol/L    CO2 22 21 - 32 mmol/L    Anion gap 6 5 - 15 mmol/L    Glucose 152 (H) 65 - 100 mg/dL    BUN 16 6 - 20 MG/DL    Creatinine 0.66 0.55 - 1.02 MG/DL    BUN/Creatinine ratio 24 (H) 12 - 20      GFR est AA >60 >60 ml/min/1.73m2    GFR est non-AA >60 >60 ml/min/1.73m2    Calcium 8.6 8.5 - 10.1 MG/DL   GLUCOSE, POC    Collection Time: 05/21/18  8:06 AM   Result Value Ref Range    Glucose (POC) 171 (H) 65 - 100 mg/dL    Performed by Geovanna Causey)

## 2018-05-21 NOTE — PROGRESS NOTES
Pt is an 40 y.o.  female, admitted for sepsis. Pt was alert and oriented, upon CM room visit. Pt reported that she was once residing in [place] with an roommate, however, upon d/c she will be residing with family. Pt reported that she came to [address], because her family and support system resides here in [place], [place]. Pt reported that she is independent with ADLs, and she drives Pt reported that she is not active with an PCP here in [facility], and the last time she was seen by an doctor was last spring. Pt reported no DME, HH/SNF. Pt will have transport at d/c and denies needing additional services at d/c.  will provide [employer] list to pt. Reason for Admission: sepsis                      RRAT Score:   11                  Plan for utilizing home health:    Pt denies needing additional home health                       Likelihood of Readmission:  LOW                          Transition of Care Plan:  Home at d/c with follow up appointment or dispatch health appointment     Care Management Interventions  PCP Verified by CM: Yes  Mode of Transport at Discharge: Other (see comment) (pt's family )  Transition of Care Consult (CM Consult): Discharge Planning  Discharge Durable Medical Equipment: No  Physical Therapy Consult: No  Occupational Therapy Consult: No  Speech Therapy Consult: No  Current Support Network:  Other, Relative's Home  Confirm Follow Up Transport: Family  Plan discussed with Pt/Family/Caregiver: Yes  Discharge Location  Discharge Placement: [facility]/ [address] 41, MSW   [phone]

## 2018-05-21 NOTE — PROGRESS NOTES
General Surgery End of Shift Nursing Note    Bedside shift change report given to Bryan Frankalmastraci Stephen (oncoming nurse) by Roselyn Duffy (offgoing nurse). Report included the following information SBAR, Kardex, OR Summary, Intake/Output, MAR and Recent Results. Shift worked:   7 am to 7 pm   Summary of shift:  Pt tolerating diet. One bowel movement today. Voiding appropriately. Pain meds given as requested. Wound care completed. Issues for physician to address:   none     Number times ambulated in hallway past shift: 0    Number of times OOB to chair past shift: 3    Pain Management:  Current medication: dilaudid  Patient states pain is manageable on current pain medication: YES    GI:    Current diet:  DIET DIABETIC CONSISTENT CARB Regular    Tolerating current diet: YES  Passing flatus: YES  Last Bowel Movement: today    Respiratory:    Incentive Spirometer at bedside: YES  Patient instructed on use: YES    Patient Safety:    Falls Score: 1  Bed Alarm On? No  Sitter?  No    Debby Still

## 2018-05-21 NOTE — PROGRESS NOTES
Infectious Disease Progress Note       Subjective:   Ms Carbajal Found seen. Says she feels better except did not sleep much over night. Says pain is improving in genital area. Denied vomiting or diarrhea but says intermittently nauseated. ROS: 10 point ROS obtained and pertinent positives include above. All others negative.          Objective:    Vitals:   Patient Vitals for the past 24 hrs:   Temp Pulse Resp BP SpO2   05/21/18 1146 97.9 °F (36.6 °C) 65 18 124/79 99 %   05/21/18 0801 97.5 °F (36.4 °C) 67 18 116/77 100 %   05/21/18 0330 98.1 °F (36.7 °C) 66 18 141/88 99 %   05/20/18 2343 98.8 °F (37.1 °C) 78 18 127/68 98 %   05/20/18 1951 98 °F (36.7 °C) 72 18 135/72 98 %   05/20/18 1554 98.3 °F (36.8 °C) 76 18 134/81 98 %       Physical Exam:  NAD, seated in chair  HEENT no scleral icterus, no thrush   CV S1 2 reg  Lungs CTA B/L  ABD obese, soft NT   : + indurated area that is tender in R labial major/pubic area, no discharge, erythema noted   LE no edema  Neuro alert, oriented,  Skin no rash     Medications:    Current Facility-Administered Medications:     sodium hypochlorite (QUARTER STRENGTH DAKIN'S) 0.125% irrigation (bottle), , Topical, DAILY, Oscar Harkins MD    HYDROmorphone (DILAUDID) tablet 1 mg, 1 mg, Oral, Q4H PRN, Oscar Harkins MD, 1 mg at 05/20/18 2205    promethazine (PHENERGAN) tablet 25 mg, 25 mg, Oral, Q6H PRN, Oscar Harkins MD, 25 mg at 05/19/18 2226    insulin lispro (HUMALOG) injection 12 Units, 12 Units, SubCUTAneous, TIDAC, Blossom Ybarra MD, 12 Units at 05/21/18 0833    insulin glargine (LANTUS) injection 69 Units, 69 Units, SubCUTAneous, DAILY, Blossom Ybarra MD, 69 Units at 05/21/18 0833    insulin lispro (HUMALOG) injection, , SubCUTAneous, AC&HS, Blossom Ybarra MD, 3 Units at 05/21/18 9525    acetaminophen (TYLENOL) tablet 650 mg, 650 mg, Oral, Q4H PRN, Arian Cummins MD    HYDROmorphone (DILAUDID) injection 0.5 mg, 0.5 mg, IntraVENous, Q6H PRN, Arian Cummins MD, 0.5 mg at 05/21/18 1054    sodium chloride (NS) flush 5-10 mL, 5-10 mL, IntraVENous, Q8H, Beryl Osgood, MD, 10 mL at 05/20/18 2104    sodium chloride (NS) flush 5-10 mL, 5-10 mL, IntraVENous, PRN, Beryl Osgood, MD    ondansetron [facility] ODT) tablet 4 mg, 4 mg, Oral, Q8H PRN, Sofie Guthrie MD, 4 mg at 05/21/18 0215    lidocaine (URO-JET) 2 % jelly, , Urethral, PRN, Mikael Corbett MD    sodium chloride (NS) flush 5-10 mL, 5-10 mL, IntraVENous, Q8H, Anel Bose MD, Stopped at 05/20/18 2200    sodium chloride (NS) flush 5-10 mL, 5-10 mL, IntraVENous, PRN, April SEVERINO Francis MD    metroNIDAZOLE (FLAGYL) IVPB premix 500 mg, 500 mg, IntraVENous, Q8H, April SEVERINO Arizmendi MD, Last Rate: 100 mL/hr at 05/21/18 0426, 500 mg at 05/21/18 0426    levoFLOXacin (LEVAQUIN) 750 mg in D5W IVPB, 750 mg, IntraVENous, Q24H, Anel Bose MD, Last Rate: 100 mL/hr at 05/20/18 1805, 750 mg at 05/20/18 1805    vancomycin (VANCOCIN) 1500 mg in  ml infusion, 1,500 mg, IntraVENous, Q12H, Anel Bose MD, Last Rate: 250 mL/hr at 05/21/18 1054, 1,500 mg at 05/21/18 1054    glucose chewable tablet 16 g, 4 Tab, Oral, PRN, Sabina Pete MD    dextrose (D50W) injection syrg 12.5-25 g, 12.5-25 g, IntraVENous, PRN, Sabina Pete MD    glucagon (GLUCAGEN) injection 1 mg, 1 mg, IntraMUSCular, PRN, Sabina Pete MD      Labs:  Recent Results (from the past 24 hour(s))   GLUCOSE, POC    Collection Time: 05/20/18  4:52 PM   Result Value Ref Range    Glucose (POC) 283 (H) 65 - 100 mg/dL    Performed by [facility]    GLUCOSE, POC    Collection Time: 05/20/18  9:17 PM   Result Value Ref Range    Glucose (POC) 237 (H) 65 - 100 mg/dL    Performed by Kathrine Felty    CBC WITH AUTOMATED DIFF    Collection Time: 05/21/18  6:22 AM   Result Value Ref Range    WBC 6.3 3.6 - 11.0 K/uL    RBC 3.10 (L) 3.80 - 5.20 M/uL    HGB 8.4 (L) 11.5 - 16.0 g/dL    HCT 25.7 (L) 35.0 - 47.0 %    MCV 82.9 80.0 - 99.0 FL    MCH 27.1 26.0 - 34.0 PG    MCHC 32.7 30.0 - 36.5 g/dL    RDW 15.8 (H) 11.5 - 14.5 %    PLATELET 199 533 - 854 K/uL    MPV 10.5 8.9 - 12.9 FL    NRBC 0.0 0  WBC    ABSOLUTE NRBC 0.00 0.00 - 0.01 K/uL    NEUTROPHILS 62 32 - 75 %    LYMPHOCYTES 28 12 - 49 %    MONOCYTES 6 5 - 13 %    EOSINOPHILS 3 0 - 7 %    BASOPHILS 0 0 - 1 %    IMMATURE GRANULOCYTES 1 (H) 0.0 - 0.5 %    ABS. NEUTROPHILS 3.9 1.8 - 8.0 K/UL    ABS. LYMPHOCYTES 1.7 0.8 - 3.5 K/UL    ABS. MONOCYTES 0.4 0.0 - 1.0 K/UL    ABS. EOSINOPHILS 0.2 0.0 - 0.4 K/UL    ABS. BASOPHILS 0.0 0.0 - 0.1 K/UL    ABS. IMM.  GRANS. 0.1 (H) 0.00 - 0.04 K/UL    DF AUTOMATED     METABOLIC PANEL, BASIC    Collection Time: 05/21/18  6:22 AM   Result Value Ref Range    Sodium 138 136 - 145 mmol/L    Potassium 4.1 3.5 - 5.1 mmol/L    Chloride 110 (H) 97 - 108 mmol/L    CO2 22 21 - 32 mmol/L    Anion gap 6 5 - 15 mmol/L    Glucose 152 (H) 65 - 100 mg/dL    BUN 16 6 - 20 MG/DL    Creatinine 0.66 0.55 - 1.02 MG/DL    BUN/Creatinine ratio 24 (H) 12 - 20      GFR est AA >60 >60 ml/min/1.73m2    GFR est non-AA >60 >60 ml/min/1.73m2    Calcium 8.6 8.5 - 10.1 MG/DL   GLUCOSE, POC    Collection Time: 05/21/18  8:06 AM   Result Value Ref Range    Glucose (POC) 171 (H) 65 - 100 mg/dL    Performed by Yolanda MAHER(CON)    GLUCOSE, POC    Collection Time: 05/21/18 11:52 AM   Result Value Ref Range    Glucose (POC) 160 (H) 65 - 100 mg/dL    Performed by Malina Hernandez            Micro:     5/17  Component Results      Component Value Ref Range & Units Status     Special Requests: NO SPECIAL REQUESTS   Final     GRAM STAIN FEW WBCS SEEN   Final     GRAM STAIN NO DEFINITE ORGANISM SEEN   Final     Culture result:   Final     NO GROWTH 2 DAYS ON SOLID MEDIA     Culture result:    Final     STAPHYLOCOCCUS SPECIES, COAGULASE NEGATIVE (2 DIFFERENT COLONY TYPES/ STRAINS), ISOLATED FROM THIO BROTH ONLY (A)       Result History            Blood 5/17/18 no growth     Imaging:      CT A/P  FINDINGS:   LUNG BASES: Clear.  INCIDENTALLY IMAGED HEART AND MEDIASTINUM: Unremarkable. LIVER: No mass or biliary dilatation. GALLBLADDER: Prior cholecystectomy  SPLEEN: No mass. PANCREAS: No mass or ductal dilatation. ADRENALS: Unremarkable. KIDNEYS: No mass, calculus, or hydronephrosis. STOMACH: Unremarkable. SMALL BOWEL: No dilatation or wall thickening. COLON: No dilatation or wall thickening. APPENDIX: Unremarkable. Axial image 74  PERITONEUM: No ascites or pneumoperitoneum. RETROPERITONEUM: No lymphadenopathy or aortic aneurysm. REPRODUCTIVE ORGANS: Anteverted uterus. Bilateral tubal ligation clips. Left  ovary contains a cyst (image 79). URINARY BLADDER: No mass or calculus. BONES: No destructive bone lesion. ADDITIONAL COMMENTS:      New right mons macrolobulated abscess that measures 7 x 5.2 x 6.5 cm and is  associated with soft tissue swelling. This abscess contains mostly fluid but a  little bit of air. New left mons abscess that contains only fluid and measures 2  x 2.1 x 1.9 cm (axial image 103 coronal image 61).     IMPRESSION  IMPRESSION:  Acute right mons 7 cm abscess  Acute left mons 2 cm abscess. This result was verbally relayed by myself to Dr. King Garcia at 1802 hours  789        Procedures: I and D labia major abscess 5/17      Assessment / Plan:      Ms Nathan Hopper is a 40year old lady with hx of DM, HTN, Hyperlipidemia who is admitted and S/P I And D of Right and Left Labia majora on 5/17/18. From discussion with her and chart review,  She presented in [place] to possibly an ER [facility] ) with similar findings and was given Doxycycline (date of this unclear). The presented to [facility] in [place], [place] around 4/18 wt hyperglycemia in the setting of inability to take insulin/meds and found to have abscess in L labia majora. Notes indicate she was initially on Vancomycin, and Clindamycin and then changed to Vancomycin, Aztreonam and Flagyl.   She underwent I and D on 4/23 and cultures from speaking to microbiology staff at [facility] for S epidermidis and E faecalis. Notes indicate, antibiotics adjusted to Vancomycin and continued for 2 weeks. She denies any PO antibiotics on discharge. She says that she is \"estranged\"  from her  and has 2 children and came over to [place] and landed here. Her social situation seems complex and she has not been able to take her medications properly. She says that when she was given DM sugars, her levels were good. She admits to occasional skin lesions such as boils on her skin. Per her , episode in April was first time she had abscesses in genital area. Says she was sexually active in April before her admission at [facility] with \"another \"  Male. To her knowledge, he did not have any boils, skin lesions. She admits to fevers and chills. Says she just got \"periods\" today as well. She denies a hx of any other STI.      So far this admission, she is noted to be febrile up to 102 yesterday. Leukocytosis up to 12 on admission, Lactate was 1.9 ( max), UA 0-4 WBC with positive nitrite, trace leukocyte esterase, negative urine pregnancy and normal LFT and renal function. Urine tox 2010 + for cannabinoids. She says that she does not use any illicit drugs but is around her  who smokes \"marijuana\". Hep B S ag negative in past screening. CT A/P with 7 by 5 by 6.5 cm abscess on R labia and 2 cm abscess on L labia. She underwent I and D on 5/17 and notes indicate \" A 7 x 6 cm abscess was palpated being largest on the right side of the midline in the [place] but tracking past the midline to the left side of the Mons tapering as it transverses to the left\" .  \"It was then entered bluntly with the operators finger and approximately 150 cc of pus was obtained\"     Cultures obtained from [facility]                         Wound abscess vulva 4/23/18                                               Heavy Staphylococcus epidermidis Clindamycin resistant                                                                    Daptomycin Sensitive                                                                    Vancomycin ALMA 3 susceptible                                              Enterococcus faecalis                                                                     Ampicillin ALMA 1 susceptible                                                                     Daptomycin 2 susceptible                                                                     Vancomycin 2 susceptible                                                                     Gentamicin synergy < 500 susceptible                                                                     Streptomycin synergy < 1000 susceptible                                                   Blood cx 4/26 negative      1) Labia major abscess:   Previous cx 4/23 at [facility] Heavy Staphylococcus epidermidis  And Ampicillin sensitive E faecalis   S/P I and D 4/23 and 4/25 in [place] , S/P tx wt Vancomycin for 2 weeks   S/P I and D 5/17  S/P I and D 5/17 Cx wt 2 biotypes of Coag negative Staph in thio broth only   If source control has been achieved with aggressive drainage , then would recommend following it with PO antibiotics for another 7 to 10 days   Zyvox PO 600mg BID.  This is an expensive antibiotic and will need case managements assistance in obtaining this for patient   Drug interactions exist with SSRI but she denies being on such medications   Other side effects include bone marrow suppression, GI upset and also risk for CDI   Would recommend changing her antibiotics while here to ensure that she tolerates and also does not worsen with symptoms on PO abx and if she does, then would recommend further assessment to see if source control is needed further       2) DM A1C 8.9  Per primary team      3) Allergies:  PCN : \"breathing \" trouble per patient   Keflex \" breathing\" trouble per patient   Sulfa unclear reaction fully      4) DVT px per primary team      5) Screening : urine pregnancy negative , HIV NR, Gonorrhea/Chlamydia and RPR pending and will need to be followed upon      6) Smoking cessation emphasized as it complicates wound healing and infection  Care              I will sign off at this time. Thank you for the opportunity to participate in the care of this patient. Please contact with questions or concerns.       Milly Boogie DO   12:22 PM

## 2018-05-21 NOTE — DIABETES MGMT
DTC Progress Note    Recommendations/ Comments: Pt with elevated BG ranging from 164-319 mg/dL yesterday. Pt missed insulin correction dose yesterday morning. BG appears to be trending downward today - BG this am 171 mg/dL and 160 mg/dL at lunch-time. DTC will continue to monitor BG for trends as needed. Current hospital DM medication: Lantus 69 units, lispro with [id number] units, lispro correction - resistant sensitivity     Chart reviewed on [facility]. Patient is a 40 y.o. female with known DM on metformin 1000 mg BID, lantus 65 units nightly, regular insulin 12 units with meals. A1c:   Lab Results   Component Value Date/Time    Hemoglobin A1c 8.9 (H) 05/17/2018 11:25 PM    Hemoglobin A1c 11.2 (H) 12/30/2017 12:46 AM       Recent Glucose Results:   Lab Results   Component Value Date/Time     (H) 05/21/2018 06:22 AM    GLUCPOC 160 (H) 05/21/2018 11:52 AM    GLUCPOC 171 (H) 05/21/2018 08:06 AM    GLUCPOC 237 (H) 05/20/2018 09:17 PM        Lab Results   Component Value Date/Time    Creatinine 0.66 05/21/2018 06:22 AM     Estimated Creatinine Clearance: 159 mL/min (based on Cr of 0.66). Active Orders   Diet    DIET DIABETIC CONSISTENT CARB Regular        PO intake:   Patient Vitals for the past 72 hrs:   % Diet Eaten   05/20/18 2101 100 %   05/19/18 1749 100 %   05/19/18 1358 100 %   05/19/18 1011 100 %       Will continue to follow as needed.     Thank you    Keysha Nash, [phone]

## 2018-05-21 NOTE — PROGRESS NOTES
Hospitalist Progress Note    NAME: Sherrod Schilder   :  1980   MRN:  [id number]       Assessment / Plan:  Diabetes mellitus uncontrolled with hyperglycemia  Last A1c is 8.9  Increase Lantus to 69 units  ( PTA 65)  Increase lispro to 12 units TID  Continue sliding scale  Pt needs follow up as out patient    Recurrent vulvar abscess status post incision and drainage  Patient had a recent 3 week hospitalization in [place] with similar complaints  Wound culture NGTD  Plan for gyn and OBG  -- changed to zyvox          Hypertension  Dyslipidemia  Diabetic neuropathy  Cont  home statin, gabapentin    Obesity      Body mass index is 40.22 kg/(m^2). Code status: Full  Prophylaxis: Per Primary team     We will sign off, please call if questions       Subjective:     Chief Complaint / Reason for Physician Visit  No acute complaints  No nausea or vomiting. Review of Systems:  Symptom Y/N Comments  Symptom Y/N Comments   Fever/Chills    Chest Pain n    Poor Appetite n   Edema     Cough    Abdominal Pain y vulvar pain   Sputum n   Joint Pain     SOB/VIRK    Pruritis/Rash     Nausea/vomit    Tolerating PT/OT     Diarrhea    Tolerating Diet     Constipation    Other       Could NOT obtain due to:      Objective:     VITALS:   Last 24hrs VS reviewed since prior progress note.  Most recent are:  Patient Vitals for the past 24 hrs:   Temp Pulse Resp BP SpO2   18 1535 98.3 °F (36.8 °C) 72 16 137/84 98 %   18 1146 97.9 °F (36.6 °C) 65 18 124/79 99 %   18 0801 97.5 °F (36.4 °C) 67 18 116/77 100 %   18 0330 98.1 °F (36.7 °C) 66 18 141/88 99 %   18 2343 98.8 °F (37.1 °C) 78 18 127/68 98 %   18 1951 98 °F (36.7 °C) 72 18 135/72 98 %       Intake/Output Summary (Last 24 hours) at 18 1606  Last data filed at 18 1447   Gross per 24 hour   Intake             2510 ml   Output                0 ml   Net             2510 ml        PHYSICAL EXAM:  General: cooperative, no acute distress    EENT:  EOMI. Anicteric sclerae. Resp:  CTA bilaterally, no wheezing or rales. No accessory muscle use  CV:  Regular  rhythm,  No edema  GI:  Soft, Non distended,mild tenderness in the vulvar area + packing  Neurologic:  Alert and oriented X 3, normal speech,   Psych:   Not anxious nor agitated  Skin:  No rashes.   No jaundice    Reviewed most current lab test results and cultures  YES  Reviewed most current radiology test results   YES  Review and summation of old records today    NO  Reviewed patient's current orders and MAR    YES  PMH/SH reviewed - no change compared to H&P          Current Facility-Administered Medications:     sodium hypochlorite (QUARTER STRENGTH DAKIN'S) 0.125% irrigation (bottle), , Topical, DAILY, Andriy Del Valle MD    linezolid (ZYVOX) tablet 600 mg, 600 mg, Oral, Q12H, Milly Boogie DO, 600 mg at 05/21/18 1452    HYDROmorphone (DILAUDID) tablet 1 mg, 1 mg, Oral, Q4H PRN, Andriy Del Valle MD, 1 mg at 05/21/18 1452    promethazine (PHENERGAN) tablet 25 mg, 25 mg, Oral, Q6H PRN, Andriy Del Valle MD, 25 mg at 05/19/18 2226    insulin lispro (HUMALOG) injection 12 Units, 12 Units, SubCUTAneous, TIDAC, Rambo Martinez MD, 12 Units at 05/21/18 1231    insulin glargine (LANTUS) injection 69 Units, 69 Units, SubCUTAneous, DAILY, Rambo Martinez MD, 69 Units at 05/21/18 0833    insulin lispro (HUMALOG) injection, , SubCUTAneous, AC&HS, Rambo Martinez MD, 3 Units at 05/21/18 1232    acetaminophen (TYLENOL) tablet 650 mg, 650 mg, Oral, Q4H PRN, Conor Amaya MD    HYDROmorphone (DILAUDID) injection 0.5 mg, 0.5 mg, IntraVENous, Q6H PRN, Conor Amaya MD, 0.5 mg at 05/21/18 1054    sodium chloride (NS) flush 5-10 mL, 5-10 mL, IntraVENous, Q8H, Andriy Del Valle MD, 10 mL at 05/21/18 1443    sodium chloride (NS) flush 5-10 mL, 5-10 mL, IntraVENous, PRN, Andriy Del Valle MD    ondansetron (ZOFRAN ODT) tablet 4 mg, 4 mg, Oral, Q8H PRN, Conor Amaya MD, 4 mg at 05/21/18 0215    lidocaine (URO-JET) 2 % jelly, , Urethral, PRN, Kaykay Patel MD    sodium chloride (NS) flush 5-10 mL, 5-10 mL, IntraVENous, Q8H, April Oscar Chavez MD, Stopped at 05/20/18 2200    sodium chloride (NS) flush 5-10 mL, 5-10 mL, IntraVENous, PRN, April SEVERINO Arizmendi MD    glucose chewable tablet 16 g, 4 Tab, Oral, PRN, Faizan Martinez MD    dextrose (D50W) injection syrg 12.5-25 g, 12.5-25 g, IntraVENous, PRN, Faizan Martinez MD    glucagon (GLUCAGEN) injection 1 mg, 1 mg, IntraMUSCular, PRN, Faizan Martinez MD  ________________________________________________________________________  Care Plan discussed with:    Comments   Patient y    Family      RN y    Care Manager     Consultant                        Multidiciplinary team rounds were held today with , nursing, pharmacist and clinical coordinator. Patient's plan of care was discussed; medications were reviewed and discharge planning was addressed. ________________________________________________________________________  Total NON critical care TIME: 25    Minutes    Total CRITICAL CARE TIME Spent:   Minutes non procedure based      Comments   >50% of visit spent in counseling and coordination of care     ________________________________________________________________________  Adrianna Bose MD     Procedures: see electronic medical records for all procedures/Xrays and details which were not copied into this note but were reviewed prior to creation of Plan. LABS:  I reviewed today's most current labs and imaging studies.   Pertinent labs include:  Recent Labs      05/21/18 0622 05/20/18   0302 05/19/18 0216   WBC  6.3  5.5  6.3   HGB  8.4*  8.8*  8.8*   HCT  25.7*  27.2*  27.4*   PLT  169  162  150     Recent Labs      05/21/18 0622 05/20/18   0302  05/19/18 0216   NA  138  141  136   K  4.1  3.9  4.1   CL  110*  111*  108   CO2  22  23  22   GLU  152*  144*  269*   BUN  16  19  25*   CREA  0.66  0.70  0.75   CA  8.6  8.6 8.2*       Signed: Iam Suarez MD

## 2018-05-21 NOTE — WOUND CARE
Wound care changed orders for this patient's wound care to be done by nursing staff today. Dakin Solution for cleansing / irrigation of the wound as well as moistening the packing.    Debi Minor RN, BSN, [employer]

## 2018-05-21 NOTE — PROGRESS NOTES
*CM acknowledged consult*  Pt will be seen by the [address] [place] [address] Shane Breen on 5/25 at 8:45AM.  CM will inform pt.   Pt informed CM that she will be residing with family here at [facility] at d/c.    NATHANIEL Yu CM  [phone]

## 2018-05-21 NOTE — PROGRESS NOTES
Will transition to zyvox PO, d/c IV abx and monitor overnight. Case management involved and outpatient wound care established for 5/25/18.

## 2018-05-22 VITALS
OXYGEN SATURATION: 100 % | TEMPERATURE: 98.4 F | WEIGHT: 264.55 LBS | BODY MASS INDEX: 40.09 KG/M2 | RESPIRATION RATE: 20 BRPM | SYSTOLIC BLOOD PRESSURE: 136 MMHG | DIASTOLIC BLOOD PRESSURE: 99 MMHG | HEART RATE: 81 BPM | HEIGHT: 68 IN

## 2018-05-22 LAB
ANION GAP SERPL CALC-SCNC: 8 MMOL/L (ref 5–15)
BACTERIA SPEC CULT: NORMAL
BASOPHILS # BLD: 0 K/UL (ref 0–0.1)
BASOPHILS NFR BLD: 0 % (ref 0–1)
BUN SERPL-MCNC: 14 MG/DL (ref 6–20)
BUN/CREAT SERPL: 20 (ref 12–20)
CALCIUM SERPL-MCNC: 8.8 MG/DL (ref 8.5–10.1)
CHLORIDE SERPL-SCNC: 110 MMOL/L (ref 97–108)
CO2 SERPL-SCNC: 21 MMOL/L (ref 21–32)
CREAT SERPL-MCNC: 0.7 MG/DL (ref 0.55–1.02)
DIFFERENTIAL METHOD BLD: ABNORMAL
EOSINOPHIL # BLD: 0.2 K/UL (ref 0–0.4)
EOSINOPHIL NFR BLD: 3 % (ref 0–7)
ERYTHROCYTE [DISTWIDTH] IN BLOOD BY AUTOMATED COUNT: 15.9 % (ref 11.5–14.5)
GLUCOSE BLD STRIP.AUTO-MCNC: 154 MG/DL (ref 65–100)
GLUCOSE BLD STRIP.AUTO-MCNC: 160 MG/DL (ref 65–100)
GLUCOSE BLD STRIP.AUTO-MCNC: 176 MG/DL (ref 65–100)
GLUCOSE SERPL-MCNC: 118 MG/DL (ref 65–100)
HCT VFR BLD AUTO: 28.4 % (ref 35–47)
HGB BLD-MCNC: 9.3 G/DL (ref 11.5–16)
IMM GRANULOCYTES # BLD: 0.1 K/UL (ref 0–0.04)
IMM GRANULOCYTES NFR BLD AUTO: 2 % (ref 0–0.5)
LYMPHOCYTES # BLD: 1.7 K/UL (ref 0.8–3.5)
LYMPHOCYTES NFR BLD: 24 % (ref 12–49)
MCH RBC QN AUTO: 27.1 PG (ref 26–34)
MCHC RBC AUTO-ENTMCNC: 32.7 G/DL (ref 30–36.5)
MCV RBC AUTO: 82.8 FL (ref 80–99)
MONOCYTES # BLD: 0.4 K/UL (ref 0–1)
MONOCYTES NFR BLD: 6 % (ref 5–13)
NEUTS SEG # BLD: 4.5 K/UL (ref 1.8–8)
NEUTS SEG NFR BLD: 65 % (ref 32–75)
NRBC # BLD: 0 K/UL (ref 0–0.01)
NRBC BLD-RTO: 0 PER 100 WBC
PLATELET # BLD AUTO: 182 K/UL (ref 150–400)
PMV BLD AUTO: 9.9 FL (ref 8.9–12.9)
POTASSIUM SERPL-SCNC: 4.2 MMOL/L (ref 3.5–5.1)
RBC # BLD AUTO: 3.43 M/UL (ref 3.8–5.2)
SERVICE CMNT-IMP: ABNORMAL
SERVICE CMNT-IMP: NORMAL
SODIUM SERPL-SCNC: 139 MMOL/L (ref 136–145)
WBC # BLD AUTO: 6.9 K/UL (ref 3.6–11)

## 2018-05-22 PROCEDURE — 74011250637 HC RX REV CODE- 250/637: Performed by: INTERNAL MEDICINE

## 2018-05-22 PROCEDURE — 77030019607 HC DSG BURN S&N -A

## 2018-05-22 PROCEDURE — 80048 BASIC METABOLIC PNL TOTAL CA: CPT | Performed by: OBSTETRICS & GYNECOLOGY

## 2018-05-22 PROCEDURE — 74011250637 HC RX REV CODE- 250/637: Performed by: OBSTETRICS & GYNECOLOGY

## 2018-05-22 PROCEDURE — 74011636637 HC RX REV CODE- 636/637: Performed by: INTERNAL MEDICINE

## 2018-05-22 PROCEDURE — 36415 COLL VENOUS BLD VENIPUNCTURE: CPT | Performed by: OBSTETRICS & GYNECOLOGY

## 2018-05-22 PROCEDURE — 85025 COMPLETE CBC W/AUTO DIFF WBC: CPT | Performed by: OBSTETRICS & GYNECOLOGY

## 2018-05-22 PROCEDURE — 74011250636 HC RX REV CODE- 250/636: Performed by: OBSTETRICS & GYNECOLOGY

## 2018-05-22 PROCEDURE — 82962 GLUCOSE BLOOD TEST: CPT

## 2018-05-22 RX ORDER — HYDROMORPHONE HYDROCHLORIDE 2 MG/1
1 TABLET ORAL
Qty: 30 TAB | Refills: 0 | Status: SHIPPED | OUTPATIENT
Start: 2018-05-22 | End: 2018-07-09

## 2018-05-22 RX ORDER — FLUCONAZOLE 150 MG/1
150 TABLET ORAL DAILY
Qty: 3 TAB | Refills: 0 | Status: SHIPPED | OUTPATIENT
Start: 2018-05-22 | End: 2018-05-23

## 2018-05-22 RX ORDER — PROMETHAZINE HYDROCHLORIDE 25 MG/1
25 TABLET ORAL
Qty: 20 TAB | Refills: 0 | Status: SHIPPED | OUTPATIENT
Start: 2018-05-22 | End: 2018-07-23

## 2018-05-22 RX ORDER — LINEZOLID 600 MG/1
600 TABLET, FILM COATED ORAL EVERY 12 HOURS
Qty: 18 TAB | Refills: 0 | Status: SHIPPED | OUTPATIENT
Start: 2018-05-22 | End: 2018-05-31

## 2018-05-22 RX ADMIN — INSULIN LISPRO 12 UNITS: 100 INJECTION, SOLUTION INTRAVENOUS; SUBCUTANEOUS at 17:44

## 2018-05-22 RX ADMIN — INSULIN LISPRO 3 UNITS: 100 INJECTION, SOLUTION INTRAVENOUS; SUBCUTANEOUS at 09:14

## 2018-05-22 RX ADMIN — INSULIN LISPRO 12 UNITS: 100 INJECTION, SOLUTION INTRAVENOUS; SUBCUTANEOUS at 09:14

## 2018-05-22 RX ADMIN — INSULIN LISPRO 3 UNITS: 100 INJECTION, SOLUTION INTRAVENOUS; SUBCUTANEOUS at 13:22

## 2018-05-22 RX ADMIN — Medication 10 ML: at 13:23

## 2018-05-22 RX ADMIN — INSULIN LISPRO 12 UNITS: 100 INJECTION, SOLUTION INTRAVENOUS; SUBCUTANEOUS at 13:22

## 2018-05-22 RX ADMIN — HYDROMORPHONE HYDROCHLORIDE 0.5 MG: 2 INJECTION INTRAMUSCULAR; INTRAVENOUS; SUBCUTANEOUS at 09:15

## 2018-05-22 RX ADMIN — INSULIN LISPRO 3 UNITS: 100 INJECTION, SOLUTION INTRAVENOUS; SUBCUTANEOUS at 17:44

## 2018-05-22 RX ADMIN — INSULIN GLARGINE 69 UNITS: 100 INJECTION, SOLUTION SUBCUTANEOUS at 09:14

## 2018-05-22 RX ADMIN — LINEZOLID 600 MG: 600 TABLET, FILM COATED ORAL at 09:15

## 2018-05-22 RX ADMIN — HYDROMORPHONE HYDROCHLORIDE 0.5 MG: 2 INJECTION INTRAMUSCULAR; INTRAVENOUS; SUBCUTANEOUS at 16:03

## 2018-05-22 RX ADMIN — HYDROMORPHONE HYDROCHLORIDE 1 MG: 2 TABLET ORAL at 03:39

## 2018-05-22 NOTE — ROUTINE PROCESS
PCP LAL[facility]O appt scheduled with Dr. Tiarra Mendez on May 30 at 12:15pm. Appt added to [address] CM Specialist

## 2018-05-22 NOTE — PROGRESS NOTES
Pt will d/c on today and will return to [place] to reside with family. Pt is aware that she will have to return back to [place] for her outpatient wound care appointment. Pt aware that she will need to follow up with pcp. Pt was given [place] PCP list, and reported that she will review list, once returning back home. Pt aware that her nurse will review d/c plans and needs. CM has completed d/c assessment with pt at this time.     NATHANIEL Rockwell   [phone]

## 2018-05-22 NOTE — WOUND CARE
Wound Care Follow up: Chart reviewed and patient assessed for her Mons Pubis wound post op day # 5 from Incision and drainage. Dr. Jameel Raymond did the I&D last week and we have been doing packing of the [address] wound as well as her previous left labia wound. Results: The wound depth has decreased by 1/ 2 the size and there is no longer any undermining. The periwound area is still indurated and still sensitive to palpation but the wound bed is granulating well and there is no slough in the wound. See photo below of the wound bed. The Mons wound still needs packing and today the patient was shown how to do it with gloves on her hands and she reached the hole to be packed and we covered the wound with an absorbent pad from drainage. Mons Pubis wound: The left labia wound is also still needing to be packed and covered while it heals. The wound bed is granulating well and should continue with moist wound care until completely sealed. Patient was also counseled in detail about her diabetes control and she is working on this to make better choices. She drinks about 3 liters of water daily. Plan: Patient is able to do a daily packing of her wounds using mirrors and clean hands and gloves. We can give her enough supplies to get her through about 2-3 weeks with a few rolls of kerlix and she can take her [employer] Solution that is being used and use it for 14 more days. Pt. Instructed to switch to Normal Saline after the two weeks. This is inexpensive wound care that patient can do without the use of home care and she demonstrated the ability to do it today. This was passed on to the covering physician today and pt. Will be discharged sometime today once her urine results are back. She is having left flank pain today.    Adela Hurtado, RN, BSN, CWON ([phone])

## 2018-05-22 NOTE — DISCHARGE INSTRUCTIONS
Learning About High Blood Sugar  What is high blood sugar? Your body turns the food you eat into glucose (sugar), which it uses for energy. But if your body isn't able to use the sugar right away, it can build up in your blood and lead to high blood sugar. When the amount of sugar in your blood stays too high for too much of the time, you may have diabetes. Diabetes is a disease that can cause serious health problems. The good news is that lifestyle changes may help you get your blood sugar back to normal and avoid or delay diabetes. What causes high blood sugar? Sugar (glucose) can build up in your blood if you:  · Are overweight. · Have a family history of diabetes. · Take certain medicines, such as steroids. What are the symptoms? Having high blood sugar may not cause any symptoms at all. Or it may make you feel very thirsty or very hungry. You may also urinate more often than usual, have blurry vision, or lose weight without trying. How is high blood sugar treated? You can take steps to lower your blood sugar level if you understand what makes it get higher. Your doctor may want you to learn how to test your blood sugar level at home. Then you can see how illness, stress, or different kinds of food or medicine raise or lower your blood sugar level. Other tests may be needed to see if you have diabetes. How can you prevent high blood sugar? · Watch your weight. If you're overweight, losing just a small amount of weight may help. Reducing fat around your waist is most important. · Limit the amount of calories, sweets, and unhealthy fat you eat. Ask your doctor if a dietitian can help you. A registered dietitian can help you create meal plans that fit your lifestyle. · Get at least 30 minutes of exercise on most days of the week. Exercise helps control your blood sugar. It also helps you maintain a healthy weight. Walking is a good choice.  You also may want to do other activities, such as running, swimming, cycling, or playing tennis or team sports. · If your doctor prescribed medicines, take them exactly as prescribed. Call your doctor if you think you are having a problem with your medicine. You will get more details on the specific medicines your doctor prescribes. Follow-up care is a key part of your treatment and safety. Be sure to make and go to all appointments, and call your doctor if you are having problems. It's also a good idea to know your test results and keep a list of the medicines you take. Where can you learn more? Go to http://ish-irish.info/. Enter O108 in the search box to learn more about \"Learning About High Blood Sugar. \"  Current as of: March 13, 2017  Content Version: 11.4  © 6515-0911 [employer]. Care instructions adapted under license by [employer] (which disclaims liability or warranty for this information). If you have questions about a medical condition or this instruction, always ask your healthcare professional. [address] any warranty or liability for your use of this information.

## 2018-05-22 NOTE — PROGRESS NOTES
Gynecology Post Operative Note    Velta Cheek    Assessment/Plan: POD5 s/p I&D of vulvar (mons) abscess      Recurrent vulvar abscesses s/p recent 3 week hospital admission in [place] (records scanned into EMR) including I&D of left labia majora in ED followed 2 days later by debridement and wound vac placement, likely related to poor healing (suboptimal IDDM control) in setting of microabrasions from shaving  - no evidence of sepsis - temp and WBC remain normal s/p I&D, blood cxs negative  - dressing/packing changes per wound care team - help much appreciated  - d/c home  - outpatient wound care arranged to start   - continue zyvox (day 2) to complete a 10 day course      Medical co-morbidities (IDDM, HTN, recent REN, class 3 obesity) - better controlled, IM signed off  - hyponatremia resolved s/p NS  - f/u with PCP    C/o headache - managed with tylenol. Tolerating Zyvox without complaint. Tolerating PO. Occasional nausea, no emesis. Voiding, (+)BMs. Pain controlled with dilaudid. Orders/Charges: Medium    Vitals:  Visit Vitals    /85 (BP 1 Location: Right arm, BP Patient Position: At rest)    Pulse 70    Temp 98 °F (36.7 °C)    Resp 16    Ht 5' 8\" (1.727 m)  Comment: 18    Wt 120 kg (264 lb 8.8 oz)    LMP 2018    SpO2 100%    Breastfeeding No    BMI 40.22 kg/m2     Temp (24hrs), Av.1 °F (36.7 °C), Min:97.5 °F (36.4 °C), Max:99 °F (37.2 °C)      Last 24hr Input/Output:    Intake/Output Summary (Last 24 hours) at 18 4042  Last data filed at 18 1857   Gross per 24 hour   Intake              720 ml   Output                0 ml   Net              720 ml          Exam:  Patient without distress.               Abdomen obese, soft/NT, bowel sounds present              Mons appropriately tender with dressing intact, no surrounding erythema or induration              Left labia majora wound healing well by secondary intention with packing in place, no erythema or induration              Lower extremities are negative for swelling, cords, or tenderness.     Labs: Lab Results   Component Value Date/Time    WBC 6.9 05/22/2018 05:32 AM    WBC 6.3 05/21/2018 06:22 AM    WBC 5.5 05/20/2018 03:02 AM    WBC 6.3 05/19/2018 02:16 AM    WBC 9.5 05/18/2018 06:22 AM    WBC 12.6 (H) 05/17/2018 02:01 PM    WBC 7.2 01/28/2018 08:20 PM    WBC 5.0 12/31/2017 04:41 AM    WBC 8.7 12/29/2017 10:32 PM    WBC 9.9 11/28/2017 06:21 PM    WBC 5.0 10/10/2017 03:25 AM    WBC 4.1 10/09/2017 03:28 AM    WBC 4.9 10/08/2017 03:43 AM    WBC 6.8 10/07/2017 12:37 AM    WBC 9.1 10/05/2017 08:07 PM    WBC 8.7 10/05/2017 04:10 PM    WBC 7.2 10/01/2017 10:58 PM    WBC 6.9 04/17/2011 06:00 AM    WBC 6.8 04/16/2011 07:25 AM    WBC 11.3 (H) 04/15/2011 01:30 AM    WBC 9.3 03/10/2011 03:20 AM    WBC 9.1 03/09/2011 03:45 AM    WBC 8.9 03/08/2011 03:35 AM    WBC 10.0 03/07/2011 11:30 PM    WBC 10.7 03/07/2011 09:21 PM    WBC 8.5 01/26/2011 04:15 AM    WBC 9.0 01/25/2011 02:00 AM    WBC 9.1 12/14/2010 03:35 AM    WBC 9.4 12/12/2010 11:30 PM    HGB 9.3 (L) 05/22/2018 05:32 AM    HGB 8.4 (L) 05/21/2018 06:22 AM    HGB 8.8 (L) 05/20/2018 03:02 AM    HGB 8.8 (L) 05/19/2018 02:16 AM    HGB 9.7 (L) 05/18/2018 06:22 AM    HGB 11.5 05/17/2018 02:01 PM    HGB 11.8 01/28/2018 08:20 PM    HGB 11.8 12/31/2017 04:41 AM    HGB 11.3 (L) 12/29/2017 10:32 PM    HGB 12.0 11/28/2017 06:21 PM    HGB 9.6 (L) 10/10/2017 03:25 AM    HGB 9.9 (L) 10/09/2017 03:28 AM    HGB 10.3 (L) 10/08/2017 03:43 AM    HGB 11.8 10/07/2017 12:37 AM    HGB  10/05/2017 08:07 PM     UNABLE TO OBTAIN ACCURATE RESULTS DUE TO GROSS LIPEMIA    HGB  10/05/2017 04:10 PM     UNABLE TO OBTAIN ACCURATE RESULTS DUE TO GROSS LIPEMIA    HGB 11.8 10/01/2017 10:58 PM    HGB 11.6 04/17/2011 06:00 AM    HGB 11.5 04/16/2011 07:25 AM    HGB 13.2 04/15/2011 01:30 AM    HGB 13.9 03/10/2011 03:20 AM    HGB 11.0 (L) 03/09/2011 03:45 AM    HGB 11.8 03/08/2011 03:35 AM    HGB 11.8 03/07/2011 11:30 PM    HGB 12.1 03/07/2011 09:21 PM    HGB 11.9 01/26/2011 04:15 AM    HGB 13.6 01/25/2011 02:00 AM    HGB 13.1 12/14/2010 03:35 AM    HGB 15.2 12/12/2010 11:30 PM    HCT 28.4 (L) 05/22/2018 05:32 AM    HCT 25.7 (L) 05/21/2018 06:22 AM    HCT 27.2 (L) 05/20/2018 03:02 AM    HCT 27.4 (L) 05/19/2018 02:16 AM    HCT 29.8 (L) 05/18/2018 06:22 AM    HCT 34.5 (L) 05/17/2018 02:01 PM    HCT 34.3 (L) 01/28/2018 08:20 PM    HCT 32.3 (L) 12/31/2017 04:41 AM    HCT 33.0 (L) 12/29/2017 10:32 PM    HCT 35.0 11/28/2017 06:21 PM    HCT 29.5 (L) 10/10/2017 03:25 AM    HCT 30.5 (L) 10/09/2017 03:28 AM    HCT 31.5 (L) 10/08/2017 03:43 AM    HCT 34.0 (L) 10/07/2017 12:37 AM    HCT 36.1 10/05/2017 08:07 PM    HCT 37.2 10/05/2017 04:10 PM    HCT 34.8 (L) 10/01/2017 10:58 PM    HCT 33.0 (L) 04/17/2011 06:00 AM    HCT 31.5 (L) 04/16/2011 07:25 AM    HCT 39.6 04/15/2011 01:30 AM    HCT 37.7 03/10/2011 03:20 AM    HCT 33.0 (L) 03/09/2011 03:45 AM    HCT 33.6 (L) 03/08/2011 03:35 AM    HCT 34.0 (L) 03/07/2011 11:30 PM    HCT 34.7 (L) 03/07/2011 09:21 PM    HCT 34.4 (L) 01/26/2011 04:15 AM    HCT 37.7 01/25/2011 02:00 AM    HCT 37.4 12/14/2010 03:35 AM    HCT 39.2 12/12/2010 11:30 PM    PLATELET 983 92/74/6479 05:32 AM    PLATELET 968 20/52/2385 06:22 AM    PLATELET 270 04/23/8676 03:02 AM    PLATELET 337 60/83/4213 02:16 AM    PLATELET 366 94/60/9521 06:22 AM    PLATELET 211 91/78/2991 02:01 PM    PLATELET 337 47/42/4584 08:20 PM    PLATELET 257 (L) 64/24/3412 04:41 AM    PLATELET 544 99/27/2997 10:32 PM    PLATELET 697 (L) 43/34/7576 06:21 PM    PLATELET 92 (L) 48/41/6361 03:25 AM    PLATELET 72 (L) 31/86/2102 03:28 AM    PLATELET 75 (L) 58/18/8074 03:43 AM    PLATELET 95 (L) 87/09/3048 12:37 AM    PLATELET 338 36/64/3722 08:07 PM    PLATELET 378 (L) 35/16/2968 04:10 PM    PLATELET 059 (L) 00/67/6441 10:58 PM    PLATELET 915 (L) 05/50/1305 06:00 AM    PLATELET 766 (L) 95/79/8330 07:25 AM    PLATELET 900 49/93/0357 01:30 AM PLATELET 357 82/89/0574 03:20 AM    PLATELET 277 (L) 07/01/3016 03:45 AM    PLATELET 188 97/14/4044 03:35 AM    PLATELET 116 13/19/8794 11:30 PM    PLATELET 561 64/07/3280 09:21 PM    PLATELET 873 19/04/3635 04:15 AM    PLATELET 717 58/06/9173 02:00 AM    PLATELET 348 94/45/9811 03:35 AM    PLATELET 415 39/38/1257 11:30 PM       Recent Results (from the past 24 hour(s))   GLUCOSE, POC    Collection Time: 05/21/18  8:06 AM   Result Value Ref Range    Glucose (POC) 171 (H) 65 - 100 mg/dL    Performed by [facility])    GLUCOSE, POC    Collection Time: 05/21/18 11:52 AM   Result Value Ref Range    Glucose (POC) 160 (H) 65 - 100 mg/dL    Performed by [facility])    GLUCOSE, POC    Collection Time: 05/21/18  4:23 PM   Result Value Ref Range    Glucose (POC) 214 (H) 65 - 100 mg/dL    Performed by Miah Fox ([facility])    GLUCOSE, POC    Collection Time: 05/21/18  9:23 PM   Result Value Ref Range    Glucose (POC) 174 (H) 65 - 100 mg/dL    Performed by Lia Cabrera ([facility])    METABOLIC PANEL, BASIC    Collection Time: 05/22/18  3:18 AM   Result Value Ref Range    Sodium 139 136 - 145 mmol/L    Potassium 4.2 3.5 - 5.1 mmol/L    Chloride 110 (H) 97 - 108 mmol/L    CO2 21 21 - 32 mmol/L    Anion gap 8 5 - 15 mmol/L    Glucose 118 (H) 65 - 100 mg/dL    BUN 14 6 - 20 MG/DL    Creatinine 0.70 0.55 - 1.02 MG/DL    BUN/Creatinine ratio 20 12 - 20      GFR est AA >60 >60 ml/min/1.73m2    GFR est non-AA >60 >60 ml/min/1.73m2    Calcium 8.8 8.5 - 10.1 MG/DL   CBC WITH AUTOMATED DIFF    Collection Time: 05/22/18  5:32 AM   Result Value Ref Range    WBC 6.9 3.6 - 11.0 K/uL    RBC 3.43 (L) 3.80 - 5.20 M/uL    HGB 9.3 (L) 11.5 - 16.0 g/dL    HCT 28.4 (L) 35.0 - 47.0 %    MCV 82.8 80.0 - 99.0 FL    MCH 27.1 26.0 - 34.0 PG    MCHC 32.7 30.0 - 36.5 g/dL    RDW 15.9 (H) 11.5 - 14.5 %    PLATELET 257 083 - 935 K/uL    MPV 9.9 8.9 - 12.9 FL    NRBC 0.0 0  WBC    ABSOLUTE NRBC 0.00 0.00 - 0.01 K/uL    NEUTROPHILS 65 32 - 75 %    LYMPHOCYTES 24 12 - 49 %    MONOCYTES 6 5 - 13 %    EOSINOPHILS 3 0 - 7 %    BASOPHILS 0 0 - 1 %    IMMATURE GRANULOCYTES 2 (H) 0.0 - 0.5 %    ABS. NEUTROPHILS 4.5 1.8 - 8.0 K/UL    ABS. LYMPHOCYTES 1.7 0.8 - 3.5 K/UL    ABS. MONOCYTES 0.4 0.0 - 1.0 K/UL    ABS. EOSINOPHILS 0.2 0.0 - 0.4 K/UL    ABS. BASOPHILS 0.0 0.0 - 0.1 K/UL    ABS. IMM.  GRANS. 0.1 (H) 0.00 - 0.04 K/UL    DF AUTOMATED

## 2018-05-22 NOTE — DISCHARGE SUMMARY
Gynecology Discharge Summary     Patient ID:  Dimitrios Lima  [id number]  52 y.o.  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    Essential hypertension I10    Uncontrolled type 2 diabetes mellitus with diabetic polyneuropathy, with long-term current use of insulin (HCC) E11.42, Z79.4, E11.65    Hyperlipidemia LDL goal <100 E78.5    Obesity, Class III, BMI 40-49.9 (morbid obesity) ([address] E66.01    Vitamin D deficiency E55.9    Breast abscess of female N61.1    Recurrent depression ([address] F33.9    Vulvar abscess N76.4       Procedures for this admission: Procedure(s):  [address] 5/17/18    Hospital Course: Presented to [facility] ED 1 week after discharge from 3 week admission in [facility] for I&D/debridement of left labia majora abscess (see scanned records). New vulvar abscess (R mons) identified. Underwent uncomplicated I&D. Fever and leukocytosis resolved thereafter. Wound care involved and wounds healing well. Transitioned from triple parenteral antibiotics (vancomycin/levaquin/flagyl) to zyvox based on wound culture results. IM involved to manage diabetes and HTN.      Disposition: Home or self care with outpatient wound management. Discharged Condition: stable            Patient Instructions:   Current Discharge Medication List      START taking these medications    Details   HYDROmorphone (DILAUDID) 2 mg tablet Take 0.5 Tabs by mouth every four (4) hours as needed. Max Daily Amount: 6 mg. Qty: 30 Tab, Refills: 0    Associated Diagnoses: Vulvar abscess      linezolid (ZYVOX) 600 mg tablet Take 1 Tab by mouth every twelve (12) hours for 9 days. Qty: 18 Tab, Refills: 0      promethazine (PHENERGAN) 25 mg tablet Take 1 Tab by mouth every eight (8) hours as needed. Indications: PREVENTION OF POST-OPERATIVE NAUSEA AND VOMITING  Qty: 20 Tab, Refills: 0      fluconazole (DIFLUCAN) 150 mg tablet Take 1 Tab by mouth daily for 1 day. Repeat in 3 days prn itching. Indications: PREVENTION OF VULVOVAGINAL CANDIDIASIS  Qty: 3 Tab, Refills: 0         CONTINUE these medications which have NOT CHANGED    Details   insulin glargine (LANTUS U-100 INSULIN) 100 unit/mL injection 65 Units by SubCUTAneous route nightly. acetaminophen (TYLENOL) 500 mg tablet Take 1,000-2,500 mg by mouth every six (6) hours as needed for Pain. insulin regular (HUMULIN R REGULAR U-100 INSULN) 100 unit/mL injection 12 Units by SubCUTAneous route Before breakfast, lunch, and dinner. Plus sliding scale      atorvastatin (LIPITOR) 40 mg tablet Take 1 Tab by mouth nightly. Qty: 30 Tab, Refills: 11      fenofibrate nanocrystallized (TRICOR) 145 mg tablet Take 1 Tab by mouth daily. Qty: 30 Tab, Refills: 11      omega-3 acid ethyl esters (LOVAZA) 1 gram capsule Take 2 Caps by mouth two (2) times a day. Qty: 120 Cap, Refills: 11      metFORMIN (GLUCOPHAGE) 1,000 mg tablet Take 1 Tab by mouth two (2) times daily (with meals). Qty: 60 Tab, Refills: 11      ergocalciferol (VITAMIN D2) 50,000 unit capsule Take 1 Cap by mouth every seven (7) days.   Qty: 4 Cap, Refills: 11      gabapentin (NEURONTIN) 300 mg capsule Take 2 Caps by mouth three (3) times daily. Qty: 180 Cap, Refills: 11      albuterol (PROAIR HFA) 90 mcg/actuation inhaler Take 2 Puffs by inhalation every four (4) hours as needed for Wheezing. STOP taking these medications       insulin glargine (LANTUS SOLOSTAR) 100 unit/mL (3 mL) inpn Comments:   Reason for Stopping:             Activity: Activity as tolerated, No sex for 4 weeks, No driving while on analgesics and See surgical instructions  Diet: Diabetic Diet  Wound Care: Keep wound clean and dry and follow-up as scheduled for outpatient wound care    Follow-up with GYN in 3-5 days. Follow-up with Internal Medicine within 2 weeks.      Signed:  Amanda Phillips MD  5/22/2018  8:36 AM

## 2018-05-22 NOTE — PROGRESS NOTES
Problem: Falls - Risk of  Goal: *Absence of Falls  Document Doug Fall Risk and appropriate interventions in the flowsheet.    Outcome: Progressing Towards Goal  Fall Risk Interventions:  Mobility Interventions: Patient to call before getting OOB         Medication Interventions: Patient to call before getting OOB    Elimination Interventions: Call light in reach    History of Falls Interventions: Door open when patient unattended

## 2018-05-22 NOTE — PROGRESS NOTES
CM spoke with pt regarding her placement issues. It was reported by [facility] source that pt is returning back to [address] [place] [address], [place], and/or [place]. CM completed room visit and pt reported that she will be residing with family until she save money to return back to [place]. It was reported that pt has had training on how to care for wound.   NATHANIEL Smith   [phone]

## 2018-05-23 LAB
BACTERIA SPEC CULT: NORMAL
BACTERIA SPEC CULT: NORMAL
SERVICE CMNT-IMP: NORMAL
SERVICE CMNT-IMP: NORMAL

## 2018-05-23 NOTE — PROGRESS NOTES
Patient discharged, info given to patient by Psychiatric RN. Patient alert and oriented asnd able to stand at d/c. CNA tookpatient to Yellow cab at ER entrance.

## 2018-05-23 NOTE — PROGRESS NOTES
CM received call from hospital, reporting that pt had had troubles with filling her prescriptions: Zyvox because of the pricing. Pt reported that she took medications to [facility] in [place], and when she attempted to fill scripts pharmacy reported that the medications would cost her $5,000. CM informed pt that she has medicaid and medications should be free at cost.    CM contacted [facility], via telephone to receive the cash pricing of medications. CM informed by pharm that without insurance the cash price for medications would be $81.00. However, since pt has insurance medications will be free or an cheaper cost then $81.00. CM called pt back and informed her of the discovered price. CM informed pt that the [facility] closes at 4:30pm with hopes that she is able to come here to get her medications filled. Pt reported that she is \"unable to come to [place] today, because she is busy, however, she I am able to get scripts filled on Thursday\". CM informed pt to be sure to get medications filled to avoid readmission.     Reshma Rucker, MSW   [phone]

## 2018-07-04 ENCOUNTER — HOSPITAL ENCOUNTER (INPATIENT)
Age: 38
LOS: 4 days | Discharge: HOME | DRG: 420 | End: 2018-07-09
Attending: EMERGENCY MEDICINE | Admitting: INTERNAL MEDICINE
Payer: MEDICAID

## 2018-07-04 DIAGNOSIS — R73.9 HYPERGLYCEMIA: ICD-10-CM

## 2018-07-04 DIAGNOSIS — R10.84 ABDOMINAL PAIN, GENERALIZED: ICD-10-CM

## 2018-07-04 DIAGNOSIS — E87.1 ACUTE HYPONATREMIA: Primary | ICD-10-CM

## 2018-07-04 PROCEDURE — 96361 HYDRATE IV INFUSION ADD-ON: CPT

## 2018-07-04 PROCEDURE — 94762 N-INVAS EAR/PLS OXIMTRY CONT: CPT

## 2018-07-04 PROCEDURE — 96374 THER/PROPH/DIAG INJ IV PUSH: CPT

## 2018-07-04 PROCEDURE — 99284 EMERGENCY DEPT VISIT MOD MDM: CPT

## 2018-07-04 PROCEDURE — 96375 TX/PRO/DX INJ NEW DRUG ADDON: CPT

## 2018-07-04 RX ORDER — FAMOTIDINE 20 MG/1
20 TABLET, FILM COATED ORAL
Status: COMPLETED | OUTPATIENT
Start: 2018-07-04 | End: 2018-07-05

## 2018-07-04 RX ORDER — LIDOCAINE HYDROCHLORIDE 20 MG/ML
15 SOLUTION OROPHARYNGEAL
Status: COMPLETED | OUTPATIENT
Start: 2018-07-04 | End: 2018-07-05

## 2018-07-04 RX ORDER — ONDANSETRON 2 MG/ML
4 INJECTION INTRAMUSCULAR; INTRAVENOUS
Status: COMPLETED | OUTPATIENT
Start: 2018-07-04 | End: 2018-07-05

## 2018-07-04 NOTE — IP AVS SNAPSHOT
[address] [facility] 
[phone] Patient: Marvin Julian MRN: [id number] MHR:6/21/8497 About your hospitalization You were admitted on:  July 5, 2018 You last received care in the:  [facility] 2 PROGRESSIVE CARE You were discharged on:  July 9, 2018 Why you were hospitalized Your primary diagnosis was:  Not on File Your diagnoses also included:  Hyperosmolar Non-Ketotic State In Patient With Type 2 Diabetes Mellitus (Hcc) Follow-up Information Follow up With Details Comments Contact Info [facility] [facility]  They are open on Wednesdays from 6pm to 9pm. No appointment necessary just go in on that day. [phone] 
[address] [place] [address] None   None (395) Patient stated that they have no PCP Colton Arciniega MD In 1 week  [address] [facility] [address] [place] [address] 
[phone] Colton Arciniega MD  Hospital follow-up scheduled at time ( If you have questions or need to reschedule please call Ellen Velazco [address] [place] [address] [address] [facility] 
[phone] Discharge Orders None A check jenise indicates which time of day the medication should be taken. My Medications CHANGE how you take these medications Instructions Each Dose to Equal  
 Morning Noon Evening Bedtime * atorvastatin 40 mg tablet Commonly known as:  LIPITOR What changed:  Another medication with the same name was added. Make sure you understand how and when to take each. Your last dose was: Your next dose is: Take 1 Tab by mouth nightly. 40 mg  
    
   
   
   
  
 * atorvastatin 40 mg tablet Commonly known as:  LIPITOR What changed: You were already taking a medication with the same name, and this prescription was added. Make sure you understand how and when to take each. Your last dose was: Your next dose is: Take 1 Tab by mouth daily. 40 mg  
    
   
   
   
  
 ergocalciferol 50,000 unit capsule Commonly known as:  ERGOCALCIFEROL Start taking on:  7/11/2018 What changed:  when to take this Your last dose was: Your next dose is: Take 1 Cap by mouth three (3) days a week. Indications: Vitamin D Deficiency 89961 Units * fenofibrate nanocrystallized 145 mg tablet Commonly known as:  [employer] What changed:  Another medication with the same name was added. Make sure you understand how and when to take each. Your last dose was: Your next dose is: Take 1 Tab by mouth daily. 145 mg  
    
   
   
   
  
 * fenofibrate nanocrystallized 145 mg tablet Commonly known as:  [employer] Start taking on:  7/10/2018 What changed: You were already taking a medication with the same name, and this prescription was added. Make sure you understand how and when to take each. Your last dose was: Your next dose is: Take 1 Tab by mouth daily. Indications: hypertriglyceridemia 145 mg  
    
   
   
   
  
 * metFORMIN 1,000 mg tablet Commonly known as:  GLUCOPHAGE What changed:  Another medication with the same name was added. Make sure you understand how and when to take each. Your last dose was: Your next dose is: Take 1 Tab by mouth two (2) times daily (with meals). 1000 mg  
    
   
   
   
  
 * metFORMIN 500 mg tablet Commonly known as:  GLUCOPHAGE What changed: You were already taking a medication with the same name, and this prescription was added. Make sure you understand how and when to take each. Your last dose was: Your next dose is: Take 1 Tab by mouth two (2) times daily (with meals). 500 mg  
    
   
   
   
  
 * omega-3 acid ethyl esters 1 gram capsule Commonly known as:  Leti Smith What changed:  Another medication with the same name was added. Make sure you understand how and when to take each. Your last dose was: Your next dose is: Take 2 Caps by mouth two (2) times a day. 2 g  
    
   
   
   
  
 * omega-3 acid ethyl esters 1 gram capsule Commonly known as:  Dorothy Xiong What changed: You were already taking a medication with the same name, and this prescription was added. Make sure you understand how and when to take each. Your last dose was: Your next dose is: Take 2 Caps by mouth two (2) times a day. Indications: hypertriglyceridemia 2 g * Notice: This list has 8 medication(s) that are the same as other medications prescribed for you. Read the directions carefully, and ask your doctor or other care provider to review them with you. CONTINUE taking these medications Instructions Each Dose to Equal  
 Morning Noon Evening Bedtime  
 acetaminophen 500 mg tablet Commonly known as:  TYLENOL Your last dose was: Your next dose is: Take 1,000-2,500 mg by mouth every six (6) hours as needed for Pain. 0975-8157 mg  
    
   
   
   
  
 gabapentin 300 mg capsule Commonly known as:  NEURONTIN Your last dose was: Your next dose is: Take 2 Caps by mouth three (3) times daily. 600 mg PROAIR HFA 90 mcg/actuation inhaler Generic drug:  albuterol Your last dose was: Your next dose is: Take 2 Puffs by inhalation every four (4) hours as needed for Wheezing. 2 Puff  
    
   
   
   
  
 promethazine 25 mg tablet Commonly known as:  PHENERGAN Your last dose was: Your next dose is: Take 1 Tab by mouth every eight (8) hours as needed. Indications: PREVENTION OF POST-OPERATIVE NAUSEA AND VOMITING  
 25 mg  
    
   
   
   
  
  
STOP taking these medications HumuLIN R Regular U-100 Insuln 100 unit/mL injection Generic drug:  insulin regular HYDROmorphone 2 mg tablet Commonly known as:  DILAUDID ASK your doctor about these medications Instructions Each Dose to Equal  
 Morning Noon Evening Bedtime  
 insulin glargine 100 unit/mL injection Commonly known as:  LANTUS U-100 INSULIN What changed:   
- how much to take - when to take this Ask about: Which instructions should I use? Your last dose was: Your next dose is:    
   
   
 100 Units by SubCUTAneous route two (2) times a day. Indications: type 2 diabetes mellitus 100 Units  
    
   
   
   
  
 insulin lispro 100 unit/mL injection Commonly known as:  HUMALOG Your last dose was: Your next dose is:    
   
   
 Inject 5 units for every 50 points over 1502 Where to Get Your Medications Information on where to get these meds will be given to you by the nurse or doctor. ! Ask your nurse or doctor about these medications  
  atorvastatin 40 mg tablet  
 ergocalciferol 50,000 unit capsule  
 fenofibrate nanocrystallized 145 mg tablet  
 insulin glargine 100 unit/mL injection  
 insulin lispro 100 unit/mL injection  
 metFORMIN 500 mg tablet  
 omega-3 acid ethyl esters 1 gram capsule Discharge Instructions Discharge Instructions PATIENT ID: Lyssa Hodgson MRN: [id number] YOB: 1980 DATE OF ADMISSION: 7/4/2018 11:38 PM   
DATE OF DISCHARGE: 7/9/2018 PRIMARY CARE PROVIDER: None DISCHARGING PHYSICIAN: Evalina Castleman, MD   
To contact this individual call [phone] and ask the  to page. If unavailable ask to be transferred the Adult Hospitalist Department. DISCHARGE DIAGNOSES Uncontrolled diabetes mellitus, Hypertriglyceridemia, Truncal Obesity CONSULTATIONS: IP CONSULT TO ENDOCRINOLOGY PROCEDURES/SURGERIES: * No surgery found * PENDING TEST RESULTS:  
At the time of discharge the following test results are still pending: FOLLOW UP APPOINTMENTS:  
Follow-up Information Follow up With Details Comments Contact Info [address] [facility]  They are open on Wednesdays from 6pm to 9pm. No appointment necessary just go in on that day. [phone] 
[address] [place] [address] None   None (395) Patient stated that they have no PCP José Miguel Vergara MD In 1 week  [address] [facility] [address] [place] [address] 
[phone] ADDITIONAL CARE RECOMMENDATIONS:  
 
DIET: Diabetic Diet ACTIVITY: Activity as tolerated WOUND CARE:  
 
EQUIPMENT needed:  
 
 
DISCHARGE MEDICATIONS: 
 See Medication Reconciliation Form · It is important that you take the medication exactly as they are prescribed. · Keep your medication in the bottles provided by the pharmacist and keep a list of the medication names, dosages, and times to be taken in your wallet. · Do not take other medications without consulting your doctor. NOTIFY YOUR PHYSICIAN FOR ANY OF THE FOLLOWING:  
Fever over 101 degrees for 24 hours. Chest pain, shortness of breath, fever, chills, nausea, vomiting, diarrhea, change in mentation, falling, weakness, bleeding. Severe pain or pain not relieved by medications. Or, any other signs or symptoms that you may have questions about. DISPOSITION: 
 x Home With: 
 OT  PT  [facility]  RN  
  
 SNF/Inpatient Rehab/LTAC Independent/assisted living Hospice Other: CDMP Checked:  
Yes x Signed:  
Randy Costello MD 
7/9/2018 11:30 AM 
 
 
Dr. Svetlana Murphy discharge instructions: 
 
1) Take lantus 100 units every 12 hours as 2 smaller injections of 50 units back to back in different locations. 2) Check your sugar 3 times daily fasting and 2 hours after 2 of your meals. If your sugar is over 150, dose humalog based on the scale below: 
Blood sugar 151-200  5 units 201-250  10 units 251-300  15 units 301-350  20 units 351-400  25 units 401-450                       30 units 451-500                       35 units 3) Continue taking 1/2 of the 1000 mg metformin with breakfast and dinner through Wed night and on Thursday start 1 whole tab with breakfast and dinner. 4) Our office will call you to schedule a follow up visit. 5) Please don't hesitate to call [phone] with further questions. [employer] Announcement We are excited to announce that we are making your provider's discharge notes available to you in [employer]. You will see these notes when they are completed and signed by the physician that discharged you from your recent hospital stay. If you have any questions or concerns about any information you see in [employer], please call the Health Information Department where you were seen or reach out to your Primary Care Provider for more information about your plan of care. Introducing [facility]! Dear Diallo Lawler: Thank you for requesting a [employer] account. Our records indicate that you already have an active [employer] account. You can access your account anytime at [email or web address] Did you know that you can access your hospital and ER discharge instructions at any time in [employer]? You can also review all of your test results from your hospital stay or ER visit. Additional Information If you have questions, please visit the Frequently Asked Questions section of the [employer] website at [email or web address] Remember, [employer] is NOT to be used for urgent needs. For medical emergencies, dial 911. Now available from your iPhone and Android! Introducing Alex Moreno As a [facility] patient, I wanted to make you aware of our electronic visit tool called Alex Moreno. [facility] 24/7 allows you to connect within minutes with a medical provider 24 hours a day, seven days a week via a mobile device or tablet or logging into a secure website from your computer. You can access [employer] from anywhere in the [place]. A virtual visit might be right for you when you have a simple condition and feel like you just dont want to get out of bed, or cant get away from work for an appointment, when your regular [facility] provider is not available (evenings, weekends or holidays), or when youre out of town and need minor care. Electronic visits cost only $49 and if the [employer] provider determines a prescription is needed to treat your condition, one can be electronically transmitted to a nearby pharmacy*. Please take a moment to enroll today if you have not already done so. The enrollment process is free and takes just a few minutes. To enroll, please download the [facility] dominik to your tablet or phone, or visit [email or web address] to enroll on your computer. And, as an [address] [place] [address] patient with a [employer] account, the results of your visits will be scanned into your electronic medical record and your primary care provider will be able to view the scanned results. We urge you to continue to see your regular [facility] provider for your ongoing medical care. And while your primary care provider may not be the one available when you seek a Alex Moreno virtual visit, the peace of mind you get from getting a real diagnosis real time can be priceless. For more information on [employer], view our Frequently Asked Questions (FAQs) at [email or web address]. Sincerely, 
 
Jjoo Ornelas MD 
Chief Medical Officer Liliana Rey *:  certain medications cannot be prescribed via Alex [employer] Providers Seen During Your Hospitalization Provider Specialty Primary office phone Kristin Landin MD Emergency Medicine [phone] Miller Cervantes MD Palliative Medicine [phone] Rosario Flores MD Internal Medicine [phone] Your Primary Care Physician (PCP) Primary Care Physician Office Phone Office Fax NONE ** None ** ** None ** You are allergic to the following Allergen Reactions Toradol (Ketorolac Tromethamine) Unknown (comments) Seizure like symptoms per pt. Augmentin (Amoxicillin-Pot Clavulanate) Swelling Demerol (Meperidine) Swelling Heparin Hives Ibuprofen Diarrhea Nausea and Vomiting Muscle tightness Keflex (Cephalexin) Shortness of Breath Morphine Swelling Nubain (Nalbuphine) Hives Pcn (Penicillins) Swelling Sulfa (Sulfonamide Antibiotics) Unknown (comments) Recent Documentation Height Weight BMI OB Status Smoking Status 1.727 m 123.4 kg 41.36 kg/m2 Having regular periods Current Some Day Smoker Emergency Contacts Name Discharge Info Relation Home Work Mobile Angeles Palma N/A  AT THIS TIME [6] Other Relative [6] [phone] Kang Palma DISCHARGE CAREGIVER [3] Boyfriend [17] [phone] Kang Palma  Spouse [3] [phone] Patient Belongings The following personal items are in your possession at time of discharge: 
  Dental Appliances: None  Visual Aid: None      Home Medications: Kept at bedside   Jewelry: None  Clothing: At bedside    Other Valuables: Cell Phone (, purse) Please provide this summary of care documentation to your next provider. Signatures-by signing, you are acknowledging that this After Visit Summary has been reviewed with you and you have received a copy. Patient Signature:  ____________________________________________________________ Date:  ____________________________________________________________  
  
Josph Perfect  Provider Signature: ____________________________________________________________ Date:  ____________________________________________________________

## 2018-07-05 ENCOUNTER — APPOINTMENT (OUTPATIENT)
Dept: ULTRASOUND IMAGING | Age: 38
DRG: 420 | End: 2018-07-05
Attending: HOSPITALIST
Payer: MEDICAID

## 2018-07-05 ENCOUNTER — TELEPHONE (OUTPATIENT)
Dept: ENDOCRINOLOGY | Age: 38
End: 2018-07-05

## 2018-07-05 PROBLEM — E11.00 HYPEROSMOLAR NON-KETOTIC STATE IN PATIENT WITH TYPE 2 DIABETES MELLITUS (HCC): Status: ACTIVE | Noted: 2018-07-05

## 2018-07-05 LAB
ADMINISTERED INITIALS, ADMINIT: NORMAL
ALBUMIN SERPL-MCNC: 2.1 G/DL (ref 3.5–5)
ALBUMIN/GLOB SERPL: 0.6 {RATIO} (ref 1.1–2.2)
ALP SERPL-CCNC: ABNORMAL U/L (ref 45–117)
ALT SERPL-CCNC: 366 U/L (ref 12–78)
AMPHET UR QL SCN: NEGATIVE
ANION GAP SERPL CALC-SCNC: 10 MMOL/L (ref 5–15)
ANION GAP SERPL CALC-SCNC: 11 MMOL/L (ref 5–15)
ANION GAP SERPL CALC-SCNC: 7 MMOL/L (ref 5–15)
APAP SERPL-MCNC: <2 UG/ML (ref 10–30)
APPEARANCE UR: CLEAR
AST SERPL-CCNC: 543 U/L (ref 15–37)
ATRIAL RATE: 79 BPM
BACTERIA URNS QL MICRO: NEGATIVE /HPF
BARBITURATES UR QL SCN: NEGATIVE
BASOPHILS # BLD: 0 K/UL (ref 0–0.1)
BASOPHILS NFR BLD: 0 % (ref 0–1)
BENZODIAZ UR QL: NEGATIVE
BILIRUB SERPL-MCNC: 2.1 MG/DL (ref 0.2–1)
BILIRUB UR QL: NEGATIVE
BUN SERPL-MCNC: ABNORMAL MG/DL (ref 6–20)
BUN/CREAT SERPL: ABNORMAL (ref 12–20)
CALCIUM SERPL-MCNC: ABNORMAL MG/DL (ref 8.5–10.1)
CALCULATED P AXIS, ECG09: 52 DEGREES
CALCULATED R AXIS, ECG10: -7 DEGREES
CALCULATED T AXIS, ECG11: 21 DEGREES
CANNABINOIDS UR QL SCN: NEGATIVE
CHLORIDE SERPL-SCNC: 85 MMOL/L (ref 97–108)
CHLORIDE SERPL-SCNC: 91 MMOL/L (ref 97–108)
CHLORIDE SERPL-SCNC: 98 MMOL/L (ref 97–108)
CO2 SERPL-SCNC: 23 MMOL/L (ref 21–32)
CO2 SERPL-SCNC: 23 MMOL/L (ref 21–32)
CO2 SERPL-SCNC: 25 MMOL/L (ref 21–32)
COCAINE UR QL SCN: NEGATIVE
COLOR UR: ABNORMAL
CREAT SERPL-MCNC: 0.48 MG/DL (ref 0.55–1.02)
CREAT SERPL-MCNC: 1.18 MG/DL (ref 0.55–1.02)
CREAT SERPL-MCNC: ABNORMAL MG/DL (ref 0.55–1.02)
D50 ADMINISTERED, D50ADM: 0 ML
D50 ORDER, D50ORD: 0 ML
DIAGNOSIS, 93000: NORMAL
DIFFERENTIAL METHOD BLD: ABNORMAL
DRUG SCRN COMMENT,DRGCM: NORMAL
EOSINOPHIL # BLD: 0.1 K/UL (ref 0–0.4)
EOSINOPHIL NFR BLD: 2 % (ref 0–7)
EPITH CASTS URNS QL MICRO: ABNORMAL /LPF
ERYTHROCYTE [DISTWIDTH] IN BLOOD BY AUTOMATED COUNT: 14.9 % (ref 11.5–14.5)
EST. AVERAGE GLUCOSE BLD GHB EST-MCNC: 249 MG/DL
GLOBULIN SER CALC-MCNC: 3.7 G/DL (ref 2–4)
GLSCOM COMMENTS: NORMAL
GLUCOSE BLD STRIP.AUTO-MCNC: 173 MG/DL (ref 65–100)
GLUCOSE BLD STRIP.AUTO-MCNC: 196 MG/DL (ref 65–100)
GLUCOSE BLD STRIP.AUTO-MCNC: 215 MG/DL (ref 65–100)
GLUCOSE BLD STRIP.AUTO-MCNC: 216 MG/DL (ref 65–100)
GLUCOSE BLD STRIP.AUTO-MCNC: 223 MG/DL (ref 65–100)
GLUCOSE BLD STRIP.AUTO-MCNC: 225 MG/DL (ref 65–100)
GLUCOSE BLD STRIP.AUTO-MCNC: 226 MG/DL (ref 65–100)
GLUCOSE BLD STRIP.AUTO-MCNC: 226 MG/DL (ref 65–100)
GLUCOSE BLD STRIP.AUTO-MCNC: 227 MG/DL (ref 65–100)
GLUCOSE BLD STRIP.AUTO-MCNC: 242 MG/DL (ref 65–100)
GLUCOSE BLD STRIP.AUTO-MCNC: 273 MG/DL (ref 65–100)
GLUCOSE BLD STRIP.AUTO-MCNC: 273 MG/DL (ref 65–100)
GLUCOSE BLD STRIP.AUTO-MCNC: 286 MG/DL (ref 65–100)
GLUCOSE BLD STRIP.AUTO-MCNC: 311 MG/DL (ref 65–100)
GLUCOSE BLD STRIP.AUTO-MCNC: 318 MG/DL (ref 65–100)
GLUCOSE BLD STRIP.AUTO-MCNC: 386 MG/DL (ref 65–100)
GLUCOSE BLD STRIP.AUTO-MCNC: 501 MG/DL (ref 65–100)
GLUCOSE SERPL-MCNC: 386 MG/DL (ref 65–100)
GLUCOSE SERPL-MCNC: 629 MG/DL (ref 65–100)
GLUCOSE SERPL-MCNC: 830 MG/DL (ref 65–100)
GLUCOSE UR STRIP.AUTO-MCNC: >1000 MG/DL
GLUCOSE, GLC: 173 MG/DL
GLUCOSE, GLC: 223 MG/DL
GLUCOSE, GLC: 225 MG/DL
GLUCOSE, GLC: 227 MG/DL
GLUCOSE, GLC: 242 MG/DL
GLUCOSE, GLC: 266 MG/DL
GLUCOSE, GLC: 273 MG/DL
GLUCOSE, GLC: 311 MG/DL
GLUCOSE, GLC: 318 MG/DL
GLUCOSE, GLC: 386 MG/DL
GLUCOSE, GLC: 501 MG/DL
HAV IGM SER QL: NONREACTIVE
HBA1C MFR BLD: 10.3 % (ref 4.2–6.3)
HBV CORE IGM SER QL: NONREACTIVE
HBV SURFACE AG SER QL: 0.18 INDEX
HBV SURFACE AG SER QL: NEGATIVE
HCG UR QL: NEGATIVE
HCT VFR BLD AUTO: 32.2 % (ref 35–47)
HCV AB SERPL QL IA: NONREACTIVE
HCV COMMENT,HCGAC: NORMAL
HGB BLD-MCNC: 11.6 G/DL (ref 11.5–16)
HGB UR QL STRIP: NEGATIVE
HIGH TARGET, HITG: 180 MG/DL
HIGH TARGET, HITG: 250 MG/DL
HIGH TARGET, HITG: 250 MG/DL
HIGH TARGET, HITG: 300 MG/DL
HYALINE CASTS URNS QL MICRO: ABNORMAL /LPF (ref 0–5)
IMM GRANULOCYTES # BLD: 0 K/UL (ref 0–0.04)
IMM GRANULOCYTES NFR BLD AUTO: 0 % (ref 0–0.5)
INSULIN ADMINSTERED, INSADM: 10 UNITS/HOUR
INSULIN ADMINSTERED, INSADM: 4.5 UNITS/HOUR
INSULIN ADMINSTERED, INSADM: 4.9 UNITS/HOUR
INSULIN ADMINSTERED, INSADM: 5 UNITS/HOUR
INSULIN ADMINSTERED, INSADM: 5 UNITS/HOUR
INSULIN ADMINSTERED, INSADM: 6.4 UNITS/HOUR
INSULIN ADMINSTERED, INSADM: 6.5 UNITS/HOUR
INSULIN ADMINSTERED, INSADM: 7.3 UNITS/HOUR
INSULIN ADMINSTERED, INSADM: 7.7 UNITS/HOUR
INSULIN ADMINSTERED, INSADM: 8.2 UNITS/HOUR
INSULIN ADMINSTERED, INSADM: 8.8 UNITS/HOUR
INSULIN ORDER, INSORD: 10 UNITS/HOUR
INSULIN ORDER, INSORD: 4.5 UNITS/HOUR
INSULIN ORDER, INSORD: 4.9 UNITS/HOUR
INSULIN ORDER, INSORD: 5 UNITS/HOUR
INSULIN ORDER, INSORD: 5 UNITS/HOUR
INSULIN ORDER, INSORD: 6.4 UNITS/HOUR
INSULIN ORDER, INSORD: 6.5 UNITS/HOUR
INSULIN ORDER, INSORD: 7.3 UNITS/HOUR
INSULIN ORDER, INSORD: 7.7 UNITS/HOUR
INSULIN ORDER, INSORD: 8.2 UNITS/HOUR
INSULIN ORDER, INSORD: 8.8 UNITS/HOUR
KETONES UR QL STRIP.AUTO: NEGATIVE MG/DL
LEUKOCYTE ESTERASE UR QL STRIP.AUTO: NEGATIVE
LIPASE SERPL-CCNC: 104 U/L (ref 73–393)
LOW TARGET, LOT: 140 MG/DL
LOW TARGET, LOT: 150 MG/DL
LOW TARGET, LOT: 150 MG/DL
LOW TARGET, LOT: 200 MG/DL
LYMPHOCYTES # BLD: 1.7 K/UL (ref 0.8–3.5)
LYMPHOCYTES NFR BLD: 25 % (ref 12–49)
MAGNESIUM SERPL-MCNC: 1.2 MG/DL (ref 1.6–2.4)
MCH RBC QN AUTO: 28.4 PG (ref 26–34)
MCHC RBC AUTO-ENTMCNC: 36 G/DL (ref 30–36.5)
MCV RBC AUTO: 78.7 FL (ref 80–99)
METAMYELOCYTES NFR BLD MANUAL: 1 %
METHADONE UR QL: NEGATIVE
MINUTES UNTIL NEXT BG, NBG: 120 MIN
MINUTES UNTIL NEXT BG, NBG: 60 MIN
MONOCYTES # BLD: 0.1 K/UL (ref 0–1)
MONOCYTES NFR BLD: 2 % (ref 5–13)
MULTIPLIER, MUL: 0.02
MULTIPLIER, MUL: 0.02
MULTIPLIER, MUL: 0.03
MULTIPLIER, MUL: 0.04
NEUTS SEG # BLD: 4.8 K/UL (ref 1.8–8)
NEUTS SEG NFR BLD: 70 % (ref 32–75)
NITRITE UR QL STRIP.AUTO: NEGATIVE
NRBC # BLD: 0.24 K/UL (ref 0–0.01)
NRBC BLD-RTO: 3.5 PER 100 WBC
OPIATES UR QL: NEGATIVE
ORDER INITIALS, ORDINIT: NORMAL
P-R INTERVAL, ECG05: 138 MS
PCP UR QL: NEGATIVE
PH UR STRIP: 5 [PH] (ref 5–8)
PHOSPHATE SERPL-MCNC: 4.5 MG/DL (ref 2.6–4.7)
PLATELET # BLD AUTO: 110 K/UL (ref 150–400)
POTASSIUM SERPL-SCNC: 3.2 MMOL/L (ref 3.5–5.1)
POTASSIUM SERPL-SCNC: 3.3 MMOL/L (ref 3.5–5.1)
POTASSIUM SERPL-SCNC: 3.6 MMOL/L (ref 3.5–5.1)
PROT SERPL-MCNC: 5.8 G/DL (ref 6.4–8.2)
PROT UR STRIP-MCNC: 30 MG/DL
Q-T INTERVAL, ECG07: 388 MS
QRS DURATION, ECG06: 84 MS
QTC CALCULATION (BEZET), ECG08: 444 MS
RBC # BLD AUTO: 4.09 M/UL (ref 3.8–5.2)
RBC #/AREA URNS HPF: ABNORMAL /HPF (ref 0–5)
RBC MORPH BLD: ABNORMAL
SERVICE CMNT-IMP: ABNORMAL
SODIUM SERPL-SCNC: 119 MMOL/L (ref 136–145)
SODIUM SERPL-SCNC: 124 MMOL/L (ref 136–145)
SODIUM SERPL-SCNC: 130 MMOL/L (ref 136–145)
SP GR UR REFRACTOMETRY: >1.03 (ref 1–1.03)
SP1: NORMAL
SP2: NORMAL
SP3: NORMAL
TROPONIN I SERPL-MCNC: <0.05 NG/ML
TROPONIN I SERPL-MCNC: <0.05 NG/ML
UA: UC IF INDICATED,UAUC: ABNORMAL
UROBILINOGEN UR QL STRIP.AUTO: 0.2 EU/DL (ref 0.2–1)
VENTRICULAR RATE, ECG03: 79 BPM
WBC # BLD AUTO: 6.9 K/UL (ref 3.6–11)
WBC URNS QL MICRO: ABNORMAL /HPF (ref 0–4)

## 2018-07-05 PROCEDURE — 74011636637 HC RX REV CODE- 636/637: Performed by: INTERNAL MEDICINE

## 2018-07-05 PROCEDURE — 80307 DRUG TEST PRSMV CHEM ANLYZR: CPT | Performed by: EMERGENCY MEDICINE

## 2018-07-05 PROCEDURE — 80048 BASIC METABOLIC PNL TOTAL CA: CPT | Performed by: EMERGENCY MEDICINE

## 2018-07-05 PROCEDURE — 36415 COLL VENOUS BLD VENIPUNCTURE: CPT | Performed by: HOSPITALIST

## 2018-07-05 PROCEDURE — 84484 ASSAY OF TROPONIN QUANT: CPT | Performed by: EMERGENCY MEDICINE

## 2018-07-05 PROCEDURE — 81025 URINE PREGNANCY TEST: CPT | Performed by: INTERNAL MEDICINE

## 2018-07-05 PROCEDURE — 65660000000 HC RM CCU STEPDOWN

## 2018-07-05 PROCEDURE — 74011000258 HC RX REV CODE- 258: Performed by: INTERNAL MEDICINE

## 2018-07-05 PROCEDURE — 74011250636 HC RX REV CODE- 250/636: Performed by: EMERGENCY MEDICINE

## 2018-07-05 PROCEDURE — 74011000250 HC RX REV CODE- 250: Performed by: INTERNAL MEDICINE

## 2018-07-05 PROCEDURE — 83735 ASSAY OF MAGNESIUM: CPT | Performed by: EMERGENCY MEDICINE

## 2018-07-05 PROCEDURE — 74011636637 HC RX REV CODE- 636/637: Performed by: EMERGENCY MEDICINE

## 2018-07-05 PROCEDURE — 74011000250 HC RX REV CODE- 250: Performed by: EMERGENCY MEDICINE

## 2018-07-05 PROCEDURE — 82962 GLUCOSE BLOOD TEST: CPT

## 2018-07-05 PROCEDURE — 80053 COMPREHEN METABOLIC PANEL: CPT | Performed by: EMERGENCY MEDICINE

## 2018-07-05 PROCEDURE — 74011250636 HC RX REV CODE- 250/636: Performed by: INTERNAL MEDICINE

## 2018-07-05 PROCEDURE — 89055 LEUKOCYTE ASSESSMENT FECAL: CPT | Performed by: INTERNAL MEDICINE

## 2018-07-05 PROCEDURE — 93005 ELECTROCARDIOGRAM TRACING: CPT

## 2018-07-05 PROCEDURE — 84100 ASSAY OF PHOSPHORUS: CPT | Performed by: EMERGENCY MEDICINE

## 2018-07-05 PROCEDURE — 83690 ASSAY OF LIPASE: CPT | Performed by: EMERGENCY MEDICINE

## 2018-07-05 PROCEDURE — 80074 ACUTE HEPATITIS PANEL: CPT | Performed by: INTERNAL MEDICINE

## 2018-07-05 PROCEDURE — 80307 DRUG TEST PRSMV CHEM ANLYZR: CPT | Performed by: INTERNAL MEDICINE

## 2018-07-05 PROCEDURE — 80048 BASIC METABOLIC PNL TOTAL CA: CPT | Performed by: HOSPITALIST

## 2018-07-05 PROCEDURE — 81001 URINALYSIS AUTO W/SCOPE: CPT | Performed by: EMERGENCY MEDICINE

## 2018-07-05 PROCEDURE — 74011250637 HC RX REV CODE- 250/637: Performed by: EMERGENCY MEDICINE

## 2018-07-05 PROCEDURE — 74011250636 HC RX REV CODE- 250/636: Performed by: HOSPITALIST

## 2018-07-05 PROCEDURE — 74011000258 HC RX REV CODE- 258: Performed by: EMERGENCY MEDICINE

## 2018-07-05 PROCEDURE — 87045 FECES CULTURE AEROBIC BACT: CPT | Performed by: INTERNAL MEDICINE

## 2018-07-05 PROCEDURE — 74011250637 HC RX REV CODE- 250/637: Performed by: HOSPITALIST

## 2018-07-05 PROCEDURE — 74011000258 HC RX REV CODE- 258: Performed by: HOSPITALIST

## 2018-07-05 PROCEDURE — 74011636637 HC RX REV CODE- 636/637: Performed by: HOSPITALIST

## 2018-07-05 PROCEDURE — 83036 HEMOGLOBIN GLYCOSYLATED A1C: CPT | Performed by: INTERNAL MEDICINE

## 2018-07-05 PROCEDURE — 87077 CULTURE AEROBIC IDENTIFY: CPT | Performed by: INTERNAL MEDICINE

## 2018-07-05 PROCEDURE — 76700 US EXAM ABDOM COMPLETE: CPT

## 2018-07-05 PROCEDURE — 85025 COMPLETE CBC W/AUTO DIFF WBC: CPT | Performed by: EMERGENCY MEDICINE

## 2018-07-05 RX ORDER — INSULIN GLARGINE 100 [IU]/ML
50 INJECTION, SOLUTION SUBCUTANEOUS EVERY 12 HOURS
Status: DISCONTINUED | OUTPATIENT
Start: 2018-07-05 | End: 2018-07-09 | Stop reason: HOSPADM

## 2018-07-05 RX ORDER — INSULIN GLARGINE 100 [IU]/ML
100 INJECTION, SOLUTION SUBCUTANEOUS EVERY 12 HOURS
Status: DISCONTINUED | OUTPATIENT
Start: 2018-07-05 | End: 2018-07-05 | Stop reason: SDUPTHER

## 2018-07-05 RX ORDER — HYDROMORPHONE HYDROCHLORIDE 2 MG/ML
0.5 INJECTION, SOLUTION INTRAMUSCULAR; INTRAVENOUS; SUBCUTANEOUS ONCE
Status: COMPLETED | OUTPATIENT
Start: 2018-07-06 | End: 2018-07-06

## 2018-07-05 RX ORDER — SODIUM CHLORIDE 9 MG/ML
100 INJECTION, SOLUTION INTRAVENOUS CONTINUOUS
Status: DISCONTINUED | OUTPATIENT
Start: 2018-07-05 | End: 2018-07-05

## 2018-07-05 RX ORDER — FAMOTIDINE 10 MG/ML
20 INJECTION INTRAVENOUS EVERY 12 HOURS
Status: DISCONTINUED | OUTPATIENT
Start: 2018-07-05 | End: 2018-07-05

## 2018-07-05 RX ORDER — DEXTROSE 50 % IN WATER (D50W) INTRAVENOUS SYRINGE
12.5-25 AS NEEDED
Status: DISCONTINUED | OUTPATIENT
Start: 2018-07-05 | End: 2018-07-09 | Stop reason: HOSPADM

## 2018-07-05 RX ORDER — GABAPENTIN 300 MG/1
600 CAPSULE ORAL 3 TIMES DAILY
Status: DISCONTINUED | OUTPATIENT
Start: 2018-07-05 | End: 2018-07-05

## 2018-07-05 RX ORDER — POTASSIUM CHLORIDE 750 MG/1
40 TABLET, FILM COATED, EXTENDED RELEASE ORAL
Status: COMPLETED | OUTPATIENT
Start: 2018-07-05 | End: 2018-07-05

## 2018-07-05 RX ORDER — INSULIN LISPRO 100 [IU]/ML
INJECTION, SOLUTION INTRAVENOUS; SUBCUTANEOUS
Status: DISCONTINUED | OUTPATIENT
Start: 2018-07-05 | End: 2018-07-05

## 2018-07-05 RX ORDER — DEXTROSE MONOHYDRATE AND SODIUM CHLORIDE 5; .9 G/100ML; G/100ML
100 INJECTION, SOLUTION INTRAVENOUS CONTINUOUS
Status: DISCONTINUED | OUTPATIENT
Start: 2018-07-05 | End: 2018-07-05

## 2018-07-05 RX ORDER — SODIUM CHLORIDE 0.9 % (FLUSH) 0.9 %
5-10 SYRINGE (ML) INJECTION AS NEEDED
Status: DISCONTINUED | OUTPATIENT
Start: 2018-07-05 | End: 2018-07-09 | Stop reason: HOSPADM

## 2018-07-05 RX ORDER — ACETAMINOPHEN 325 MG/1
650 TABLET ORAL
Status: DISCONTINUED | OUTPATIENT
Start: 2018-07-05 | End: 2018-07-09 | Stop reason: HOSPADM

## 2018-07-05 RX ORDER — MAGNESIUM SULFATE 100 %
4 CRYSTALS MISCELLANEOUS AS NEEDED
Status: DISCONTINUED | OUTPATIENT
Start: 2018-07-05 | End: 2018-07-09 | Stop reason: HOSPADM

## 2018-07-05 RX ORDER — SODIUM CHLORIDE 0.9 % (FLUSH) 0.9 %
5-10 SYRINGE (ML) INJECTION EVERY 8 HOURS
Status: DISCONTINUED | OUTPATIENT
Start: 2018-07-05 | End: 2018-07-09 | Stop reason: HOSPADM

## 2018-07-05 RX ORDER — SODIUM CHLORIDE 9 MG/ML
100 INJECTION, SOLUTION INTRAVENOUS CONTINUOUS
Status: DISCONTINUED | OUTPATIENT
Start: 2018-07-05 | End: 2018-07-09

## 2018-07-05 RX ORDER — ONDANSETRON 2 MG/ML
8 INJECTION INTRAMUSCULAR; INTRAVENOUS
Status: DISCONTINUED | OUTPATIENT
Start: 2018-07-05 | End: 2018-07-09 | Stop reason: HOSPADM

## 2018-07-05 RX ORDER — METOCLOPRAMIDE HYDROCHLORIDE 5 MG/ML
5 INJECTION INTRAMUSCULAR; INTRAVENOUS EVERY 6 HOURS
Status: COMPLETED | OUTPATIENT
Start: 2018-07-05 | End: 2018-07-05

## 2018-07-05 RX ORDER — INSULIN GLARGINE 100 [IU]/ML
70 INJECTION, SOLUTION SUBCUTANEOUS ONCE
Status: COMPLETED | OUTPATIENT
Start: 2018-07-05 | End: 2018-07-05

## 2018-07-05 RX ORDER — INSULIN GLARGINE 100 [IU]/ML
30 INJECTION, SOLUTION SUBCUTANEOUS DAILY
Status: DISCONTINUED | OUTPATIENT
Start: 2018-07-05 | End: 2018-07-05

## 2018-07-05 RX ORDER — INSULIN LISPRO 100 [IU]/ML
INJECTION, SOLUTION INTRAVENOUS; SUBCUTANEOUS
Status: DISCONTINUED | OUTPATIENT
Start: 2018-07-05 | End: 2018-07-09 | Stop reason: HOSPADM

## 2018-07-05 RX ORDER — INSULIN GLARGINE 100 [IU]/ML
30 INJECTION, SOLUTION SUBCUTANEOUS DAILY
Status: DISCONTINUED | OUTPATIENT
Start: 2018-07-06 | End: 2018-07-05

## 2018-07-05 RX ADMIN — DEXTROSE MONOHYDRATE AND SODIUM CHLORIDE 100 ML/HR: 5; .9 INJECTION, SOLUTION INTRAVENOUS at 09:28

## 2018-07-05 RX ADMIN — SODIUM CHLORIDE 100 ML/HR: 900 INJECTION, SOLUTION INTRAVENOUS at 07:50

## 2018-07-05 RX ADMIN — Medication 10 ML: at 21:43

## 2018-07-05 RX ADMIN — METOCLOPRAMIDE 5 MG: 5 INJECTION, SOLUTION INTRAMUSCULAR; INTRAVENOUS at 18:32

## 2018-07-05 RX ADMIN — ONDANSETRON 8 MG: 2 INJECTION, SOLUTION INTRAMUSCULAR; INTRAVENOUS at 20:12

## 2018-07-05 RX ADMIN — MAGNESIUM SULFATE HEPTAHYDRATE 3 G: 500 INJECTION, SOLUTION INTRAMUSCULAR; INTRAVENOUS at 04:37

## 2018-07-05 RX ADMIN — SODIUM CHLORIDE 4.5 UNITS/HR: 900 INJECTION, SOLUTION INTRAVENOUS at 17:23

## 2018-07-05 RX ADMIN — FAMOTIDINE 20 MG: 20 TABLET, FILM COATED ORAL at 00:23

## 2018-07-05 RX ADMIN — INSULIN GLARGINE 30 UNITS: 100 INJECTION, SOLUTION SUBCUTANEOUS at 14:04

## 2018-07-05 RX ADMIN — SODIUM CHLORIDE 100 ML/HR: 900 INJECTION, SOLUTION INTRAVENOUS at 06:23

## 2018-07-05 RX ADMIN — Medication 10 ML: at 12:55

## 2018-07-05 RX ADMIN — FAMOTIDINE 20 MG: 10 INJECTION INTRAVENOUS at 09:29

## 2018-07-05 RX ADMIN — INSULIN GLARGINE 50 UNITS: 100 INJECTION, SOLUTION SUBCUTANEOUS at 21:43

## 2018-07-05 RX ADMIN — INSULIN GLARGINE 50 UNITS: 100 INJECTION, SOLUTION SUBCUTANEOUS at 21:42

## 2018-07-05 RX ADMIN — SODIUM CHLORIDE 1000 ML: 900 INJECTION, SOLUTION INTRAVENOUS at 03:01

## 2018-07-05 RX ADMIN — ONDANSETRON 8 MG: 2 INJECTION, SOLUTION INTRAMUSCULAR; INTRAVENOUS at 04:37

## 2018-07-05 RX ADMIN — SODIUM CHLORIDE 75 ML/HR: 900 INJECTION, SOLUTION INTRAVENOUS at 15:21

## 2018-07-05 RX ADMIN — SODIUM CHLORIDE 5 UNITS/HR: 900 INJECTION, SOLUTION INTRAVENOUS at 08:13

## 2018-07-05 RX ADMIN — POTASSIUM CHLORIDE 40 MEQ: 750 TABLET, EXTENDED RELEASE ORAL at 14:04

## 2018-07-05 RX ADMIN — ONDANSETRON 4 MG: 2 INJECTION, SOLUTION INTRAMUSCULAR; INTRAVENOUS at 00:23

## 2018-07-05 RX ADMIN — METOCLOPRAMIDE 5 MG: 5 INJECTION, SOLUTION INTRAMUSCULAR; INTRAVENOUS at 06:27

## 2018-07-05 RX ADMIN — INSULIN HUMAN 10 UNITS: 100 INJECTION, SOLUTION PARENTERAL at 03:01

## 2018-07-05 RX ADMIN — INSULIN GLARGINE 70 UNITS: 100 INJECTION, SOLUTION SUBCUTANEOUS at 16:55

## 2018-07-05 RX ADMIN — SODIUM CHLORIDE 8.8 UNITS/HR: 900 INJECTION, SOLUTION INTRAVENOUS at 03:16

## 2018-07-05 RX ADMIN — LIDOCAINE HYDROCHLORIDE 15 ML: 20 SOLUTION ORAL; TOPICAL at 00:23

## 2018-07-05 RX ADMIN — SODIUM CHLORIDE 10 UNITS/HR: 900 INJECTION, SOLUTION INTRAVENOUS at 12:46

## 2018-07-05 RX ADMIN — ACETAMINOPHEN 650 MG: 325 TABLET ORAL at 17:23

## 2018-07-05 RX ADMIN — METOCLOPRAMIDE 5 MG: 5 INJECTION, SOLUTION INTRAMUSCULAR; INTRAVENOUS at 12:47

## 2018-07-05 RX ADMIN — Medication 10 ML: at 06:25

## 2018-07-05 RX ADMIN — ALUMINUM HYDROXIDE AND MAGNESIUM HYDROXIDE 30 ML: 200; 200 SUSPENSION ORAL at 00:23

## 2018-07-05 RX ADMIN — SODIUM CHLORIDE 7.7 UNITS/HR: 900 INJECTION, SOLUTION INTRAVENOUS at 05:35

## 2018-07-05 RX ADMIN — SODIUM CHLORIDE 7.3 UNITS/HR: 900 INJECTION, SOLUTION INTRAVENOUS at 15:11

## 2018-07-05 RX ADMIN — SODIUM CHLORIDE 1000 ML: 900 INJECTION, SOLUTION INTRAVENOUS at 00:23

## 2018-07-05 NOTE — ED NOTES
Assumed care of pt. Pt medicated per MAR. Pt resting in bed, bed locked and in low position. Call bell within reach.

## 2018-07-05 NOTE — PROGRESS NOTES
Endocrinology Progress Note    Called and spoke with pt's nurse, Garcia Parr. Pt has been receiving insulin drip but the target was set at 200-300 and her rate was anywhere between 5-10 units/hr since being on this. Dr. Harry Harris put in a one time dose of 30 of lantus and told Garcia Parr to shut the drip off 2 hours later. I spoke with Garcia Parr and told her that this dose of lantus won't be nearly enough so I put in a one time dose of 70 units and will start her on 100 units of lantus every 12 hours starting at 9pm tonight. I changed her correction scale for humalog to 5 units for every 50 > 150 and will have Garcia Parr continue the insulin drip (reset the target to 140-180) for the next 2 hours and the stop the drip. I will be by to see her tomorrow morning. Updated Dr. Harry Harris of this plan over tiger text. Please don't hesitate to call [phone] with further questions.     Faviola Sevilla MD

## 2018-07-05 NOTE — ED NOTES
Medication titrated per MAR. Pt. Resting comfortably in bed, denies needs at this time. Bed locked and low, call bell in reach.

## 2018-07-05 NOTE — H&P
Hospitalist Admission Note    NAME: Abraham Oh   :  1980   MRN:  [id number]     Date/Time:  2018 3:31 AM    Patient PCP: None  ______________________________________________________________________  Given the patient's current clinical presentation, I have a high level of concern for decompensation if discharged from the emergency department. Complex decision making was performed, which includes reviewing the patient's available past medical records, laboratory results, and x-ray films. My assessment of this patient's clinical condition and my plan of care is as follows. Assessment / Plan:  HONKS: patient has been injecting herself with  insulin; Glucose 830 on presentation  -admit to stepdown unit  -Insulin gtt ordered  -consult Endocrine  -IVF's with NS  -IV antiemetics (reglan scheduled for 3 doses) and IV Zofran prn  -clears as tolerated  -hold home metformin    Abdominal Pain  Elevated LFT's  -hold on CT A/P for now, if pain continues after improvement in HONKS then consider obtaining  -check hepatitis panel and acetaminophen level  -hold antihyperlipidemics  -check lipase again tomorrow (history of pancreatitis from hypertriglyceridemia)     Hypomagnesemia: Mg 1.2  -replete with IV magnesium and monitor    Chest pain: patient seems indifferent about committing to this symptom  -get EKG and cycle serial troponin's for completeness    Diarrhea:  -given recent hospitalizations will check for C. Diff and fecal Leukocytes, send stool Cx for completeness     Pseudohyponatremia: Na 119 corrects to 131 when accounting for glucose of 830  -IVF's as above    Thrombocytopenia: plts 110K  -monitor    Code Status: Full  Surrogate Decision Maker: Jose Maria Patel    DVT Prophylaxis: SCD's  GI Prophylaxis: IV Pepcid    Baseline: Functional      Subjective:   CHIEF COMPLAINT: Abdominal pain    HISTORY OF PRESENT ILLNESS:     Rosaline Mooney is a 40 y.o.    female who presents with abdominal pain. Patient has been in [place] for job related purposes and notes that she has returned to [place] as she lost has job in [place] and hopes to get her health care coverage reinstated in [place]. Patient reports struggles recently with abscesses and needing surgeries. She also reports 4-5 days of RUQ and Epigastric Abdominal Pain. She describes early satiety and vomiting as well as poor PO intake. She reports 4 days of diarrhea. She describes subjective fevers and diaphoresis as well as CP, palpitations and the urge to take deep breaths. She has been injecting herself with  insulin as she otherwise did not have any. She presented with her abdominal pain and lipase was normal although LFT's were noted to be elevated and glucose was 830. We were asked to admit for work up and evaluation of the above problems.      Past Medical History:   Diagnosis Date    Calculus of kidney     Diabetes ([facility] [address]     Hyperlipidemia     Hypertension     MI (myocardial infarction) ([facility] [address]     Other ill-defined conditions(799.89)     Neuropathies    Pancreatitis     due to hypertriglyceridemia    Vitamin D deficiency 2017        Past Surgical History:   Procedure Laterality Date    CARDIAC SURG PROCEDURE UNLIST      cath    CYSTOSCOPY      HX  SECTION      x 2    HX CHOLECYSTECTOMY      HX GYN      tubes clamped    HX TONSIL AND ADENOIDECTOMY      HX WISDOM TEETH EXTRACTION         Social History   Substance Use Topics    Smoking status: Current Some Day Smoker     Packs/day: 0.50    Smokeless tobacco: Never Used    Alcohol use No      Comment: once per year        Family History   Problem Relation Age of Onset    Hypertension Mother     Stroke Mother     Diabetes Mother     Cancer Mother 59     breast?    Heart Disease Father     Diabetes Father     Cancer Paternal Grandmother 79     ovarian     Allergies   Allergen Reactions    Toradol [Ketorolac Tromethamine] Unknown (comments) Seizure like symptoms per pt.  Augmentin [Amoxicillin-Pot Clavulanate] Swelling    Demerol [Meperidine] Swelling    Heparin Hives    Ibuprofen Diarrhea and Nausea and Vomiting     Muscle tightness    Keflex [Cephalexin] Shortness of Breath    Morphine Swelling    Nubain [Nalbuphine] Hives    Pcn [Penicillins] Swelling    Sulfa (Sulfonamide Antibiotics) Unknown (comments)        Prior to Admission medications    Medication Sig Start Date End Date Taking? Authorizing Provider   HYDROmorphone (DILAUDID) 2 mg tablet Take 0.5 Tabs by mouth every four (4) hours as needed. Max Daily Amount: 6 mg. 5/22/18   Victor Hugo Hong MD   promethazine (PHENERGAN) 25 mg tablet Take 1 Tab by mouth every eight (8) hours as needed. Indications: PREVENTION OF POST-OPERATIVE NAUSEA AND VOMITING 5/22/18   Victor Hugo Hong MD   insulin glargine (LANTUS U-100 INSULIN) 100 unit/mL injection 65 Units by SubCUTAneous route nightly. Cheyenne Kohler MD   acetaminophen (TYLENOL) 500 mg tablet Take 1,000-2,500 mg by mouth every six (6) hours as needed for Pain. Cheyenne Kohler MD   insulin regular (HUMULIN R REGULAR U-100 INSULN) 100 unit/mL injection 12 Units by SubCUTAneous route Before breakfast, lunch, and dinner. Plus sliding scale    Cheyenne Kohler MD   atorvastatin (LIPITOR) 40 mg tablet Take 1 Tab by mouth nightly. 11/30/17   New Addison MD   fenofibrate nanocrystallized (TRICOR) 145 mg tablet Take 1 Tab by mouth daily. 11/30/17   New Addison MD   omega-3 acid ethyl esters (LOVAZA) 1 gram capsule Take 2 Caps by mouth two (2) times a day. 11/30/17   New Addison MD   metFORMIN (GLUCOPHAGE) 1,000 mg tablet Take 1 Tab by mouth two (2) times daily (with meals). 11/30/17   New Addison MD   ergocalciferol (VITAMIN D2) 50,000 unit capsule Take 1 Cap by mouth every seven (7) days. Patient taking differently: Take 50,000 Units by mouth every seven (7) days.  Saturdays 11/30/17   New Addison MD   gabapentin (NEURONTIN) 300 mg capsule Take 2 Caps by mouth three (3) times daily. 11/30/17   Colvin Gaucher, MD   albuterol (PROAIR HFA) 90 mcg/actuation inhaler Take 2 Puffs by inhalation every four (4) hours as needed for Wheezing. Historical Provider       REVIEW OF SYSTEMS:     Total of 12 systems reviewed as follows:       POSITIVE= underlined text  Negative = text not underlined  General:  fever, chills, sweats, generalized weakness, weight loss/gain,      loss of appetite   Eyes:    blurred vision, eye pain, loss of vision, double vision  ENT:    rhinorrhea, pharyngitis   Respiratory:   cough, sputum production, SOB, VIRK, wheezing, pleuritic pain   Cardiology:   chest pain, palpitations, orthopnea, PND, edema, syncope   Gastrointestinal:  abdominal pain , N/V, diarrhea, dysphagia, constipation, bleeding   Genitourinary:  frequency, urgency, dysuria, hematuria, incontinence   Muskuloskeletal :  arthralgia, myalgia, back pain  Hematology:  easy bruising, nose or gum bleeding, lymphadenopathy   Dermatological: rash, ulceration, pruritis, color change / jaundice  Endocrine:   hot flashes or polydipsia   Neurological:  headache, dizziness, confusion, focal weakness, paresthesia,     Speech difficulties, memory loss, gait difficulty  Psychological: Feelings of anxiety, depression, agitation    Objective:   VITALS:    Visit Vitals    BP (!) 163/93    Pulse 100    Temp 98 °F (36.7 °C)    Resp 20    Ht 5' 8\" (1.727 m)    Wt 121.4 kg (267 lb 10.2 oz)    SpO2 97%    BMI 40.69 kg/m2       PHYSICAL EXAM:    General:    Alert, cooperative, no distress, appears stated age. HEENT: Atraumatic, anicteric sclerae, pink conjunctivae     No oral ulcers, mucosa dry, o/p clear  Neck:  Supple, symmetrical,  Thyroid not enlarged  Lungs:   Clear to auscultation bilaterally. No Wheezing or Rhonchi. No rales. Chest wall:  No tenderness  No Accessory muscle use.   Heart:   Regular  rhythm,  No  murmur   No edema  Abdomen:   Soft, + tender. Not distended. Bowel sounds present  Extremities: No cyanosis. No clubbing,      Skin turgor normal, Capillary refill normal, Radial dial pulse 2+  Skin:     Not pale. Not Jaundiced  No rashes   Psych:  Fair insight. Not depressed. Not anxious or agitated. Neurologic: EOMs intact. No facial asymmetry. No aphasia or slurred speech. No focal neurologic deficits. Alert and oriented X 4.     _______________________________________________________________________  Care Plan discussed with:    Comments   Patient x    Family      RN x    Care Manager                    Consultant:      _______________________________________________________________________  Expected  Disposition:   Home with Family x   HH/PT/OT/RN    SNF/LTC    MARIAJOSE    ________________________________________________________________________  TOTAL TIME:  61 Minutes    Critical Care Provided     Minutes non procedure based      Comments    x Reviewed previous records   >50% of visit spent in counseling and coordination of care x Discussion with patient and/or family and questions answered       ________________________________________________________________________  Signed: Herson Carreon MD    Procedures: see electronic medical records for all procedures/Xrays and details which were not copied into this note but were reviewed prior to creation of Plan.     LAB DATA REVIEWED:    Recent Results (from the past 24 hour(s))   CBC WITH AUTOMATED DIFF    Collection Time: 07/05/18 12:22 AM   Result Value Ref Range    WBC 6.9 3.6 - 11.0 K/uL    RBC 4.09 3.80 - 5.20 M/uL    HGB 11.6 11.5 - 16.0 g/dL    HCT 32.2 (L) 35.0 - 47.0 %    MCV 78.7 (L) 80.0 - 99.0 FL    MCH 28.4 26.0 - 34.0 PG    MCHC 36.0 30.0 - 36.5 g/dL    RDW 14.9 (H) 11.5 - 14.5 %    PLATELET 493 (L) 255 - 400 K/uL    NRBC 3.5 (H) 0  WBC    ABSOLUTE NRBC 0.24 (H) 0.00 - 0.01 K/uL    NEUTROPHILS 70 32 - 75 %    LYMPHOCYTES 25 12 - 49 %    MONOCYTES 2 (L) 5 - 13 %    EOSINOPHILS 2 0 - 7 %    BASOPHILS 0 0 - 1 %    METAMYELOCYTES 1 %    IMMATURE GRANULOCYTES 0 0.0 - 0.5 %    ABS. NEUTROPHILS 4.8 1.8 - 8.0 K/UL    ABS. LYMPHOCYTES 1.7 0.8 - 3.5 K/UL    ABS. MONOCYTES 0.1 0.0 - 1.0 K/UL    ABS. EOSINOPHILS 0.1 0.0 - 0.4 K/UL    ABS. BASOPHILS 0.0 0.0 - 0.1 K/UL    ABS. IMM. GRANS. 0.0 0.00 - 0.04 K/UL    DF MANUAL      RBC COMMENTS NORMOCYTIC, NORMOCHROMIC     METABOLIC PANEL, COMPREHENSIVE    Collection Time: 07/05/18 12:22 AM   Result Value Ref Range    Sodium 119 (LL) 136 - 145 mmol/L    Potassium 3.6 3.5 - 5.1 mmol/L    Chloride 85 (L) 97 - 108 mmol/L    CO2 23 21 - 32 mmol/L    Anion gap 11 5 - 15 mmol/L    Glucose 830 (HH) 65 - 100 mg/dL    BUN  6 - 20 MG/DL     UNABLE TO OBTAIN ACCURATE RESULTS DUE TO GROSS LIPEMIA    Creatinine 0.48 (L) 0.55 - 1.02 MG/DL    BUN/Creatinine ratio  12 - 20       UNABLE TO OBTAIN ACCURATE RESULTS DUE TO GROSS LIPEMIA    GFR est AA >60 >60 ml/min/1.73m2    GFR est non-AA >60 >60 ml/min/1.73m2    Calcium  8.5 - 10.1 MG/DL     UNABLE TO OBTAIN ACCURATE RESULTS DUE TO GROSS LIPEMIA    Bilirubin, total 2.1 (H) 0.2 - 1.0 MG/DL    ALT (SGPT) 366 (H) 12 - 78 U/L    AST (SGOT) 543 (H) 15 - 37 U/L    Alk.  phosphatase  45 - 117 U/L     UNABLE TO OBTAIN ACCURATE RESULTS DUE TO GROSS LIPEMIA    Protein, total 5.8 (L) 6.4 - 8.2 g/dL    Albumin 2.1 (L) 3.5 - 5.0 g/dL    Globulin 3.7 2.0 - 4.0 g/dL    A-G Ratio 0.6 (L) 1.1 - 2.2     LIPASE    Collection Time: 07/05/18 12:22 AM   Result Value Ref Range    Lipase 104 73 - 393 U/L   URINALYSIS W/ REFLEX CULTURE    Collection Time: 07/05/18 12:22 AM   Result Value Ref Range    Color YELLOW/STRAW      Appearance CLEAR CLEAR      Specific gravity >1.030 (H) 1.003 - 1.030    pH (UA) 5.0 5.0 - 8.0      Protein 30 (A) NEG mg/dL    Glucose >1000 (A) NEG mg/dL    Ketone NEGATIVE  NEG mg/dL    Bilirubin NEGATIVE  NEG      Blood NEGATIVE  NEG      Urobilinogen 0.2 0.2 - 1.0 EU/dL    Nitrites NEGATIVE  NEG      Leukocyte Esterase NEGATIVE  NEG      UA:UC IF INDICATED CULTURE NOT INDICATED BY UA RESULT CNI      WBC 0-4 0 - 4 /hpf    RBC 0-5 0 - 5 /hpf    Epithelial cells FEW FEW /lpf    Bacteria NEGATIVE  NEG /hpf    Hyaline cast 0-2 0 - 5 /lpf   DRUG SCREEN, URINE    Collection Time: 07/05/18 12:22 AM   Result Value Ref Range    AMPHETAMINES NEGATIVE  NEG      BARBITURATES NEGATIVE  NEG      BENZODIAZEPINES NEGATIVE  NEG      COCAINE NEGATIVE  NEG      METHADONE NEGATIVE  NEG      OPIATES NEGATIVE  NEG      PCP(PHENCYCLIDINE) NEGATIVE  NEG      THC (TH-CANNABINOL) NEGATIVE  NEG      Drug screen comment (NOTE)    MAGNESIUM    Collection Time: 07/05/18 12:22 AM   Result Value Ref Range    Magnesium 1.2 (L) 1.6 - 2.4 mg/dL   PHOSPHORUS    Collection Time: 07/05/18 12:22 AM   Result Value Ref Range    Phosphorus 4.5 2.6 - 4.7 MG/DL   GLUCOSE, POC    Collection Time: 07/05/18  2:28 AM   Result Value Ref Range    Glucose (POC) 501 (H) 65 - 100 mg/dL    Performed by Cammie Ortega    Collection Time: 07/05/18  3:19 AM   Result Value Ref Range    Glucose 501 mg/dL    Insulin order 8.8 units/hour    Insulin adminstered 8.8 units/hour    Multiplier 0.020     Low target 150 mg/dL    High target 250 mg/dL    D50 order 0.0 ml    D50 administered 0.00 ml    Minutes until next BG 60 min    Order initials ESF     Administered initials NG     GLSCOM Comments

## 2018-07-05 NOTE — PROGRESS NOTES
Verbal shift change report given to Lalitha Davila RN (oncoming nurse) by Rock Grant RN (offgoing nurse). Report included the following information SBAR, Kardex, MAR, Recent Results and Cardiac Rhythm NSR. Pt currently off floor to ultrasound. Lalitha Davila RN    20:10  Pt received from having ultrasound via pt transport. Lalitha Davila RN    23:59  Pt c/o persistent abd pain, now constant instead of intermittent. Pt stated last time she had dose of tylenol did not feel much improvement. Notified Dr. Tong Rodriguez, order received for one time dose of IV dilaudid, as pt has allergy to morphine, but stated she has previously tolerated diluadid without any complicatios/reactions. 07:15  Bedside shift change report given to Rock Rudy CID (oncoming nurse) by Lalitha Davila RN (offgoing nurse). Report included the following information SBAR, Kardex, Intake/Output, Recent Results and Cardiac Rhythm NSR.

## 2018-07-05 NOTE — ED NOTES
TRANSFER - OUT REPORT:    Verbal report given to Pedro Pablo RN (name) on Adalberto Charles  being transferred to [address] [place], [place] [id number] (unit) for routine progression of care       Report consisted of patients Situation, Background, Assessment and   Recommendations(SBAR). Information from the following report(s) SBAR, ED Summary, Intake/Output, MAR and Recent Results was reviewed with the receiving nurse. Lines:   Peripheral IV 07/05/18 Left Forearm (Active)   Site Assessment Clean, dry, & intact 7/5/2018  2:49 AM   Phlebitis Assessment 0 7/5/2018  2:49 AM   Infiltration Assessment 0 7/5/2018  2:49 AM   Dressing Status Clean, dry, & intact 7/5/2018  2:49 AM   Dressing Type Transparent 7/5/2018  2:49 AM   Hub Color/Line Status Pink 7/5/2018  2:49 AM        Opportunity for questions and clarification was provided.       Patient transported with:   Registered Nurse  Tech

## 2018-07-05 NOTE — ED NOTES
Pt states she has had 2 days of diarrhea and states her abdomen feels bloated. States whenever she eats anything she instantly feels full. States this makes her nauseous and makes it harder to breathe. States her upper abdomen is cramping and is tender to palpation. States she has a hx of pancreatitis and is diabetic.

## 2018-07-05 NOTE — PROGRESS NOTES
PCU SHIFT NURSING NOTE      Bedside and Verbal shift change report given to Marlin Cuevas RN (oncoming nurse) by Christy Manzo, RN (offgoing nurse). Report included the following information SBAR, Kardex, MAR, Recent Results and Cardiac Rhythm Nsr. Shift Summary:   5434:  Pt had small BM, soft stool, not liquid enough to send for c dif. 1330:  Spoke with Dr. Daryl Boles about c dif, discontinued order. MD wants to transition pt off insulin drip. Will medicate when available. Asked MD if pt mentioned abdominal pain to him, MD states pt did not mention any pain to him. Will continue to monitor. No new orders for abd pain. 1404:  Administered ordered Lantus. Will stop Insulin drip at 1600.  1615;  Spoke with Dr. Aida Maciel. informed him about the pt receiving 30 units of lantus at 1400 and that the insulin drip ws suppose to be stopped now. Per dr. Aida Maciel give 70 units of lantus and continue the insulin drip for another 2 hours. Will continue to monitor pt.  1625:  Spoke with pt and she states that she has only been taking lantus 60 units BID. Will inform MD before giving the 70 units that was just ordered. 1633:  Spoke with Dr. Kieran Hartman and he still wants the additional 70 units of lantus. Will give. 1640:  Pt complaining of headache and abd pain. Pt states the headache just started but the abd pain has gotten worse throughout the day rating it a 8/10. Paged and spoke with Dr. Daryl Boles and received order for tylenol PO and an Abd US. Will place orders. 1650:  Verified with Dr. Aida Maciel that NS was ok as far as IV fluids and that we do not need to switch back to D5 NS. Per MD NS is ok. perimeters changed per mD on glucose range in gluco stabilizer. 1723;  New bag of insulin started at 4.5 ml/hr. Will continue to monitor pt. informed pt that if she feels low to let staff know.     Admission Date 7/4/2018   Admission Diagnosis Hyperosmolar non-ketotic state in patient with type 2 diabetes mellitus ([facility] [address]   Consults IP CONSULT TO ENDOCRINOLOGY        Consults   []PT   []OT   []Speech   [x]Case Management      [] Palliative      Cardiac Monitoring Order   [x]Yes   []No     IV drips   []Yes    Drip:                            Dose:  Drip:                            Dose:  Drip:                            Dose:   [x]No     GI Prophylaxis   [x]Yes   []No         DVT Prophylaxis   SCDs:             Romario stockings:         [x] Medication   []Contraindicated   []None      Activity Level           Purposeful Rounding every 1-2 hour? [x]Yes   Mancia Score  Total Score: 1   Bed Alarm (If score 3 or >)   []Yes   [] Refused (See signed refusal form in chart)   Joel Score      Joel Score (if score 14 or less)   []PMT consult   []Wound Care consult      []Specialty bed   [x] Nutrition consult          Needs prior to discharge:   Home O2 required:    []Yes   [x]No    If yes, how much O2 required?     Other:    Last Bowel Movement:        Influenza Vaccine Received Flu Vaccine for Current Season (usually Sept-March): Not Flu Season        Pneumonia Vaccine           Diet Active Orders   Diet    DIET CLEAR LIQUID      LDAs               Peripheral IV 07/05/18 Left Forearm (Active)   Site Assessment Clean, dry, & intact 7/5/2018  6:33 AM   Phlebitis Assessment 0 7/5/2018  6:33 AM   Infiltration Assessment 0 7/5/2018  6:33 AM   Dressing Status Clean, dry, & intact 7/5/2018  6:33 AM   Dressing Type Transparent 7/5/2018  6:33 AM   Hub Color/Line Status Pink 7/5/2018  6:33 AM   Alcohol Cap Used Yes 7/5/2018  6:33 AM       Peripheral IV 07/05/18 Left Forearm (Active)   Site Assessment Clean, dry, & intact 7/5/2018  6:33 AM   Phlebitis Assessment 0 7/5/2018  6:33 AM   Infiltration Assessment 0 7/5/2018  6:33 AM   Dressing Status Clean, dry, & intact 7/5/2018  6:33 AM   Dressing Type Transparent 7/5/2018  6:33 AM   Hub Color/Line Status Blue 7/5/2018  6:33 AM   Alcohol Cap Used Yes 7/5/2018  6:33 AM                      Urinary Catheter      Intake & Output        Readmission Risk Assessment Tool Score Low Risk            11       Total Score        3 Has Seen PCP in Last 6 Months (Yes=3, No=0)    4 IP Visits Last 12 Months (1-3=4, 4=9, >4=11)    4 Pt. Coverage (Medicare=5 , Medicaid, or Self-Pay=4)        Criteria that do not apply:    . Living with Significant Other. Assisted Living. LTAC. SNF.  or   Rehab    Patient Length of Stay (>5 days = 3)    Charlson Comorbidity Score (Age + Comorbid Conditions)       Expected Length of Stay - - -   Actual Length of Stay 0

## 2018-07-05 NOTE — TELEPHONE ENCOUNTER
Narcisa Santillan, with [facility], called in a hospital consult for this patient. Narcisa Santillan can be reached at:  [phone].     Room # [id number]  Seen For:   Diabetes Management  [facility])  Referring Doctor:  Dr. Aren Iniguez

## 2018-07-05 NOTE — PROGRESS NOTES
Problem: Falls - Risk of  Goal: *Absence of Falls  Document Doug Fall Risk and appropriate interventions in the flowsheet. Outcome: Progressing Towards Goal  Fall Risk Interventions:  Mobility Interventions: Patient to call before getting OOB         Medication Interventions: Patient to call before getting OOB, Evaluate medications/consider consulting pharmacy    Elimination Interventions: Toileting schedule/hourly rounds             Problem: Pressure Injury - Risk of  Goal: *Prevention of pressure injury  Document Joel Scale and appropriate interventions in the flowsheet.    Outcome: Progressing Towards Goal  Pressure Injury Interventions:  Sensory Interventions: Float heels, Keep linens dry and wrinkle-free, Maintain/enhance activity level, Minimize linen layers    Moisture Interventions: Limit adult briefs, Maintain skin hydration (lotion/cream), Minimize layers    Activity Interventions: Increase time out of bed, Pressure redistribution bed/mattress(bed type)    Mobility Interventions: Pressure redistribution bed/mattress (bed type)    Nutrition Interventions: Document food/fluid/supplement intake

## 2018-07-05 NOTE — ED PROVIDER NOTES
EMERGENCY DEPARTMENT HISTORY AND PHYSICAL EXAM           Date: 7/4/2018  Patient Name: Boyd Pastor    History of Presenting Illness     Chief Complaint   Patient presents with    Abdominal Pain     across upper abdomen x 3 days with nausea       History Provided By: Patient    HPI: Boyd Pastor is a 40 y.o. female, pmhx DM / HTN / Marcial Lamberto / neuropathy / pancreatitis, who presents ambulatory to the ED c/o gradually worsening diffuse epigastric abd pain with associated nausea and diarrhea over the last 3 days. Pt reports an additional subjective fever this morning, stating \"it woke me up when it broke. \" She notes a hx of similar sxs with her previous episodes of pancreatitis. Pt denies any recent medications for her current sxs. She denies any recent follow up with her PCP, GI specialist, or endocrinologist, noting she recently moved back to the area. Pt states she was admitted for sepsis to a [place] for 3 weeks in April and May 2018. She reports a hx of DM, additionally c/o recent polydipsia. Pt otherwise specifically denies any recent chills, vomiting, CP, SOB, urinary sxs, changes in BM, or headache. PCP: None   Endocrinologist: Elder People    PMHx: Significant for DM, HTN, HLD, neuropathy, pancreatitis, sepsis  PSHx: Significant for abscess surgical I&D  Social Hx: +tobacco (0.5 ppd), -EtOH, +Illicit Drugs (occasional marijuana)    There are no other complaints, changes, or physical findings at this time.      Current Facility-Administered Medications   Medication Dose Route Frequency Provider Last Rate Last Dose    insulin regular (NOVOLIN R, HUMULIN R) injection 10 Units  10 Units IntraVENous NOW Carlos York MD        insulin regular (NOVOLIN R, HUMULIN R) 100 Units in 0.9% sodium chloride 100 mL infusion  0-50 Units/hr IntraVENous TITRATE Carlos York MD        insulin lispro (HUMALOG) injection   SubCUTAneous Keyla Cortez MD        glucose chewable tablet 16 g  4 Tab Oral PRN Teressa Celeste MD        dextrose (D50W) injection syrg 12.5-25 g  12.5-25 g IntraVENous PRN Teressa Celeste MD        glucagon [facility]) injection 1 mg  1 mg IntraMUSCular DUKE Celeste MD        sodium chloride 0.9 % bolus infusion 1,000 mL  1,000 mL IntraVENous ONCE Teressa Celeste MD        Followed by   [facility] sodium chloride 0.9 % bolus infusion 1,000 mL  1,000 mL IntraVENous Deborah Oh MD        Followed by   [facility] sodium chloride 0.9 % bolus infusion 1,000 mL  1,000 mL IntraVENous Deborah Oh MD        Followed by   [facility] sodium chloride 0.9 % bolus infusion 642 mL  642 mL IntraVENous ONCE Teressa Celeste MD         Current Outpatient Prescriptions   Medication Sig Dispense Refill    HYDROmorphone (DILAUDID) 2 mg tablet Take 0.5 Tabs by mouth every four (4) hours as needed. Max Daily Amount: 6 mg. 30 Tab 0    promethazine (PHENERGAN) 25 mg tablet Take 1 Tab by mouth every eight (8) hours as needed. Indications: PREVENTION OF POST-OPERATIVE NAUSEA AND VOMITING 20 Tab 0    insulin glargine (LANTUS U-100 INSULIN) 100 unit/mL injection 65 Units by SubCUTAneous route nightly.  acetaminophen (TYLENOL) 500 mg tablet Take 1,000-2,500 mg by mouth every six (6) hours as needed for Pain.  insulin regular (HUMULIN R REGULAR U-100 INSULN) 100 unit/mL injection 12 Units by SubCUTAneous route Before breakfast, lunch, and dinner. Plus sliding scale      atorvastatin (LIPITOR) 40 mg tablet Take 1 Tab by mouth nightly. 30 Tab 11    fenofibrate nanocrystallized (TRICOR) 145 mg tablet Take 1 Tab by mouth daily. 30 Tab 11    omega-3 acid ethyl esters (LOVAZA) 1 gram capsule Take 2 Caps by mouth two (2) times a day. 120 Cap 11    metFORMIN (GLUCOPHAGE) 1,000 mg tablet Take 1 Tab by mouth two (2) times daily (with meals). 60 Tab 11    ergocalciferol (VITAMIN D2) 50,000 unit capsule Take 1 Cap by mouth every seven (7) days.  (Patient taking differently: Take 50,000 Units by mouth every seven (7) days. ) 4 Cap 11    gabapentin (NEURONTIN) 300 mg capsule Take 2 Caps by mouth three (3) times daily. 180 Cap 11    albuterol (PROAIR HFA) 90 mcg/actuation inhaler Take 2 Puffs by inhalation every four (4) hours as needed for Wheezing. Past History     Past Medical History:  Past Medical History:   Diagnosis Date    Calculus of kidney     Diabetes ([facility]     Hyperlipidemia     Hypertension     MI (myocardial infarction) ([facility]     Other ill-defined conditions(799.89)     Neuropathies    Pancreatitis     due to hypertriglyceridemia    Vitamin D deficiency 2017       Past Surgical History:  Past Surgical History:   Procedure Laterality Date    CARDIAC SURG PROCEDURE UNLIST      cath    CYSTOSCOPY      HX  SECTION      x 2    HX CHOLECYSTECTOMY      HX GYN      tubes clamped    HX TONSIL AND ADENOIDECTOMY      HX WISDOM TEETH EXTRACTION         Family History:  Family History   Problem Relation Age of Onset    Hypertension Mother     Stroke Mother     Diabetes Mother     Cancer Mother 59     breast?    Heart Disease Father     Diabetes Father     Cancer Paternal Grandmother 79     ovarian       Social History:  Social History   Substance Use Topics    Smoking status: Current Some Day Smoker     Packs/day: 0.50    Smokeless tobacco: Never Used    Alcohol use No      Comment: once per year       Allergies: Allergies   Allergen Reactions    Toradol [Ketorolac Tromethamine] Unknown (comments)     Seizure like symptoms per pt.     Augmentin [Amoxicillin-Pot Clavulanate] Swelling    Demerol [Meperidine] Swelling    Heparin Hives    Ibuprofen Diarrhea and Nausea and Vomiting     Muscle tightness    Keflex [Cephalexin] Shortness of Breath    Morphine Swelling    Nubain [Nalbuphine] Hives    Pcn [Penicillins] Swelling    Sulfa (Sulfonamide Antibiotics) Unknown (comments)         Review of Systems   Review of Systems   Constitutional: Positive for fever (subjective). Eyes: Negative. Respiratory: Negative. Negative for shortness of breath. Cardiovascular: Negative for chest pain. Gastrointestinal: Positive for abdominal pain, diarrhea and nausea. Negative for vomiting. Endocrine: Positive for polydipsia. Genitourinary: Negative. Negative for difficulty urinating, dysuria and hematuria. Musculoskeletal: Negative. Skin: Negative. Allergic/Immunologic: Negative. Neurological: Negative. Psychiatric/Behavioral: Negative for suicidal ideas. All other systems reviewed and are negative. Physical Exam   Physical Exam   Constitutional: She is oriented to person, place, and time. She appears well-developed and well-nourished. No distress. HENT:   Head: Normocephalic and atraumatic. Nose: Nose normal.   Eyes: Conjunctivae and EOM are normal. No scleral icterus. Neck: Normal range of motion. No tracheal deviation present. Cardiovascular: Normal rate, regular rhythm, normal heart sounds and intact distal pulses. Exam reveals no friction rub. No murmur heard. Pulmonary/Chest: Effort normal and breath sounds normal. No stridor. No respiratory distress. She has no wheezes. She has no rales. Abdominal: Soft. Bowel sounds are normal. She exhibits no distension. There is tenderness in the right upper quadrant, epigastric area and left upper quadrant. There is no rebound. Musculoskeletal: Normal range of motion. She exhibits no tenderness. Neurological: She is alert and oriented to person, place, and time. No cranial nerve deficit. Skin: Skin is warm and dry. No rash noted. She is not diaphoretic. Psychiatric: She has a normal mood and affect. Her speech is normal and behavior is normal. Judgment and thought content normal. Cognition and memory are normal.   Nursing note and vitals reviewed.         Diagnostic Study Results     Labs -     Recent Results (from the past 12 hour(s))   CBC WITH AUTOMATED DIFF    Collection Time: 07/05/18 12:22 AM   Result Value Ref Range    WBC 6.9 3.6 - 11.0 K/uL    RBC 4.09 3.80 - 5.20 M/uL    HGB 11.6 11.5 - 16.0 g/dL    HCT 32.2 (L) 35.0 - 47.0 %    MCV 78.7 (L) 80.0 - 99.0 FL    MCH 28.4 26.0 - 34.0 PG    MCHC 36.0 30.0 - 36.5 g/dL    RDW 14.9 (H) 11.5 - 14.5 %    PLATELET 270 (L) 832 - 400 K/uL    NRBC 3.5 (H) 0  WBC    ABSOLUTE NRBC 0.24 (H) 0.00 - 0.01 K/uL    NEUTROPHILS 70 32 - 75 %    LYMPHOCYTES 25 12 - 49 %    MONOCYTES 2 (L) 5 - 13 %    EOSINOPHILS 2 0 - 7 %    BASOPHILS 0 0 - 1 %    METAMYELOCYTES 1 %    IMMATURE GRANULOCYTES 0 0.0 - 0.5 %    ABS. NEUTROPHILS 4.8 1.8 - 8.0 K/UL    ABS. LYMPHOCYTES 1.7 0.8 - 3.5 K/UL    ABS. MONOCYTES 0.1 0.0 - 1.0 K/UL    ABS. EOSINOPHILS 0.1 0.0 - 0.4 K/UL    ABS. BASOPHILS 0.0 0.0 - 0.1 K/UL    ABS. IMM. GRANS. 0.0 0.00 - 0.04 K/UL    DF MANUAL      RBC COMMENTS NORMOCYTIC, NORMOCHROMIC     METABOLIC PANEL, COMPREHENSIVE    Collection Time: 07/05/18 12:22 AM   Result Value Ref Range    Sodium 119 (LL) 136 - 145 mmol/L    Potassium 3.6 3.5 - 5.1 mmol/L    Chloride 85 (L) 97 - 108 mmol/L    CO2 23 21 - 32 mmol/L    Anion gap 11 5 - 15 mmol/L    Glucose 830 (HH) 65 - 100 mg/dL    BUN PENDING MG/DL    Creatinine PENDING MG/DL    BUN/Creatinine ratio PENDING     GFR est AA PENDING ml/min/1.73m2    GFR est non-AA PENDING ml/min/1.73m2    Calcium PENDING MG/DL    Bilirubin, total 2.1 (H) 0.2 - 1.0 MG/DL    ALT (SGPT) PENDING U/L    AST (SGOT) 543 (H) 15 - 37 U/L    Alk.  phosphatase PENDING U/L    Protein, total PENDING g/dL    Albumin 2.1 (L) 3.5 - 5.0 g/dL    Globulin PENDING g/dL    A-G Ratio PENDING     LIPASE    Collection Time: 07/05/18 12:22 AM   Result Value Ref Range    Lipase 104 73 - 393 U/L   URINALYSIS W/ REFLEX CULTURE    Collection Time: 07/05/18 12:22 AM   Result Value Ref Range    Color YELLOW/STRAW      Appearance CLEAR CLEAR      Specific gravity >1.030 (H) 1.003 - 1.030    pH (UA) 5.0 5.0 - 8.0      Protein 30 (A) NEG mg/dL    Glucose >1000 (A) NEG mg/dL    Ketone NEGATIVE  NEG mg/dL    Bilirubin NEGATIVE  NEG      Blood NEGATIVE  NEG      Urobilinogen 0.2 0.2 - 1.0 EU/dL    Nitrites NEGATIVE  NEG      Leukocyte Esterase NEGATIVE  NEG      UA:UC IF INDICATED CULTURE NOT INDICATED BY UA RESULT CNI      WBC 0-4 0 - 4 /hpf    RBC 0-5 0 - 5 /hpf    Epithelial cells FEW FEW /lpf    Bacteria NEGATIVE  NEG /hpf    Hyaline cast 0-2 0 - 5 /lpf   DRUG SCREEN, URINE    Collection Time: 07/05/18 12:22 AM   Result Value Ref Range    AMPHETAMINES NEGATIVE  NEG      BARBITURATES NEGATIVE  NEG      BENZODIAZEPINES NEGATIVE  NEG      COCAINE NEGATIVE  NEG      METHADONE NEGATIVE  NEG      OPIATES NEGATIVE  NEG      PCP(PHENCYCLIDINE) NEGATIVE  NEG      THC (TH-CANNABINOL) NEGATIVE  NEG      Drug screen comment (NOTE)        Medical Decision Making   I am the first provider for this patient. I reviewed the vital signs, available nursing notes, past medical history, past surgical history, family history and social history. Vital Signs-Reviewed the patient's vital signs. Patient Vitals for the past 12 hrs:   Temp Pulse Resp BP SpO2   07/04/18 2342 98 °F (36.7 °C) 100 20 (!) 168/95 98 %       Pulse Oximetry Analysis - 97% on RA    Cardiac Monitor:   Rate: 94 bpm  Rhythm: Normal Sinus Rhythm     Records Reviewed: Nursing Notes, Old Medical Records, Previous electrocardiograms, Previous Radiology Studies and Previous Laboratory Studies    Provider Notes (Medical Decision Making):     DDX:  Pancreatitis, hyperglycemia, uti, electrolyte abnormality    Plan:  Labs, ua, ivf, analgesic, antiemetic    Impression:  [facility]    ED Course:   Initial assessment performed. The patients presenting problems have been discussed, and they are in agreement with the care plan formulated and outlined with them. I have encouraged them to ask questions as they arise throughout their visit.     I reviewed our electronic medical record system for any past medical records that were available that may contribute to the patients current condition, the nursing notes and and vital signs from today's visit    Nursing notes will be reviewed as they become available in realtime while the pt has been in the ED. Carlos Flaherty MD    I have spent 3-5 minutes discussing the medical risks of prolonged smoking habits and advised the patient of the benefits of the cessation of smoking, providing specific suggestions on how to quit. Carlos Flaherty MD    PROGRESS NOTE:  2:32 AM  Pt hyponatremic and hyperglycemic with Sodium 119 and . Written by Taiwo Jacinto, ED Scribe, as dictated by Carlos Flaherty MD    CONSULT NOTE:   2:20 AM  Carlos Flaherty MD spoke with Dr. Amarilis Rayo,   Specialty: Jil Rayo due to hyponatremia and DKA. Discussed pt's HPI and available diagnostic results thus far. Expressed concerns for needed admission. Consultant will admit. Carlos Flaherty MD      Diagnosis     Clinical Impression:   1. Acute hyponatremia    2. Hyperglycemia    3. Abdominal pain, generalized        PLAN:  1. Admit to hospitalist    Disposition:    ADMISSION NOTE:  2:23 AM  Patient is being admitted to the hospital by Dr. Amarilis Rayo. The results of their tests and reasons for their admission have been discussed with them and/or available family. They convey agreement and understanding for the need to be admitted and for their admission diagnosis. Written by Shorty Alvares, ED Scribe, as dictated by Carlos Flaherty MD.            Attestations: This note is prepared by Taiwo Jacinto, acting as Scribe for MD Carlos Phipps MD : The scribe's documentation has been prepared under my direction and personally reviewed by me in its entirety. I confirm that the note above accurately reflects all work, treatment, procedures, and medical decision making performed by me. This note will not be viewable in [address]

## 2018-07-05 NOTE — DIABETES MGMT
DTC note:  Consult received. Pt is followed by Dr. Crystal Jovel in the outpt setting. Insulin orders per him- noted pt to be transitioned off insulin gtt. DTC will f/u with pt tomorrow.     Thank you,  THALIA ValdezN, RN, [address]

## 2018-07-06 LAB
ALBUMIN SERPL-MCNC: 1.9 G/DL (ref 3.5–5)
ALBUMIN/GLOB SERPL: ABNORMAL {RATIO} (ref 1.1–2.2)
ALP SERPL-CCNC: ABNORMAL U/L (ref 45–117)
ALT SERPL-CCNC: ABNORMAL U/L (ref 12–78)
ANION GAP SERPL CALC-SCNC: 6 MMOL/L (ref 5–15)
AST SERPL-CCNC: 233 U/L (ref 15–37)
BASOPHILS # BLD: 0.1 K/UL (ref 0–0.1)
BASOPHILS NFR BLD: 1 % (ref 0–1)
BILIRUB SERPL-MCNC: 1 MG/DL (ref 0.2–1)
BUN SERPL-MCNC: ABNORMAL MG/DL (ref 6–20)
BUN/CREAT SERPL: ABNORMAL (ref 12–20)
CALCIUM SERPL-MCNC: ABNORMAL MG/DL (ref 8.5–10.1)
CHLORIDE SERPL-SCNC: 99 MMOL/L (ref 97–108)
CHOLEST SERPL-MCNC: 532 MG/DL
CO2 SERPL-SCNC: 25 MMOL/L (ref 21–32)
CREAT SERPL-MCNC: 0.59 MG/DL (ref 0.55–1.02)
DIFFERENTIAL METHOD BLD: ABNORMAL
EOSINOPHIL # BLD: 0.1 K/UL (ref 0–0.4)
EOSINOPHIL NFR BLD: 2 % (ref 0–7)
ERYTHROCYTE [DISTWIDTH] IN BLOOD BY AUTOMATED COUNT: 15 % (ref 11.5–14.5)
GLOBULIN SER CALC-MCNC: ABNORMAL G/DL (ref 2–4)
GLUCOSE BLD STRIP.AUTO-MCNC: 121 MG/DL (ref 65–100)
GLUCOSE BLD STRIP.AUTO-MCNC: 130 MG/DL (ref 65–100)
GLUCOSE BLD STRIP.AUTO-MCNC: 157 MG/DL (ref 65–100)
GLUCOSE BLD STRIP.AUTO-MCNC: 168 MG/DL (ref 65–100)
GLUCOSE BLD STRIP.AUTO-MCNC: 195 MG/DL (ref 65–100)
GLUCOSE SERPL-MCNC: 282 MG/DL (ref 65–100)
HCT VFR BLD AUTO: 31.6 % (ref 35–47)
HDLC SERPL-MCNC: 45 MG/DL
HDLC SERPL: 11.8 {RATIO} (ref 0–5)
HGB BLD-MCNC: 10.1 G/DL (ref 11.5–16)
IMM GRANULOCYTES # BLD: 0 K/UL (ref 0–0.04)
IMM GRANULOCYTES NFR BLD AUTO: 0 % (ref 0–0.5)
LDLC SERPL CALC-MCNC: ABNORMAL MG/DL (ref 0–100)
LDLC SERPL DIRECT ASSAY-MCNC: 161 MG/DL (ref 0–100)
LIPASE SERPL-CCNC: 108 U/L (ref 73–393)
LIPID PROFILE,FLP: ABNORMAL
LYMPHOCYTES # BLD: 1.6 K/UL (ref 0.8–3.5)
LYMPHOCYTES NFR BLD: 24 % (ref 12–49)
MAGNESIUM SERPL-MCNC: 1.7 MG/DL (ref 1.6–2.4)
MCH RBC QN AUTO: 27.8 PG (ref 26–34)
MCHC RBC AUTO-ENTMCNC: 34.1 G/DL (ref 30–36.5)
MCV RBC AUTO: 81.5 FL (ref 80–99)
MONOCYTES # BLD: 0.1 K/UL (ref 0–1)
MONOCYTES NFR BLD: 2 % (ref 5–13)
NEUTS SEG # BLD: 4.6 K/UL (ref 1.8–8)
NEUTS SEG NFR BLD: 71 % (ref 32–75)
NRBC # BLD: 0 K/UL (ref 0–0.01)
NRBC BLD-RTO: 0 PER 100 WBC
PLATELET # BLD AUTO: 117 K/UL (ref 150–400)
PMV BLD AUTO: 10.2 FL (ref 8.9–12.9)
POTASSIUM SERPL-SCNC: 3.1 MMOL/L (ref 3.5–5.1)
PROT SERPL-MCNC: ABNORMAL G/DL (ref 6.4–8.2)
RBC # BLD AUTO: 3.63 M/UL (ref 3.8–5.2)
RBC MORPH BLD: ABNORMAL
SERVICE CMNT-IMP: ABNORMAL
SODIUM SERPL-SCNC: 130 MMOL/L (ref 136–145)
TRIGL SERPL-MCNC: >4000 MG/DL (ref ?–150)
TROPONIN I SERPL-MCNC: <0.05 NG/ML
VLDLC SERPL CALC-MCNC: ABNORMAL MG/DL
WBC # BLD AUTO: 6.5 K/UL (ref 3.6–11)
WBC #/AREA STL HPF: NORMAL /HPF (ref 0–4)

## 2018-07-06 PROCEDURE — 84484 ASSAY OF TROPONIN QUANT: CPT | Performed by: INTERNAL MEDICINE

## 2018-07-06 PROCEDURE — 36415 COLL VENOUS BLD VENIPUNCTURE: CPT | Performed by: INTERNAL MEDICINE

## 2018-07-06 PROCEDURE — 83721 ASSAY OF BLOOD LIPOPROTEIN: CPT | Performed by: HOSPITALIST

## 2018-07-06 PROCEDURE — 74011250637 HC RX REV CODE- 250/637: Performed by: INTERNAL MEDICINE

## 2018-07-06 PROCEDURE — 74011250636 HC RX REV CODE- 250/636: Performed by: HOSPITALIST

## 2018-07-06 PROCEDURE — 80053 COMPREHEN METABOLIC PANEL: CPT | Performed by: INTERNAL MEDICINE

## 2018-07-06 PROCEDURE — 85025 COMPLETE CBC W/AUTO DIFF WBC: CPT | Performed by: INTERNAL MEDICINE

## 2018-07-06 PROCEDURE — 83690 ASSAY OF LIPASE: CPT | Performed by: INTERNAL MEDICINE

## 2018-07-06 PROCEDURE — 74011250637 HC RX REV CODE- 250/637: Performed by: HOSPITALIST

## 2018-07-06 PROCEDURE — 82962 GLUCOSE BLOOD TEST: CPT

## 2018-07-06 PROCEDURE — 80061 LIPID PANEL: CPT | Performed by: HOSPITALIST

## 2018-07-06 PROCEDURE — 83735 ASSAY OF MAGNESIUM: CPT | Performed by: INTERNAL MEDICINE

## 2018-07-06 PROCEDURE — 65660000000 HC RM CCU STEPDOWN

## 2018-07-06 PROCEDURE — 74011636637 HC RX REV CODE- 636/637: Performed by: INTERNAL MEDICINE

## 2018-07-06 RX ORDER — ERGOCALCIFEROL 1.25 MG/1
50000 CAPSULE ORAL
Status: DISCONTINUED | OUTPATIENT
Start: 2018-07-06 | End: 2018-07-09

## 2018-07-06 RX ORDER — POTASSIUM CHLORIDE 750 MG/1
40 TABLET, FILM COATED, EXTENDED RELEASE ORAL EVERY 4 HOURS
Status: COMPLETED | OUTPATIENT
Start: 2018-07-06 | End: 2018-07-06

## 2018-07-06 RX ADMIN — POTASSIUM CHLORIDE 40 MEQ: 750 TABLET, EXTENDED RELEASE ORAL at 08:36

## 2018-07-06 RX ADMIN — SODIUM CHLORIDE 75 ML/HR: 900 INJECTION, SOLUTION INTRAVENOUS at 15:37

## 2018-07-06 RX ADMIN — INSULIN GLARGINE 50 UNITS: 100 INJECTION, SOLUTION SUBCUTANEOUS at 09:36

## 2018-07-06 RX ADMIN — SODIUM CHLORIDE 75 ML/HR: 900 INJECTION, SOLUTION INTRAVENOUS at 04:05

## 2018-07-06 RX ADMIN — Medication 10 ML: at 15:34

## 2018-07-06 RX ADMIN — ERGOCALCIFEROL 50000 UNITS: 1.25 CAPSULE ORAL at 09:36

## 2018-07-06 RX ADMIN — INSULIN GLARGINE 50 UNITS: 100 INJECTION, SOLUTION SUBCUTANEOUS at 22:10

## 2018-07-06 RX ADMIN — Medication 10 ML: at 22:11

## 2018-07-06 RX ADMIN — INSULIN LISPRO 5 UNITS: 100 INJECTION, SOLUTION INTRAVENOUS; SUBCUTANEOUS at 00:40

## 2018-07-06 RX ADMIN — HYDROMORPHONE HYDROCHLORIDE 0.5 MG: 2 INJECTION INTRAMUSCULAR; INTRAVENOUS; SUBCUTANEOUS at 00:42

## 2018-07-06 RX ADMIN — POTASSIUM CHLORIDE 40 MEQ: 750 TABLET, EXTENDED RELEASE ORAL at 11:57

## 2018-07-06 RX ADMIN — INSULIN LISPRO 5 UNITS: 100 INJECTION, SOLUTION INTRAVENOUS; SUBCUTANEOUS at 11:57

## 2018-07-06 RX ADMIN — INSULIN LISPRO 5 UNITS: 100 INJECTION, SOLUTION INTRAVENOUS; SUBCUTANEOUS at 22:09

## 2018-07-06 NOTE — PROGRESS NOTES
Hospitalist Progress Note              Rios Thorpe MD.                                                             Cell: [phone]                               NAME:  Kristal Douglas  :  1980  MRN:  [id number]  Date of Service:  2018    Summary: 40 y.o. female who presented on 2018 due RUQ and epigastric pain. She was found to have uncontrolled blood sugar       Assessment/Plan:  Hyperglycemic hyperosmolar state: Poa due to likely insulin non complaince requiring insulin gtt. Now off insulin on ISS ,  And basal insulin. Appreciate input from Endo- Thanks    Hypertriglyceridemia: Due to uncontrolled DM. TG > 4000 and will probably benefit from plasmapharesis. Continue IVF and basal insulin    Epigastric/ RUQ pain: May be due to pancreatitis related to elevated TG. Appears to be resolving    Thrombocytopenia: monitor for now    Hyponatremia: Likely pseudo-hyponatremia in the setting of hypertriglyceridemia. Continue IVF    Hypokalemia requiring kcl supplementation    Obesity   Code status: full  DVT prophylaxsis: SCD  Dispo: to be detremined         Interval History/Subjective:  F/u for uncontrolled BS  She is feeling better. Abdominal pain also better. No fever or chills. No chest pain or SOB    Review of Systems:  Pertinent items are noted in HPI. Objective:     VITALS:   Last 24hrs VS reviewed since prior progress note. Most recent are:  Visit Vitals    BP (!) 145/93    Pulse 79    Temp 98.6 °F (37 °C)    Resp 16    Ht 5' 8\" (1.727 m)    Wt 123.4 kg (272 lb 0.8 oz)    SpO2 95%    BMI 41.36 kg/m2       Intake/Output Summary (Last 24 hours) at 18  Last data filed at 18 1633   Gross per 24 hour   Intake           2127.5 ml   Output                0 ml   Net           2127.5 ml        PHYSICAL EXAM:  General: No acute distress, cooperative, pleasant , Obese  EENT: EOMI. Anicteric sclerae.  Oral mucous moist, oropharynx benign  Resp: CTA bilaterally. No wheezing/rhonchi/rales. No accessory muscle use  CV: Regular rhythm, normal rate, no murmurs, gallops, rubs  GI: Soft, non distended, non tender. normoactive bowel sounds, no hepatosplenomegaly Extremities: No edema, warm, 2+ pulses throughout  Neurologic: Moves all extremities. AAOx3, CN II-XII grossly intact  Psych: Good insight. Not anxious nor agitated. Skin: Good Turgor, no rashes or ulcers    Lab Data Personally Reviewed: (see below)     Medications list Personally Reviewed:  x YES  NO     _______________________________________________________________________  Care Plan discussed with:  Patient/Family and Nurse    Total NON critical care TIME:  30 minutes    Naina Smith MD     Procedures: see electronic medical records for all procedures/Xrays and details which were not copied into this note but were reviewed prior to creation of Plan.       LABS:  Recent Labs      07/06/18   0408  07/05/18   0022   WBC  6.5  6.9   HGB  10.1*  11.6   HCT  31.6*  32.2*   PLT  117*  110*     Recent Labs      07/06/18   0408  07/05/18   1023  07/05/18   0359  07/05/18   0022   NA  130*  130*  124*  119*   K  3.1*  3.3*  3.2*  3.6   CL  99  98  91*  85*   CO2  25  25  23  23   BUN  UNABLE TO OBTAIN ACCURATE RESULTS DUE TO GROSS LIPEMIA  UNABLE TO OBTAIN ACCURATE RESULTS DUE TO GROSS LIPEMIA  UNABLE TO OBTAIN ACCURATE RESULTS DUE TO GROSS LIPEMIA  UNABLE TO OBTAIN ACCURATE RESULTS DUE TO GROSS LIPEMIA   CREA  0.59  1.18*  UNABLE TO OBTAIN ACCURATE RESULTS DUE TO GROSS LIPEMIA  0.48*   GLU  282*  386*  629*  830*   CA  UNABLE TO OBTAIN ACCURATE RESULTS DUE TO GROSS LIPEMIA  UNABLE TO OBTAIN ACCURATE RESULTS DUE TO GROSS LIPEMIA  UNABLE TO OBTAIN ACCURATE RESULTS DUE TO GROSS LIPEMIA  UNABLE TO OBTAIN ACCURATE RESULTS DUE TO GROSS LIPEMIA   MG  1.7   --    --   1.2*   PHOS   --    --    --   4.5     Recent Labs      07/06/18   0408  07/05/18   0022   SGOT  233*  543*   ALT  UNABLE TO OBTAIN ACCURATE RESULTS DUE TO GROSS LIPEMIA  366*   AP  UNABLE TO OBTAIN ACCURATE RESULTS DUE TO GROSS LIPEMIA  UNABLE TO OBTAIN ACCURATE RESULTS DUE TO GROSS LIPEMIA   TBILI  1.0  2.1*   TP  UNABLE TO OBTAIN ACCURATE RESULTS DUE TO GROSS LIPEMIA  5.8*   ALB  1.9*  2.1*   GLOB  Cannot be calculated  3.7   LPSE  108  104     No results for input(s): INR, PTP, APTT in the last 72 hours. No lab exists for component: INREXT   No results for input(s): FE, TIBC, PSAT, FERR in the last 72 hours. No results found for: FOL, RBCF   No results for input(s): PH, PCO2, PO2 in the last 72 hours.   Recent Labs      07/06/18   0408  07/05/18   1023  07/05/18   0359   TROIQ  <0.05  <0.05  <0.05     Lab Results   Component Value Date/Time    Cholesterol, total 532 (H) 07/06/2018 04:08 AM    HDL Cholesterol 45 07/06/2018 04:08 AM    LDL,Direct 161 (H) 07/06/2018 04:08 AM    LDL, calculated Not calculated due to elevated triglyceride level 07/06/2018 04:08 AM    Triglyceride >4000 (H) 07/06/2018 04:08 AM    CHOL/HDL Ratio 11.8 (H) 07/06/2018 04:08 AM     Lab Results   Component Value Date/Time    Glucose (POC) 130 (H) 07/06/2018 04:21 PM    Glucose (POC) 157 (H) 07/06/2018 11:20 AM    Glucose (POC) 121 (H) 07/06/2018 07:32 AM    Glucose (POC) 195 (H) 07/06/2018 12:26 AM    Glucose (POC) 196 (H) 07/05/2018 11:28 PM     Lab Results   Component Value Date/Time    Color YELLOW/STRAW 07/05/2018 12:22 AM    Appearance CLEAR 07/05/2018 12:22 AM    Specific gravity >1.030 (H) 07/05/2018 12:22 AM    Specific gravity >1.030 (H) 05/17/2018 04:45 PM    pH (UA) 5.0 07/05/2018 12:22 AM    Protein 30 (A) 07/05/2018 12:22 AM    Glucose >1000 (A) 07/05/2018 12:22 AM    Ketone NEGATIVE  07/05/2018 12:22 AM    Bilirubin NEGATIVE  07/05/2018 12:22 AM    Urobilinogen 0.2 07/05/2018 12:22 AM    Nitrites NEGATIVE  07/05/2018 12:22 AM    Leukocyte Esterase NEGATIVE  07/05/2018 12:22 AM    Epithelial cells FEW 07/05/2018 12:22 AM    Bacteria NEGATIVE 07/05/2018 12:22 AM    WBC 0-4 07/05/2018 12:22 AM    RBC 0-5 07/05/2018 12:22 AM

## 2018-07-06 NOTE — CONSULTS
Endocrinology Consult    Asked by Dr. Ho Hernandez to see Ms. Migdalia Perez for management of uncontrolled DM. I had previously seen her in the  of  and last office visit was in 2018. At that time I had changed her from lantus to U-500 and she states she was taking 130 units bid and this was working well to control her sugars with fasting sugars usually under 130 on this dose. 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 She ran out of insulin and developed GYN abscesses and was hospitalized with sepsis for 3 weeks from end of  to mid-May. Apparently she was transitioned back to lantus and [place] and [place] was added and her sugars were doing better while in the hospital but about a week after discharge from this hospital, she was readmitted to [facility] on 18 with another vulvar abscess. She was stabilized and discharged and states that over the past month she ran out of lantus about 3 weeks ago and was using  humalog and her sugar went over 800 and was admitted yesterday. She was given IVF and put on an insulin drip and I transitioned her off this yesterday afternoon and gave her a dose of 100 units of lantus in the afternoon and again last night and her sugar was down to 196 last night and was given 5 units of humalog for correction and sugar was down to 121 on POC this morning. She is still having some nausea but not vomiting. Her A1c was 10.3% during this admission. She had been tolerating metformin as an outpatient but this has been on hold so far during her stay. Assessment/Plan:    1.  Uncontrolled type 2 diabetes mellitus with diabetic polyneuropathy, with long-term current use of insulin ([facility] [address]): her most recent Hgb A1c was was 10.3% in  up from 8.9% in  down from 11.2% in  up from 10.9% in 10/17.  She was very insulin resistant and did best while on U-500 in the past and may consider changing back to this once she is eating more.  For now will continue her on basal insulin with lantus and correction humalog. May consider adding back metformin later today if her nausea is doing better. Will plan on avoiding GLP-1 and DPP-IV agents as these can induce pancreatitis and she has already had several episodes of pancreatitis from high TGs.  - cont lantus 100 units every 12 hours  - cont humalog 5:50 > 150 for correction  - hold metformin 1g bid for now   - check bs 4 times per day       2. Essential hypertension: her BP is at goal < 140/90 and this is off lisinopril  - hold lisinopril 10 mg daily for now         3. Hyperlipidemia LDL goal <100:  and TG > 4000 during inpatient stay in 10/17 when she require plasmapheresis and insulin drip to treat the high TGs. Part of her severely elevated TGs is relative deficiency of lipoprotein lipase which occurs with severe hyperglycemia as insulin is needed for this enzyme to work to cleave TGs. Therefore, with better blood sugar control, her TGs should improve. Currently off lipid lowering meds and panel from this morning is pending.  - off lipitor 40 mg daily  - off fenofibrate 145 mg daily  - off lovaza 2 caps bid           4. Vitamin D deficiency: recalls being told her level was very low in the past and needed supplementation with high dose vitamin D. Will give a now dose during her hospital stay and will check a baseline level. - begin ergocalciferol 50K units weekly  - check Vitamin D 25-OH level tomorrow    I spent 30 minutes of face to face time on her case and > 50% of the time was spent reviewing the chart and coordinating her care with Dr. Cheo Lester and the nurse caring for her. Thanks for the consult. Please don't hesitate to call [phone] with further questions.     Reva Zhong MD

## 2018-07-06 NOTE — DIABETES MGMT
DTC Consult Note    Recommendations/ Comments: Noted pt currently being followed by her endo - Dr. Zulma Gannon. Met with pt for consult. Pt reported that she was in the process of moving and has not felt well for the past 2 weeks. She stated that she has been through education classes and is aware of what to do in order to manage her DM but she reported moving and other things have caught up with her. She stated she does not currently have a functioning meter, DTC will supply pt with new one. Pt reported she had insulin that she believes got damaged by the heat and that is what led to hospitalization. Discussed insulin storage and usage with pt. Pt declined outpatient education at this time. Provided education materials and DTC contact info. Current hospital DM medication:  -Lantus 100 units every 12 hours  -Humalog insulin resistant scale    Consult received for:  [x]             Assessment of home management                []      Medication Recommendations                []             Meter/monitoring     []             Insulin instruction     []             New diagnosis     []             Outpatient education     []             Insulin pump patient     [x]             Insulin infusion     []             DKA/HHS    Chart reviewed and initial evaluation complete on Karri Villasenor. Patient is a 40 y.o. female with known history of Type 2 Diabetes on Lantus 65 units bid, Regular insulin 12 units ac tid, Metformin 1,000mg bid at home. BG monitoring at home 0 times per day. Patient reports BG levels at home - pt reported her meter is not functioning properly.     Assessed and instructed patient on the following:   ·  interpretation of lab results, blood sugar goals, complications of diabetes mellitus, illness management, SMBG skills, nutrition, referred to Diabetes Educator, site rotation and use of insulin pen    Encouraged the following:   · dietary modifications: well balanced meals  · regular blood sugar monitoring: at least 2 times daily     Provided patient with the following: [x]             Survival skills education materials               [x]             Insulin education materials               []             CHO counting education materials               []             Outpatient DTC contact number               []             Glucometer                 Discussed with patient and/or family need for follow up appointment for diabetes management after discharge. A1c:   Lab Results   Component Value Date/Time    Hemoglobin A1c 10.3 (H) 07/05/2018 03:10 AM       Recent Glucose Results:   Lab Results   Component Value Date/Time     (H) 07/06/2018 04:08 AM     (H) 07/05/2018 10:23 AM    GLUCPOC 121 (H) 07/06/2018 07:32 AM    GLUCPOC 195 (H) 07/06/2018 12:26 AM    GLUCPOC 196 (H) 07/05/2018 11:28 PM        Lab Results   Component Value Date/Time    Creatinine 0.59 07/06/2018 04:08 AM     Estimated Creatinine Clearance: 180.7 mL/min (based on Cr of 0.59). Active Orders   Diet    DIET CLEAR LIQUID        PO intake: No data found. Will continue to follow as needed. Thank you.     Kayleigh Mosley, [address] [place] [address], [address]  Office: [phone]

## 2018-07-06 NOTE — PROGRESS NOTES
PCU SHIFT NURSING NOTE      Bedside and Verbal shift change report given to Arpit Owen RN (oncoming nurse) by Catalina Casillas RN (offgoing nurse). Report included the following information SBAR, Kardex, MAR, Recent Results and Cardiac Rhythm NSR. Shift Summary:   1452:  Spoke with Dr. Donte Tatum about pt triglycerides >4000. He asked that we inform endocrinology. 1457: informed Dr. Leland Gannon of triglycerides. He states he will address this in the am.    Admission Date 7/4/2018   Admission Diagnosis Hyperosmolar non-ketotic state in patient with type 2 diabetes mellitus ([facility] [address]   Consults IP CONSULT TO ENDOCRINOLOGY        Consults   []PT   []OT   []Speech   [x]Case Management      [] Palliative      Cardiac Monitoring Order   [x]Yes   []No     IV drips   []Yes    Drip:                            Dose:  Drip:                            Dose:  Drip:                            Dose:   [x]No     GI Prophylaxis   [x]Yes   []No         DVT Prophylaxis   SCDs:             Romario stockings:         [x] Medication   []Contraindicated   []None      Activity Level Activity Level: Up ad christopher     Activity Assistance: No assistance needed   Purposeful Rounding every 1-2 hour? [x]Yes   Mancia Score  Total Score: 1   Bed Alarm (If score 3 or >)   []Yes   [] Refused (See signed refusal form in chart)   Joel Score  Joel Score: 18   Joel Score (if score 14 or less)   []PMT consult   []Wound Care consult      []Specialty bed   [x] Nutrition consult          Needs prior to discharge:   Home O2 required:    []Yes   [x]No    If yes, how much O2 required?     Other:    Last Bowel Movement: Last Bowel Movement Date: 07/05/18      Influenza Vaccine Received Flu Vaccine for Current Season (usually Sept-March): Not Flu Season        Pneumonia Vaccine           Diet Active Orders   Diet    DIET CLEAR LIQUID      LDAs               Peripheral IV 07/05/18 Left Forearm (Active)   Site Assessment Clean, dry, & intact 7/6/2018  8:44 AM   Phlebitis Assessment 0 7/6/2018  8:44 AM   Infiltration Assessment 0 7/6/2018  8:44 AM   Dressing Status Clean, dry, & intact 7/6/2018  8:44 AM   Dressing Type Tape;Transparent 7/6/2018  8:44 AM   Hub Color/Line Status Pink; Infusing 7/6/2018  8:44 AM   Alcohol Cap Used Yes 7/5/2018  6:33 AM       Peripheral IV 07/05/18 Left Forearm (Active)   Site Assessment Clean, dry, & intact 7/6/2018  8:44 AM   Phlebitis Assessment 0 7/6/2018  8:44 AM   Infiltration Assessment 0 7/6/2018  8:44 AM   Dressing Status Clean, dry, & intact 7/6/2018  8:44 AM   Dressing Type Tape;Transparent 7/6/2018  8:44 AM   Hub Color/Line Status Blue 7/6/2018  8:44 AM   Alcohol Cap Used Yes 7/5/2018  6:33 AM                      Urinary Catheter      Intake & Output   Date 07/05/18 0700 - 07/06/18 0659 07/06/18 0700 - 07/07/18 0659   Shift 0700-1859 1900-0659 24 Hour Total 0700-1859 1900-0659 24 Hour Total   I  N  T  A  K  E   P.O.  240 240         P. O.  240 240       I.V.  (mL/kg/hr) 100  (0.1)  100  (0)         Volume (magnesium sulfate 3 g in 0.9% sodium chloride 100 mL IVPB) 100  100       Shift Total  (mL/kg) 100  (0.8) 240  (1.9) 340  (2.8)      O  U  T  P  U  T   Urine  (mL/kg/hr)            Urine Occurrence(s) 3 x  3 x       Stool            Stool Occurrence(s) 2 x  2 x       Shift Total  (mL/kg)          240 340      Weight (kg) 123.4 123.4 123.4 123.4 123.4 123.4         Readmission Risk Assessment Tool Score Low Risk            8       Total Score        4 IP Visits Last 12 Months (1-3=4, 4=9, >4=11)    4 Pt. Coverage (Medicare=5 , Medicaid, or Self-Pay=4)        Criteria that do not apply:    Has Seen PCP in Last 6 Months (Yes=3, No=0)    . Living with Significant Other. Assisted Living. LTAC. SNF.  or   Rehab    Patient Length of Stay (>5 days = 3)    Charlson Comorbidity Score (Age + Comorbid Conditions)       Expected Length of Stay 2d 21h   Actual Length of Stay 1

## 2018-07-07 LAB
25(OH)D3 SERPL-MCNC: <9 NG/ML (ref 30–100)
ANION GAP SERPL CALC-SCNC: 5 MMOL/L (ref 5–15)
ATRIAL RATE: 70 BPM
BACTERIA SPEC CULT: NORMAL
BASOPHILS # BLD: 0 K/UL (ref 0–0.1)
BASOPHILS NFR BLD: 0 % (ref 0–1)
BUN SERPL-MCNC: ABNORMAL MG/DL (ref 6–20)
BUN/CREAT SERPL: ABNORMAL (ref 12–20)
C JEJUNI+C COLI AG STL QL: NEGATIVE
CALCIUM SERPL-MCNC: ABNORMAL MG/DL (ref 8.5–10.1)
CALCULATED P AXIS, ECG09: 53 DEGREES
CALCULATED R AXIS, ECG10: -5 DEGREES
CALCULATED T AXIS, ECG11: 19 DEGREES
CHLORIDE SERPL-SCNC: 103 MMOL/L (ref 97–108)
CO2 SERPL-SCNC: 25 MMOL/L (ref 21–32)
CREAT SERPL-MCNC: 0.41 MG/DL (ref 0.55–1.02)
DIAGNOSIS, 93000: NORMAL
DIFFERENTIAL METHOD BLD: ABNORMAL
E COLI SXT1+2 STL IA: NEGATIVE
EOSINOPHIL # BLD: 0 K/UL (ref 0–0.4)
EOSINOPHIL NFR BLD: 0 % (ref 0–7)
ERYTHROCYTE [DISTWIDTH] IN BLOOD BY AUTOMATED COUNT: 15.1 % (ref 11.5–14.5)
GLUCOSE BLD STRIP.AUTO-MCNC: 125 MG/DL (ref 65–100)
GLUCOSE BLD STRIP.AUTO-MCNC: 133 MG/DL (ref 65–100)
GLUCOSE BLD STRIP.AUTO-MCNC: 135 MG/DL (ref 65–100)
GLUCOSE BLD STRIP.AUTO-MCNC: 145 MG/DL (ref 65–100)
GLUCOSE BLD STRIP.AUTO-MCNC: 165 MG/DL (ref 65–100)
GLUCOSE BLD STRIP.AUTO-MCNC: 170 MG/DL (ref 65–100)
GLUCOSE SERPL-MCNC: 266 MG/DL (ref 65–100)
HCT VFR BLD AUTO: 31.7 % (ref 35–47)
HGB BLD-MCNC: 10.4 G/DL (ref 11.5–16)
IMM GRANULOCYTES # BLD: 0 K/UL (ref 0–0.04)
IMM GRANULOCYTES NFR BLD AUTO: 0 % (ref 0–0.5)
LYMPHOCYTES # BLD: 1.8 K/UL (ref 0.8–3.5)
LYMPHOCYTES NFR BLD: 30 % (ref 12–49)
MAGNESIUM SERPL-MCNC: 1.8 MG/DL (ref 1.6–2.4)
MCH RBC QN AUTO: 27.5 PG (ref 26–34)
MCHC RBC AUTO-ENTMCNC: 33.5 G/DL (ref 30–36.5)
MCV RBC AUTO: 82 FL (ref 80–99)
MONOCYTES # BLD: 0.3 K/UL (ref 0–1)
MONOCYTES NFR BLD: 5 % (ref 5–13)
NEUTS SEG # BLD: 3.8 K/UL (ref 1.8–8)
NEUTS SEG NFR BLD: 65 % (ref 32–75)
NRBC # BLD: 0 K/UL (ref 0–0.01)
NRBC BLD-RTO: 0 PER 100 WBC
P-R INTERVAL, ECG05: 140 MS
PLATELET # BLD AUTO: 117 K/UL (ref 150–400)
PMV BLD AUTO: 10.9 FL (ref 8.9–12.9)
POTASSIUM SERPL-SCNC: 3.5 MMOL/L (ref 3.5–5.1)
Q-T INTERVAL, ECG07: 414 MS
QRS DURATION, ECG06: 86 MS
QTC CALCULATION (BEZET), ECG08: 447 MS
RBC # BLD AUTO: 3.78 M/UL (ref 3.8–5.2)
RBC MORPH BLD: ABNORMAL
SERVICE CMNT-IMP: ABNORMAL
SERVICE CMNT-IMP: NORMAL
SODIUM SERPL-SCNC: 133 MMOL/L (ref 136–145)
TROPONIN I SERPL-MCNC: <0.05 NG/ML
VENTRICULAR RATE, ECG03: 70 BPM
WBC # BLD AUTO: 5.9 K/UL (ref 3.6–11)

## 2018-07-07 PROCEDURE — 80048 BASIC METABOLIC PNL TOTAL CA: CPT | Performed by: HOSPITALIST

## 2018-07-07 PROCEDURE — 82962 GLUCOSE BLOOD TEST: CPT

## 2018-07-07 PROCEDURE — 83735 ASSAY OF MAGNESIUM: CPT | Performed by: HOSPITALIST

## 2018-07-07 PROCEDURE — 74011636637 HC RX REV CODE- 636/637: Performed by: INTERNAL MEDICINE

## 2018-07-07 PROCEDURE — 85025 COMPLETE CBC W/AUTO DIFF WBC: CPT | Performed by: HOSPITALIST

## 2018-07-07 PROCEDURE — C9113 INJ PANTOPRAZOLE SODIUM, VIA: HCPCS | Performed by: HOSPITALIST

## 2018-07-07 PROCEDURE — 74011250636 HC RX REV CODE- 250/636: Performed by: HOSPITALIST

## 2018-07-07 PROCEDURE — 74011250637 HC RX REV CODE- 250/637: Performed by: HOSPITALIST

## 2018-07-07 PROCEDURE — 84484 ASSAY OF TROPONIN QUANT: CPT | Performed by: HOSPITALIST

## 2018-07-07 PROCEDURE — 74011000250 HC RX REV CODE- 250: Performed by: HOSPITALIST

## 2018-07-07 PROCEDURE — 74011250637 HC RX REV CODE- 250/637: Performed by: INTERNAL MEDICINE

## 2018-07-07 PROCEDURE — 82306 VITAMIN D 25 HYDROXY: CPT | Performed by: INTERNAL MEDICINE

## 2018-07-07 PROCEDURE — 93005 ELECTROCARDIOGRAM TRACING: CPT

## 2018-07-07 PROCEDURE — 65660000000 HC RM CCU STEPDOWN

## 2018-07-07 PROCEDURE — 36415 COLL VENOUS BLD VENIPUNCTURE: CPT | Performed by: INTERNAL MEDICINE

## 2018-07-07 RX ORDER — FENOFIBRATE 145 MG/1
145 TABLET, COATED ORAL DAILY
Status: DISCONTINUED | OUTPATIENT
Start: 2018-07-07 | End: 2018-07-09 | Stop reason: HOSPADM

## 2018-07-07 RX ORDER — GLUCOSAM/CHONDRO/HERB 149/HYAL 750-100 MG
2 TABLET ORAL 2 TIMES DAILY
Status: DISCONTINUED | OUTPATIENT
Start: 2018-07-07 | End: 2018-07-09 | Stop reason: HOSPADM

## 2018-07-07 RX ORDER — ONDANSETRON 4 MG/1
8 TABLET, ORALLY DISINTEGRATING ORAL
Status: DISCONTINUED | OUTPATIENT
Start: 2018-07-07 | End: 2018-07-09 | Stop reason: HOSPADM

## 2018-07-07 RX ORDER — ATORVASTATIN CALCIUM 40 MG/1
40 TABLET, FILM COATED ORAL DAILY
Status: DISCONTINUED | OUTPATIENT
Start: 2018-07-07 | End: 2018-07-09 | Stop reason: HOSPADM

## 2018-07-07 RX ADMIN — ACETAMINOPHEN 650 MG: 325 TABLET ORAL at 02:13

## 2018-07-07 RX ADMIN — SODIUM CHLORIDE 100 ML/HR: 900 INJECTION, SOLUTION INTRAVENOUS at 23:48

## 2018-07-07 RX ADMIN — Medication 10 ML: at 02:18

## 2018-07-07 RX ADMIN — INSULIN GLARGINE 50 UNITS: 100 INJECTION, SOLUTION SUBCUTANEOUS at 21:24

## 2018-07-07 RX ADMIN — SODIUM CHLORIDE 100 ML/HR: 900 INJECTION, SOLUTION INTRAVENOUS at 01:12

## 2018-07-07 RX ADMIN — FENOFIBRATE 145 MG: 145 TABLET ORAL at 09:23

## 2018-07-07 RX ADMIN — OMEGA-3 FATTY ACIDS CAP 1000 MG 2 CAPSULE: 1000 CAP at 16:57

## 2018-07-07 RX ADMIN — ATORVASTATIN CALCIUM 40 MG: 40 TABLET, FILM COATED ORAL at 09:23

## 2018-07-07 RX ADMIN — ONDANSETRON 8 MG: 4 TABLET, ORALLY DISINTEGRATING ORAL at 16:30

## 2018-07-07 RX ADMIN — OMEGA-3 FATTY ACIDS CAP 1000 MG 2 CAPSULE: 1000 CAP at 09:23

## 2018-07-07 RX ADMIN — ONDANSETRON 8 MG: 4 TABLET, ORALLY DISINTEGRATING ORAL at 12:09

## 2018-07-07 RX ADMIN — INSULIN GLARGINE 50 UNITS: 100 INJECTION, SOLUTION SUBCUTANEOUS at 09:23

## 2018-07-07 RX ADMIN — SODIUM CHLORIDE 100 ML/HR: 900 INJECTION, SOLUTION INTRAVENOUS at 13:51

## 2018-07-07 RX ADMIN — ACETAMINOPHEN 650 MG: 325 TABLET ORAL at 19:07

## 2018-07-07 RX ADMIN — SODIUM CHLORIDE 40 MG: 9 INJECTION INTRAMUSCULAR; INTRAVENOUS; SUBCUTANEOUS at 02:14

## 2018-07-07 RX ADMIN — INSULIN GLARGINE 50 UNITS: 100 INJECTION, SOLUTION SUBCUTANEOUS at 21:23

## 2018-07-07 RX ADMIN — INSULIN LISPRO 5 UNITS: 100 INJECTION, SOLUTION INTRAVENOUS; SUBCUTANEOUS at 16:53

## 2018-07-07 NOTE — PROGRESS NOTES
Hospitalist Progress Note Corey Bass MD. Cell: [phone] NAME:  Carlos Muro :  1980 MRN:  [id number] Date of Service:  2018 Summary: 40 y.o. female who presented on 2018 due RUQ and epigastric pain. She was found to have uncontrolled blood sugar Assessment/Plan: Hyperglycemic hyperosmolar state: Poa due to likely insulin non complaince requiring insulin gtt. Dr. Chela Cervantes from Endocrinology managing insulin Hypertriglyceridemia: Due to uncontrolled DM. TG > 4000 and will probably benefit from plasmapharesis. Continue IVF and basal insulin 
- Started on Statin, Fenofibrate and Fish oil Nausea - Quite nauseated especially after food 
- Start Zofran 8 mg ODT 30 mins prior to meals. Can use IV Zofran if she remains nauseated after. Epigastric/ RUQ pain: May be due to pancreatitis related to elevated TG. Appears to be resolving Thrombocytopenia: monitor for now Hyponatremia: Likely pseudo-hyponatremia in the setting of hypertriglyceridemia. Continue IVF Hypokalemia requiring kcl supplementation Obesity Code status: full DVT prophylaxsis: SCD Dispo: to be detremined Interval History/Subjective: 
F/u for uncontrolled BS She is feeling better. Abdominal pain also better. No fever or chills. No chest pain or SOB Nausea with food. Review of Systems: 
Pertinent items are noted in HPI. Objective: VITALS:  
Last 24hrs VS reviewed since prior progress note. Most recent are: 
Visit Vitals  /80 (BP 1 Location: Right arm, BP Patient Position: At rest)  Pulse 66  Temp 98.6 °F (37 °C)  Resp 16  
 Ht 5' 8\" (1.727 m)  Wt 123.4 kg (272 lb 0.8 oz)  SpO2 99%  BMI 41.36 kg/m2 Intake/Output Summary (Last 24 hours) at 18 1045 Last data filed at 18 1633 
 Gross per 24 hour Intake            717.5 ml Output                0 ml Net            717.5 ml PHYSICAL EXAM: 
General: No acute distress, cooperative, pleasant , Obese EENT: EOMI. Anicteric sclerae. Oral mucous moist, oropharynx benign Resp: CTA bilaterally. No wheezing/rhonchi/rales. No accessory muscle use CV: Regular rhythm, normal rate, no murmurs, gallops, rubs GI: Soft, non distended, non tender. normoactive bowel sounds, no hepatosplenomegaly Extremities: No edema, warm, 2+ pulses throughout Neurologic: Moves all extremities. AAOx3, CN II-XII grossly intact Psych: Good insight. Not anxious nor agitated. Skin: Good Turgor, no rashes or ulcers Lab Data Personally Reviewed: (see below) Medications list Personally Reviewed:  x YES  NO  
 
_______________________________________________________________________ Care Plan discussed with:  Patient/Family and Nurse Total NON critical care TIME:  30 minutes Leticia Edgar MD  
 
Procedures: see electronic medical records for all procedures/Xrays and details which were not copied into this note but were reviewed prior to creation of Plan. LABS: 
Recent Labs  
   07/07/18 
 0219  07/06/18 
 0408 WBC  5.9  6.5 HGB  10.4*  10.1* HCT  31.7*  31.6*  
PLT  117*  117* Recent Labs  
   07/07/18 
 0219  07/06/18 
 0408  07/05/18 
 1023   07/05/18 
 0022 NA  133*  130*  130*   < >  119*  
K  3.5  3.1*  3.3*   < >  3.6 CL  103  99  98   < >  85* CO2  25  25  25   < >  23 BUN  UNABLE TO OBTAIN ACCURATE RESULTS DUE TO GROSS LIPEMIA  UNABLE TO OBTAIN ACCURATE RESULTS DUE TO GROSS LIPEMIA  UNABLE TO OBTAIN ACCURATE RESULTS DUE TO GROSS LIPEMIA   < >  UNABLE TO OBTAIN ACCURATE RESULTS DUE TO GROSS LIPEMIA  
CREA  0.41*  0.59  1.18*   < >  0.48* GLU  266*  282*  386*   < >  830* CA  UNABLE TO OBTAIN ACCURATE RESULTS DUE TO GROSS LIPEMIA  UNABLE TO OBTAIN ACCURATE RESULTS DUE TO GROSS LIPEMIA  UNABLE TO OBTAIN ACCURATE RESULTS DUE TO GROSS LIPEMIA   < >  UNABLE TO OBTAIN ACCURATE RESULTS DUE TO GROSS LIPEMIA  
MG  1.8  1.7   --    --   1.2*  
PHOS   --    --    --    --   4.5  
 < > = values in this interval not displayed. Recent Labs  
   07/06/18 
 0408  07/05/18 
 0022 SGOT  233*  543* ALT  UNABLE TO OBTAIN ACCURATE RESULTS DUE TO GROSS LIPEMIA  366* AP  UNABLE TO OBTAIN ACCURATE RESULTS DUE TO GROSS LIPEMIA  UNABLE TO OBTAIN ACCURATE RESULTS DUE TO GROSS LIPEMIA TBILI  1.0  2.1*  
TP  UNABLE TO OBTAIN ACCURATE RESULTS DUE TO GROSS LIPEMIA  5.8* ALB  1.9*  2.1*  
GLOB  Cannot be calculated  3.7 LPSE  108  104 No results for input(s): INR, PTP, APTT in the last 72 hours. No lab exists for component: INREXT, INREXT No results for input(s): FE, TIBC, PSAT, FERR in the last 72 hours. No results found for: FOL, RBCF No results for input(s): PH, PCO2, PO2 in the last 72 hours. Recent Labs  
   07/07/18 
 0219  07/06/18 
 0408  07/05/18 
 1023 TROIQ  <0.05  <0.05  <0.05 Lab Results Component Value Date/Time Cholesterol, total 532 (H) 07/06/2018 04:08 AM  
 HDL Cholesterol 45 07/06/2018 04:08 AM  
 LDL,Direct 161 (H) 07/06/2018 04:08 AM  
 LDL, calculated Not calculated due to elevated triglyceride level 07/06/2018 04:08 AM  
 Triglyceride >4000 (H) 07/06/2018 04:08 AM  
 CHOL/HDL Ratio 11.8 (H) 07/06/2018 04:08 AM  
 
Lab Results Component Value Date/Time Glucose (POC) 133 (H) 07/07/2018 07:34 AM  
 Glucose (POC) 125 (H) 07/07/2018 06:56 AM  
 Glucose (POC) 135 (H) 07/07/2018 01:52 AM  
 Glucose (POC) 168 (H) 07/06/2018 09:03 PM  
 Glucose (POC) 130 (H) 07/06/2018 04:21 PM  
 
Lab Results Component Value Date/Time  Color YELLOW/STRAW 07/05/2018 12:22 AM  
 Appearance CLEAR 07/05/2018 12:22 AM  
 Specific gravity >1.030 (H) 07/05/2018 12:22 AM  
 Specific gravity >1.030 (H) 05/17/2018 04:45 PM  
 pH (UA) 5.0 07/05/2018 12:22 AM  
 Protein 30 (A) 07/05/2018 12:22 AM  
 Glucose >1000 (A) 07/05/2018 12:22 AM  
 Ketone NEGATIVE  07/05/2018 12:22 AM  
 Bilirubin NEGATIVE  07/05/2018 12:22 AM  
 Urobilinogen 0.2 07/05/2018 12:22 AM  
 Nitrites NEGATIVE  07/05/2018 12:22 AM  
 Leukocyte Esterase NEGATIVE  07/05/2018 12:22 AM  
 Epithelial cells FEW 07/05/2018 12:22 AM  
 Bacteria NEGATIVE  07/05/2018 12:22 AM  
 WBC 0-4 07/05/2018 12:22 AM  
 RBC 0-5 07/05/2018 12:22 AM

## 2018-07-07 NOTE — PROGRESS NOTES
Endocrinology Progress Note Reviewed blood sugars from yesterday remotely from [facility]. Component GLUCOSE,FAST - POC Latest Ref Rng & Units 65 - 100 mg/dL  
7/7/2018 
    7:34  (H)  
7/7/2018 
    6:56  (H)  
7/7/2018 
    1:52  (H)  
7/6/2018 
    9:03  (H)  
7/6/2018 
    4:21  (H)  
7/6/2018 11:20  (H) She received lantus 100 units every 12 hours and required a dose of 5 units of correction humalog for lunch and bedtime and her sugars have been well controlled on this regimen. Her lipid panel returned showing a TG level > 4000 similar to prior admission in 10/17. At that time she required plasma exchange x 2. Her lipase was normal yesterday so it does not appear that this level is causing pancreatitis at this time. She is still having nausea overnight per the notes so I will hold on any meal time insulin and also on restarting metformin. I will add back her lipid lowering regimen of atorvastatin, fenofibrate and fish oil to help lower her TGs and keep her insulin regimen the same for now. Please don't hesitate to call [phone] with further questions.  
 
Ruthann White MD

## 2018-07-07 NOTE — PROGRESS NOTES
01:56  Pt c/o steadily increasing tightness/bloating in abd since eating dinner this evening. Also c/o \"chest heaviness\" that has started around 0100, and left sided headache that characterized as pressure. VSS. EKG done. Dr. Mary Ellen Matthews made aware of complaints, VS, and EKG report. Skin warm,dry, appropriate color. No acute distress. Order received for one time dose of IV protonix and troponin level x1. Will proceed with order and continue to monitor pt. Kunal Fierro RN 
 
07:30  Bedside and Verbal shift change report given to Lam Smart RN (oncoming nurse) by Kunal Fierro RN (offgoing nurse). Report included the following information SBAR, Kardex, MAR, Recent Results and Cardiac Rhythm NSR.

## 2018-07-08 LAB
GLUCOSE BLD STRIP.AUTO-MCNC: 148 MG/DL (ref 65–100)
GLUCOSE BLD STRIP.AUTO-MCNC: 173 MG/DL (ref 65–100)
GLUCOSE BLD STRIP.AUTO-MCNC: 186 MG/DL (ref 65–100)
GLUCOSE BLD STRIP.AUTO-MCNC: 204 MG/DL (ref 65–100)
SERVICE CMNT-IMP: ABNORMAL

## 2018-07-08 PROCEDURE — 65660000000 HC RM CCU STEPDOWN

## 2018-07-08 PROCEDURE — 74011250637 HC RX REV CODE- 250/637: Performed by: INTERNAL MEDICINE

## 2018-07-08 PROCEDURE — 74011250636 HC RX REV CODE- 250/636: Performed by: HOSPITALIST

## 2018-07-08 PROCEDURE — 74011250637 HC RX REV CODE- 250/637: Performed by: HOSPITALIST

## 2018-07-08 PROCEDURE — 82962 GLUCOSE BLOOD TEST: CPT

## 2018-07-08 PROCEDURE — 74011636637 HC RX REV CODE- 636/637: Performed by: INTERNAL MEDICINE

## 2018-07-08 RX ORDER — GUAIFENESIN 600 MG/1
600 TABLET, EXTENDED RELEASE ORAL EVERY 12 HOURS
Status: DISCONTINUED | OUTPATIENT
Start: 2018-07-08 | End: 2018-07-09 | Stop reason: HOSPADM

## 2018-07-08 RX ADMIN — INSULIN GLARGINE 50 UNITS: 100 INJECTION, SOLUTION SUBCUTANEOUS at 09:09

## 2018-07-08 RX ADMIN — INSULIN LISPRO 5 UNITS: 100 INJECTION, SOLUTION INTRAVENOUS; SUBCUTANEOUS at 13:06

## 2018-07-08 RX ADMIN — ONDANSETRON 8 MG: 4 TABLET, ORALLY DISINTEGRATING ORAL at 18:19

## 2018-07-08 RX ADMIN — FENOFIBRATE 145 MG: 145 TABLET ORAL at 10:34

## 2018-07-08 RX ADMIN — ONDANSETRON 8 MG: 4 TABLET, ORALLY DISINTEGRATING ORAL at 10:34

## 2018-07-08 RX ADMIN — ACETAMINOPHEN 650 MG: 325 TABLET ORAL at 01:39

## 2018-07-08 RX ADMIN — INSULIN GLARGINE 50 UNITS: 100 INJECTION, SOLUTION SUBCUTANEOUS at 22:01

## 2018-07-08 RX ADMIN — Medication 10 ML: at 22:02

## 2018-07-08 RX ADMIN — INSULIN LISPRO 10 UNITS: 100 INJECTION, SOLUTION INTRAVENOUS; SUBCUTANEOUS at 22:00

## 2018-07-08 RX ADMIN — ATORVASTATIN CALCIUM 40 MG: 40 TABLET, FILM COATED ORAL at 10:34

## 2018-07-08 RX ADMIN — GUAIFENESIN 600 MG: 600 TABLET, EXTENDED RELEASE ORAL at 13:06

## 2018-07-08 RX ADMIN — SODIUM CHLORIDE 100 ML/HR: 900 INJECTION, SOLUTION INTRAVENOUS at 21:50

## 2018-07-08 RX ADMIN — OMEGA-3 FATTY ACIDS CAP 1000 MG 2 CAPSULE: 1000 CAP at 10:34

## 2018-07-08 RX ADMIN — GUAIFENESIN 600 MG: 600 TABLET, EXTENDED RELEASE ORAL at 22:00

## 2018-07-08 RX ADMIN — OMEGA-3 FATTY ACIDS CAP 1000 MG 2 CAPSULE: 1000 CAP at 18:19

## 2018-07-08 RX ADMIN — ACETAMINOPHEN 650 MG: 325 TABLET ORAL at 19:18

## 2018-07-08 RX ADMIN — INSULIN LISPRO 5 UNITS: 100 INJECTION, SOLUTION INTRAVENOUS; SUBCUTANEOUS at 18:19

## 2018-07-08 NOTE — PROGRESS NOTES
Hospitalist Progress Note              Cassandra Sanchez MD.                                                             Cell: [phone]                               NAME:  Kelsie Berger  :  1980  MRN:  [id number]  Date of Service:  2018    Summary: 40 y.o. female who presented on 2018 due RUQ and epigastric pain. She was found to have uncontrolled blood sugar       Assessment/Plan:  Hyperglycemic hyperosmolar state: Poa due to likely insulin non complaince requiring insulin gtt. Dr. Leland Gannon from Endocrinology managing insulin    Nasal congestion  - Feels congested  - Start Mucinex    Hypertriglyceridemia: Due to uncontrolled DM. TG > 4000 and will probably benefit from plasmapharesis. Continue IVF and basal insulin  - Started on Statin, Fenofibrate and Fish oil    Nausea  - Quite nauseated especially after food  - Start Zofran 8 mg ODT 30 mins prior to meals. Can use IV Zofran if she remains nauseated after. Epigastric/ RUQ pain: May be due to pancreatitis related to elevated TG. Appears to be resolving    Thrombocytopenia: monitor for now    Hyponatremia: Likely pseudo-hyponatremia in the setting of hypertriglyceridemia. Continue IVF    Hypokalemia requiring kcl supplementation    Obesity   Code status: full  DVT prophylaxsis: SCD  Dispo: to be detremined         Interval History/Subjective:  F/u for uncontrolled BS  She is feeling better. Abdominal pain also better. No fever or chills. No chest pain or SOB  Nausea with food. Review of Systems:  Pertinent items are noted in HPI. Objective:     VITALS:   Last 24hrs VS reviewed since prior progress note.  Most recent are:  Visit Vitals    /85 (BP 1 Location: Left arm, BP Patient Position: At rest)    Pulse 68    Temp 97.6 °F (36.4 °C)    Resp 18    Ht 5' 8\" (1.727 m)    Wt 123.4 kg (272 lb 0.8 oz)    SpO2 100%    BMI 41.36 kg/m2       Intake/Output Summary (Last 24 hours) at 07/08/18 1215  Last data filed at 07/08/18 1201   Gross per 24 hour   Intake          3051.66 ml   Output                0 ml   Net          3051.66 ml        PHYSICAL EXAM:  General: No acute distress, cooperative, pleasant , Obese  EENT: EOMI. Anicteric sclerae. Oral mucous moist, oropharynx benign  Resp: CTA bilaterally. No wheezing/rhonchi/rales. No accessory muscle use  CV: Regular rhythm, normal rate, no murmurs, gallops, rubs  GI: Soft, non distended, non tender. normoactive bowel sounds, no hepatosplenomegaly Extremities: No edema, warm, 2+ pulses throughout  Neurologic: Moves all extremities. AAOx3, CN II-XII grossly intact  Psych: Good insight. Not anxious nor agitated. Skin: Good Turgor, no rashes or ulcers    Lab Data Personally Reviewed: (see below)     Medications list Personally Reviewed:  x YES  NO     _______________________________________________________________________  Care Plan discussed with:  Patient/Family and Nurse    Total NON critical care TIME:  30 minutes    Debra Lyman MD     Procedures: see electronic medical records for all procedures/Xrays and details which were not copied into this note but were reviewed prior to creation of Plan.       LABS:  Recent Labs      07/07/18 0219 07/06/18   0408   WBC  5.9  6.5   HGB  10.4*  10.1*   HCT  31.7*  31.6*   PLT  117*  117*     Recent Labs      07/07/18   0219  07/06/18   0408   NA  133*  130*   K  3.5  3.1*   CL  103  99   CO2  25  25   BUN  UNABLE TO OBTAIN ACCURATE RESULTS DUE TO GROSS LIPEMIA  UNABLE TO OBTAIN ACCURATE RESULTS DUE TO GROSS LIPEMIA   CREA  0.41*  0.59   GLU  266*  282*   CA  UNABLE TO OBTAIN ACCURATE RESULTS DUE TO GROSS LIPEMIA  UNABLE TO OBTAIN ACCURATE RESULTS DUE TO GROSS LIPEMIA   MG  1.8  1.7     Recent Labs      07/06/18   0408   SGOT  233*   ALT  UNABLE TO OBTAIN ACCURATE RESULTS DUE TO GROSS LIPEMIA   AP  UNABLE TO OBTAIN ACCURATE RESULTS DUE TO GROSS LIPEMIA   TBILI  1.0   TP  UNABLE TO OBTAIN ACCURATE RESULTS DUE TO GROSS LIPEMIA   ALB  1.9*   GLOB  Cannot be calculated   LPSE  108     No results for input(s): INR, PTP, APTT in the last 72 hours. No lab exists for component: INREXT, INREXT   No results for input(s): FE, TIBC, PSAT, FERR in the last 72 hours. No results found for: FOL, RBCF   No results for input(s): PH, PCO2, PO2 in the last 72 hours.   Recent Labs      07/07/18   0219  07/06/18   0408   TROIQ  <0.05  <0.05     Lab Results   Component Value Date/Time    Cholesterol, total 532 (H) 07/06/2018 04:08 AM    HDL Cholesterol 45 07/06/2018 04:08 AM    LDL,Direct 161 (H) 07/06/2018 04:08 AM    LDL, calculated Not calculated due to elevated triglyceride level 07/06/2018 04:08 AM    Triglyceride >4000 (H) 07/06/2018 04:08 AM    CHOL/HDL Ratio 11.8 (H) 07/06/2018 04:08 AM     Lab Results   Component Value Date/Time    Glucose (POC) 173 (H) 07/08/2018 11:34 AM    Glucose (POC) 148 (H) 07/08/2018 07:30 AM    Glucose (POC) 165 (H) 07/07/2018 09:10 PM    Glucose (POC) 170 (H) 07/07/2018 04:33 PM    Glucose (POC) 145 (H) 07/07/2018 11:39 AM     Lab Results   Component Value Date/Time    Color YELLOW/STRAW 07/05/2018 12:22 AM    Appearance CLEAR 07/05/2018 12:22 AM    Specific gravity >1.030 (H) 07/05/2018 12:22 AM    Specific gravity >1.030 (H) 05/17/2018 04:45 PM    pH (UA) 5.0 07/05/2018 12:22 AM    Protein 30 (A) 07/05/2018 12:22 AM    Glucose >1000 (A) 07/05/2018 12:22 AM    Ketone NEGATIVE  07/05/2018 12:22 AM    Bilirubin NEGATIVE  07/05/2018 12:22 AM    Urobilinogen 0.2 07/05/2018 12:22 AM    Nitrites NEGATIVE  07/05/2018 12:22 AM    Leukocyte Esterase NEGATIVE  07/05/2018 12:22 AM    Epithelial cells FEW 07/05/2018 12:22 AM    Bacteria NEGATIVE  07/05/2018 12:22 AM    WBC 0-4 07/05/2018 12:22 AM    RBC 0-5 07/05/2018 12:22 AM

## 2018-07-08 NOTE — PROGRESS NOTES
PCU SHIFT NURSING NOTE      Bedside shift change report given to Castillo Lei (oncoming nurse) by Errol Garner (offgoing nurse). Report included the following information SBAR, Kardex, Procedure Summary, Intake/Output, MAR and Recent Results. Shift Summary:   0730 - Pt resting quietly with eyes closed. Wakes easily, but states she wants to sleep more this morningn      Admission Date 7/4/2018   Admission Diagnosis Hyperosmolar non-ketotic state in patient with type 2 diabetes mellitus ([facility] [address]   Consults IP CONSULT TO ENDOCRINOLOGY        Consults   []PT   []OT   []Speech   []Case Management      [] Palliative      Cardiac Monitoring Order   []Yes   []No     IV drips   []Yes    Drip:                            Dose:  Drip:                            Dose:  Drip:                            Dose:   []No     GI Prophylaxis   []Yes   []No         DVT Prophylaxis   SCDs:  Sequential Compression Device: Bilateral          Romario stockings:         [] Medication   []Contraindicated   []None      Activity Level Activity Level: Up ad christopher     Activity Assistance: No assistance needed   Purposeful Rounding every 1-2 hour? []Yes   Mancia Score  Total Score: 1   Bed Alarm (If score 3 or >)   []Yes   [] Refused (See signed refusal form in chart)   Joel Score  Joel Score: 22   Joel Score (if score 14 or less)   []PMT consult   []Wound Care consult      []Specialty bed   [] Nutrition consult          Needs prior to discharge:   Home O2 required:    []Yes   []No    If yes, how much O2 required?     Other:    Last Bowel Movement: Last Bowel Movement Date: 07/07/18      Influenza Vaccine Received Flu Vaccine for Current Season (usually Sept-March): Not Flu Season        Pneumonia Vaccine           Diet Active Orders   Diet    DIET DIABETIC CONSISTENT CARB Regular      LDAs               Peripheral IV 07/05/18 Left Forearm (Active)   Site Assessment Clean, dry, & intact 7/8/2018 12:05 PM   Phlebitis Assessment 0 7/8/2018 12:05 PM Infiltration Assessment 0 7/8/2018 12:05 PM   Dressing Status Clean, dry, & intact 7/8/2018 12:05 PM   Dressing Type Tape;Transparent 7/8/2018 12:05 PM   Hub Color/Line Status Pink 7/8/2018 12:05 PM   Alcohol Cap Used Yes 7/5/2018  6:33 AM                      Urinary Catheter      Intake & Output   Date 07/07/18 0700 - 07/08/18 0659 07/08/18 0700 - 07/09/18 0659   Shift 4403-4721 0681-4795 24 Hour Total 2525-4423 3779-9951 24 Hour Total   I  N  T  A  K  E   P.O. 300  300 1140  1140      P. O. 300  300 1140  1140    I.V.  (mL/kg/hr) 975  (0.7) 1453.3  (1) 2428.3  (0.8) 1175  1175      Volume (0.9% sodium chloride infusion) 975 1453.3 2428.3 1175  1175    Shift Total  (mL/kg) 1275  (10.3) 1453.3  (11.8) 2728.3  (22.1) 2315  (18.8)  2315  (18.8)   O  U  T  P  U  T   Urine  (mL/kg/hr)            Urine Occurrence(s)    4 x  4 x    Shift Total  (mL/kg)         NET 1275 1453.3 2728.3 2315  2315   Weight (kg) 123.4 123.4 123.4 123.4 123.4 123.4         Readmission Risk Assessment Tool Score Low Risk            8       Total Score        4 IP Visits Last 12 Months (1-3=4, 4=9, >4=11)    4 Pt. Coverage (Medicare=5 , Medicaid, or Self-Pay=4)        Criteria that do not apply:    Has Seen PCP in Last 6 Months (Yes=3, No=0)    . Living with Significant Other. Assisted Living. LTAC. SNF.  or   Rehab    Patient Length of Stay (>5 days = 3)    Charlson Comorbidity Score (Age + Comorbid Conditions)       Expected Length of Stay 2d 21h   Actual Length of Stay 3

## 2018-07-09 VITALS
HEART RATE: 72 BPM | SYSTOLIC BLOOD PRESSURE: 147 MMHG | DIASTOLIC BLOOD PRESSURE: 80 MMHG | TEMPERATURE: 98 F | OXYGEN SATURATION: 100 % | WEIGHT: 272.05 LBS | RESPIRATION RATE: 20 BRPM | HEIGHT: 68 IN | BODY MASS INDEX: 41.23 KG/M2

## 2018-07-09 LAB
ANION GAP SERPL CALC-SCNC: 7 MMOL/L (ref 5–15)
BUN SERPL-MCNC: 7 MG/DL (ref 6–20)
BUN/CREAT SERPL: 10 (ref 12–20)
CALCIUM SERPL-MCNC: ABNORMAL MG/DL (ref 8.5–10.1)
CHLORIDE SERPL-SCNC: 108 MMOL/L (ref 97–108)
CO2 SERPL-SCNC: 24 MMOL/L (ref 21–32)
CREAT SERPL-MCNC: 0.7 MG/DL (ref 0.55–1.02)
ERYTHROCYTE [DISTWIDTH] IN BLOOD BY AUTOMATED COUNT: 15.1 % (ref 11.5–14.5)
GLUCOSE BLD STRIP.AUTO-MCNC: 135 MG/DL (ref 65–100)
GLUCOSE BLD STRIP.AUTO-MCNC: 154 MG/DL (ref 65–100)
GLUCOSE BLD STRIP.AUTO-MCNC: 90 MG/DL (ref 65–100)
GLUCOSE SERPL-MCNC: 139 MG/DL (ref 65–100)
HCT VFR BLD AUTO: 32.3 % (ref 35–47)
HGB BLD-MCNC: 9.5 G/DL (ref 11.5–16)
MCH RBC QN AUTO: 27.7 PG (ref 26–34)
MCHC RBC AUTO-ENTMCNC: 33.8 G/DL (ref 30–36.5)
MCV RBC AUTO: 81.9 FL (ref 80–99)
NRBC # BLD: 0 K/UL (ref 0–0.01)
NRBC BLD-RTO: 0 PER 100 WBC
PLATELET # BLD AUTO: 101 K/UL (ref 150–400)
PMV BLD AUTO: 11.2 FL (ref 8.9–12.9)
POTASSIUM SERPL-SCNC: 3.6 MMOL/L (ref 3.5–5.1)
RBC # BLD AUTO: 3.43 M/UL (ref 3.8–5.2)
SERVICE CMNT-IMP: ABNORMAL
SERVICE CMNT-IMP: ABNORMAL
SERVICE CMNT-IMP: NORMAL
SODIUM SERPL-SCNC: 139 MMOL/L (ref 136–145)
WBC # BLD AUTO: 4.5 K/UL (ref 3.6–11)

## 2018-07-09 PROCEDURE — 80048 BASIC METABOLIC PNL TOTAL CA: CPT | Performed by: INTERNAL MEDICINE

## 2018-07-09 PROCEDURE — 74011250636 HC RX REV CODE- 250/636: Performed by: HOSPITALIST

## 2018-07-09 PROCEDURE — 85027 COMPLETE CBC AUTOMATED: CPT | Performed by: INTERNAL MEDICINE

## 2018-07-09 PROCEDURE — 74011250637 HC RX REV CODE- 250/637: Performed by: INTERNAL MEDICINE

## 2018-07-09 PROCEDURE — 36415 COLL VENOUS BLD VENIPUNCTURE: CPT | Performed by: INTERNAL MEDICINE

## 2018-07-09 PROCEDURE — 82962 GLUCOSE BLOOD TEST: CPT

## 2018-07-09 PROCEDURE — 74011636637 HC RX REV CODE- 636/637: Performed by: INTERNAL MEDICINE

## 2018-07-09 RX ORDER — INSULIN GLARGINE 100 [IU]/ML
100 INJECTION, SOLUTION SUBCUTANEOUS 2 TIMES DAILY
Qty: 1 VIAL | Refills: 3 | Status: SHIPPED | OUTPATIENT
Start: 2018-07-09 | End: 2018-07-09 | Stop reason: SDUPTHER

## 2018-07-09 RX ORDER — METFORMIN HYDROCHLORIDE 500 MG/1
500 TABLET ORAL 2 TIMES DAILY WITH MEALS
Qty: 60 TAB | Refills: 3 | Status: SHIPPED | OUTPATIENT
Start: 2018-07-09 | End: 2018-07-26 | Stop reason: SDUPTHER

## 2018-07-09 RX ORDER — FENOFIBRATE 145 MG/1
145 TABLET, COATED ORAL DAILY
Qty: 60 TAB | Refills: 2 | Status: SHIPPED | OUTPATIENT
Start: 2018-07-10 | End: 2018-07-26 | Stop reason: SDUPTHER

## 2018-07-09 RX ORDER — ERGOCALCIFEROL 1.25 MG/1
50000 CAPSULE ORAL
Status: DISCONTINUED | OUTPATIENT
Start: 2018-07-09 | End: 2018-07-09 | Stop reason: HOSPADM

## 2018-07-09 RX ORDER — METFORMIN HYDROCHLORIDE 500 MG/1
500 TABLET ORAL 2 TIMES DAILY WITH MEALS
Status: DISCONTINUED | OUTPATIENT
Start: 2018-07-09 | End: 2018-07-09 | Stop reason: HOSPADM

## 2018-07-09 RX ORDER — ERGOCALCIFEROL 1.25 MG/1
50000 CAPSULE ORAL
Qty: 12 CAP | Refills: 0 | Status: SHIPPED | OUTPATIENT
Start: 2018-07-11 | End: 2018-07-26 | Stop reason: SDUPTHER

## 2018-07-09 RX ORDER — OMEGA-3-ACID ETHYL ESTERS 1 G/1
2 CAPSULE, LIQUID FILLED ORAL 2 TIMES DAILY
Qty: 30 CAP | Refills: 2 | Status: SHIPPED | OUTPATIENT
Start: 2018-07-09 | End: 2018-07-26 | Stop reason: SDUPTHER

## 2018-07-09 RX ORDER — INSULIN LISPRO 100 [IU]/ML
INJECTION, SOLUTION INTRAVENOUS; SUBCUTANEOUS
Qty: 2 VIAL | Refills: 0 | Status: SHIPPED | COMMUNITY
Start: 2018-07-09 | End: 2018-08-23 | Stop reason: SDUPTHER

## 2018-07-09 RX ORDER — ATORVASTATIN CALCIUM 40 MG/1
40 TABLET, FILM COATED ORAL DAILY
Qty: 30 TAB | Refills: 3 | Status: SHIPPED | OUTPATIENT
Start: 2018-07-09 | End: 2018-07-26

## 2018-07-09 RX ORDER — INSULIN GLARGINE 100 [IU]/ML
100 INJECTION, SOLUTION SUBCUTANEOUS 2 TIMES DAILY
Qty: 2 VIAL | Refills: 0 | Status: SHIPPED | COMMUNITY
Start: 2018-07-09 | End: 2018-08-23 | Stop reason: SDUPTHER

## 2018-07-09 RX ADMIN — INSULIN GLARGINE 50 UNITS: 100 INJECTION, SOLUTION SUBCUTANEOUS at 08:27

## 2018-07-09 RX ADMIN — INSULIN LISPRO 5 UNITS: 100 INJECTION, SOLUTION INTRAVENOUS; SUBCUTANEOUS at 16:56

## 2018-07-09 RX ADMIN — ONDANSETRON 8 MG: 4 TABLET, ORALLY DISINTEGRATING ORAL at 08:26

## 2018-07-09 RX ADMIN — OMEGA-3 FATTY ACIDS CAP 1000 MG 2 CAPSULE: 1000 CAP at 08:27

## 2018-07-09 RX ADMIN — ONDANSETRON 8 MG: 4 TABLET, ORALLY DISINTEGRATING ORAL at 11:34

## 2018-07-09 RX ADMIN — METFORMIN HYDROCHLORIDE 500 MG: 500 TABLET, FILM COATED ORAL at 09:19

## 2018-07-09 RX ADMIN — ONDANSETRON 8 MG: 4 TABLET, ORALLY DISINTEGRATING ORAL at 16:56

## 2018-07-09 RX ADMIN — ATORVASTATIN CALCIUM 40 MG: 40 TABLET, FILM COATED ORAL at 08:27

## 2018-07-09 RX ADMIN — ERGOCALCIFEROL 50000 UNITS: 1.25 CAPSULE ORAL at 09:19

## 2018-07-09 RX ADMIN — SODIUM CHLORIDE 100 ML/HR: 900 INJECTION, SOLUTION INTRAVENOUS at 09:19

## 2018-07-09 RX ADMIN — GUAIFENESIN 600 MG: 600 TABLET, EXTENDED RELEASE ORAL at 08:26

## 2018-07-09 RX ADMIN — FENOFIBRATE 145 MG: 145 TABLET ORAL at 08:26

## 2018-07-09 NOTE — DISCHARGE INSTRUCTIONS
Discharge Instructions       PATIENT ID: Belle Lutz  MRN: [id number]   YOB: 1980    DATE OF ADMISSION: 7/4/2018 11:38 PM    DATE OF DISCHARGE: 7/9/2018    PRIMARY CARE PROVIDER: None       DISCHARGING PHYSICIAN: Gaye Mclain MD    To contact this individual call [phone] and ask the  to page. If unavailable ask to be transferred the Adult Hospitalist Department. DISCHARGE DIAGNOSES Uncontrolled diabetes mellitus, Hypertriglyceridemia, Truncal Obesity    CONSULTATIONS: IP CONSULT TO ENDOCRINOLOGY    PROCEDURES/SURGERIES: * No surgery found *    PENDING TEST RESULTS:   At the time of discharge the following test results are still pending:     FOLLOW UP APPOINTMENTS:   Follow-up Information     Follow up With Details Comments [facility]  They are open on Wednesdays from 6pm to 9pm. No appointment necessary just go in on that day. [phone]  [address] [place] [address]    None   None (023) Patient stated that they have no PCP      David Alcala MD In 1 week  [facility] [address]  [phone]             ADDITIONAL CARE RECOMMENDATIONS:     DIET: Diabetic Diet    ACTIVITY: Activity as tolerated    WOUND CARE:     EQUIPMENT needed:       DISCHARGE MEDICATIONS:   See Medication Reconciliation Form    · It is important that you take the medication exactly as they are prescribed. · Keep your medication in the bottles provided by the pharmacist and keep a list of the medication names, dosages, and times to be taken in your wallet. · Do not take other medications without consulting your doctor. NOTIFY YOUR PHYSICIAN FOR ANY OF THE FOLLOWING:   Fever over 101 degrees for 24 hours. Chest pain, shortness of breath, fever, chills, nausea, vomiting, diarrhea, change in mentation, falling, weakness, bleeding. Severe pain or pain not relieved by medications.   Or, any other signs or symptoms that you may have questions about. DISPOSITION:   x Home With:   OT  PT  HH  RN       SNF/Inpatient Rehab/LTAC    Independent/assisted living    Hospice    Other:     CDMP Checked:   Yes x       Signed:   Deandre Edwards MD  7/9/2018  11:30 AM      Dr. Fuentes Res discharge instructions:    1) Take lantus 100 units every 12 hours as 2 smaller injections of 50 units back to back in different locations. 2) Check your sugar 3 times daily fasting and 2 hours after 2 of your meals. If your sugar is over 150, dose humalog based on the scale below:  Blood sugar            151-200  5 units      201-250  10 units     251-300  15 units      301-350  20 units      351-400  25 units  401-450                       30 units  451-500                       35 units    3) Continue taking 1/2 of the 1000 mg metformin with breakfast and dinner through Wed night and on Thursday start 1 whole tab with breakfast and dinner. 4) Our office will call you to schedule a follow up visit. 5) Please don't hesitate to call [phone] with further questions.

## 2018-07-09 NOTE — PROGRESS NOTES
Endocrinology Progress Note    Reviewed blood sugars from Saturday and Sunday:    Component       GLUCOSE,FAST - POC   Latest Ref Rng & Units       65 - 100 mg/dL   7/9/2018      7:39 AM 90   7/8/2018      9:08  (H)   7/8/2018      4:37  (H)   7/8/2018      11:34  (H)   7/8/2018      7:30  (H)   7/7/2018      9:10  (H)   7/7/2018      4:33  (H)   7/7/2018      11:39  (H)   7/7/2018      7:34  (H)   7/7/2018      6:56  (H)     Her blood sugars have mostly been controlled between 120-180 but her pre-dinner and bedtime sugars were both over 180. She required a total of 20 units of correction humalog yesterday. States that her nausea is better and she is able to keep her food down and feels ready to go home today. Assessment/Plan:     1. Uncontrolled type 2 diabetes mellitus with diabetic polyneuropathy, with long-term current use of insulin ([address]): her most recent Hgb A1c was was 10.3% in 7/18 up from 8.9% in 5/18 down from 11.2% in 12/17 up from 10.9% in 10/17.  She was very insulin resistant and did best while on U-500 in the past and may consider changing back to this in the future but for now is doing well on basal insulin with lantus and correction humalog. Will add back metformin today at half her home dose given nausea is better. Wilma Sohail plan on avoiding GLP-1 and DPP-IV agents as these can induce pancreatitis and she has already had several episodes of pancreatitis from high TGs. I will check my supplies of samples of lantus and humalog vials and syringes and bring these by at lunch time as she currently doesn't have insurance. From my perspective, she should be ready for discharge later this afternoon. - cont lantus 100 units every 12 hours  - cont humalog 5:50 > 150 for correction  - restart metformin 500 mg bid  - check bs 4 times per day        2.  Essential hypertension: her BP is running above goal < 140/90 and this is off lisinopril so will restart. - restart lisinopril 10 mg daily       3. Hyperlipidemia LDL goal <100:  and TG > 4000 during inpatient stay in 10/17 when she require plasmapheresis and insulin drip to treat the high TGs.  Part of her severely elevated TGs is relative deficiency of lipoprotein lipase which occurs with severe hyperglycemia as insulin is needed for this enzyme to work to cleave TGs.  Therefore, with better blood sugar control, her TGs should improve. Her TC was 532 and TG > 4000 during this admission and I have resumed her old lipid regimen. - cont lipitor 40 mg daily  - copnt fenofibrate 145 mg daily  - cont lovaza 2 caps bid            4.  Vitamin D deficiency: Her level was < 9 during this hospital stay and I have started her on weekly vitamin D. Will benefit from a higher dose 3x/week to get her level > 30 so will give her another dose today.   - increase ergocalciferol 50K units to 3x/week     I spent 15 minutes of face to face time on her case and > 50% of the time was spent reviewing the chart and coordinating her care with the nurse caring for her.     Please don't hesitate to call [phone] with further questions.  Daniel Byrd MD

## 2018-07-09 NOTE — PROGRESS NOTES
Reviewed discharge instructions with Pt. Verbalized understanding. Copy of discharge instructions, AVS and printed prescriptions provided in discharge booklet. Follow up appointments were not made for patient. All questions were appropriate to content and were answered to pt's satisfaction. Telemetry and all IV's were removed. Pt assisted in getting dressed. Voices no c/o at this time. Pt taken to exit via wheelchair with volunteer services.

## 2018-07-09 NOTE — PROGRESS NOTES
Reason for Admission:   Patient came into ed with worsening diffuse epigastric abdominal pain with nausea and diarrhea. She also reports fever. She was admitted for sepsis to hospital in [place] for three weeks in April and May 2018. She lives with her , mother in law, sister in law and her children. She states she was independent in adl's and iadl's prior to coming to the hospital. She is looking for a job at  This time. She denies using dme or oxygen at home. She gets her prescriptions filled at this time at the [facility] in [place] [address]. She does not have insurance at the time. She states her medicaid lapsed and [facility] is working to assist her in reapplying for it. She will also give her a Rapportive application. Patient goes to Dr Dalton Vitale for her diabetes. She does not have a pcp at the moment and she is interested in getting connected with the [address] [place] [address]. Gave her information and informed her to go there on Wednesday. They are open Wednesdays from 6pm to 9pm. She has the number and information placed on avs for her. RRAT Score:    8                   Plan for utilizing home health:    She does not think she will need home health at this time. She is not homebound.                        Likelihood of Readmission:  Low                           Transition of Care Plan:     Patient will follow up with endocrinologist and will follow up with the [facility] [address] Management Interventions  Transition of Care Consult (CM Consult): Discharge Planning  Discharge Durable Medical Equipment:  (Patient has glucometer .)  Current Support Network: Lives with Spouse  Confirm Follow Up Transport: Family  Plan discussed with Pt/Family/Caregiver: Yes  Discharge Location  Discharge Placement: Home

## 2018-07-09 NOTE — CDMP QUERY
Dr. Greta Sandra :  Patient is noted to have a BMI of 41.36. Please clarify if this patient is:     =>Morbidly obese (BMI ³ 40)  =>Obese (BMI 30 - 39.9)  =>Overweight (BMI 25 - 29.9)  =>Other explanation of clinical findings  =>Clinically Undetermined (no explanation for clinical findings)    Presentation:   Ht: 5' 8\" (1.727 m)  Wt: 123.4 kg (272 lb 0.8 oz)  Admission Wt: 121.4 kg (267 lb 10.2 oz)      BMI: 41.36 kg/m 2    REFERENCE:  The [address] [place] [address] has issued a statement indicating that, \"Individuals who are overweight, obese, or morbidly obese are at an increased risk for certain medical conditions when compared to persons of normal weight. Therefore, these conditions are always clinically significant and reportable when documented by the provider. Please clarify and document your clinical opinion in the progress notes and discharge summary, including the definitive and or presumptive diagnosis, (suspected or probable), related to the above clinical findings. Please include clinical findings supporting your diagnosis. Thank Vincenzo Trujillo  [facility]  [address]; UUI;8844

## 2018-07-09 NOTE — DISCHARGE SUMMARY
Discharge Summary       PATIENT ID: Jane Sage  MRN: [id number]   YOB: 1980    DATE OF ADMISSION: 7/4/2018 11:38 PM    DATE OF DISCHARGE: 7/9/2018  PRIMARY CARE PROVIDER: None       DISCHARGING PHYSICIAN: Dewayne Singh MD    To contact this individual call [phone] and ask the  to page. If unavailable ask to be transferred the Adult Hospitalist Department. CONSULTATIONS: IP CONSULT TO ENDOCRINOLOGY    PROCEDURES/SURGERIES: * No surgery found *    ADMITTING DIAGNOSES & HOSPITAL COURSE:   40 y.o. female who presented on 7/4/2018 due to right upper quadrant and epigastric pain. She was found to have uncontrolled blood sugar. Patient was admitted for hyperglycemic hyperosmolar state requiring insulin drip and aggressive intravenous fluid hydration. Patient was not a DKA. Endocrinology was consulted and she was switch from insulin drip to basal insulin and correction humalog as per sliding scale. Serum lipase done showed hypertriglyceridemia with TG > 4000. She did not undergo plasmapheresis but started on fenofibrate and statin. Regarding abdominal pain, this subsided after better blood sugar and fluid hydration. Patient was discharged home on lantus 100 units twice daily and insulin sliding scale. She was re-started on metformin and she was counseled to check her blood sugar at least 4 times daily. Patient will follow up with PCP and endocrinology on an out-patient basis. DISCHARGE DIAGNOSES / PLAN:      Hyperglycemic hyperosmolar state: Poa due to likely insulin non complaince requiring insulin gtt. Dr. Ra Mcfarland from Endocrinology managing insulin     Nasal congestion  - Feels congested  - Start Mucinex     Hypertriglyceridemia: Due to uncontrolled DM. TG > 4000 and will probably benefit from plasmapharesis.   Continue IVF and basal insulin  - Started on Statin, Fenofibrate and Fish oil     Nausea  - Resolved     Epigastric/ RUQ pain: May be due to pancreatitis related to elevated TG. Resolved s/p insulin and NS IVF hydration.     Thrombocytopenia: monitor for now     Hyponatremia: Likely pseudo-hyponatremia in the setting of hypertriglyceridemia. Continue IVF     Hypokalemia requiring kcl supplementation  Resolved. Disposition prior to discharge: Hemodynamically stable and has improved. Discharge to home          PENDING TEST RESULTS:   At the time of discharge the following test results are still pending:     FOLLOW UP APPOINTMENTS:    Follow-up Information     Follow up With Details Comments [facility]  They are open on Wednesdays from 6pm to 9pm. No appointment necessary just go in on that day. [phone]  [address]    None   None (988) Patient stated that they have no PCP      Zeke Rose MD In 1 week  [address] [facility]  [address]       Zeke Rose, [address] follow-up scheduled at time ( If you have questions or need to reschedule please call [phone]  [facility]  [phone]             ADDITIONAL CARE RECOMMENDATIONS:     DIET: Diabetic Diet    ACTIVITY: Activity as tolerated    WOUND CARE: None    EQUIPMENT needed:       DISCHARGE MEDICATIONS:  Discharge Medication List as of 7/9/2018  3:26 PM      START taking these medications    Details   !! fenofibrate nanocrystallized (TRICOR) 145 mg tablet Take 1 Tab by mouth daily. Indications: hypertriglyceridemia, Print, Disp-60 Tab, R-2      !! metFORMIN (GLUCOPHAGE) 500 mg tablet Take 1 Tab by mouth two (2) times daily (with meals). , Print, Disp-60 Tab, R-3      !! omega-3 acid ethyl esters (LOVAZA) 1 gram capsule Take 2 Caps by mouth two (2) times a day. Indications: hypertriglyceridemia, Print, Disp-30 Cap, R-2      !! atorvastatin (LIPITOR) 40 mg tablet Take 1 Tab by mouth daily. , Print, Disp-30 Tab, R-3      insulin lispro (HUMALOG) 100 unit/mL injection Inject 5 units for every 50 points over 1502, Sample, Disp-2 Vial, R-0       !! - Potential duplicate medications found. Please discuss with provider. CONTINUE these medications which have CHANGED    Details   ergocalciferol (ERGOCALCIFEROL) 50,000 unit capsule Take 1 Cap by mouth three (3) days a week. Indications: Vitamin D Deficiency, Print, Disp-12 Cap, R-0      insulin glargine (LANTUS U-100 INSULIN) 100 unit/mL injection 100 Units by SubCUTAneous route two (2) times a day. Indications: type 2 diabetes mellitus, Sample, Disp-2 Vial, R-0         CONTINUE these medications which have NOT CHANGED    Details   promethazine (PHENERGAN) 25 mg tablet Take 1 Tab by mouth every eight (8) hours as needed. Indications: PREVENTION OF POST-OPERATIVE NAUSEA AND VOMITING, Print, Disp-20 Tab, R-0      acetaminophen (TYLENOL) 500 mg tablet Take 1,000-2,500 mg by mouth every six (6) hours as needed for Pain., Historical Med      !! atorvastatin (LIPITOR) 40 mg tablet Take 1 Tab by mouth nightly., Normal, Disp-30 Tab, R-11      !! fenofibrate nanocrystallized (TRICOR) 145 mg tablet Take 1 Tab by mouth daily. , Normal, Disp-30 Tab, R-11      !! omega-3 acid ethyl esters (LOVAZA) 1 gram capsule Take 2 Caps by mouth two (2) times a day., Normal, Disp-120 Cap, R-11      !! metFORMIN (GLUCOPHAGE) 1,000 mg tablet Take 1 Tab by mouth two (2) times daily (with meals). , Normal, Disp-60 Tab, R-11      gabapentin (NEURONTIN) 300 mg capsule Take 2 Caps by mouth three (3) times daily. , Normal, Disp-180 Cap, R-11      albuterol (PROAIR HFA) 90 mcg/actuation inhaler Take 2 Puffs by inhalation every four (4) hours as needed for Wheezing., Historical Med       !! - Potential duplicate medications found. Please discuss with provider.       STOP taking these medications       HYDROmorphone (DILAUDID) 2 mg tablet Comments:   Reason for Stopping:         insulin regular (HUMULIN R REGULAR U-100 INSULN) 100 unit/mL injection Comments:   Reason for Stopping:                 NOTIFY YOUR PHYSICIAN FOR ANY OF THE FOLLOWING:   Fever over 101 degrees for 24 hours. Chest pain, shortness of breath, fever, chills, nausea, vomiting, diarrhea, change in mentation, falling, weakness, bleeding. Severe pain or pain not relieved by medications. Or, any other signs or symptoms that you may have questions about. DISPOSITION:  x  Home With:   OT  PT  HH  RN       Long term SNF/Inpatient Rehab    Independent/assisted living    Hospice    Other:       PATIENT CONDITION AT DISCHARGE:     Functional status    Poor     Deconditioned    x Independent      Cognition   x  Lucid     Forgetful     Dementia      Catheters/lines (plus indication)    Betancourt     PICC     PEG    x None      Code status    x Full code     DNR      PHYSICAL EXAMINATION AT DISCHARGE:   Refer to Progress Note    General: No acute distress, cooperative, pleasant , Obese  EENT: EOMI. Anicteric sclerae. Oral mucous moist, oropharynx benign  Resp: CTA bilaterally. No wheezing/rhonchi/rales. No accessory muscle use  CV: Regular rhythm, normal rate, no murmurs, gallops, rubs  GI: Soft, non distended, non tender. normoactive bowel sounds, no hepatosplenomegaly Extremities: No edema, warm, 2+ pulses throughout  Neurologic: Moves all extremities. AAOx3, CN II-XII grossly intact  Psych: Good insight. Not anxious nor agitated.   Skin: Good Turgor, no rashes or ulcers    CHRONIC MEDICAL DIAGNOSES:  Problem List as of 7/9/2018  Date Reviewed: 5/17/2018          Codes Class Noted - Resolved    Hyperosmolar non-ketotic state in patient with type 2 diabetes mellitus ([facility] ICD-10-CM: E11.01  ICD-9-CM: 250.20  7/5/2018 - Present        Vulvar abscess ICD-10-CM: N76.4  ICD-9-CM: 616.4  5/17/2018 - Present        Recurrent depression ([facility] ICD-10-CM: F33.9  ICD-9-CM: 296.30  1/11/2018 - Present        Breast abscess of female ICD-10-CM: N61.1  ICD-9-CM: 611.0  12/5/2017 - Present        Obesity, Class III, BMI 40-49.9 (morbid obesity) ([facility] ICD-10-CM: E66.01  ICD-9-CM: 278.01  11/30/2017 - Present        Vitamin D deficiency ICD-10-CM: E55.9  ICD-9-CM: 268.9  11/30/2017 - Present        Essential hypertension ICD-10-CM: I10  ICD-9-CM: 401.9  10/3/2017 - Present        Uncontrolled type 2 diabetes mellitus with diabetic polyneuropathy, with long-term current use of insulin (HCC) ICD-10-CM: E11.42, Z79.4, E11.65  ICD-9-CM: 250.62, 357.2, V58.67  10/3/2017 - Present        Hyperlipidemia LDL goal <100 ICD-10-CM: E78.5  ICD-9-CM: 272.4  10/3/2017 - Present        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 ICD-10-CM: G40.309  ICD-9-CM: 345.10  1/25/2011 - Present        RESOLVED: DKA (diabetic ketoacidoses) ([facility] ICD-10-CM: E13.10  ICD-9-CM: 250.10  12/30/2017 - 2/19/2018        RESOLVED: Pancreatitis ICD-10-CM: K85.90  ICD-9-CM: 577.0  10/5/2017 - 2/19/2018        RESOLVED: Abdominal pain, right upper quadrant ICD-10-CM: R10.11  ICD-9-CM: 789.01  4/15/2011 - 2/19/2018        RESOLVED:  hyperglycemia hyperosmolar non-ketotic coma ICD-10-CM: E13.01  ICD-9-CM: 249.20  12/13/2010 - 2/19/2018        RESOLVED: Other chest pain ICD-10-CM: R07.89  ICD-9-CM: 786.59  12/13/2010 - 2/19/2018              About 35 minutes was spent with the patient on counseling and coordination of care    Signed:   Dorothy Hoyt MD  7/19/2018  11:10 AM

## 2018-07-13 ENCOUNTER — TELEPHONE (OUTPATIENT)
Dept: ENDOCRINOLOGY | Age: 38
End: 2018-07-13

## 2018-07-13 NOTE — TELEPHONE ENCOUNTER
Called and spoke with pt. Since being home she has been able to keep her fasting sugars in the 90-120s on the lantus. She has not yet heard back about any insurance. I told her that having her sugars better controlled has likely led to a change in her vision as her eyes are getting used to the change in sugar and hopefully with keeping her sugars normal, her vision should improve over the coming week. I asked her to give me an update next week with how she's doing and we'll need to come up with a plan for her insulin as she only has enough to last until 7/19/18. she voiced understanding of this plan.

## 2018-07-13 NOTE — TELEPHONE ENCOUNTER
----- Message from Courtneysimona Osiel sent at 7/13/2018  5:05 PM EDT -----  Regarding: FW: Non-Urgent Medical Question  Contact: [phone]      ----- Message -----     From: Joanna Graham     Sent: 7/13/2018   4:54 PM       To: [facility] Nurse Pool  Subject: Non-Urgent Medical Question                      Dr. Georgette Smith,    I have noticed a big change in my vision since I have been home, can't really see anything that is close. Just was curious if I needed to address quickly.   Thank you,   Erin Lynne

## 2018-07-22 PROCEDURE — 99285 EMERGENCY DEPT VISIT HI MDM: CPT

## 2018-07-23 ENCOUNTER — HOSPITAL ENCOUNTER (EMERGENCY)
Age: 38
Discharge: HOME | End: 2018-07-23
Attending: EMERGENCY MEDICINE
Payer: MEDICAID

## 2018-07-23 ENCOUNTER — APPOINTMENT (OUTPATIENT)
Dept: CT IMAGING | Age: 38
End: 2018-07-23
Attending: EMERGENCY MEDICINE
Payer: MEDICAID

## 2018-07-23 ENCOUNTER — APPOINTMENT (OUTPATIENT)
Dept: GENERAL RADIOLOGY | Age: 38
End: 2018-07-23
Attending: EMERGENCY MEDICINE
Payer: MEDICAID

## 2018-07-23 VITALS
OXYGEN SATURATION: 97 % | WEIGHT: 265.88 LBS | RESPIRATION RATE: 18 BRPM | HEIGHT: 69 IN | SYSTOLIC BLOOD PRESSURE: 113 MMHG | TEMPERATURE: 97.4 F | HEART RATE: 71 BPM | BODY MASS INDEX: 39.38 KG/M2 | DIASTOLIC BLOOD PRESSURE: 75 MMHG

## 2018-07-23 DIAGNOSIS — R11.2 NAUSEA AND VOMITING, INTRACTABILITY OF VOMITING NOT SPECIFIED, UNSPECIFIED VOMITING TYPE: Primary | ICD-10-CM

## 2018-07-23 DIAGNOSIS — J01.40 ACUTE PANSINUSITIS, RECURRENCE NOT SPECIFIED: ICD-10-CM

## 2018-07-23 DIAGNOSIS — E78.1 HYPERTRIGLYCERIDEMIA: ICD-10-CM

## 2018-07-23 DIAGNOSIS — R73.9 HYPERGLYCEMIA: ICD-10-CM

## 2018-07-23 LAB
ALBUMIN SERPL-MCNC: 2.4 G/DL (ref 3.5–5)
ALBUMIN/GLOB SERPL: ABNORMAL {RATIO} (ref 1.1–2.2)
ALP SERPL-CCNC: ABNORMAL U/L (ref 45–117)
ALT SERPL-CCNC: 193 U/L (ref 12–78)
ANION GAP SERPL CALC-SCNC: 13 MMOL/L (ref 5–15)
APPEARANCE UR: CLEAR
AST SERPL-CCNC: 399 U/L (ref 15–37)
BACTERIA URNS QL MICRO: ABNORMAL /HPF
BASOPHILS # BLD: 0 K/UL (ref 0–0.1)
BASOPHILS NFR BLD: 0 % (ref 0–1)
BILIRUB SERPL-MCNC: 1.4 MG/DL (ref 0.2–1)
BILIRUB UR QL: NEGATIVE
BUN SERPL-MCNC: ABNORMAL MG/DL (ref 6–20)
BUN/CREAT SERPL: ABNORMAL (ref 12–20)
CALCIUM SERPL-MCNC: ABNORMAL MG/DL (ref 8.5–10.1)
CHLORIDE SERPL-SCNC: 92 MMOL/L (ref 97–108)
CO2 SERPL-SCNC: 19 MMOL/L (ref 21–32)
COLOR UR: ABNORMAL
CREAT SERPL-MCNC: ABNORMAL MG/DL (ref 0.55–1.02)
DIFFERENTIAL METHOD BLD: ABNORMAL
EOSINOPHIL # BLD: 0.4 K/UL (ref 0–0.4)
EOSINOPHIL NFR BLD: 4 % (ref 0–7)
EPITH CASTS URNS QL MICRO: ABNORMAL /LPF
ERYTHROCYTE [DISTWIDTH] IN BLOOD BY AUTOMATED COUNT: 14.5 % (ref 11.5–14.5)
GLOBULIN SER CALC-MCNC: ABNORMAL G/DL (ref 2–4)
GLUCOSE BLD STRIP.AUTO-MCNC: 277 MG/DL (ref 65–100)
GLUCOSE BLD STRIP.AUTO-MCNC: 313 MG/DL (ref 65–100)
GLUCOSE BLD STRIP.AUTO-MCNC: 355 MG/DL (ref 65–100)
GLUCOSE SERPL-MCNC: 593 MG/DL (ref 65–100)
GLUCOSE UR STRIP.AUTO-MCNC: >1000 MG/DL
HCG UR QL: NEGATIVE
HCT VFR BLD AUTO: 32.3 % (ref 35–47)
HGB BLD-MCNC: 11.1 G/DL (ref 11.5–16)
HGB UR QL STRIP: NEGATIVE
HYALINE CASTS URNS QL MICRO: ABNORMAL /LPF (ref 0–5)
IMM GRANULOCYTES # BLD: 0 K/UL (ref 0–0.04)
IMM GRANULOCYTES NFR BLD AUTO: 0 % (ref 0–0.5)
KETONES UR QL STRIP.AUTO: NEGATIVE MG/DL
LEUKOCYTE ESTERASE UR QL STRIP.AUTO: NEGATIVE
LIPASE SERPL-CCNC: 96 U/L (ref 73–393)
LYMPHOCYTES # BLD: 2 K/UL (ref 0.8–3.5)
LYMPHOCYTES NFR BLD: 22 % (ref 12–49)
MCH RBC QN AUTO: 27 PG (ref 26–34)
MCHC RBC AUTO-ENTMCNC: 34.4 G/DL (ref 30–36.5)
MCV RBC AUTO: 78.6 FL (ref 80–99)
MONOCYTES # BLD: 0.4 K/UL (ref 0–1)
MONOCYTES NFR BLD: 4 % (ref 5–13)
MYELOCYTES NFR BLD MANUAL: 1 %
NEUTS BAND NFR BLD MANUAL: 2 %
NEUTS SEG # BLD: 6.4 K/UL (ref 1.8–8)
NEUTS SEG NFR BLD: 67 % (ref 32–75)
NITRITE UR QL STRIP.AUTO: NEGATIVE
NRBC # BLD: 0 K/UL (ref 0–0.01)
NRBC BLD-RTO: 0 PER 100 WBC
PH UR STRIP: 5 [PH] (ref 5–8)
PLATELET # BLD AUTO: 182 K/UL (ref 150–400)
PMV BLD AUTO: 9.8 FL (ref 8.9–12.9)
POTASSIUM SERPL-SCNC: 3.8 MMOL/L (ref 3.5–5.1)
PROT SERPL-MCNC: ABNORMAL G/DL (ref 6.4–8.2)
PROT UR STRIP-MCNC: 30 MG/DL
RBC # BLD AUTO: 4.53 M/UL (ref 3.8–5.2)
RBC #/AREA URNS HPF: ABNORMAL /HPF (ref 0–5)
RBC MORPH BLD: ABNORMAL
SERVICE CMNT-IMP: ABNORMAL
SODIUM SERPL-SCNC: 124 MMOL/L (ref 136–145)
SP GR UR REFRACTOMETRY: 1.03 (ref 1–1.03)
UA: UC IF INDICATED,UAUC: ABNORMAL
UROBILINOGEN UR QL STRIP.AUTO: 0.2 EU/DL (ref 0.2–1)
WBC # BLD AUTO: 9.3 K/UL (ref 3.6–11)
WBC URNS QL MICRO: ABNORMAL /HPF (ref 0–4)

## 2018-07-23 PROCEDURE — 81001 URINALYSIS AUTO W/SCOPE: CPT | Performed by: EMERGENCY MEDICINE

## 2018-07-23 PROCEDURE — 70450 CT HEAD/BRAIN W/O DYE: CPT

## 2018-07-23 PROCEDURE — 80053 COMPREHEN METABOLIC PANEL: CPT | Performed by: EMERGENCY MEDICINE

## 2018-07-23 PROCEDURE — 74011000250 HC RX REV CODE- 250

## 2018-07-23 PROCEDURE — 74011636637 HC RX REV CODE- 636/637: Performed by: EMERGENCY MEDICINE

## 2018-07-23 PROCEDURE — 82962 GLUCOSE BLOOD TEST: CPT

## 2018-07-23 PROCEDURE — 83690 ASSAY OF LIPASE: CPT | Performed by: EMERGENCY MEDICINE

## 2018-07-23 PROCEDURE — 96361 HYDRATE IV INFUSION ADD-ON: CPT

## 2018-07-23 PROCEDURE — 74022 RADEX COMPL AQT ABD SERIES: CPT

## 2018-07-23 PROCEDURE — 96374 THER/PROPH/DIAG INJ IV PUSH: CPT

## 2018-07-23 PROCEDURE — 74011250636 HC RX REV CODE- 250/636: Performed by: EMERGENCY MEDICINE

## 2018-07-23 PROCEDURE — 74011250636 HC RX REV CODE- 250/636

## 2018-07-23 PROCEDURE — 96375 TX/PRO/DX INJ NEW DRUG ADDON: CPT

## 2018-07-23 PROCEDURE — 85025 COMPLETE CBC W/AUTO DIFF WBC: CPT | Performed by: EMERGENCY MEDICINE

## 2018-07-23 PROCEDURE — 81025 URINE PREGNANCY TEST: CPT

## 2018-07-23 PROCEDURE — 36415 COLL VENOUS BLD VENIPUNCTURE: CPT | Performed by: EMERGENCY MEDICINE

## 2018-07-23 PROCEDURE — 87086 URINE CULTURE/COLONY COUNT: CPT | Performed by: EMERGENCY MEDICINE

## 2018-07-23 PROCEDURE — 96376 TX/PRO/DX INJ SAME DRUG ADON: CPT

## 2018-07-23 RX ORDER — ONDANSETRON 2 MG/ML
4 INJECTION INTRAMUSCULAR; INTRAVENOUS
Status: COMPLETED | OUTPATIENT
Start: 2018-07-23 | End: 2018-07-23

## 2018-07-23 RX ORDER — PROMETHAZINE HYDROCHLORIDE 25 MG/1
25 TABLET ORAL
Qty: 12 TAB | Refills: 0 | Status: SHIPPED | OUTPATIENT
Start: 2018-07-23 | End: 2018-07-26

## 2018-07-23 RX ORDER — LEVOFLOXACIN 500 MG/1
500 TABLET, FILM COATED ORAL DAILY
Qty: 7 TAB | Refills: 0 | Status: SHIPPED | OUTPATIENT
Start: 2018-07-23 | End: 2018-07-28

## 2018-07-23 RX ORDER — ONDANSETRON 4 MG/1
4 TABLET, ORALLY DISINTEGRATING ORAL
Qty: 10 TAB | Refills: 0 | Status: SHIPPED | OUTPATIENT
Start: 2018-07-23 | End: 2018-08-22

## 2018-07-23 RX ORDER — FENTANYL CITRATE 50 UG/ML
25 INJECTION, SOLUTION INTRAMUSCULAR; INTRAVENOUS
Status: COMPLETED | OUTPATIENT
Start: 2018-07-23 | End: 2018-07-23

## 2018-07-23 RX ORDER — ONDANSETRON 2 MG/ML
INJECTION INTRAMUSCULAR; INTRAVENOUS
Status: COMPLETED
Start: 2018-07-23 | End: 2018-07-23

## 2018-07-23 RX ORDER — FAMOTIDINE 10 MG/ML
20 INJECTION INTRAVENOUS
Status: COMPLETED | OUTPATIENT
Start: 2018-07-23 | End: 2018-07-23

## 2018-07-23 RX ORDER — SODIUM CHLORIDE 0.9 % (FLUSH) 0.9 %
5-10 SYRINGE (ML) INJECTION EVERY 8 HOURS
Status: DISCONTINUED | OUTPATIENT
Start: 2018-07-23 | End: 2018-07-23 | Stop reason: HOSPADM

## 2018-07-23 RX ORDER — FAMOTIDINE 10 MG/ML
INJECTION INTRAVENOUS
Status: COMPLETED
Start: 2018-07-23 | End: 2018-07-23

## 2018-07-23 RX ORDER — FLUCONAZOLE 150 MG/1
150 TABLET ORAL
Qty: 1 TAB | Refills: 0 | Status: SHIPPED | OUTPATIENT
Start: 2018-07-23 | End: 2018-07-23

## 2018-07-23 RX ORDER — SODIUM CHLORIDE 0.9 % (FLUSH) 0.9 %
5-10 SYRINGE (ML) INJECTION AS NEEDED
Status: DISCONTINUED | OUTPATIENT
Start: 2018-07-23 | End: 2018-07-23 | Stop reason: HOSPADM

## 2018-07-23 RX ADMIN — SODIUM CHLORIDE 1000 ML: 900 INJECTION, SOLUTION INTRAVENOUS at 03:42

## 2018-07-23 RX ADMIN — SODIUM CHLORIDE 1000 ML: 900 INJECTION, SOLUTION INTRAVENOUS at 01:40

## 2018-07-23 RX ADMIN — FAMOTIDINE 20 MG: 10 INJECTION INTRAVENOUS at 01:41

## 2018-07-23 RX ADMIN — FENTANYL CITRATE 25 MCG: 50 INJECTION, SOLUTION INTRAMUSCULAR; INTRAVENOUS at 03:41

## 2018-07-23 RX ADMIN — INSULIN HUMAN 6 UNITS: 100 INJECTION, SOLUTION PARENTERAL at 03:43

## 2018-07-23 RX ADMIN — ONDANSETRON 4 MG: 2 INJECTION, SOLUTION INTRAMUSCULAR; INTRAVENOUS at 01:42

## 2018-07-23 RX ADMIN — Medication 10 ML: at 01:45

## 2018-07-23 RX ADMIN — ONDANSETRON 4 MG: 2 INJECTION INTRAMUSCULAR; INTRAVENOUS at 01:42

## 2018-07-23 RX ADMIN — INSULIN HUMAN 6 UNITS: 100 INJECTION, SOLUTION PARENTERAL at 06:13

## 2018-07-23 NOTE — Clinical Note
[facility] SYSTEMS Departments For adult and child immunizations, family planning, TB screening, STD testing and women's health services. [facility]: [place] [phone] [place] [phone] [facility]   [phone] [facility]   [phone] Charmayne Olanta   [phone] [place]: [facility] [phone] [place] [phone] [facility] [phone] [address]  For primary care services, woman and child wellness, and some clinic s providing specialty care. [facility] -- 1011 [facility]. [facility] [phone] [address] [place] [address] 200 [facility] [address] [place] [address] [phone] 1324 [facility] 110 [facility] [phone] [facility] 5850 [facility]  [phone] [facility] [address] [phone] [facility] 81 [facility] [phone] [facility] 1051 [facility], 809 [facility]: [facility] 700 [address], ext [phone] [facility], [address] [phone] [facility] 37 [phone] [address] 5850  Community  [phone] [employer]  [address], [phone] 3550 [facility] [employer] ([email or web address]) [phone] Sexual Health/Woman Wellness Clinics For STD/HIV testing and treatment, pregnancy testing and services, men's health, birth control services, LGBT services, and hepatitis/HPV vaccine services. [place] [facility] [place] [employer] 201 N. [facility] [phone] [facility] [address] [facility] 301 Leo Whitt [phone] 4707 N [facility] 301 [address] [phone] [address] [place] [address] Rd, 5th floor [phone] [facility] o f [place] 5843 [facility] [phone] [facility] Women [address] [facility] [address]. [place] [facility] [phone] Specialty Service Clinics 112 [facility] [phone] 6655 [facility] [place]   [phone] [place]   [phone] Women, Infant and Children's Services: [place] 24 [phone] [address] [phone] 128 [facility] 66 [facility] Psychiatry     [phone] 1325 [facility]   [phone] 564 [facility] [phone] [facility] [address] [place] [address] Physicians [phone] MD Lew Chino MD Hampton Bis, MD Baker [facility] Doctors [phone] Haseeb Vega, [facility] Hayden Castillo, MD Oli Easno MD 
[place] [address] [phone] MD Vinod Lazo MD [facility] [phone] MD Joslyn Tolentino MD Heidi Nation, MD Trudie Basta, MD 
[facility] [phone] MD Antonette Santillan MD Herb Provencal, NP [facility] [phone] MD Rosa Cabrales MD Steffani Childs, M D Arlyne Blander, MD Douglass Drum, MD Sol Hook, MD Regan Lema, [address] [place] [address] Road [phone] Cecleia Vang MD [facility] [phone] MD Charity Valdez, NP Gerline Scheuermann, MD Lindajean Artis, MD Kela Lucks, MD Lenetta Donate, MD Junior Gene, MD 
[facility] [phone] MD Alexis Haynes, [facility] Miguel Driscoll, MD Andrew Robbins MD Melbourne Emmer, MD Bonnita Gunning, MD [facility] [phone] MD Parisa Mg, MD Kendra Randhawa, MD Rome Mcgee, MD Shu Ortiz MD 
[address] [phone] David White, MD Sveta Kaminski, MD [place] [phone] Bridgette Sibley,  MD Laura Thompson, MD Azeb Hines MD 
1903 [facility] [phone] Dalila Spear, MD Jay Simon, MD Gaston Lucia, MD Nelly Herrera, MD Calvin Shelby, MD Estee Mora, NP Rodolfo Borrego MD 1619 [facility] [phone] Eduarda Angeles, MD Jaclyn Lopez, MD Steven Amaya MD 
2102 [facility] [phone] Evonne Vargas, MD Terrance Vitale, FNP Cinthia Guillaume, PAKathC Cinthia Guillaume,  FNP Chante Courtney, EARL Argueta, MD Giacomo Lawson, NP Eliane Ramos, DO Miscellaneous: 
Oma Funes -[phone]

## 2018-07-23 NOTE — ED NOTES
Vitals for 0200 - pt was off the floor at radiology.  Will connect to machine when pt arrives back to ED

## 2018-07-23 NOTE — ED NOTES
Urine POC is done by this RN - result is NEGATIVE. MD Taurus Willoughby notified. Will upload result to system when able too.

## 2018-07-23 NOTE — ED PROVIDER NOTES
EMERGENCY DEPARTMENT HISTORY AND PHYSICAL EXAM 
 
 
Date: 7/23/2018 Patient Name: Zane Guzman History of Presenting Illness Chief Complaint Patient presents with  
 Headache  
  x 2 days  Abdominal Pain  
  LUQ, N+V  
 Other Seizure like activity yesterday, no hx, \"I knew what was happening the whole time\" History Provided By: Patient HPI: Zane Guzman, 40 y.o. female with PMHx significant for IDDM, MI, neuropathy, HTN, HLD, pancreatitis, presents ambulatory to the ED with cc of progressively worsening HA x 6 days. Pt reports that her HA began as a burning sensation on the L side of her head. She states that 4 days ago the HA began to radiate to the rest of her head. Pt endorses that yesterday at 1630 she had seizure like activity that began with a feeling a warmness all over her body and that she got lockjaw. She discloses that no one was at home when this occurred but she reports that her  found her 3 hours later at [employer] off of the bed. Pt endorses associated nausea vomiting, and abdominal pain for the past 2 days. She discloses that she's had close to 40 episodes of vomiting in the past 24 hours. Pt states that she can't keep anything down except for ice and water. Pt denies any hx of migraines. Pt reports that this morning her BG was 90 but that PTA her BG was 300. Pt discloses that she is prescribed Ergocalciferol, Tricor, Glucophage, Lovaza, Lipitor, Lantus, Neurontin. Pt specifically denies any SOB, CP, lightheadedness, dizziness, diarrhea, painful urination, fever, chills, or leg swelling. Chief Complaint: HA 
Duration: 6 Days Timing:  Progressive and Worsening Location: head Quality: Burning Severity: Moderate Modifying Factors: no relief with Tylenol, mild relief with Ibuprofen Associated Symptoms: seizure like activity, abdominal pain, nausea, vomiting Allergies:  Toradol, Augmentin, Demerol, Heparin, Ibuprofen, Keflex, Morphine, Nubain, PCN, Sulfa SHx: cholecystectomy, 2 , tubes clamped, tonsil and adenoidectomy Social Hx: + tobacco (1 pack/week), + EtOH, - illicit drug use There are no other complaints, changes, or physical findings at this time. PCP: None Current Facility-Administered Medications Medication Dose Route Frequency Provider Last Rate Last Dose  sodium chloride (NS) flush 5-10 mL  5-10 mL IntraVENous Q8H April Reuben Gonsalez MD   10 mL at 18 0145  sodium chloride (NS) flush 5-10 mL  5-10 mL IntraVENous PRN April SEVERINO Arizmendi MD      
 
Current Outpatient Prescriptions Medication Sig Dispense Refill  ondansetron (ZOFRAN ODT) 4 mg disintegrating tablet Take 1 Tab by mouth every eight (8) hours as needed for Nausea. 10 Tab 0  promethazine (PHENERGAN) 25 mg tablet Take 1 Tab by mouth every six (6) hours as needed for Nausea. 12 Tab 0  
 levoFLOXacin (LEVAQUIN) 500 mg tablet Take 1 Tab by mouth daily. 7 Tab 0  
 fluconazole (DIFLUCAN) 150 mg tablet Take 1 Tab by mouth now for 1 dose. FDA advises cautious prescribing of oral fluconazole in pregnancy. 1 Tab 0  
 ergocalciferol (ERGOCALCIFEROL) 50,000 unit capsule Take 1 Cap by mouth three (3) days a week. Indications: Vitamin D Deficiency 12 Cap 0  
 fenofibrate nanocrystallized (TRICOR) 145 mg tablet Take 1 Tab by mouth daily. Indications: hypertriglyceridemia 60 Tab 2  
 metFORMIN (GLUCOPHAGE) 500 mg tablet Take 1 Tab by mouth two (2) times daily (with meals). 60 Tab 3  
 omega-3 acid ethyl esters (LOVAZA) 1 gram capsule Take 2 Caps by mouth two (2) times a day. Indications: hypertriglyceridemia 30 Cap 2  
 atorvastatin (LIPITOR) 40 mg tablet Take 1 Tab by mouth daily. 30 Tab 3  
 insulin glargine (LANTUS U-100 INSULIN) 100 unit/mL injection 100 Units by SubCUTAneous route two (2) times a day.  Indications: type 2 diabetes mellitus 2 Vial 0  
 insulin lispro (HUMALOG) 100 unit/mL injection Inject 5 units for every 50 points over 1502 2 Vial 0  
 acetaminophen (TYLENOL) 500 mg tablet Take 1,000-2,500 mg by mouth every six (6) hours as needed for Pain.  atorvastatin (LIPITOR) 40 mg tablet Take 1 Tab by mouth nightly. 30 Tab 11  
 fenofibrate nanocrystallized (TRICOR) 145 mg tablet Take 1 Tab by mouth daily. 30 Tab 11  
 omega-3 acid ethyl esters (LOVAZA) 1 gram capsule Take 2 Caps by mouth two (2) times a day. 120 Cap 11  
 metFORMIN (GLUCOPHAGE) 1,000 mg tablet Take 1 Tab by mouth two (2) times daily (with meals). 60 Tab 11  
 gabapentin (NEURONTIN) 300 mg capsule Take 2 Caps by mouth three (3) times daily. 180 Cap 11  
 albuterol (PROAIR HFA) 90 mcg/actuation inhaler Take 2 Puffs by inhalation every four (4) hours as needed for Wheezing. Past History Past Medical History: 
Past Medical History:  
Diagnosis Date  Calculus of kidney  Diabetes ([facility] [address]  Hyperlipidemia  Hypertension  MI (myocardial infarction) ([facility]   Other ill-defined conditions(799.89) Neuropathies  Pancreatitis   
 due to hypertriglyceridemia  Vitamin D deficiency 2017 Past Surgical History: 
Past Surgical History:  
Procedure Laterality Date  CARDIAC SURG PROCEDURE UNLIST    
 cath  CYSTOSCOPY    
 HX  SECTION    
 x 2  
 HX CHOLECYSTECTOMY  HX GYN    
 tubes clamped  HX TONSIL AND ADENOIDECTOMY  HX WISDOM TEETH EXTRACTION Family History: 
Family History Problem Relation Age of Onset  Hypertension Mother  Stroke Mother  Diabetes Mother  Cancer Mother 59  
  breast?  
 Heart Disease Father  Diabetes Father  Cancer Paternal Grandmother 79  
  ovarian Social History: 
Social History Substance Use Topics  Smoking status: Current Some Day Smoker Packs/day: 0.50  Smokeless tobacco: Never Used  Alcohol use No  
   Comment: once per year Allergies: Allergies Allergen Reactions  Toradol [Ketorolac Tromethamine] Unknown (comments) Seizure like symptoms per pt.  Augmentin [Amoxicillin-Pot Clavulanate] Swelling  Demerol [Meperidine] Swelling  Heparin Hives  Ibuprofen Diarrhea and Nausea and Vomiting Muscle tightness  Keflex [Cephalexin] Shortness of Breath  Morphine Swelling  Nubain [Nalbuphine] Hives  Pcn [Penicillins] Swelling  Sulfa (Sulfonamide Antibiotics) Unknown (comments) Review of Systems Review of Systems Constitutional: Negative for fatigue and fever. HENT: Negative. Eyes: Negative. Respiratory: Negative for shortness of breath and wheezing. Cardiovascular: Negative for chest pain and leg swelling. Gastrointestinal: Positive for abdominal pain, nausea and vomiting. Negative for blood in stool, constipation and diarrhea. Endocrine: Negative. Genitourinary: Negative for difficulty urinating and dysuria. Musculoskeletal: Negative. Skin: Negative for rash. Allergic/Immunologic: Negative. Neurological: Positive for seizures and headaches. Negative for weakness and numbness. Hematological: Negative. Psychiatric/Behavioral: Negative. Physical Exam  
Physical Exam 
General appearance - morbidly obese, well appearing, and in no distress Eyes - pupils equal and reactive, extraocular eye movements intact ENT - mucous membranes moist, pharynx normal without lesions Neck - supple, no significant adenopathy; non-tender to palpation Chest - clear to auscultation, no wheezes, rales or rhonchi; non-tender to palpation Heart - normal rate and regular rhythm, S1 and S2 normal, no murmurs noted Abdomen -tenderness across upper abdomen, nondistended, no masses or organomegaly Musculoskeletal - no joint tenderness, deformity or swelling; normal ROM Extremities - peripheral pulses normal, no pedal edema Skin - normal coloration and turgor, no rashes Neurological - alert, oriented x3, normal speech, no focal findings or movement disorder noted Diagnostic Study Results Labs - Recent Results (from the past 12 hour(s)) CBC WITH AUTOMATED DIFF Collection Time: 07/23/18  1:31 AM  
Result Value Ref Range WBC 9.3 3.6 - 11.0 K/uL  
 RBC 4.53 3.80 - 5.20 M/uL  
 HGB 11.1 (L) 11.5 - 16.0 g/dL HCT 32.3 (L) 35.0 - 47.0 % MCV 78.6 (L) 80.0 - 99.0 FL  
 MCH 27.0 26.0 - 34.0 PG  
 MCHC 34.4 30.0 - 36.5 g/dL  
 RDW 14.5 11.5 - 14.5 % PLATELET 175 116 - 220 K/uL MPV 9.8 8.9 - 12.9 FL  
 NRBC 0.0 0  WBC ABSOLUTE NRBC 0.00 0.00 - 0.01 K/uL NEUTROPHILS 67 32 - 75 % BAND NEUTROPHILS 2 % LYMPHOCYTES 22 12 - 49 % MONOCYTES 4 (L) 5 - 13 % EOSINOPHILS 4 0 - 7 % BASOPHILS 0 0 - 1 % MYELOCYTES 1 % IMMATURE GRANULOCYTES 0 0.0 - 0.5 % ABS. NEUTROPHILS 6.4 1.8 - 8.0 K/UL  
 ABS. LYMPHOCYTES 2.0 0.8 - 3.5 K/UL  
 ABS. MONOCYTES 0.4 0.0 - 1.0 K/UL  
 ABS. EOSINOPHILS 0.4 0.0 - 0.4 K/UL  
 ABS. BASOPHILS 0.0 0.0 - 0.1 K/UL  
 ABS. IMM. GRANS. 0.0 0.00 - 0.04 K/UL  
 DF MANUAL    
 RBC COMMENTS NORMOCYTIC, NORMOCHROMIC METABOLIC PANEL, COMPREHENSIVE Collection Time: 07/23/18  1:31 AM  
Result Value Ref Range Sodium 124 (L) 136 - 145 mmol/L Potassium 3.8 3.5 - 5.1 mmol/L Chloride 92 (L) 97 - 108 mmol/L  
 CO2 19 (L) 21 - 32 mmol/L Anion gap 13 5 - 15 mmol/L Glucose 593 (H) 65 - 100 mg/dL BUN  6 - 20 MG/DL UNABLE TO OBTAIN ACCURATE RESULTS DUE TO GROSS LIPEMIA Creatinine  0.55 - 1.02 MG/DL UNABLE TO OBTAIN ACCURATE RESULTS DUE TO GROSS LIPEMIA  
 BUN/Creatinine ratio  12 - 20 UNABLE TO OBTAIN ACCURATE RESULTS DUE TO GROSS LIPEMIA  
 GFR est AA Cannot be calculated >60 ml/min/1.73m2 GFR est non-AA Cannot be calculated >60 ml/min/1.73m2 Calcium  8.5 - 10.1 MG/DL UNABLE TO OBTAIN ACCURATE RESULTS DUE TO GROSS LIPEMIA Bilirubin, total 1.4 (H) 0.2 - 1.0 MG/DL  
 ALT (SGPT) 193 (H) 12 - 78 U/L  
 AST (SGOT) 399 (H) 15 - 37 U/L Alk.  phosphatase  45 - 117 U/L  
  UNABLE TO OBTAIN ACCURATE RESULTS DUE TO GROSS LIPEMIA Protein, total  6.4 - 8.2 g/dL UNABLE TO OBTAIN ACCURATE RESULTS DUE TO GROSS LIPEMIA Albumin 2.4 (L) 3.5 - 5.0 g/dL Globulin Cannot be calculated 2.0 - 4.0 g/dL A-G Ratio Cannot be calculated 1.1 - 2.2 LIPASE Collection Time: 07/23/18  1:31 AM  
Result Value Ref Range Lipase 96 73 - 393 U/L  
URINALYSIS W/ REFLEX CULTURE Collection Time: 07/23/18  1:31 AM  
Result Value Ref Range Color YELLOW/STRAW Appearance CLEAR CLEAR Specific gravity 1.030 1.003 - 1.030    
 pH (UA) 5.0 5.0 - 8.0 Protein 30 (A) NEG mg/dL Glucose >1000 (A) NEG mg/dL Ketone NEGATIVE  NEG mg/dL Bilirubin NEGATIVE  NEG Blood NEGATIVE  NEG Urobilinogen 0.2 0.2 - 1.0 EU/dL Nitrites NEGATIVE  NEG Leukocyte Esterase NEGATIVE  NEG    
 UA:UC IF INDICATED URINE CULTURE ORDERED (A) CNI    
 WBC 0-4 0 - 4 /hpf  
 RBC 0-5 0 - 5 /hpf Epithelial cells FEW FEW /lpf Bacteria 1+ (A) NEG /hpf Hyaline cast 0-2 0 - 5 /lpf  
HCG URINE, QL. - POC Collection Time: 07/23/18  1:55 AM  
Result Value Ref Range Pregnancy test,urine (POC) NEGATIVE  NEG    
GLUCOSE, POC Collection Time: 07/23/18  3:24 AM  
Result Value Ref Range Glucose (POC) 355 (H) 65 - 100 mg/dL Performed by Jenna Nicklaus [facility] GLUCOSE, POC Collection Time: 07/23/18  5:31 AM  
Result Value Ref Range Glucose (POC) 313 (H) 65 - 100 mg/dL Performed by [facility] GLUCOSE, POC Collection Time: 07/23/18  6:55 AM  
Result Value Ref Range Glucose (POC) 277 (H) 65 - 100 mg/dL Performed by Bonnie Shepard Radiologic Studies -  
XR ABD ACUTE W 1 V CHEST Final Result CT HEAD WO CONT Final Result CT Results  (Last 48 hours) 07/23/18 0111  CT HEAD WO CONT Final result Impression:  IMPRESSION:   
1. No evidence of acute intracranial abnormality by this modality. 2. Pansinusitis.   
   
   
  
 Narrative:  EXAM:  CT HEAD WO CONT INDICATION:   Headache Additional history: Headache x2 days. Seizure-like activity COMPARISON: None. .  
TECHNIQUE:   
Unenhanced CT of the head was performed using 5 mm images. Coronal and sagittal  
reformats were produced. Brain and bone windows were generated. CT dose reduction was achieved through use of a standardized protocol tailored  
for this examination and automatic exposure control for dose modulation. Ritika Barrio FINDINGS:  
The ventricles and sulci are normal in size, shape and configuration and  
midline. There is no significant white matter disease. There is no intracranial  
hemorrhage, extra-axial collection, mass, mass effect or midline shift. The  
basilar cisterns are open. No acute infarct is identified. The bone windows  
demonstrate no abnormalities. Mucosal thickening in the frontal sinuses, ethmoid  
air cells, sphenoid and maxillary sinuses with near complete opacification of  
the left maxillary sinus and sphenoid sinuses sSoft tissue attenuation in the  
left occipital scalp. .  
  
  
 
CXR Results  (Last 48 hours) 07/23/18 0202  XR ABD ACUTE W 1 V CHEST Final result Impression:  IMPRESSION:  
1. No evidence of acute process. Narrative:  EXAM:  XR ABD ACUTE W 1 V CHEST INDICATION:  abd pain COMPARISON: Chest x-ray, 10/6/2017. CT of the abdomen and pelvis, 5/17/2018. Ritika Barrio FINDINGS:   
The upright chest radiograph demonstrates no focal consolidation. No visible  
pneumothorax or pleural effusion. .  
Supine and upright views of the abdomen demonstrate a possibility of small bowel  
gas with gas and stool in the large bowel. No visible pneumoperitoneum. The  
bones and soft tissues are within normal limits. Surgical clips in the right  
midabdomen and bilateral tubal ligation clips. .  
  
  
 
 
 
Medical Decision Making I am the first provider for this patient.  
 
I reviewed the vital signs, available nursing notes, past medical history, past surgical history, family history and social history. Vital Signs-Reviewed the patient's vital signs. Patient Vitals for the past 12 hrs: 
 Temp Pulse Resp BP SpO2  
07/23/18 0657 97.4 °F (36.3 °C) 71 18 121/74 97 %  
07/23/18 0645 - - - 121/74 97 %  
07/23/18 0630 - - - 121/69 95 %  
07/23/18 0615 - - - 116/71 98 %  
07/23/18 0600 - - - 122/74 96 %  
07/23/18 0545 - - - 118/70 96 %  
07/23/18 0530 - - - 118/68 95 %  
07/23/18 0515 - - - 119/75 95 %  
07/23/18 0500 - - - 122/70 93 % 07/23/18 0445 - - - 121/71 95 %  
07/23/18 0430 - - - 116/77 95 %  
07/23/18 0415 - - - 128/71 95 %  
07/23/18 0400 - - - 131/69 94 %  
07/23/18 0345 - - - 140/73 99 % 07/23/18 0330 - - - 138/83 98 %  
07/23/18 0315 - - - 139/87 97 %  
07/23/18 0300 - - - 141/83 98 %  
07/23/18 0245 - - - 142/77 97 %  
07/23/18 0230 - - - (!) 142/92 98 %  
07/23/18 0215 - - - (!) 139/94 97 %  
07/23/18 0145 - - - 128/82 98 %  
07/22/18 2331 98.1 °F (36.7 °C) (!) 101 12 (!) 160/106 100 % Records Reviewed: Nursing Notes and Old Medical Records Provider Notes (Medical Decision Making): DDx: gastritis, pancreatitis, gastroenteritis, tension HA, cluster HA, migraine HA 
 
ED Course:  
Initial assessment performed. The patients presenting problems have been discussed, and they are in agreement with the care plan formulated and outlined with them. I have encouraged them to ask questions as they arise throughout their visit. 6:58 AM 
Pt was re-evaluated. Pt's BG has improved and dropped to 273. Written by MARLON Elias, as dictated by Denisha Vail MD 
 
7:03 AM 
Pt will be prescribed Levaquin for sinusitis and Diflucan to treat possible yeast infection after abx. Written by MARLON Elias, as dictated by Denisha Vail MD 
 
Critical Care Time:  
None Disposition: 
Discharge Note: 
7:00 AM 
The pt is ready for discharge.  The pt's signs, symptoms, diagnosis, and discharge instructions have been discussed and pt has conveyed their understanding. The pt is to follow up as recommended or return to ER should their symptoms worsen. Plan has been discussed and pt is in agreement. PLAN: 
1. Current Discharge Medication List  
  
START taking these medications Details  
ondansetron (ZOFRAN ODT) 4 mg disintegrating tablet Take 1 Tab by mouth every eight (8) hours as needed for Nausea. Qty: 10 Tab, Refills: 0  
  
levoFLOXacin (LEVAQUIN) 500 mg tablet Take 1 Tab by mouth daily. Qty: 7 Tab, Refills: 0  
  
fluconazole (DIFLUCAN) 150 mg tablet Take 1 Tab by mouth now for 1 dose. FDA advises cautious prescribing of oral fluconazole in pregnancy. Qty: 1 Tab, Refills: 0 CONTINUE these medications which have CHANGED Details  
promethazine (PHENERGAN) 25 mg tablet Take 1 Tab by mouth every six (6) hours as needed for Nausea. Qty: 12 Tab, Refills: 0  
  
  
 
2. Follow-up Information Follow up With Details Comments Contact Info [facility] [place] EMER[facility]ENCY DEPT  If symptoms worsen [address] [place] [address] Drive [address] [facility] 
[phone] Return to ED if worse Diagnosis Clinical Impression: 1. Nausea and vomiting, intractability of vomiting not specified, unspecified vomiting type 2. Hyperglycemia 3. Hypertriglyceridemia 4. Class 2 obesity with serious comorbidity and body mass index (BMI) of 39.0 to 39.9 in adult, unspecified obesity type 5. Acute pansinusitis, recurrence not specified Attestations: This note is prepared by Moreno Romo, acting as Scribe for Anel Buckley MD. Sandhya Mazariegos MD: The scribe's documentation has been prepared under my direction and personally reviewed by me in its entirety. I confirm that the note above accurately reflects all work, treatment, procedures, and medical decision making performed by me.

## 2018-07-23 NOTE — DISCHARGE INSTRUCTIONS
Sinusitis: Care Instructions  Your Care Instructions    Sinusitis is an infection of the lining of the sinus cavities in your head. Sinusitis often follows a cold. It causes pain and pressure in your head and face. In most cases, sinusitis gets better on its own in 1 to 2 weeks. But some mild symptoms may last for several weeks. Sometimes antibiotics are needed. Follow-up care is a key part of your treatment and safety. Be sure to make and go to all appointments, and call your doctor if you are having problems. It's also a good idea to know your test results and keep a list of the medicines you take. How can you care for yourself at home? · Take an over-the-counter pain medicine, such as acetaminophen (Tylenol), ibuprofen (Advil, Motrin), or naproxen (Aleve). Read and follow all instructions on the label. · If the doctor prescribed antibiotics, take them as directed. Do not stop taking them just because you feel better. You need to take the full course of antibiotics. · Be careful when taking over-the-counter cold or flu medicines and Tylenol at the same time. Many of these medicines have acetaminophen, which is Tylenol. Read the labels to make sure that you are not taking more than the recommended dose. Too much acetaminophen (Tylenol) can be harmful. · Breathe warm, moist air from a steamy shower, a hot bath, or a sink filled with hot water. Avoid cold, dry air. Using a humidifier in your home may help. Follow the directions for cleaning the machine. · Use saline (saltwater) nasal washes to help keep your nasal passages open and wash out mucus and bacteria. You can buy saline nose drops at a grocery store or drugstore. Or you can make your own at home by adding 1 teaspoon of salt and 1 teaspoon of baking soda to 2 cups of distilled water. If you make your own, fill a bulb syringe with the solution, insert the tip into your nostril, and squeeze gently. Edmund Mara your nose.   · Put a hot, wet towel or a warm gel pack on your face 3 or 4 times a day for 5 to 10 minutes each time. · Try a decongestant nasal spray like oxymetazoline (Afrin). Do not use it for more than 3 days in a row. Using it for more than 3 days can make your congestion worse. When should you call for help? Call your doctor now or seek immediate medical care if:    · You have new or worse swelling or redness in your face or around your eyes.     · You have a new or higher fever.    Watch closely for changes in your health, and be sure to contact your doctor if:    · You have new or worse facial pain.     · The mucus from your nose becomes thicker (like pus) or has new blood in it.     · You are not getting better as expected. Where can you learn more? Go to http://ish-irish.info/. Enter R170 in the search box to learn more about \"Sinusitis: Care Instructions. \"  Current as of: May 12, 2017  Content Version: 11.7  © 6710-2044 [employer]. Care instructions adapted under license by [employer] (which disclaims liability or warranty for this information). If you have questions about a medical condition or this instruction, always ask your healthcare professional. [place] [address] any warranty or liability for your use of this information. Learning About High Blood Sugar  What is high blood sugar? Your body turns the food you eat into glucose (sugar), which it uses for energy. But if your body isn't able to use the sugar right away, it can build up in your blood and lead to high blood sugar. When the amount of sugar in your blood stays too high for too much of the time, you may have diabetes. Diabetes is a disease that can cause serious health problems. The good news is that lifestyle changes may help you get your blood sugar back to normal and avoid or delay diabetes. What causes high blood sugar?   Sugar (glucose) can build up in your blood if you:  · Are overweight. · Have a family history of diabetes. · Take certain medicines, such as steroids. What are the symptoms? Having high blood sugar may not cause any symptoms at all. Or it may make you feel very thirsty or very hungry. You may also urinate more often than usual, have blurry vision, or lose weight without trying. How is high blood sugar treated? You can take steps to lower your blood sugar level if you understand what makes it get higher. Your doctor may want you to learn how to test your blood sugar level at home. Then you can see how illness, stress, or different kinds of food or medicine raise or lower your blood sugar level. Other tests may be needed to see if you have diabetes. How can you prevent high blood sugar? · Watch your weight. If you're overweight, losing just a small amount of weight may help. Reducing fat around your waist is most important. · Limit the amount of calories, sweets, and unhealthy fat you eat. Ask your doctor if a dietitian can help you. A registered dietitian can help you create meal plans that fit your lifestyle. · Get at least 30 minutes of exercise on most days of the week. Exercise helps control your blood sugar. It also helps you maintain a healthy weight. Walking is a good choice. You also may want to do other activities, such as running, swimming, cycling, or playing tennis or team sports. · If your doctor prescribed medicines, take them exactly as prescribed. Call your doctor if you think you are having a problem with your medicine. You will get more details on the specific medicines your doctor prescribes. Follow-up care is a key part of your treatment and safety. Be sure to make and go to all appointments, and call your doctor if you are having problems. It's also a good idea to know your test results and keep a list of the medicines you take. Where can you learn more? Go to http://ish-irish.info/.   Enter O108 in the search box to learn more about \"Learning About High Blood Sugar. \"  Current as of: December 7, 2017  Content Version: 11.7  © 5357-7267 [employer]. Care instructions adapted under license by [employer] (which disclaims liability or warranty for this information). If you have questions about a medical condition or this instruction, always ask your healthcare professional. [facility] any warranty or liability for your use of this information. Nausea and Vomiting: Care Instructions  Your Care Instructions    When you are nauseated, you may feel weak and sweaty and notice a lot of saliva in your mouth. Nausea often leads to vomiting. Most of the time you do not need to worry about nausea and vomiting, but they can be signs of other illnesses. Two common causes of nausea and vomiting are stomach flu and food poisoning. Nausea and vomiting from viral stomach flu will usually start to improve within 24 hours. Nausea and vomiting from food poisoning may last from 12 to 48 hours. The doctor has checked you carefully, but problems can develop later. If you notice any problems or new symptoms, get medical treatment right away. Follow-up care is a key part of your treatment and safety. Be sure to make and go to all appointments, and call your doctor if you are having problems. It's also a good idea to know your test results and keep a list of the medicines you take. How can you care for yourself at home? · To prevent dehydration, drink plenty of fluids, enough so that your urine is light yellow or clear like water. Choose water and other caffeine-free clear liquids until you feel better. If you have kidney, heart, or liver disease and have to limit fluids, talk with your doctor before you increase the amount of fluids you drink. · Rest in bed until you feel better. · When you are able to eat, try clear soups, mild foods, and liquids until all symptoms are gone for 12 to 48 hours. Other good choices include dry toast, crackers, cooked cereal, and gelatin dessert, such as Jell-O. When should you call for help? Call 911 anytime you think you may need emergency care. For example, call if:    · You passed out (lost consciousness).    Call your doctor now or seek immediate medical care if:    · You have symptoms of dehydration, such as:  ¨ Dry eyes and a dry mouth. ¨ Passing only a little dark urine. ¨ Feeling thirstier than usual.     · You have new or worsening belly pain.     · You have a new or higher fever.     · You vomit blood or what looks like coffee grounds.    Watch closely for changes in your health, and be sure to contact your doctor if:    · You have ongoing nausea and vomiting.     · Your vomiting is getting worse.     · Your vomiting lasts longer than 2 days.     · You are not getting better as expected. Where can you learn more? Go to http://ish-irish.info/. Enter 25 [id number] in the search box to learn more about \"Nausea and Vomiting: Care Instructions. \"  Current as of: November 20, 2017  Content Version: 11.7  © 6382-8138 [employer]. Care instructions adapted under license by [employer] (which disclaims liability or warranty for this information). If you have questions about a medical condition or this instruction, always ask your healthcare professional. [address] any warranty or liability for your use of this information.

## 2018-07-23 NOTE — ED NOTES
RN entering pt chart for first time since pt been roomed. Will do orders that are not completed at this time.

## 2018-07-24 LAB
BACTERIA SPEC CULT: NORMAL
CC UR VC: NORMAL
SERVICE CMNT-IMP: NORMAL

## 2018-07-25 ENCOUNTER — APPOINTMENT (OUTPATIENT)
Dept: GENERAL RADIOLOGY | Age: 38
DRG: 420 | End: 2018-07-25
Attending: STUDENT IN AN ORGANIZED HEALTH CARE EDUCATION/TRAINING PROGRAM
Payer: MEDICAID

## 2018-07-25 ENCOUNTER — HOSPITAL ENCOUNTER (INPATIENT)
Age: 38
LOS: 2 days | Discharge: HOME | DRG: 420 | End: 2018-07-28
Attending: EMERGENCY MEDICINE | Admitting: FAMILY MEDICINE
Payer: MEDICAID

## 2018-07-25 DIAGNOSIS — E11.00 HYPEROSMOLAR NON-KETOTIC STATE IN PATIENT WITH TYPE 2 DIABETES MELLITUS (HCC): Primary | ICD-10-CM

## 2018-07-25 LAB
ALBUMIN SERPL-MCNC: ABNORMAL G/DL (ref 3.5–5)
ALBUMIN/GLOB SERPL: ABNORMAL {RATIO} (ref 1.1–2.2)
ALP SERPL-CCNC: ABNORMAL U/L (ref 45–117)
ALT SERPL-CCNC: ABNORMAL U/L (ref 12–78)
ANION GAP SERPL CALC-SCNC: ABNORMAL MMOL/L (ref 5–15)
APPEARANCE UR: CLEAR
ARTERIAL PATENCY WRIST A: ABNORMAL
AST SERPL-CCNC: ABNORMAL U/L (ref 15–37)
BACTERIA URNS QL MICRO: NEGATIVE /HPF
BASE DEFICIT BLDV-SCNC: 2 MMOL/L
BASOPHILS # BLD: 0.1 K/UL (ref 0–0.1)
BASOPHILS NFR BLD: 1 % (ref 0–1)
BDY SITE: ABNORMAL
BILIRUB SERPL-MCNC: ABNORMAL MG/DL (ref 0.2–1)
BILIRUB UR QL: NEGATIVE
BUN SERPL-MCNC: ABNORMAL MG/DL (ref 6–20)
BUN/CREAT SERPL: ABNORMAL (ref 12–20)
CALCIUM SERPL-MCNC: ABNORMAL MG/DL (ref 8.5–10.1)
CHLORIDE SERPL-SCNC: 85 MMOL/L (ref 97–108)
CK SERPL-CCNC: NORMAL U/L (ref 26–192)
CO2 SERPL-SCNC: 36 MMOL/L (ref 21–32)
COLOR UR: ABNORMAL
CREAT SERPL-MCNC: 1.9 MG/DL (ref 0.55–1.02)
DIFFERENTIAL METHOD BLD: ABNORMAL
EOSINOPHIL # BLD: 0.2 K/UL (ref 0–0.4)
EOSINOPHIL NFR BLD: 2 % (ref 0–7)
EPITH CASTS URNS QL MICRO: ABNORMAL /LPF
ERYTHROCYTE [DISTWIDTH] IN BLOOD BY AUTOMATED COUNT: 14.6 % (ref 11.5–14.5)
GAS FLOW.O2 O2 DELIVERY SYS: ABNORMAL L/MIN
GLOBULIN SER CALC-MCNC: ABNORMAL G/DL (ref 2–4)
GLUCOSE SERPL-MCNC: 734 MG/DL (ref 65–100)
GLUCOSE UR STRIP.AUTO-MCNC: >1000 MG/DL
HCG UR QL: NEGATIVE
HCO3 BLDV-SCNC: 22.1 MMOL/L (ref 23–28)
HCT VFR BLD AUTO: 37.6 % (ref 35–47)
HGB BLD-MCNC: 13.8 G/DL (ref 11.5–16)
HGB UR QL STRIP: NEGATIVE
HYALINE CASTS URNS QL MICRO: ABNORMAL /LPF (ref 0–5)
IMM GRANULOCYTES # BLD: 0.1 K/UL (ref 0–0.04)
IMM GRANULOCYTES NFR BLD AUTO: 1 % (ref 0–0.5)
KETONES UR QL STRIP.AUTO: NEGATIVE MG/DL
LEUKOCYTE ESTERASE UR QL STRIP.AUTO: NEGATIVE
LYMPHOCYTES # BLD: 2 K/UL (ref 0.8–3.5)
LYMPHOCYTES NFR BLD: 21 % (ref 12–49)
MCH RBC QN AUTO: 29.3 PG (ref 26–34)
MCHC RBC AUTO-ENTMCNC: 36.7 G/DL (ref 30–36.5)
MCV RBC AUTO: 79.8 FL (ref 80–99)
MONOCYTES # BLD: 0.4 K/UL (ref 0–1)
MONOCYTES NFR BLD: 4 % (ref 5–13)
NEUTS SEG # BLD: 6.6 K/UL (ref 1.8–8)
NEUTS SEG NFR BLD: 71 % (ref 32–75)
NITRITE UR QL STRIP.AUTO: NEGATIVE
NRBC # BLD: 0.04 K/UL (ref 0–0.01)
NRBC BLD-RTO: 0.4 PER 100 WBC
PCO2 BLDV: 34.3 MMHG (ref 41–51)
PH BLDV: 7.42 [PH] (ref 7.32–7.42)
PH UR STRIP: 5 [PH] (ref 5–8)
PLATELET # BLD AUTO: 255 K/UL (ref 150–400)
PMV BLD AUTO: 10.8 FL (ref 8.9–12.9)
PO2 BLDV: 73 MMHG (ref 25–40)
POTASSIUM SERPL-SCNC: 3.8 MMOL/L (ref 3.5–5.1)
PROT SERPL-MCNC: ABNORMAL G/DL (ref 6.4–8.2)
PROT UR STRIP-MCNC: 100 MG/DL
RBC # BLD AUTO: 4.71 M/UL (ref 3.8–5.2)
RBC #/AREA URNS HPF: ABNORMAL /HPF (ref 0–5)
SAO2 % BLDV: 95 % (ref 65–88)
SODIUM SERPL-SCNC: 118 MMOL/L (ref 136–145)
SP GR UR REFRACTOMETRY: 1.01 (ref 1–1.03)
SPECIMEN TYPE: ABNORMAL
TOTAL RESP. RATE, ITRR: 14
TROPONIN I SERPL-MCNC: <0.05 NG/ML
UA: UC IF INDICATED,UAUC: ABNORMAL
UROBILINOGEN UR QL STRIP.AUTO: 0.2 EU/DL (ref 0.2–1)
WBC # BLD AUTO: 9.3 K/UL (ref 3.6–11)
WBC URNS QL MICRO: ABNORMAL /HPF (ref 0–4)

## 2018-07-25 PROCEDURE — 74011250637 HC RX REV CODE- 250/637: Performed by: EMERGENCY MEDICINE

## 2018-07-25 PROCEDURE — 96365 THER/PROPH/DIAG IV INF INIT: CPT

## 2018-07-25 PROCEDURE — 36415 COLL VENOUS BLD VENIPUNCTURE: CPT | Performed by: EMERGENCY MEDICINE

## 2018-07-25 PROCEDURE — 85610 PROTHROMBIN TIME: CPT | Performed by: EMERGENCY MEDICINE

## 2018-07-25 PROCEDURE — 82803 BLOOD GASES ANY COMBINATION: CPT

## 2018-07-25 PROCEDURE — 83735 ASSAY OF MAGNESIUM: CPT | Performed by: EMERGENCY MEDICINE

## 2018-07-25 PROCEDURE — 83690 ASSAY OF LIPASE: CPT | Performed by: EMERGENCY MEDICINE

## 2018-07-25 PROCEDURE — 71046 X-RAY EXAM CHEST 2 VIEWS: CPT

## 2018-07-25 PROCEDURE — 81001 URINALYSIS AUTO W/SCOPE: CPT | Performed by: EMERGENCY MEDICINE

## 2018-07-25 PROCEDURE — 82150 ASSAY OF AMYLASE: CPT | Performed by: EMERGENCY MEDICINE

## 2018-07-25 PROCEDURE — 85025 COMPLETE CBC W/AUTO DIFF WBC: CPT | Performed by: STUDENT IN AN ORGANIZED HEALTH CARE EDUCATION/TRAINING PROGRAM

## 2018-07-25 PROCEDURE — 99284 EMERGENCY DEPT VISIT MOD MDM: CPT

## 2018-07-25 PROCEDURE — 80053 COMPREHEN METABOLIC PANEL: CPT | Performed by: STUDENT IN AN ORGANIZED HEALTH CARE EDUCATION/TRAINING PROGRAM

## 2018-07-25 PROCEDURE — 83036 HEMOGLOBIN GLYCOSYLATED A1C: CPT | Performed by: EMERGENCY MEDICINE

## 2018-07-25 PROCEDURE — 94761 N-INVAS EAR/PLS OXIMETRY MLT: CPT

## 2018-07-25 PROCEDURE — 96361 HYDRATE IV INFUSION ADD-ON: CPT

## 2018-07-25 PROCEDURE — 74011250636 HC RX REV CODE- 250/636: Performed by: EMERGENCY MEDICINE

## 2018-07-25 PROCEDURE — 96375 TX/PRO/DX INJ NEW DRUG ADDON: CPT

## 2018-07-25 PROCEDURE — 84484 ASSAY OF TROPONIN QUANT: CPT | Performed by: STUDENT IN AN ORGANIZED HEALTH CARE EDUCATION/TRAINING PROGRAM

## 2018-07-25 PROCEDURE — 85730 THROMBOPLASTIN TIME PARTIAL: CPT | Performed by: EMERGENCY MEDICINE

## 2018-07-25 PROCEDURE — 84100 ASSAY OF PHOSPHORUS: CPT | Performed by: EMERGENCY MEDICINE

## 2018-07-25 PROCEDURE — 81025 URINE PREGNANCY TEST: CPT

## 2018-07-25 PROCEDURE — 82550 ASSAY OF CK (CPK): CPT | Performed by: STUDENT IN AN ORGANIZED HEALTH CARE EDUCATION/TRAINING PROGRAM

## 2018-07-25 PROCEDURE — 93005 ELECTROCARDIOGRAM TRACING: CPT

## 2018-07-25 PROCEDURE — 96374 THER/PROPH/DIAG INJ IV PUSH: CPT

## 2018-07-25 RX ORDER — DEXTROSE 50 % IN WATER (D50W) INTRAVENOUS SYRINGE
12.5-25 AS NEEDED
Status: DISCONTINUED | OUTPATIENT
Start: 2018-07-25 | End: 2018-07-28 | Stop reason: HOSPADM

## 2018-07-25 RX ORDER — MAGNESIUM SULFATE 100 %
4 CRYSTALS MISCELLANEOUS AS NEEDED
Status: DISCONTINUED | OUTPATIENT
Start: 2018-07-25 | End: 2018-07-28 | Stop reason: HOSPADM

## 2018-07-25 RX ORDER — INSULIN LISPRO 100 [IU]/ML
INJECTION, SOLUTION INTRAVENOUS; SUBCUTANEOUS
Status: DISCONTINUED | OUTPATIENT
Start: 2018-07-26 | End: 2018-07-26

## 2018-07-25 RX ORDER — HYDRALAZINE HYDROCHLORIDE 20 MG/ML
20 INJECTION INTRAMUSCULAR; INTRAVENOUS
Status: COMPLETED | OUTPATIENT
Start: 2018-07-25 | End: 2018-07-25

## 2018-07-25 RX ADMIN — NITROGLYCERIN 1 INCH: 20 OINTMENT TOPICAL at 23:17

## 2018-07-25 RX ADMIN — SODIUM CHLORIDE 1000 ML: 900 INJECTION, SOLUTION INTRAVENOUS at 23:16

## 2018-07-25 RX ADMIN — HYDRALAZINE HYDROCHLORIDE 20 MG: 20 INJECTION INTRAMUSCULAR; INTRAVENOUS at 23:17

## 2018-07-25 NOTE — IP AVS SNAPSHOT
[address] 
[phone] Patient: Lara Cm MRN: [id number] ECA:[id number]/[id number] About your hospitalization You were admitted on:  July 26, 2018 You last received care in the:  [address] [place] [address] You were discharged on:  July 28, 2018 Why you were hospitalized Your primary diagnosis was:  Uncontrolled Type 2 Diabetes Mellitus With Hyperglycemia (Hcc) Your diagnoses also included:  Sob (Shortness Of Breath), Acute Chest Pain Follow-up Information Follow up With Details Comments Contact Info None   None ([phone]) Patient stated that they have no PCP Yane Lim MD In 2 weeks  [place] [address] [address] [place] [address] 
[phone] Discharge Orders None A check jenise indicates which time of day the medication should be taken. My Medications START taking these medications Instructions Each Dose to Equal  
 Morning Noon Evening Bedtime  
 pantoprazole 40 mg tablet Commonly known as:  PROTONIX Your last dose was: Your next dose is: Take 1 Tab by mouth daily. 40 mg CHANGE how you take these medications Instructions Each Dose to Equal  
 Morning Noon Evening Bedtime  
 ergocalciferol 50,000 unit capsule Commonly known as:  ERGOCALCIFEROL What changed:  Another medication with the same name was removed. Continue taking this medication, and follow the directions you see here. Your last dose was: Your next dose is: Take 50,000 Units by mouth every seven (7) days. Takes on Saturdays [address] Units  
    
   
   
   
  
 fenofibrate nanocrystallized 145 mg tablet Commonly known as:  [employer] What changed:  Another medication with the same name was removed. Continue taking this medication, and follow the directions you see here. Your last dose was: Your next dose is: Take 1 Tab by mouth daily. 145 mg  
    
   
   
   
  
 metFORMIN 1,000 mg tablet Commonly known as:  GLUCOPHAGE What changed:  Another medication with the same name was removed. Continue taking this medication, and follow the directions you see here. Your last dose was: Your next dose is: Take 1 Tab by mouth two (2) times daily (with meals). 1000 mg  
    
   
   
   
  
 omega-3 acid ethyl esters 1 gram capsule Commonly known as:  Jannet Postal What changed:  Another medication with the same name was removed. Continue taking this medication, and follow the directions you see here. Your last dose was: Your next dose is: Take 2 Caps by mouth two (2) times a day. 2 g CONTINUE taking these medications Instructions Each Dose to Equal  
 Morning Noon Evening Bedtime  
 acetaminophen 500 mg tablet Commonly known as:  TYLENOL Your last dose was: Your next dose is: Take 1,000-2,500 mg by mouth every six (6) hours as needed for Pain. 6163-8797 mg  
    
   
   
   
  
 atorvastatin 40 mg tablet Commonly known as:  LIPITOR Your last dose was: Your next dose is: Take 1 Tab by mouth nightly. 40 mg  
    
   
   
   
  
 gabapentin 300 mg capsule Commonly known as:  NEURONTIN Your last dose was: Your next dose is: Take 2 Caps by mouth three (3) times daily. 600 mg  
    
   
   
   
  
 insulin glargine 100 unit/mL injection Commonly known as:  LANTUS U-100 INSULIN Your last dose was: Your next dose is:    
   
   
 100 Units by SubCUTAneous route two (2) times a day. Indications: type 2 diabetes mellitus 100 Units  
    
   
   
   
  
 insulin lispro 100 unit/mL injection Commonly known as:  HUMALOG Your last dose was: Your next dose is: Inject 5 units for every 50 points over 1502  
     
   
   
   
  
 ondansetron 4 mg disintegrating tablet Commonly known as:  ZOFRAN ODT Your last dose was: Your next dose is: Take 1 Tab by mouth every eight (8) hours as needed for Nausea. 4 mg PROAIR HFA 90 mcg/actuation inhaler Generic drug:  albuterol Your last dose was: Your next dose is: Take 2 Puffs by inhalation every four (4) hours as needed for Wheezing. 2 Puff STOP taking these medications   
 levoFLOXacin 500 mg tablet Commonly known as:  [facility] [place] Where to Get Your Medications These medications were sent to [facility] [address] [facility], [address]  [address], [address] Phone:  [phone]  
  pantoprazole 40 mg tablet Discharge Instructions Discharge Instructions PATIENT ID: Faizan Bedoya MRN: [id number] YOB: 1980 DATE OF ADMISSION: 7/25/2018 10:15 PM   
DATE OF DISCHARGE: 7/28/2018 PRIMARY CARE PROVIDER: None ATTENDING PHYSICIAN: Jaya Robles MD 
DISCHARGING PROVIDER: Jaya Robles MD   
To contact this individual call [phone] and ask the  to page. If unavailable ask to be transferred the Adult Hospitalist Department. DISCHARGE DIAGNOSES Hyperosmolar hyperglycemic syndrome CONSULTATIONS: IP CONSULT TO HOSPITALIST 
IP CONSULT TO CARDIOLOGY PROCEDURES/SURGERIES: * No surgery found * PENDING TEST RESULTS:  
At the time of discharge the following test results are still pending:none FOLLOW UP APPOINTMENTS:  
Follow-up Information Follow up With Details Comments Contact Info None   None (395) Patient stated that they have no PCP Smitha Soria MD In 2 weeks  [place] [address] [address] [place] [address] 
[phone] ADDITIONAL CARE RECOMMENDATIONS: Check your blood sugars and call your endocrinologist for further dose titration. Follow up with cardiology for stress test as outpatient DIET: Diabetic Diet ACTIVITY: Activity as tolerated WOUND CARE: na 
 
EQUIPMENT needed: na 
 
 
  
 SNF/Inpatient Rehab/LTAC Independent/assisted living Hospice Other:  
 
 
 
 
Signed: Rock Tal MD 
7/28/2018 
9:02 AM 
 
 
  
  
  
Shopeando Announcement We are excited to announce that we are making your provider's discharge notes available to you in Shopeando. You will see these notes when they are completed and signed by the physician that discharged you from your recent hospital stay. If you have any questions or concerns about any information you see in Versiumt, please call the Health Information Department where you were seen or reach out to your Primary Care Provider for more information about your plan of care. Introducing [facility]! Dear Panchito Segundo: Thank you for requesting a Shopeando account. Our records indicate that you already have an active Shopeando account. You can access your account anytime at [email or web address] Did you know that you can access your hospital and ER discharge instructions at any time in Hello Chair? You can also review all of your test results from your hospital stay or ER visit. Additional Information If you have questions, please visit the Frequently Asked Questions section of the [email or web address] website at [email or web address] Remember, Hello Chair is NOT to be used for urgent needs. For medical emergencies, dial 911. Now available from your iPhone and Android! Introducing Alex Moreno As a [employer] patient, I wanted to make you aware of our electronic visit tool called Alex Moreno. [employer]api[employer] allows you to connect within minutes with a medical provider 24 hours a day, seven days a week via a mobile device or tablet or logging into a secure website from your computer. You can access Alex Moreno from anywhere in the [place]. A virtual visit might be right for you when you have a simple condition and feel like you just dont want to get out of bed, or cant get away from work for an appointment, when your regular [facility] provider is not available (evenings, weekends or holidays), or when youre out of town and need minor care. Electronic visits cost only $49 and if the [employer] provider determines a prescription is needed to treat your condition, one can be electronically transmitted to a nearby pharmacy*. Please take a moment to enroll today if you have not already done so. The enrollment process is free and takes just a few minutes. To enroll, please download the S[employer] dominik to your tablet or phone, or visit [email or web address] to enroll on your computer. And, as an [address] [place] [address] patient with a [employer] account, the results of your visits will be scanned into your electronic medical record and your primary care provider will be able to view the scanned results. We urge you to continue to see your regular Marci Loud provider for your ongoing medical care. And while your primary care provider may not be the one available when you seek a Alex LoveLive.TVcliffordfin virtual visit, the peace of mind you get from getting a real diagnosis real time can be priceless. For more information on Alex LoveLive.[email or web address], view our Frequently Asked Questions (FAQs) at [email or web address]. Sincerely, 
 
Gera Virk MD 
Chief Medical Officer Liliana Rey *:  certain medications cannot be prescribed via WellRightcliffordfin Providers Seen During Your Hospitalization Provider Specialty Primary office phone Jean Dunn MD Emergency Medicine [phone] Raj Nails MD Family Practice [phone] Edie Cho MD Hospitalist [phone] Your Primary Care Physician (PCP) Primary Care Physician Office Phone Office Fax NONE ** None ** ** None ** You are allergic to the following Allergen Reactions Toradol (Ketorolac Tromethamine) Unknown (comments) Seizure like symptoms per pt. Augmentin (Amoxicillin-Pot Clavulanate) Swelling Demerol (Meperidine) Swelling Heparin Hives Ibuprofen Diarrhea Nausea and Vomiting Muscle tightness Keflex (Cephalexin) Shortness of Breath Morphine Swelling Nubain (Nalbuphine) Hives Pcn (Penicillins) Swelling Sulfa (Sulfonamide Antibiotics) Unknown (comments) Recent Documentation Height Weight BMI OB Status Smoking Status 1.753 m 123 kg 40.04 kg/m2 Having regular periods Current Some Day Smoker Emergency Contacts Name Discharge Info Relation Home Work Mobile [place] Palma N/A  AT THIS TIME [6] Other Relative [6] [phone] Kang Palma DISCHARGE CAREGIVER [3] Boyfriend [17] [phone] Patient Belongings The following personal items are in your possession at time of discharge: 
  Dental Appliances: None  Visual Aid: None      Home Medications: None   Jewelry: None  Clothing: At bedside    Other Valuables: Cell Phone Please provide this summary of care documentation to your next provider. Signatures-by signing, you are acknowledging that this After Visit Summary has been reviewed with you and you have received a copy. Patient Signature:  ____________________________________________________________ Date:  ____________________________________________________________  
  
[facility] Provider Signature:  ____________________________________________________________ Date:  ____________________________________________________________

## 2018-07-26 PROBLEM — E11.65 UNCONTROLLED TYPE 2 DIABETES MELLITUS WITH HYPERGLYCEMIA (HCC): Status: ACTIVE | Noted: 2018-07-26

## 2018-07-26 PROBLEM — R07.9 ACUTE CHEST PAIN: Status: ACTIVE | Noted: 2018-07-26

## 2018-07-26 PROBLEM — R06.02 SOB (SHORTNESS OF BREATH): Status: ACTIVE | Noted: 2018-07-26

## 2018-07-26 LAB
ADMINISTERED INITIALS, ADMINIT: NORMAL
ALBUMIN SERPL-MCNC: 2.3 G/DL (ref 3.5–5)
ALBUMIN/GLOB SERPL: ABNORMAL {RATIO} (ref 1.1–2.2)
ALP SERPL-CCNC: ABNORMAL U/L (ref 45–117)
ALT SERPL-CCNC: ABNORMAL U/L (ref 12–78)
AMYLASE SERPL-CCNC: NORMAL U/L (ref 25–115)
ANION GAP SERPL CALC-SCNC: 3 MMOL/L (ref 5–15)
ANION GAP SERPL CALC-SCNC: 5 MMOL/L (ref 5–15)
APTT PPP: 26.5 SEC (ref 22.1–32)
AST SERPL-CCNC: ABNORMAL U/L (ref 15–37)
ATRIAL RATE: 100 BPM
ATRIAL RATE: 99 BPM
BASOPHILS # BLD: 0 K/UL (ref 0–0.1)
BASOPHILS NFR BLD: 0 % (ref 0–1)
BILIRUB SERPL-MCNC: 1.4 MG/DL (ref 0.2–1)
BUN SERPL-MCNC: ABNORMAL MG/DL (ref 6–20)
BUN SERPL-MCNC: ABNORMAL MG/DL (ref 6–20)
BUN/CREAT SERPL: ABNORMAL (ref 12–20)
BUN/CREAT SERPL: ABNORMAL (ref 12–20)
CALCIUM SERPL-MCNC: ABNORMAL MG/DL (ref 8.5–10.1)
CALCIUM SERPL-MCNC: ABNORMAL MG/DL (ref 8.5–10.1)
CALCULATED P AXIS, ECG09: 45 DEGREES
CALCULATED P AXIS, ECG09: 52 DEGREES
CALCULATED R AXIS, ECG10: -17 DEGREES
CALCULATED R AXIS, ECG10: -2 DEGREES
CALCULATED T AXIS, ECG11: 24 DEGREES
CALCULATED T AXIS, ECG11: 31 DEGREES
CHLORIDE SERPL-SCNC: 94 MMOL/L (ref 97–108)
CHLORIDE SERPL-SCNC: 95 MMOL/L (ref 97–108)
CHOLEST SERPL-MCNC: ABNORMAL MG/DL
CO2 SERPL-SCNC: 26 MMOL/L (ref 21–32)
CO2 SERPL-SCNC: 29 MMOL/L (ref 21–32)
CREAT SERPL-MCNC: 1.5 MG/DL (ref 0.55–1.02)
CREAT SERPL-MCNC: 1.68 MG/DL (ref 0.55–1.02)
D50 ADMINISTERED, D50ADM: 0 ML
D50 ORDER, D50ORD: 0 ML
DIAGNOSIS, 93000: NORMAL
DIAGNOSIS, 93000: NORMAL
DIFFERENTIAL METHOD BLD: ABNORMAL
EOSINOPHIL # BLD: 0 K/UL (ref 0–0.4)
EOSINOPHIL NFR BLD: 0 % (ref 0–7)
ERYTHROCYTE [DISTWIDTH] IN BLOOD BY AUTOMATED COUNT: 14.7 % (ref 11.5–14.5)
EST. AVERAGE GLUCOSE BLD GHB EST-MCNC: 240 MG/DL
GLOBULIN SER CALC-MCNC: ABNORMAL G/DL (ref 2–4)
GLSCOM COMMENTS: NORMAL
GLUCOSE BLD STRIP.AUTO-MCNC: 214 MG/DL (ref 65–100)
GLUCOSE BLD STRIP.AUTO-MCNC: 215 MG/DL (ref 65–100)
GLUCOSE BLD STRIP.AUTO-MCNC: 223 MG/DL (ref 65–100)
GLUCOSE BLD STRIP.AUTO-MCNC: 225 MG/DL (ref 65–100)
GLUCOSE BLD STRIP.AUTO-MCNC: 235 MG/DL (ref 65–100)
GLUCOSE BLD STRIP.AUTO-MCNC: 239 MG/DL (ref 65–100)
GLUCOSE BLD STRIP.AUTO-MCNC: 240 MG/DL (ref 65–100)
GLUCOSE BLD STRIP.AUTO-MCNC: 241 MG/DL (ref 65–100)
GLUCOSE BLD STRIP.AUTO-MCNC: 242 MG/DL (ref 65–100)
GLUCOSE BLD STRIP.AUTO-MCNC: 311 MG/DL (ref 65–100)
GLUCOSE BLD STRIP.AUTO-MCNC: 332 MG/DL (ref 65–100)
GLUCOSE BLD STRIP.AUTO-MCNC: 413 MG/DL (ref 65–100)
GLUCOSE BLD STRIP.AUTO-MCNC: 421 MG/DL (ref 65–100)
GLUCOSE SERPL-MCNC: 354 MG/DL (ref 65–100)
GLUCOSE SERPL-MCNC: 435 MG/DL (ref 65–100)
GLUCOSE, GLC: 214 MG/DL
GLUCOSE, GLC: 215 MG/DL
GLUCOSE, GLC: 223 MG/DL
GLUCOSE, GLC: 225 MG/DL
GLUCOSE, GLC: 235 MG/DL
GLUCOSE, GLC: 241 MG/DL
GLUCOSE, GLC: 242 MG/DL
GLUCOSE, GLC: 311 MG/DL
GLUCOSE, GLC: 332 MG/DL
GLUCOSE, GLC: 413 MG/DL
GLUCOSE, GLC: 421 MG/DL
HBA1C MFR BLD: 10 % (ref 4.2–6.3)
HCT VFR BLD AUTO: 30 % (ref 35–47)
HDLC SERPL-MCNC: 39 MG/DL
HDLC SERPL: ABNORMAL {RATIO} (ref 0–5)
HGB BLD-MCNC: 11.2 G/DL (ref 11.5–16)
HIGH TARGET, HITG: 300 MG/DL
IMM GRANULOCYTES # BLD: 0 K/UL
IMM GRANULOCYTES NFR BLD AUTO: 0 %
INR PPP: 0.9 (ref 0.9–1.1)
INR PPP: 0.9 (ref 0.9–1.1)
INSULIN ADMINSTERED, INSADM: 10.6 UNITS/HOUR
INSULIN ADMINSTERED, INSADM: 10.9 UNITS/HOUR
INSULIN ADMINSTERED, INSADM: 12.6 UNITS/HOUR
INSULIN ADMINSTERED, INSADM: 7.2 UNITS/HOUR
INSULIN ADMINSTERED, INSADM: 7.7 UNITS/HOUR
INSULIN ADMINSTERED, INSADM: 7.8 UNITS/HOUR
INSULIN ADMINSTERED, INSADM: 8.2 UNITS/HOUR
INSULIN ADMINSTERED, INSADM: 8.3 UNITS/HOUR
INSULIN ADMINSTERED, INSADM: 8.8 UNITS/HOUR
INSULIN ADMINSTERED, INSADM: 9.1 UNITS/HOUR
INSULIN ADMINSTERED, INSADM: 9.1 UNITS/HOUR
INSULIN ORDER, INSORD: 10.6 UNITS/HOUR
INSULIN ORDER, INSORD: 10.9 UNITS/HOUR
INSULIN ORDER, INSORD: 12.6 UNITS/HOUR
INSULIN ORDER, INSORD: 7.2 UNITS/HOUR
INSULIN ORDER, INSORD: 7.7 UNITS/HOUR
INSULIN ORDER, INSORD: 7.8 UNITS/HOUR
INSULIN ORDER, INSORD: 8.2 UNITS/HOUR
INSULIN ORDER, INSORD: 8.3 UNITS/HOUR
INSULIN ORDER, INSORD: 8.8 UNITS/HOUR
INSULIN ORDER, INSORD: 9.1 UNITS/HOUR
INSULIN ORDER, INSORD: 9.1 UNITS/HOUR
LDLC SERPL CALC-MCNC: ABNORMAL MG/DL (ref 0–100)
LIPASE SERPL-CCNC: 311 U/L (ref 73–393)
LIPID PROFILE,FLP: ABNORMAL
LOW TARGET, LOT: 200 MG/DL
LYMPHOCYTES # BLD: 1.8 K/UL (ref 0.8–3.5)
LYMPHOCYTES NFR BLD: 25 % (ref 12–49)
MAGNESIUM SERPL-MCNC: 1.4 MG/DL (ref 1.6–2.4)
MAGNESIUM SERPL-MCNC: 1.6 MG/DL (ref 1.6–2.4)
MCH RBC QN AUTO: 29.7 PG (ref 26–34)
MCHC RBC AUTO-ENTMCNC: 37.3 G/DL (ref 30–36.5)
MCV RBC AUTO: 79.6 FL (ref 80–99)
MINUTES UNTIL NEXT BG, NBG: 120 MIN
MINUTES UNTIL NEXT BG, NBG: 60 MIN
MONOCYTES # BLD: 0.2 K/UL (ref 0–1)
MONOCYTES NFR BLD: 3 % (ref 5–13)
MULTIPLIER, MUL: 0.02
MULTIPLIER, MUL: 0.03
MULTIPLIER, MUL: 0.04
MULTIPLIER, MUL: 0.05
MYELOCYTES NFR BLD MANUAL: 1 %
NEUTS SEG # BLD: 5 K/UL (ref 1.8–8)
NEUTS SEG NFR BLD: 71 % (ref 32–75)
NRBC # BLD: 0.02 K/UL (ref 0–0.01)
NRBC BLD-RTO: 0.3 PER 100 WBC
ORDER INITIALS, ORDINIT: NORMAL
P-R INTERVAL, ECG05: 128 MS
P-R INTERVAL, ECG05: 138 MS
PHOSPHATE SERPL-MCNC: 1.6 MG/DL (ref 2.6–4.7)
PHOSPHATE SERPL-MCNC: 2.3 MG/DL (ref 2.6–4.7)
PLATELET # BLD AUTO: 178 K/UL (ref 150–400)
PMV BLD AUTO: 10.7 FL (ref 8.9–12.9)
POTASSIUM SERPL-SCNC: 3.1 MMOL/L (ref 3.5–5.1)
POTASSIUM SERPL-SCNC: 3.4 MMOL/L (ref 3.5–5.1)
PROT SERPL-MCNC: ABNORMAL G/DL (ref 6.4–8.2)
PROTHROMBIN TIME: 9 SEC (ref 9–11.1)
PROTHROMBIN TIME: 9.3 SEC (ref 9–11.1)
Q-T INTERVAL, ECG07: 358 MS
Q-T INTERVAL, ECG07: 376 MS
QRS DURATION, ECG06: 86 MS
QRS DURATION, ECG06: 88 MS
QTC CALCULATION (BEZET), ECG08: 459 MS
QTC CALCULATION (BEZET), ECG08: 485 MS
RBC # BLD AUTO: 3.77 M/UL (ref 3.8–5.2)
RBC MORPH BLD: ABNORMAL
SERVICE CMNT-IMP: ABNORMAL
SODIUM SERPL-SCNC: 126 MMOL/L (ref 136–145)
SODIUM SERPL-SCNC: 126 MMOL/L (ref 136–145)
THERAPEUTIC RANGE,PTTT: NORMAL SECS (ref 58–77)
TRIGL SERPL-MCNC: 3777 MG/DL (ref ?–150)
TROPONIN I SERPL-MCNC: <0.05 NG/ML
TROPONIN I SERPL-MCNC: <0.05 NG/ML
VENTRICULAR RATE, ECG03: 100 BPM
VENTRICULAR RATE, ECG03: 99 BPM
VLDLC SERPL CALC-MCNC: ABNORMAL MG/DL
WBC # BLD AUTO: 7.1 K/UL (ref 3.6–11)

## 2018-07-26 PROCEDURE — 74011000258 HC RX REV CODE- 258: Performed by: FAMILY MEDICINE

## 2018-07-26 PROCEDURE — 74011636637 HC RX REV CODE- 636/637: Performed by: HOSPITALIST

## 2018-07-26 PROCEDURE — 96365 THER/PROPH/DIAG IV INF INIT: CPT

## 2018-07-26 PROCEDURE — 77010033678 HC OXYGEN DAILY

## 2018-07-26 PROCEDURE — 85610 PROTHROMBIN TIME: CPT | Performed by: FAMILY MEDICINE

## 2018-07-26 PROCEDURE — 65660000001 HC RM ICU INTERMED STEPDOWN

## 2018-07-26 PROCEDURE — 36415 COLL VENOUS BLD VENIPUNCTURE: CPT | Performed by: FAMILY MEDICINE

## 2018-07-26 PROCEDURE — 84100 ASSAY OF PHOSPHORUS: CPT | Performed by: EMERGENCY MEDICINE

## 2018-07-26 PROCEDURE — 74011250636 HC RX REV CODE- 250/636: Performed by: HOSPITALIST

## 2018-07-26 PROCEDURE — 74011636637 HC RX REV CODE- 636/637: Performed by: EMERGENCY MEDICINE

## 2018-07-26 PROCEDURE — 74011250636 HC RX REV CODE- 250/636: Performed by: FAMILY MEDICINE

## 2018-07-26 PROCEDURE — 93041 RHYTHM ECG TRACING: CPT

## 2018-07-26 PROCEDURE — 85025 COMPLETE CBC W/AUTO DIFF WBC: CPT | Performed by: FAMILY MEDICINE

## 2018-07-26 PROCEDURE — 74011000258 HC RX REV CODE- 258: Performed by: EMERGENCY MEDICINE

## 2018-07-26 PROCEDURE — 94760 N-INVAS EAR/PLS OXIMETRY 1: CPT

## 2018-07-26 PROCEDURE — 74011250637 HC RX REV CODE- 250/637: Performed by: FAMILY MEDICINE

## 2018-07-26 PROCEDURE — 74011250637 HC RX REV CODE- 250/637: Performed by: HOSPITALIST

## 2018-07-26 PROCEDURE — 80053 COMPREHEN METABOLIC PANEL: CPT | Performed by: FAMILY MEDICINE

## 2018-07-26 PROCEDURE — 83735 ASSAY OF MAGNESIUM: CPT | Performed by: EMERGENCY MEDICINE

## 2018-07-26 PROCEDURE — 80061 LIPID PANEL: CPT | Performed by: FAMILY MEDICINE

## 2018-07-26 PROCEDURE — 84484 ASSAY OF TROPONIN QUANT: CPT | Performed by: FAMILY MEDICINE

## 2018-07-26 PROCEDURE — 82962 GLUCOSE BLOOD TEST: CPT

## 2018-07-26 RX ORDER — POTASSIUM CHLORIDE 750 MG/1
40 TABLET, FILM COATED, EXTENDED RELEASE ORAL
Status: COMPLETED | OUTPATIENT
Start: 2018-07-26 | End: 2018-07-26

## 2018-07-26 RX ORDER — INSULIN GLARGINE 100 [IU]/ML
50 INJECTION, SOLUTION SUBCUTANEOUS DAILY
Status: DISCONTINUED | OUTPATIENT
Start: 2018-07-26 | End: 2018-07-27

## 2018-07-26 RX ORDER — SODIUM CHLORIDE 9 MG/ML
125 INJECTION, SOLUTION INTRAVENOUS CONTINUOUS
Status: DISCONTINUED | OUTPATIENT
Start: 2018-07-26 | End: 2018-07-27

## 2018-07-26 RX ORDER — FENTANYL CITRATE 50 UG/ML
25 INJECTION, SOLUTION INTRAMUSCULAR; INTRAVENOUS
Status: DISCONTINUED | OUTPATIENT
Start: 2018-07-26 | End: 2018-07-28 | Stop reason: HOSPADM

## 2018-07-26 RX ORDER — SODIUM CHLORIDE 0.9 % (FLUSH) 0.9 %
5-10 SYRINGE (ML) INJECTION EVERY 8 HOURS
Status: DISCONTINUED | OUTPATIENT
Start: 2018-07-26 | End: 2018-07-28 | Stop reason: HOSPADM

## 2018-07-26 RX ORDER — SODIUM CHLORIDE 9 MG/ML
50 INJECTION, SOLUTION INTRAVENOUS CONTINUOUS
Status: DISCONTINUED | OUTPATIENT
Start: 2018-07-26 | End: 2018-07-28 | Stop reason: HOSPADM

## 2018-07-26 RX ORDER — FENOFIBRATE 145 MG/1
145 TABLET, COATED ORAL DAILY
Status: DISCONTINUED | OUTPATIENT
Start: 2018-07-26 | End: 2018-07-28 | Stop reason: HOSPADM

## 2018-07-26 RX ORDER — DEXTROSE 50 % IN WATER (D50W) INTRAVENOUS SYRINGE
12.5-25 AS NEEDED
Status: DISCONTINUED | OUTPATIENT
Start: 2018-07-26 | End: 2018-07-26 | Stop reason: SDUPTHER

## 2018-07-26 RX ORDER — FENTANYL CITRATE 50 UG/ML
12.5 INJECTION, SOLUTION INTRAMUSCULAR; INTRAVENOUS ONCE
Status: COMPLETED | OUTPATIENT
Start: 2018-07-26 | End: 2018-07-26

## 2018-07-26 RX ORDER — DEXTROSE MONOHYDRATE AND SODIUM CHLORIDE 5; .9 G/100ML; G/100ML
100 INJECTION, SOLUTION INTRAVENOUS CONTINUOUS
Status: DISCONTINUED | OUTPATIENT
Start: 2018-07-26 | End: 2018-07-27

## 2018-07-26 RX ORDER — MAGNESIUM SULFATE 100 %
4 CRYSTALS MISCELLANEOUS AS NEEDED
Status: DISCONTINUED | OUTPATIENT
Start: 2018-07-26 | End: 2018-07-26 | Stop reason: SDUPTHER

## 2018-07-26 RX ORDER — ONDANSETRON 2 MG/ML
4 INJECTION INTRAMUSCULAR; INTRAVENOUS
Status: DISCONTINUED | OUTPATIENT
Start: 2018-07-26 | End: 2018-07-28 | Stop reason: HOSPADM

## 2018-07-26 RX ORDER — INSULIN GLARGINE 100 [IU]/ML
100 INJECTION, SOLUTION SUBCUTANEOUS DAILY
Status: DISCONTINUED | OUTPATIENT
Start: 2018-07-26 | End: 2018-07-26 | Stop reason: SDUPTHER

## 2018-07-26 RX ORDER — ACETAMINOPHEN 325 MG/1
650 TABLET ORAL
Status: DISCONTINUED | OUTPATIENT
Start: 2018-07-26 | End: 2018-07-28 | Stop reason: HOSPADM

## 2018-07-26 RX ORDER — ATORVASTATIN CALCIUM 40 MG/1
40 TABLET, FILM COATED ORAL
Status: DISCONTINUED | OUTPATIENT
Start: 2018-07-26 | End: 2018-07-28 | Stop reason: HOSPADM

## 2018-07-26 RX ORDER — INSULIN LISPRO 100 [IU]/ML
INJECTION, SOLUTION INTRAVENOUS; SUBCUTANEOUS
Status: DISCONTINUED | OUTPATIENT
Start: 2018-07-26 | End: 2018-07-28 | Stop reason: HOSPADM

## 2018-07-26 RX ORDER — IBUPROFEN 200 MG
1 TABLET ORAL DAILY
Status: DISCONTINUED | OUTPATIENT
Start: 2018-07-26 | End: 2018-07-28 | Stop reason: HOSPADM

## 2018-07-26 RX ORDER — MAGNESIUM SULFATE HEPTAHYDRATE 40 MG/ML
2 INJECTION, SOLUTION INTRAVENOUS ONCE
Status: COMPLETED | OUTPATIENT
Start: 2018-07-26 | End: 2018-07-26

## 2018-07-26 RX ORDER — SODIUM CHLORIDE 0.9 % (FLUSH) 0.9 %
5-10 SYRINGE (ML) INJECTION AS NEEDED
Status: DISCONTINUED | OUTPATIENT
Start: 2018-07-26 | End: 2018-07-28 | Stop reason: HOSPADM

## 2018-07-26 RX ORDER — OXYCODONE HYDROCHLORIDE 5 MG/1
5 TABLET ORAL
Status: DISCONTINUED | OUTPATIENT
Start: 2018-07-26 | End: 2018-07-28 | Stop reason: HOSPADM

## 2018-07-26 RX ORDER — GABAPENTIN 300 MG/1
600 CAPSULE ORAL 3 TIMES DAILY
Status: DISCONTINUED | OUTPATIENT
Start: 2018-07-26 | End: 2018-07-28 | Stop reason: HOSPADM

## 2018-07-26 RX ORDER — ERGOCALCIFEROL 1.25 MG/1
50000 CAPSULE ORAL
COMMUNITY
End: 2018-08-23 | Stop reason: SDUPTHER

## 2018-07-26 RX ORDER — ATORVASTATIN CALCIUM 20 MG/1
40 TABLET, FILM COATED ORAL DAILY
Status: DISCONTINUED | OUTPATIENT
Start: 2018-07-26 | End: 2018-07-26 | Stop reason: SDUPTHER

## 2018-07-26 RX ADMIN — FENTANYL CITRATE 25 MCG: 50 INJECTION, SOLUTION INTRAMUSCULAR; INTRAVENOUS at 02:21

## 2018-07-26 RX ADMIN — SODIUM CHLORIDE 125 ML/HR: 900 INJECTION, SOLUTION INTRAVENOUS at 02:20

## 2018-07-26 RX ADMIN — OXYCODONE HYDROCHLORIDE 5 MG: 5 TABLET ORAL at 09:47

## 2018-07-26 RX ADMIN — DEXTROSE MONOHYDRATE AND SODIUM CHLORIDE 100 ML/HR: 5; .9 INJECTION, SOLUTION INTRAVENOUS at 14:54

## 2018-07-26 RX ADMIN — GABAPENTIN 600 MG: 300 CAPSULE ORAL at 16:23

## 2018-07-26 RX ADMIN — ATORVASTATIN CALCIUM 40 MG: 40 TABLET, FILM COATED ORAL at 22:33

## 2018-07-26 RX ADMIN — Medication 10 ML: at 22:00

## 2018-07-26 RX ADMIN — Medication 10 ML: at 07:11

## 2018-07-26 RX ADMIN — INSULIN GLARGINE 50 UNITS: 100 INJECTION, SOLUTION SUBCUTANEOUS at 14:31

## 2018-07-26 RX ADMIN — FENTANYL CITRATE 12.5 MCG: 50 INJECTION, SOLUTION INTRAMUSCULAR; INTRAVENOUS at 05:02

## 2018-07-26 RX ADMIN — MAGNESIUM SULFATE HEPTAHYDRATE 2 G: 40 INJECTION, SOLUTION INTRAVENOUS at 19:40

## 2018-07-26 RX ADMIN — INSULIN LISPRO 4 UNITS: 100 INJECTION, SOLUTION INTRAVENOUS; SUBCUTANEOUS at 16:23

## 2018-07-26 RX ADMIN — ONDANSETRON 4 MG: 2 INJECTION, SOLUTION INTRAMUSCULAR; INTRAVENOUS at 04:06

## 2018-07-26 RX ADMIN — SODIUM CHLORIDE 7.7 UNITS/HR: 900 INJECTION, SOLUTION INTRAVENOUS at 07:10

## 2018-07-26 RX ADMIN — GABAPENTIN 600 MG: 300 CAPSULE ORAL at 22:33

## 2018-07-26 RX ADMIN — Medication 2 CAPSULE: at 19:40

## 2018-07-26 RX ADMIN — FENOFIBRATE 145 MG: 145 TABLET ORAL at 08:29

## 2018-07-26 RX ADMIN — SODIUM CHLORIDE 7.2 UNITS/HR: 900 INJECTION, SOLUTION INTRAVENOUS at 00:28

## 2018-07-26 RX ADMIN — Medication 10 ML: at 14:01

## 2018-07-26 RX ADMIN — INSULIN LISPRO 2 UNITS: 100 INJECTION, SOLUTION INTRAVENOUS; SUBCUTANEOUS at 22:35

## 2018-07-26 RX ADMIN — ACETAMINOPHEN 650 MG: 325 TABLET ORAL at 06:06

## 2018-07-26 RX ADMIN — POTASSIUM CHLORIDE 40 MEQ: 750 TABLET, EXTENDED RELEASE ORAL at 19:41

## 2018-07-26 RX ADMIN — DEXTROSE MONOHYDRATE AND SODIUM CHLORIDE 100 ML/HR: 5; .9 INJECTION, SOLUTION INTRAVENOUS at 05:16

## 2018-07-26 RX ADMIN — GABAPENTIN 600 MG: 300 CAPSULE ORAL at 08:35

## 2018-07-26 RX ADMIN — SODIUM CHLORIDE 125 ML/HR: 900 INJECTION, SOLUTION INTRAVENOUS at 16:25

## 2018-07-26 RX ADMIN — OXYCODONE HYDROCHLORIDE 5 MG: 5 TABLET ORAL at 22:39

## 2018-07-26 NOTE — PROGRESS NOTES
Problem: Discharge Planning  Goal: *Discharge to safe environment  Outcome: Progressing Towards Goal  Disposition Needs:  Anticipated plan is for patient to discharge home with family assistance. There are no current CM consults or needs. Disposition needs TBD/subject to change pending recommendations. CM will continue to follow and assist with disposition needs as they arise. Mode of transport at time of discharge to be self or family.   Patient's car is located at [address] [facility], [place]  3:30 PM

## 2018-07-26 NOTE — PROGRESS NOTES
0745 - Bedside and Verbal shift change report given to angela moody (oncoming nurse) by Kendall Fonseca (offgoing nurse). Report included the following information SBAR, Kardex, ED Summary, Intake/Output, MAR, Recent Results and Cardiac Rhythm NSR.

## 2018-07-26 NOTE — PROGRESS NOTES
Reason for Readmission:   Hyperosmolar non-ketotic state in patient with type 2 diabetes mellitus/ Acute Chest Pain    RRAT Score and Risk Level:     8/ Low Risk Level  Level of Readmission:    Level 2. Patient with 2 ED visits in the last 6 months and 3 IP admissions in the past 12 months. Care Conference scheduled:   None needed at this time. Resources/supports as identified by patient/family:   Family  Top Challenges facing patient (as identified by patient/family and CM):  Health Challenges and Financial Stressors   Finances/Medication cost?  Patient with no current source of income. Medicaid lapsed. [facility] has been assisting patient with re-applying for Medicaid. CM provided patient with a Care Card Application to review, complete, and submit. Patient to follow-up with Joseph Green  For follow-up needs. Pharmacy utilized for prescriptions is [address] [place] [address] located in [place]. Transportation    Self or Family Transport ([facility] car [facility] at [facility])    Support system or lack thereof? Identified supports consist of the following: Siva Samantha [phone] and Rafa Fuelling (Boyfriend) [phone]  Living arrangements? Patient resides with her boyfriend and his family (mother, sister, and children)        Self-care/ADLs/Cognition? Patient alert and oriented. Independent with ADL's and IADL's. No DME utilized. Current Advanced Directive/Advance Care Plan:  Patient is a full code with no advance care planning on file. CM to review with patient at a later time. Plan for utilizing home health:   TBD/subject to change pending recommendations at discharge. Likelihood of additional readmission:   Moderate             Transition of Care Plan:    Based on readmission, the patient's previous Plan of Care   has been evaluated and/or modified.  The current Transition of Care Plan is:     Anticipated plan is for patient to discharge home with family assistance. There are no current CM consults or needs. Disposition needs TBD/subject to change pending recommendations. CM will continue to follow and assist with disposition needs as they arise. Care Management Interventions  PCP Verified by CM: No  Palliative Care Criteria Met (RRAT>21 & CHF Dx)?: No  Mode of Transport at Discharge: S  Transition of Care Consult (CM Consult):  (There are no current CM consults)  Discharge Durable Medical Equipment: No  Physical Therapy Consult: No  Occupational Therapy Consult: No  Speech Therapy Consult: No  Current Support Network: Lives with Spouse, Own Home, Other  Confirm Follow Up Transport: Self (Self or Family.   Patient's car at hospital)  Plan discussed with Pt/Family/Caregiver: Yes  Freedom of Choice Offered: Yes  Discharge Location  Discharge Placement: Home with family assistance (TBD/subject to change pending recommendations)    NATHANIEL Cameron/LORENA  3:28 PM

## 2018-07-26 NOTE — CDMP QUERY
Please clarify if this patient is (was) being treated/managed for:     => Acute kidney injury  (POA) in the setting of HHS with decrease in GFR by 50% with increased Cr. requiring NS bolus, insulin drip, and lab monitoring   => Other explanation of clinical findings  => Clinically Undetermined (no explanation for clinical findings)    The medical record reflects the following:    Risk Factors:  DM    Clinical Indicators:  Pt presented with dx of HHS. Cr up to 1.90 w/ GFR 30. Last known () Cr 0.70 w/GFR >60. Treatment: NS bolus, insulin drip, and lab monitoring    Reference:     RIFLE  (BSV Approved)    RISK:  Increased SCr x 1.5 or GFR decrease > 25% (within 7 days)    INJURY:  Increased SCr x 2.0 or GFR decreased > 50%    FAILURE:  Increased SCr x 3.0 or GFR decrease > 75% or SCr >4.0 mg/dL or acute increase >0.5 mg/dL    LOSS:  Persistent acute renal failure = complete loss of kidney function > 4 weeks    END STAGE:  End stage of kidney disease > 3 months      Please clarify and document your clinical opinion in the progress notes and discharge summary including the definitive and/or presumptive diagnosis, (suspected or probable), related to the above clinical findings. Please include clinical findings supporting your diagnosis.     Thank you,    Miguel Mata RN, BSN, [facility], [address]  [phone]

## 2018-07-26 NOTE — CONSULTS
Cardiology Consult Note      Patient Name: Yonatan Dunn  : 1980 MRN: [id number]  Date: 2018  Time: 10:03 AM    Admit Diagnosis: Acute chest pain  SOB (shortness of breath)  Uncontrolled type 2 diabetes mellitus with hyperglycemia [facility])    Primary Cardiologist: Noemi Alvarez. Denise Faulkner M.D. Consulting Cardiologist: Rosa Padilla M.D.    Reason for Consult: CP    Requesting MD: Brennen Hercules MD    HPI:  Yonatan Dunn is a 40 y.o. female admitted on 2018  for Acute chest pain  SOB (shortness of breath)  Uncontrolled type 2 diabetes mellitus with hyperglycemia ([address] has a past medical history of Calculus of kidney; Diabetes ([address] Hyperlipidemia; Hypertension; MI (myocardial infarction) ([address] (); Other ill-defined conditions(799.89); Pancreatitis; and Vitamin D deficiency (2017). Presents to the ED for CP. Began around noon yesterday. Patient demonstrates with her hand it is located about the sternum. More like pressure. It is continuing at this moment. She admits she has been under a lot of stress for the last 3 day. H/o MI in , [place]. Cath was normal, per patient. Seen in consult in  by Dr. Denise Faulkner for the same presentation. Subjective:  Very disoriented upon waking. In no distress. Note CP continues. No SOB      Assessment and Plan     1. Chest pain - atypical   - 20 hours since onset, not alleviated by NTG paste   - Troponin 0.05 x 2 and normal   - EKG NSR and no evidence for ACS   - Stress echo this am  2. DMII   - poorly controlled   - Glucose 435   - SSI  3. Hypomagnesemia   - Mg 1.6  4. Hypokalemia   - K 3.1  5. CKD   Stage III - Cr 1.5-1.9  6. Dyslipidemia   Trig 3777, HDL 39   Lipitor 40 mg   Tricor 145  7. HTN   - Elevated on presentation   - No home meds  8. Body mass index is 40.64 kg/(m^2). - Morbid obesity  9.  Tobacco abuse   - Nicotine patch    CP, atypical.  Normal Troponin levels and EKG. H/o MI, but normal cath. Will check stress echo as she does have numerous RF (obese, HTN, HLD, Smoker, inactive, DM, stress). I have seen and examined the patient and agree with N.P. assessment. Normal troponin despite prolonged cp  ECG with no acute ischemic changes  Patient with HTG syndrome likely related to her DM but cannot exclude FHTG  CP I suspect may be in part related to some degree of pancreatitis? TG too elevated continue iv insulin but PLASMAPHERESIS should be considered even if no clear diagnosis for acute pancreatitis has been made yet but will defer to primary team final decision  Stress echo on the back burner for  Now until metabolic parameters improved  Obtain echo      No specialty comments available.     Review of Systems:     GENERAL   Recent weight loss - no   Fever -----------------   no   Chills -----------------   no     EYES, VISION   Visual Changes - no     EARS, NOSE, THROAT   Hearing loss ----------- no   Swallowing difficulties - no     CARDIOVASCULAR   Chest pain/pressure ---- yes   Arrhythmia/palpitations - no       RESPIRATORY   Cough ------------------ no   Shortness of breath - yes   Wheezing -------------- no   GASTROINTESTINAL   Abdominal pain - no   Heartburn -------- no   Bloody stool ----- no     GENITOURINARY   Frequent urination - no   Urgency -------------- no     MUSCULOSKELETAL   Joint pain/swelling ---- no   Musculoskeletal pain - no     SKIN & INTEGUMENTARY   Rashes - no   Sores --- no         NEUROLOGICAL   Numbness/tingling - no   Sensation loss ------ no     PSYCHIATRIC   Nervousness/anxiety - no   Depression -------------- no     ENDOCRINE   Heat/cold intolerance - no   Excessive thirst -------- no     HEMATOLOGIC/LYMPHATIC   Abnormal bleeding - no     ALL/IMMUN   Allergic reaction ------ no   Recurrent infections - no     Previous treatment/evaluation includes echocardiogram and stress echocardiogram .  Cardiac risk factors: smoking/ tobacco exposure, dyslipidemia, diabetes mellitus, obesity, sedentary life style, hypertension, stress. Past Medical History:   Diagnosis Date    Calculus of kidney     Diabetes [facility]     Hyperlipidemia     Hypertension     MI (myocardial infarction) ([facility]     Other ill-defined conditions(799.89)     Neuropathies    Pancreatitis     due to hypertriglyceridemia    Vitamin D deficiency 2017     Past Surgical History:   Procedure Laterality Date    CARDIAC SURG PROCEDURE UNLIST      cath    CYSTOSCOPY      HX  SECTION      x 2    HX CHOLECYSTECTOMY      HX GYN      tubes clamped    HX TONSIL AND ADENOIDECTOMY      HX WISDOM TEETH EXTRACTION       Current Facility-Administered Medications   Medication Dose Route Frequency    atorvastatin (LIPITOR) tablet 40 mg  40 mg Oral QHS    fenofibrate nanocrystallized (TRICOR) tablet 145 mg  145 mg Oral DAILY    gabapentin (NEURONTIN) capsule 600 mg  600 mg Oral TID    . PHARMACY TO SUBSTITUTE PER PROTOCOL (Reordered from: omega-3 acid ethyl esters (LOVAZA) 1 gram capsule)    Per Protocol    sodium chloride (NS) flush 5-10 mL  5-10 mL IntraVENous Q8H    sodium chloride (NS) flush 5-10 mL  5-10 mL IntraVENous PRN    0.9% sodium chloride infusion  125 mL/hr IntraVENous CONTINUOUS    fentaNYL citrate (PF) injection 25 mcg  25 mcg IntraVENous Q4H PRN    nicotine (NICODERM CQ) 21 mg/24 hr patch 1 Patch  1 Patch TransDERmal DAILY    0.9% sodium chloride infusion  125 mL/hr IntraVENous CONTINUOUS    ondansetron (ZOFRAN) injection 4 mg  4 mg IntraVENous Q6H PRN    acetaminophen (TYLENOL) tablet 650 mg  650 mg Oral Q6H PRN    dextrose 5% and 0.9% NaCl infusion  100 mL/hr IntraVENous CONTINUOUS    oxyCODONE IR (ROXICODONE) tablet 5 mg  5 mg Oral Q4H PRN    insulin regular (NOVOLIN R, HUMULIN R) 100 Units in 0.9% sodium chloride 100 mL infusion  0-50 Units/hr IntraVENous TITRATE    insulin lispro (HUMALOG) injection   SubCUTAneous TIDAC    glucose chewable tablet 16 g  4 Tab Oral PRN    dextrose (D50W) injection syrg 12.5-25 g  12.5-25 g IntraVENous PRN    glucagon (GLUCAGEN) injection 1 mg  1 mg IntraMUSCular PRN     Current Outpatient Prescriptions   Medication Sig    ondansetron (ZOFRAN ODT) 4 mg disintegrating tablet Take 1 Tab by mouth every eight (8) hours as needed for Nausea.  levoFLOXacin (LEVAQUIN) 500 mg tablet Take 1 Tab by mouth daily.  ergocalciferol (ERGOCALCIFEROL) 50,000 unit capsule Take 1 Cap by mouth three (3) days a week. Indications: Vitamin D Deficiency    fenofibrate nanocrystallized (TRICOR) 145 mg tablet Take 1 Tab by mouth daily. Indications: hypertriglyceridemia    metFORMIN (GLUCOPHAGE) 500 mg tablet Take 1 Tab by mouth two (2) times daily (with meals).  omega-3 acid ethyl esters (LOVAZA) 1 gram capsule Take 2 Caps by mouth two (2) times a day. Indications: hypertriglyceridemia    atorvastatin (LIPITOR) 40 mg tablet Take 1 Tab by mouth daily.  insulin glargine (LANTUS U-100 INSULIN) 100 unit/mL injection 100 Units by SubCUTAneous route two (2) times a day. Indications: type 2 diabetes mellitus    insulin lispro (HUMALOG) 100 unit/mL injection Inject 5 units for every 50 points over 1502    acetaminophen (TYLENOL) 500 mg tablet Take 1,000-2,500 mg by mouth every six (6) hours as needed for Pain.  atorvastatin (LIPITOR) 40 mg tablet Take 1 Tab by mouth nightly.  fenofibrate nanocrystallized (TRICOR) 145 mg tablet Take 1 Tab by mouth daily.  omega-3 acid ethyl esters (LOVAZA) 1 gram capsule Take 2 Caps by mouth two (2) times a day.  metFORMIN (GLUCOPHAGE) 1,000 mg tablet Take 1 Tab by mouth two (2) times daily (with meals).  gabapentin (NEURONTIN) 300 mg capsule Take 2 Caps by mouth three (3) times daily.  albuterol (PROAIR HFA) 90 mcg/actuation inhaler Take 2 Puffs by inhalation every four (4) hours as needed for Wheezing.        Allergies   Allergen Reactions    Toradol [Ketorolac Tromethamine] Unknown (comments)     Seizure like symptoms per pt.  Augmentin [Amoxicillin-Pot Clavulanate] Swelling    Demerol [Meperidine] Swelling    Heparin Hives    Ibuprofen Diarrhea and Nausea and Vomiting     Muscle tightness    Keflex [Cephalexin] Shortness of Breath    Morphine Swelling    Nubain [Nalbuphine] Hives    Pcn [Penicillins] Swelling    Sulfa (Sulfonamide Antibiotics) Unknown (comments)      Family History   Problem Relation Age of Onset    Hypertension Mother     Stroke Mother     Diabetes Mother     Cancer Mother 59     breast?    Heart Disease Father     Diabetes Father     Cancer Paternal Grandmother 79     ovarian      Social History     Social History    Marital status: SINGLE     Spouse name: N/A    Number of children: N/A    Years of education: N/A     Occupational History    Unemployed      Social History Main Topics    Smoking status: Current Some Day Smoker     Packs/day: 0.50    Smokeless tobacco: Never Used    Alcohol use No      Comment: once per year    Drug use: No    Sexual activity: Yes     Partners: Male     Other Topics Concern    None     Social History Narrative    Patient is . Objective:    Physical Exam    Vitals:   Vitals:    07/26/18 0630 07/26/18 0700 07/26/18 0730 07/26/18 0800   BP: 135/62 129/63 137/65 138/72   Pulse: 89 88 87 85   Resp: 21 19 11 18   Temp:    98 °F (36.7 °C)   SpO2: 95% 94% 94% 95%   Weight:       Height:           General:    Alert, cooperative, no distress, appears stated age. Neck:   Supple, no carotid bruit and no JVD. Back:     Symmetric, normal curvature. Lungs:     clear to auscultation bilaterally. Heart[de-identified]    Regular rate and rhythm, S1, S2 normal, no murmur, click, rub or gallop. Abdomen:     Soft, non-tender. Bowel sounds normal.    Extremities:   Extremities normal, atraumatic, no cyanosis or edema.    Vascular:   Pulses - 2+ DP   Skin:   Skin color normal. No rashes or lesions   Neurologic:   CN II-XII grossly intact. Telemetry: normal sinus rhythm    ECG:   EKG Results     Procedure 720 Value Units Date/Time    ECG RHYTHM ANALYSIS ADULT [[id number]] Collected:  07/26/18 0308    Order Status:  Completed Updated:  07/26/18 0907     Ventricular Rate 100 BPM      Atrial Rate 100 BPM      P-R Interval 138 ms      QRS Duration 86 ms      Q-T Interval 376 ms      QTC Calculation (Bezet) 485 ms      Calculated P Axis 45 degrees      Calculated R Axis -17 degrees      Calculated T Axis 24 degrees      Diagnosis --     Normal sinus rhythm  Poor R-wave Progression  When compared with ECG of 25-JUL-2018 21:36,  QT has lengthened  Confirmed by Giacomo Mcguire MD, Diaz Campbell [id number]) on 7/26/2018 9:07:22 AM      EKG 12 LEAD INITIAL [[id number]] Collected:  07/25/18 2136    Order Status:  Completed Updated:  07/26/18 0845     Ventricular Rate 99 BPM      Atrial Rate 99 BPM      P-R Interval 128 ms      QRS Duration 88 ms      Q-T Interval 358 ms      QTC Calculation (Bezet) 459 ms      Calculated P Axis 52 degrees      Calculated R Axis -2 degrees      Calculated T Axis 31 degrees      Diagnosis --     Normal sinus rhythm  Poor R-wave Progression  When compared with ECG of 07-JUL-2018 01:16,  No significant change was found  Confirmed by Giacomo Mcguire MD, Diaz Campbell ([id number]) on 7/26/2018 8:45:18 AM      ECG RHYTHM ANALYSIS ADULT [[id number]]     Order Status:  Sent             Data Review:     Radiology:   XR Results (most recent):    Results from Hospital Encounter encounter on 07/25/18   XR CHEST PA LAT   Narrative EXAM:  XR CHEST PA LAT    INDICATION:  Chest pain, coronary artery disease. COMPARISON: Portable chest on 7/23/2018. TECHNIQUE: PA and lateral chest views    FINDINGS: The cardiomediastinal and hilar contours are within normal limits. The  pulmonary vasculature is within normal limits. The lungs and pleural spaces are clear. The visualized bones and upper abdomen  are age-appropriate.          Impression IMPRESSION:    Normal PA and lateral chest views. No change. Recent Labs      07/26/18 0433 07/25/18 2138   TROIQ  <0.05  <0.05     Recent Labs      07/26/18 0433 07/25/18 2138   NA  126*  118*   K  3.1*  3.8   CL  95*  85*   CO2  26  36*   BUN  UNABLE TO OBTAIN ACCURATE RESULTS DUE TO GROSS LIPEMIA  UNABLE TO OBTAIN ACCURATE RESULTS DUE TO GROSS LIPEMIA   CREA  1.50*  1.90*   GLU  435*  734*   PHOS  2.3*  1.6*   CA  UNABLE TO OBTAIN ACCURATE RESULTS DUE TO GROSS LIPEMIA  UNABLE TO OBTAIN ACCURATE RESULTS DUE TO GROSS LIPEMIA     Recent Labs      07/26/18 0433 07/25/18 2138   WBC  7.1  9.3   HGB  11.2*  13.8   HCT  30.0*  37.6   PLT  178  255     Recent Labs      07/26/18 0433 07/25/18 2138   PTP  9.3  9.0   INR  0.9  0.9   SGOT  UNABLE TO OBTAIN ACCURATE RESULTS DUE TO GROSS LIPEMIA  UNABLE TO OBTAIN ACCURATE RESULTS DUE TO GROSS LIPEMIA   AP  UNABLE TO OBTAIN ACCURATE RESULTS DUE TO GROSS LIPEMIA  UNABLE TO OBTAIN ACCURATE RESULTS DUE TO GROSS LIPEMIA     Recent Labs      07/26/18 0433   CHOL  UNABLE TO OBTAIN ACCURATE RESULTS DUE TO GROSS LIPEMIA   LDLC  Calculation not valid with this patient's other Lipid values. No results for input(s): CRP, TSH, TSHEXT, TSHEXT in the last 72 hours. No lab exists for component: ESR    Stella Yang. Jami Montenegro M.D.          [facility] [address] [place] [address] Rd[address], [address] [address]     [address]     [phone]    CC:None

## 2018-07-26 NOTE — ED TRIAGE NOTES
Patient arrives to the ED with c/o chest pain the upper left chest, described as crushing pain which started at noon to day, + nausea, no vomiting noted, patient states she had a MI in Oct 2008, patient also c/o some SOB which started @ 1530 today.

## 2018-07-26 NOTE — ED NOTES
Assumed care of pt. Pt c/o 9/10 chest pain. Nitropaste in placed to right upper chest. Will continue to monitor pt.

## 2018-07-26 NOTE — DIABETES MGMT
DTC Progress Note Recommendations/ Comments: Noted consult for assessment of home management. Will see pt when appropriate. Patient on the Insulin drip for 14 hours. Noted insulin drip stopped at 1600, she received 100 units of Lantus 2 hours prior. Noted pt may need mealtime insulin once eating solid foods. Reassess with additional blood sugar values. Current hospital DM medication: Lantus 100 units daily  (home dose is 100 units BID) and Humalog for correction, insulin resistant scale Chart reviewed on Kalpesh Zamora. Ad[facility] with [facility] Patient is a 40 y.o. female with hx Type 2 Diabetes on Lantus 100 units daily and  at home. A1c:  
Lab Results Component Value Date/Time Hemoglobin A1c 10.0 (H) 07/25/2018 09:38 PM  
 Hemoglobin A1c 10.3 (H) 07/05/2018 03:10 AM  
 
 
Recent Glucose Results:  
Lab Results Component Value Date/Time  (H) 07/26/2018 09:28 AM  
  (H) 07/26/2018 04:33 AM  
  (HH) 07/25/2018 09:38 PM  
 GLUCPOC 239 (H) 07/26/2018 04:18 PM  
 GLUCPOC 242 (H) 07/26/2018 02:02 PM  
 GLUCPOC 241 (H) 07/26/2018 11:50 AM  
  
 
Lab Results Component Value Date/Time Creatinine 1.68 (H) 07/26/2018 09:28 AM  
 
Estimated Creatinine Clearance: 64.9 mL/min (based on Cr of 1.68). Active Orders Diet DIET CLEAR LIQUID No Conc. Sweets PO intake: No data found. Will continue to follow as needed. Thank you, Puneet Lloyd [facility] Pager: [phone]

## 2018-07-26 NOTE — ROUTINE PROCESS
TRANSFER - OUT REPORT:    Verbal report given to fina(name) on Kristal Douglas  being transferred to [facility]) for routine progression of care       Report consisted of patients Situation, Background, Assessment and   Recommendations(SBAR). Information from the following report(s) SBAR, Kardex, ED Summary, Intake/Output, MAR, Recent Results and Cardiac Rhythm NSR was reviewed with the receiving nurse. Lines:   Peripheral IV 07/25/18 Left Forearm (Active)   Site Assessment Clean, dry, & intact 7/26/2018  8:00 AM   Phlebitis Assessment 0 7/26/2018  8:00 AM   Infiltration Assessment 0 7/26/2018  8:00 AM   Dressing Status Clean, dry, & intact 7/26/2018  8:00 AM   Dressing Type Tape;Transparent 7/26/2018  8:00 AM   Hub Color/Line Status Pink;Capped 7/26/2018  8:00 AM   Action Taken Open ports on tubing capped 7/26/2018  8:00 AM   Alcohol Cap Used Yes 7/26/2018  8:00 AM       Peripheral IV 07/25/18 Right Forearm (Active)   Site Assessment Clean, dry, & intact 7/26/2018  8:00 AM   Phlebitis Assessment 0 7/26/2018  8:00 AM   Infiltration Assessment 0 7/26/2018  8:00 AM   Dressing Status Clean, dry, & intact 7/26/2018  8:00 AM   Dressing Type Tape;Transparent 7/26/2018  8:00 AM   Hub Color/Line Status Pink; Infusing 7/26/2018  8:00 AM   Action Taken Open ports on tubing capped 7/26/2018  8:00 AM   Alcohol Cap Used Yes 7/26/2018  8:00 AM        Opportunity for questions and clarification was provided.       Patient transported with:   Monitor

## 2018-07-26 NOTE — H&P
[address] [facility]  HISTORY AND PHYSICAL      Juaquin Guzman.  MR#: [id number]  : 1980  ACCOUNT #: [de-identified]   ADMIT DATE: 2018    CHIEF COMPLAINT:  Chest pain. HISTORY OF PRESENT ILLNESS:  A 80-year-old white female with past medical history of myocardial infarction, type 2 diabetes mellitus, hypertension, hyperlipidemia, neuropathy (unspecified), hypertriglyceridemia, vitamin D deficiency, hyperlipidemia presented to the emergency department from home with chief complaint of chest pain. Symptoms onset reportedly began yesterday with pain located in the left anterior chest, radiating to her left arm and shoulder, which she noted was a pressure, constant, severe, without specific exacerbating or alleviating factors. Complains of having shortness of breath as if she cannot catch her breath. She admits to having some left upper quadrant and epigastric abdominal pain. She also complained of having some nausea per the ER records. There are no reports of vomiting. She reportedly had a prior myocardial infarction (NSTEMI). She notes that troponin level was elevated but EKG was normal.  On arrival to the emergency department, initial recorded vital signs, blood pressure 149/97, heart rate 101, respiratory rate of 16, O2 saturation 96% on room air. Workup included labs showing blood glucose of 734, creatinine 1.90, sodium 118. Per the ED, the patient was started on 0.9% normal saline 1000 mL IV fluid bolus x1, given regular insulin and started on Reglan titrated IV infusion. The patient still complains of having chest pain following administration of nitroglycerin 2% ointment, 1 inch. Patient notes chest pain symptoms are similar to those experienced with prior MI. She notes she had prior heart catheterization in  which was negative for any blockage.   Currently she has not been followed by a cardiologist.  She is not complaining of any active dizziness, lightheadedness, focal weakness, numbness, paresthesias, slurred speech, facial droop, headache, neck pain, back pain, palpitations, cough, congestion, vomiting, diarrhea, melena, dysuria,  hematuria, calf pain, swelling, edema, recent trauma, recent prolonged trips, immobilization or surgeries, prior history of blood clot, thromboembolus. PAST MEDICAL HISTORY:    1.  Kidney stone. 2.  Type 2 diabetes mellitus. 3.  Hypertension. 4.  Hyperlipidemia. 5.  Hypertriglyceridemia. 6.  Myocardial infarction. 7.  Neuropathies, unspecified. 8.  Pancreatitis due to hypertriglyceridemia. 9.  Vitamin D deficiency. 10.  Morbid obesity. PAST SURGICAL HISTORY:    1. Heart catheterization. 2.  Cystoscopy. 3.   section x2.    4.  Cholecystectomy. 5.  Tubal ligation. 6.  Tonsillectomy and adenoidectomy. 7.  Orient tooth extraction. MEDICATIONS:  Complete medication list reviewed and noted on chart records. Taking Last Dose Start Date End Date Provider        acetaminophen (TYLENOL) 500 mg tablet    --  --  Cheyenne Kohler MD      Take 1,000-2,500 mg by mouth every six (6) hours as needed for Pain.      albuterol (PROAIR HFA) 90 mcg/actuation inhaler    --  --  Historical Provider      Take 2 Puffs by inhalation every four (4) hours as needed for Wheezing.      atorvastatin (LIPITOR) 40 mg tablet    17  -- Ruthann White MD      Take 1 Tab by mouth nightly.      atorvastatin (LIPITOR) 40 mg tablet    18  -- Jodene Siemens, MD      Take 1 Tab by mouth daily.      ergocalciferol (ERGOCALCIFEROL) 50,000 unit capsule    18  -- Jodene Siemens, MD      Take 1 Cap by mouth three (3) days a week.  Indications: Vitamin D Deficiency      fenofibrate nanocrystallized (TRICOR) 145 mg tablet    17  -- Ruthann White MD      Take 1 Tab by mouth daily.      fenofibrate nanocrystallized (TRICOR) 145 mg tablet    07/10/18  --  Matteo Godfrey MD      Take 1 Tab by mouth daily. Indications: hypertriglyceridemia      gabapentin (NEURONTIN) 300 mg capsule    11/30/17  -- José Miguel Vergara MD      Take 2 Caps by mouth three (3) times daily.      insulin glargine (LANTUS U-100 INSULIN) 100 unit/mL injection    07/09/18  -- José Miguel Vergara MD      100 Units by SubCUTAneous route two (2) times a day. Indications: type 2 diabetes mellitus      insulin lispro (HUMALOG) 100 unit/mL injection    07/09/18  -- José Miguel Vergara MD      Inject 5 units for every 50 points over 1502      levoFLOXacin (LEVAQUIN) 500 mg tablet    07/23/18  -- Rosibel Olivo MD      Take 1 Tab by mouth daily.      metFORMIN (GLUCOPHAGE) 1,000 mg tablet    11/30/17  -- José Miguel Vergara MD      Take 1 Tab by mouth two (2) times daily (with meals).      metFORMIN (GLUCOPHAGE) 500 mg tablet    07/09/18  -- Randy Costello MD      Take 1 Tab by mouth two (2) times daily (with meals).      omega-3 acid ethyl esters (LOVAZA) 1 gram capsule    11/30/17  -- José Miguel Vergara MD      Take 2 Caps by mouth two (2) times a day.      omega-3 acid ethyl esters (LOVAZA) 1 gram capsule    07/09/18  -- Randy Costello MD      Take 2 Caps by mouth two (2) times a day. Indications: hypertriglyceridemia      ondansetron (ZOFRAN ODT) 4 mg disintegrating tablet    07/23/18  -- Rosibel Olivo MD      Take 1 Tab by mouth every eight (8) hours as needed for Nausea.      promethazine (PHENERGAN) 25 mg tablet    07/23/18  -- Rosibel Olivo MD      Take 1 Tab by mouth every six (6) hours as needed for Nausea.           ALLERGIES:  TORADOL, AUGMENTIN, DEMEROL, HEPARIN, IBUPROFEN, KEFLEX, MORPHINE, NUBAIN, PENICILLIN, AND SULFA DRUGS. SOCIAL HISTORY:  Positive for smoking cigarettes, a half pack a day. The patient denies alcohol, illicit drugs. . FAMILY HISTORY:  Hypertension, mother. Heart disease, father. Diabetes, mother. Stroke, mother. Breast cancer, mother. Ovarian cancer, paternal grandmother. Diabetes, father, mother. Myocardial infarction, father, paternal grandmother, and aunt. REVIEW OF SYSTEMS:  Pertinent positives as noted in HPI. All other systems were reviewed and negative. PHYSICAL EXAMINATION:    VITAL SIGNS:  Temperature 98.3 degrees Fahrenheit, blood pressure 158/97, heart rate of 99, respiratory rate of 18, O2 saturation 96% on room air, recorded weight of 275 pounds (124.8 kg), recorded height of 5 feet 9 inches tall. BMI equalled 40.6. GENERAL:  Morbidly obese female in no acute respiratory distress. PSYCHIATRIC:  The patient is awake, alert, oriented x3. Anxious. NEUROLOGIC:  GCS of 15. Moves extremities x4. Sensation grossly intact. No slurred speech or facial droop. HEENT:  Normocephalic, atraumatic. PERRLA. EOMs intact. Sclerae are anicteric. Conjunctivae are clear. Nares are patent. Oropharynx is clear. Tongue is midline, not edematous. NECK:  Supple, without lymphadenopathy, JVD, carotid bruits, thyromegaly. LYMPHATIC:  Negative for cervical or supraclavicular adenopathy. RESPIRATORY:  Lungs clear to auscultation bilaterally. CARDIOVASCULAR:  Heart regular rate and rhythm. Normal S1, S2, without murmurs, rubs, or gallops. GASTROINTESTINAL:  Abdomen is obese, soft. Mild generalized tenderness on palpation, mostly in left upper quadrant, epigastrium, and left lower quadrant with voluntary guarding. No rebound, no rigidity, no auscultated abdominal bruits, no pulsatile mass. BACK:  No CVA tenderness, no step-off deformity. MUSCULOSKELETAL:  No acute palpable bony deformity. Negative for calf tenderness. VASCULAR:  2+ radial to 1+ dorsalis pedis pulse without cyanosis, clubbing, or edema. SKIN:  Warm and dry. LABORATORY DATA:  I reviewed.   Sodium 118, potassium 3.8, chloride of 85, CO2 of 36, BUN (unable to obtain), creatinine 1.90, phosphorus 1.6, magnesium 1.6, GFR 30, unable to obtain reading for total bilirubin, total protein, albumin, ALT, AST, alkaline phosphatase, amylase. Lipase equals 311. Troponin I less than 0.05. Hemoglobin A1c 10.0%. WBC 9.3, hemoglobin 13.8, hematocrit 37.6, platelets 030, neutrophils 71%. Urinalysis:  Leukocyte esterase negative, nitrites negative, urobilinogen 0.2, bilirubin negative, blood negative, ketones negative, glucose greater than 1000, protein 100, pH 5.0, specific gravity 1.010, WBCs 0-4, RBCs 0-5, bacteria negative. INR was 0.9, PT of 9.0, and PTT of 26.5. Venous blood gas: pH 7.417, pCO2 of 34.3, pO2 of 73, bicarbonate 22.1, base deficit of -2, and SvO2 is 95% on room air. Chest x-ray, PA and lateral, no change, no acute process. IMPRESSION AND PLAN:    1. Type 2 diabetes mellitus with hyperglycemia, uncontrolled, hyperosmolar/hyperglycemic state (HHS). The patient has been started on regular insulin, titrated IV infusion. Monitor point-of-care glucose levels closely. Patient will be on Glucommander stabilizer per hospital protocol. 2.  Acute chest pain. Repeat serial troponin levels. Consider for cardiology consultation in the a.m. Order oxygen, have nitroglycerin p.r.n. Aspirin therapy. NO MORPHINE DUE TO DRUG ALLERGY TO THE SAME. Per review of chart records, the patient has had fentanyl in the past for pain management. 3.  Shortness of breath. The patient is currently oxygenating well. However, may start supplemental oxygen as needed. 4.  Tachycardia, currently resolved. 5.  Hypertension, hypertensive urgency. Patient has been started on nitroglycerin. May order labetalol for additional antihypertensive therapy. Resume back on home management and home medication. 6.  Hyperlipidemia/hypertriglyceridemia. Repeat home medications. 7.  Acute hyponatremia -- with noted correction for serum glucose. The patient still needs to be monitored closely. 8.  Dehydration.   Order IV fluid hydration, resuscitation. Repeat renal panel in the a.m. Also, resend labs as many of the labs were unable to read per lab report. Check BUN, check CK total.    9.  Morbid obesity. Would advise on eventual weight loss, heart healthy diet, and lifestyle modification. 10.  Venous thromboembolism prophylaxis. Sequential compression devices to lower extremities. MD WILL Dorsey/  D: 07/26/2018 02:46     T: 07/26/2018 05:18  JOB #: [id number]

## 2018-07-26 NOTE — ED PROVIDER NOTES
HPI Comments: 40 y.o. female with past medical history significant for DM, peripheral neuropathy, HTN, HLD, CAD with h/o MI, h/o pancreatitis, status post  x2 and cholecystectomy, who presents ambulatory to the ED with chief complaint of CP and SOB which started this afternoon. Pt states that her chest pain is located in the left upper chest, and estimates that it started at ~1230. The quality of pain is described as a \"heaviness\" and she stats that it feels similar to when she had \"a heart attack at age 30\". Additionally complains of nausea associated with the pain, as well as shortness of breath that started later in the day (~1530). Pt ranks her current level of discomfort in the chest as a 5/10 in severity, and she still complains of SOB in the ED at this time. Has not seen cardiology since she lived in [place] long time ago\", and does not have a local cardiologist that she regularly sees. Pt denies any prior surgeries on her chest. She does report that she was recently admitted to the hospital for elevated blood sugar. Review of medical records indicates that patient was admitted to [facility] -2018 for hyperglycemic hyperosmolar state likely due to insulin non-compliance. Was also complaining of RUQ abdominal pain during that admission, which was thought to be due to pancreatitis related to elevated triglycerides. Her abdominal pain resolved with insulin and NS IVF hydration. Pt states that since she was discharged from the hospital she has been gaining weight. Is unsure if her legs have been swollen. Pt reports that she has been compliant with all of her regularly scheduled medications, but admits that her blood sugar has still been elevated. She specifically denies fevers, chills, congestion, cough, abdominal pain, vomiting, diarrhea, constipation, difficulty urinating, dysuria, back pain, neck pain, skin rash, or headache. There are no other acute medical concerns at this time.     Social hx: Patient denies current tobacco use (former smoker- quit smoking 1-2 weeks ago; was previously smoking 1 pack every 1.5 weeks); Negative for EtOH use; Negative for Illicit Drug Abuse   PCP: None    Note written by Emerson Clear. Romana Hoar, as dictated by Michael Zheng MD 11:07 PM     The history is provided by the patient. No  was used. Past Medical History:   Diagnosis Date    Calculus of kidney     Diabetes ([facility]     Hyperlipidemia     Hypertension     MI (myocardial infarction) ([facility] [facility] 2008    Other ill-defined conditions(799.89)     Neuropathies    Pancreatitis     due to hypertriglyceridemia    Vitamin D deficiency 2017       Past Surgical History:   Procedure Laterality Date    CARDIAC SURG PROCEDURE UNLIST      cath    CYSTOSCOPY      HX  SECTION      x 2    HX CHOLECYSTECTOMY      HX GYN      tubes clamped    HX TONSIL AND ADENOIDECTOMY      HX WISDOM TEETH EXTRACTION           Family History:   Problem Relation Age of Onset    Hypertension Mother     Stroke Mother     Diabetes Mother     Cancer Mother 59     breast?    Heart Disease Father     Diabetes Father     Cancer Paternal Grandmother 79     ovarian       Social History     Social History    Marital status: SINGLE     Spouse name: N/A    Number of children: N/A    Years of education: N/A     Occupational History    Unemployed      Social History Main Topics    Smoking status: Current Some Day Smoker     Packs/day: 0.50    Smokeless tobacco: Never Used    Alcohol use No      Comment: once per year    Drug use: No    Sexual activity: Yes     Partners: Male     Other Topics Concern    Not on file     Social History Narrative    Patient is . ALLERGIES: Toradol [ketorolac tromethamine]; Augmentin [amoxicillin-pot clavulanate]; Demerol [meperidine]; Heparin; Ibuprofen; Keflex [cephalexin];  Morphine; Nubain [nalbuphine]; Pcn [penicillins]; and Sulfa (sulfonamide antibiotics)    Review of Systems   Constitutional: Positive for unexpected weight change (weight gain). Negative for chills and fever. HENT: Negative for congestion. Respiratory: Positive for shortness of breath. Negative for cough. Cardiovascular: Positive for chest pain. Gastrointestinal: Positive for nausea. Negative for abdominal pain, constipation, diarrhea and vomiting. Genitourinary: Negative for difficulty urinating and dysuria. Musculoskeletal: Negative for back pain and neck pain. Skin: Negative for rash. Neurological: Negative for headaches. All other systems reviewed and are negative. Vitals:    07/25/18 2131 07/25/18 2245 07/25/18 2330 07/25/18 2344   BP: (!) 149/97  (!) 190/98 (!) 185/104   Pulse: (!) 101 96 (!) 110 (!) 108   Resp: 16 23 18 17   Temp: 98.3 °F (36.8 °C)   98.4 °F (36.9 °C)   SpO2: 96% 96% 97% 96%   Weight: 124.8 kg (275 lb 3.2 oz)      Height: 5' 9\" (1.753 m)               Physical Exam   Nursing note and vitals reviewed. CONSTITUTIONAL: Well-appearing; well-nourished; in no apparent distress  HEAD: Normocephalic; atraumatic  EYES: PERRL; EOM intact; conjunctiva and sclera are clear bilaterally. ENT: No rhinorrhea; normal pharynx with no tonsillar hypertrophy; mucous membranes pink/moist, no erythema, no exudate. NECK: Supple; non-tender; no cervical lymphadenopathy  CARD: Normal S1, S2; no murmurs, rubs, or gallops. Regular rate and rhythm. RESP: Normal respiratory effort; breath sounds clear and equal bilaterally; no wheezes, rhonchi, or rales. ABD: Normal bowel sounds; non-distended; non-tender; no palpable organomegaly, no masses, no bruits. Back Exam: Normal inspection; no vertebral point tenderness, no CVA tenderness. Normal range of motion. EXT: Normal ROM in all four extremities; non-tender to palpation; no swelling or deformity; distal pulses are normal, no edema. SKIN: Warm; dry; no rash.   NEURO:Alert and oriented x 3, coherent, JAYLYN-XII grossly intact, sensory and motor are non-focal.        MDM  Number of Diagnoses or Management Options  Hyperosmolar non-ketotic state in patient with type 2 diabetes mellitus [facility]):   Diagnosis management comments: Assessment: 27-year-old female with multiple chronic condition and the compression medication, who presents with chest pain on compression of instability. The differential diagnosis DKA versus HHNK, ACS, electrolyte abnormality, sepsis, and dehydration      Plan: lab/ EKG/ chest x-ray/ IV fluid/ insulin drip/ consult hospitalist/ Monitor and Reevaluate. Amount and/or Complexity of Data Reviewed  Clinical lab tests: ordered and reviewed  Tests in the radiology section of CPT®: ordered and reviewed  Tests in the medicine section of CPT®: reviewed and ordered  Discussion of test results with the performing providers: yes  Decide to obtain previous medical records or to obtain history from someone other than the patient: yes  Obtain history from someone other than the patient: yes  Review and summarize past medical records: yes  Discuss the patient with other providers: yes  Independent visualization of images, tracings, or specimens: yes    Risk of Complications, Morbidity, and/or Mortality  Presenting problems: moderate  Diagnostic procedures: moderate  Management options: moderate    Critical Care  Total time providing critical care: (Total critical care time spent exclusive of procedures: 45 minutes)    Patient Progress  Patient progress: stable        ED Course       Procedures     ED EKG interpretation:  Rhythm: normal sinus rhythm; and regular . Rate (approx.): 99; Axis: left axis deviation; P wave: normal; QRS interval: normal ; ST/T wave: normal; in  Lead: Diffusely; Other findings: borderline ekg. This EKG was interpreted by Dk Lewis MD,ED Provider. XRAY INTERPRETATION (ED MD)  Chest Xray  No acute process seen. Normal heart size. No bony abnormalities. No infiltrate.   Pearl Flores Nathen Tesfaye MD 10:58 PM      PROGRESS NOTE:  Pt has been reexamined by Khang Bell MD all available results have been reviewed with pt and any available family. Pt understands sx, dx, and tx in ED. Care plan has been outlined and questions have been answered. Pt and any available family understands and agrees to need for admission to hospital for further tx not available in ED. Pt is ready for admission. Written by Khang Bell MD,  11:32 PM    .  CONSULT NOTE:  12:00 AM Khang Bell MD spoke with Dr. Nia Cruz, Consult for Hospitalist.  Discussed available diagnostic tests and clinical findings.   Dr. Nia Cruz will evaluate the patient for admission to the hospital.

## 2018-07-26 NOTE — ED NOTES
Bedside and Verbal shift change report given to Amelia Dickson (oncoming nurse) by Brooke Rosales RN (offgoing nurse). Report included the following information SBAR, ED Summary, MAR and Recent Results.

## 2018-07-26 NOTE — PROGRESS NOTES
Admission Medication Reconciliation:    Information obtained from:  Patient/RxQuery    Comments/Recommendations: Updated PTA meds/reviewed patient's allergies. 1)  Medication changes (since last review): Adjusted  -ergocalciferol 50,000 unit cap (takes 1 cap weekly on Saturdays) vs 3 days a week    Removed  -promethazine 25 mg     3)  Patient has 2 prescriptions for metformin (500 mg and 1000 mg tabs) and was instructed by physician to take 1000 mg BID for either strength based on availability of pharmacy. 4)  There were several duplicate orders for the same medication in PTA list so adjustments were made to only have one listing per medication. 5) Patient is on 7-day course of levofloxacin with scheduled stop date of 7/29. Reported last dose on 7/24. Allergies: Toradol [ketorolac tromethamine]; Augmentin [amoxicillin-pot clavulanate]; Demerol [meperidine]; Heparin; Ibuprofen; Keflex [cephalexin]; Morphine; Nubain [nalbuphine]; Pcn [penicillins]; and Sulfa (sulfonamide antibiotics)    Significant PMH/Disease States:   Past Medical History:   Diagnosis Date    Calculus of kidney     Diabetes ([facility] [address]     Hyperlipidemia     Hypertension     MI (myocardial infarction) ([facility] [address] 2008    Other ill-defined conditions(799.89)     Neuropathies    Pancreatitis     due to hypertriglyceridemia    Vitamin D deficiency 11/30/2017       Chief Complaint for this Admission:    Chief Complaint   Patient presents with    Chest Pain    Shortness of Breath       Prior to Admission Medications:   Prior to Admission Medications   Prescriptions Last Dose Informant Patient Reported? Taking?   acetaminophen (TYLENOL) 500 mg tablet 7/25/2018 at Unknown time Self Yes Yes   Sig: Take 1,000-2,500 mg by mouth every six (6) hours as needed for Pain. albuterol (PROAIR HFA) 90 mcg/actuation inhaler  Self Yes Yes   Sig: Take 2 Puffs by inhalation every four (4) hours as needed for Wheezing.    atorvastatin (LIPITOR) 40 mg tablet 2018 at Unknown time Self No Yes   Sig: Take 1 Tab by mouth nightly.   ergocalciferol (ERGOCALCIFEROL) 50,000 unit capsule   Yes Yes   Sig: Take 50,000 Units by mouth every seven (7) days. Takes on    fenofibrate nanocrystallized (TRICOR) 145 mg tablet 2018 at Unknown time Self No Yes   Sig: Take 1 Tab by mouth daily. gabapentin (NEURONTIN) 300 mg capsule 2018 at Unknown time Self No Yes   Sig: Take 2 Caps by mouth three (3) times daily. insulin glargine (LANTUS U-100 INSULIN) 100 unit/mL injection 2018 at Unknown time  No Yes   Si Units by SubCUTAneous route two (2) times a day. Indications: type 2 diabetes mellitus   insulin lispro (HUMALOG) 100 unit/mL injection 2018 at Unknown time  No Yes   Sig: Inject 5 units for every 50 points over 1502   levoFLOXacin (LEVAQUIN) 500 mg tablet 2018  No Yes   Sig: Take 1 Tab by mouth daily. metFORMIN (GLUCOPHAGE) 1,000 mg tablet  Self No No   Sig: Take 1 Tab by mouth two (2) times daily (with meals). metFORMIN (GLUCOPHAGE) 500 mg tablet   No No   Sig: Take 1 Tab by mouth two (2) times daily (with meals). omega-3 acid ethyl esters (LOVAZA) 1 gram capsule  Self No No   Sig: Take 2 Caps by mouth two (2) times a day. ondansetron (ZOFRAN ODT) 4 mg disintegrating tablet 2018 at Unknown time  No Yes   Sig: Take 1 Tab by mouth every eight (8) hours as needed for Nausea.       Facility-Administered Medications: None

## 2018-07-27 LAB
ALBUMIN SERPL-MCNC: ABNORMAL G/DL (ref 3.5–5)
ANION GAP SERPL CALC-SCNC: 6 MMOL/L (ref 5–15)
BUN SERPL-MCNC: ABNORMAL MG/DL (ref 6–20)
BUN/CREAT SERPL: ABNORMAL (ref 12–20)
CALCIUM SERPL-MCNC: ABNORMAL MG/DL (ref 8.5–10.1)
CHLORIDE SERPL-SCNC: 100 MMOL/L (ref 97–108)
CO2 SERPL-SCNC: 25 MMOL/L (ref 21–32)
CREAT SERPL-MCNC: 1.4 MG/DL (ref 0.55–1.02)
GLUCOSE BLD STRIP.AUTO-MCNC: 235 MG/DL (ref 65–100)
GLUCOSE BLD STRIP.AUTO-MCNC: 249 MG/DL (ref 65–100)
GLUCOSE BLD STRIP.AUTO-MCNC: 260 MG/DL (ref 65–100)
GLUCOSE BLD STRIP.AUTO-MCNC: 328 MG/DL (ref 65–100)
GLUCOSE BLD STRIP.AUTO-MCNC: 382 MG/DL (ref 65–100)
GLUCOSE SERPL-MCNC: 327 MG/DL (ref 65–100)
LIPASE SERPL-CCNC: 62 U/L (ref 73–393)
MAGNESIUM SERPL-MCNC: 1.5 MG/DL (ref 1.6–2.4)
PHOSPHATE SERPL-MCNC: 2.6 MG/DL (ref 2.6–4.7)
POTASSIUM SERPL-SCNC: 3.8 MMOL/L (ref 3.5–5.1)
SERVICE CMNT-IMP: ABNORMAL
SODIUM SERPL-SCNC: 131 MMOL/L (ref 136–145)

## 2018-07-27 PROCEDURE — 65660000000 HC RM CCU STEPDOWN

## 2018-07-27 PROCEDURE — 80069 RENAL FUNCTION PANEL: CPT | Performed by: HOSPITALIST

## 2018-07-27 PROCEDURE — 83690 ASSAY OF LIPASE: CPT | Performed by: HOSPITALIST

## 2018-07-27 PROCEDURE — 82962 GLUCOSE BLOOD TEST: CPT

## 2018-07-27 PROCEDURE — 74011250637 HC RX REV CODE- 250/637: Performed by: HOSPITALIST

## 2018-07-27 PROCEDURE — 83735 ASSAY OF MAGNESIUM: CPT | Performed by: EMERGENCY MEDICINE

## 2018-07-27 PROCEDURE — 74011250637 HC RX REV CODE- 250/637: Performed by: FAMILY MEDICINE

## 2018-07-27 PROCEDURE — 36415 COLL VENOUS BLD VENIPUNCTURE: CPT | Performed by: EMERGENCY MEDICINE

## 2018-07-27 PROCEDURE — 74011636637 HC RX REV CODE- 636/637: Performed by: HOSPITALIST

## 2018-07-27 PROCEDURE — 74011250636 HC RX REV CODE- 250/636: Performed by: FAMILY MEDICINE

## 2018-07-27 PROCEDURE — 74011250636 HC RX REV CODE- 250/636: Performed by: HOSPITALIST

## 2018-07-27 PROCEDURE — 94760 N-INVAS EAR/PLS OXIMETRY 1: CPT

## 2018-07-27 RX ORDER — MAGNESIUM SULFATE HEPTAHYDRATE 40 MG/ML
2 INJECTION, SOLUTION INTRAVENOUS ONCE
Status: COMPLETED | OUTPATIENT
Start: 2018-07-27 | End: 2018-07-27

## 2018-07-27 RX ORDER — INSULIN GLARGINE 100 [IU]/ML
50 INJECTION, SOLUTION SUBCUTANEOUS 2 TIMES DAILY
Status: DISCONTINUED | OUTPATIENT
Start: 2018-07-27 | End: 2018-07-28 | Stop reason: HOSPADM

## 2018-07-27 RX ORDER — INSULIN LISPRO 100 [IU]/ML
10 INJECTION, SOLUTION INTRAVENOUS; SUBCUTANEOUS ONCE
Status: COMPLETED | OUTPATIENT
Start: 2018-07-27 | End: 2018-07-27

## 2018-07-27 RX ADMIN — GABAPENTIN 600 MG: 300 CAPSULE ORAL at 16:18

## 2018-07-27 RX ADMIN — OXYCODONE HYDROCHLORIDE 5 MG: 5 TABLET ORAL at 13:18

## 2018-07-27 RX ADMIN — INSULIN GLARGINE 50 UNITS: 100 INJECTION, SOLUTION SUBCUTANEOUS at 08:55

## 2018-07-27 RX ADMIN — GABAPENTIN 600 MG: 300 CAPSULE ORAL at 21:06

## 2018-07-27 RX ADMIN — INSULIN GLARGINE 50 UNITS: 100 INJECTION, SOLUTION SUBCUTANEOUS at 19:24

## 2018-07-27 RX ADMIN — GABAPENTIN 600 MG: 300 CAPSULE ORAL at 08:54

## 2018-07-27 RX ADMIN — ATORVASTATIN CALCIUM 40 MG: 40 TABLET, FILM COATED ORAL at 21:06

## 2018-07-27 RX ADMIN — OXYCODONE HYDROCHLORIDE 5 MG: 5 TABLET ORAL at 22:04

## 2018-07-27 RX ADMIN — INSULIN LISPRO 2 UNITS: 100 INJECTION, SOLUTION INTRAVENOUS; SUBCUTANEOUS at 22:43

## 2018-07-27 RX ADMIN — INSULIN LISPRO 7 UNITS: 100 INJECTION, SOLUTION INTRAVENOUS; SUBCUTANEOUS at 16:59

## 2018-07-27 RX ADMIN — SODIUM CHLORIDE 125 ML/HR: 900 INJECTION, SOLUTION INTRAVENOUS at 03:38

## 2018-07-27 RX ADMIN — SODIUM CHLORIDE 50 ML/HR: 900 INJECTION, SOLUTION INTRAVENOUS at 21:06

## 2018-07-27 RX ADMIN — Medication 2 CAPSULE: at 17:00

## 2018-07-27 RX ADMIN — Medication 2 CAPSULE: at 08:54

## 2018-07-27 RX ADMIN — FENOFIBRATE 145 MG: 145 TABLET ORAL at 08:54

## 2018-07-27 RX ADMIN — MAGNESIUM SULFATE HEPTAHYDRATE 2 G: 40 INJECTION, SOLUTION INTRAVENOUS at 11:48

## 2018-07-27 RX ADMIN — OXYCODONE HYDROCHLORIDE 5 MG: 5 TABLET ORAL at 03:48

## 2018-07-27 RX ADMIN — INSULIN LISPRO 4 UNITS: 100 INJECTION, SOLUTION INTRAVENOUS; SUBCUTANEOUS at 07:12

## 2018-07-27 RX ADMIN — INSULIN LISPRO 3 UNITS: 100 INJECTION, SOLUTION INTRAVENOUS; SUBCUTANEOUS at 11:48

## 2018-07-27 RX ADMIN — INSULIN LISPRO 10 UNITS: 100 INJECTION, SOLUTION INTRAVENOUS; SUBCUTANEOUS at 14:30

## 2018-07-27 RX ADMIN — Medication 10 ML: at 07:12

## 2018-07-27 RX ADMIN — Medication 10 ML: at 13:18

## 2018-07-27 NOTE — PROGRESS NOTES
0347 - 1 lb weight loss noted. Will notify day RN. 0800 - Bedside and Verbal shift change report given to Aida Munoz (oncoming nurse) by Sepideh Aleman (offgoing nurse). Report included the following information SBAR, Kardex, ED Summary, Procedure Summary, Intake/Output, MAR, Accordion, Recent Results, Med Rec Status and Cardiac Rhythm NSR. Problem: Falls - Risk of  Goal: *Absence of Falls  Document Doug Fall Risk and appropriate interventions in the flowsheet. Outcome: Progressing Towards Goal  Fall Risk Interventions:        Medication Interventions: Evaluate medications/consider consulting pharmacy, Patient to call before getting OOB, Teach patient to arise slowly      Pt has had no falls during admission, call bell and belongings within reach. Pt up ad christopher.     Problem: Diabetes Maintenance:Ongoing  Goal: Activity/Safety  Outcome: Progressing Towards Goal  Pt up ad christopher.   Goal: Medications  Outcome: Progressing Towards Goal  Pt off insulin gtt yesterday afternoon; BG being controlled with sliding scale Humalog insulin ACHS and daily Lantus.

## 2018-07-27 NOTE — DIABETES MGMT
DTC Consult Note Recommendations/ Comments:  Blood sugars remain elevated, however, breakfast tray had a large amount of carbohydrates (including 2 ginger ales). Spoke with patient avoiding sweet drinks now that diet is being advanced. Pt seen for consult at [facility] on July 9th. Pt reports that she has not missed insulin and mostly follows a meal plan. She said thinks her blood sugars were high (734 mg/dL) on admit due to stress. Note, pt is followed by endo - Dr. Aleyda King. She states she has attended four education sessions in the past.  Encouraged her to follow up with MD. 
 
Current hospital DM medication: 
-Lantus 100 units every 12 hours 
-Humalog insulin resistant scale Consult received for:  [x]             Assessment of home management Chart reviewed and initial evaluation complete on Suki [place]. Patient is a 40 y.o. female with known history of Type 2 Diabetes on  units bid, Humalog scale, Metformin 1,000mg bid at home. BG monitoring at home 2-3 times per day. Patient reports BG levels at home - pt reported her meter is not functioning properly. Assessed and instructed patient on the following:  
·  interpretation of lab results, blood sugar goals, complications of diabetes mellitus, illness management, SMBG skills, nutrition, referred to Diabetes Educator, site rotation and use of insulin pen Provided patient with the following: [x]             Survival skills education materials - received July 9th. [x]             Outpatient DTC contact number Discussed with patient and/or family need for follow up appointment for diabetes management after discharge. A1c:  
Lab Results Component Value Date/Time Hemoglobin A1c 10.0 (H) 07/25/2018 09:38 PM  
 
 
Recent Glucose Results:  
Lab Results Component Value Date/Time   (H) 07/27/2018 04:07 AM  
 GLUCPOC 382 (H) 07/27/2018 11:17 AM  
 GLUCPOC 249 (H) 07/27/2018 06:58 AM GLUCPOC 240 (H) 07/26/2018 09:52 PM  
  
 
Lab Results Component Value Date/Time Creatinine 1.40 (H) 07/27/2018 04:07 AM  
 
Estimated Creatinine Clearance: 77.2 mL/min (based on Cr of 1.4). Active Orders There are no active orders of the following type(s): Diet. PO intake:  
Patient Vitals for the past 72 hrs: 
 % Diet Eaten  
07/27/18 1200 60 %  
07/27/18 0800 80 % Will continue to follow as needed. Thank you.  
Jairo Miramontes, MS, RN, CDE

## 2018-07-27 NOTE — PROGRESS NOTES
Problem: Diabetes Maintenance:Ongoing Goal: Medications Outcome: Progressing Towards Goal 
Pt stress test was cancelled and scheduled for outpatient. Pt seen by diabetes treatment center. PT received 100 units of lantus. Pt being transferred to medical. Will continue to monitor and educate. TRANSFER - OUT REPORT: 
 
Verbal report given to Joan Salvador RN(name) on Gabriel Spann  being transferred to (unit) for routine progression of care Report consisted of patients Situation, Background, Assessment and  
Recommendations(SBAR). Information from the following report(s) SBAR, Kardex, Accordion, Recent Results, Med Rec Status, Cardiac Rhythm NSR and Alarm Parameters  was reviewed with the receiving nurse. Lines:  
Peripheral IV 07/25/18 Left Forearm (Active) Site Assessment Clean, dry, & intact 7/27/2018  8:00 AM  
Phlebitis Assessment 0 7/27/2018  8:00 AM  
Infiltration Assessment 0 7/27/2018  8:00 AM  
Dressing Status Clean, dry, & intact 7/27/2018  8:00 AM  
Dressing Type Transparent;Tape 7/27/2018  8:00 AM  
Hub Color/Line Status Pink;Capped 7/27/2018  8:00 AM  
Action Taken Open ports on tubing capped 7/27/2018  8:00 AM  
Alcohol Cap Used Yes 7/27/2018  8:00 AM  
   
Peripheral IV 07/25/18 Right Forearm (Active) Site Assessment Clean, dry, & intact 7/27/2018  8:00 AM  
Phlebitis Assessment 0 7/27/2018  8:00 AM  
Infiltration Assessment 0 7/27/2018  8:00 AM  
Dressing Status Clean, dry, & intact 7/27/2018  8:00 AM  
Dressing Type Transparent;Tape 7/27/2018  8:00 AM  
Hub Color/Line Status Pink; Infusing 7/27/2018  8:00 AM  
Action Taken Open ports on tubing capped 7/27/2018  8:00 AM  
Alcohol Cap Used Yes 7/27/2018  8:00 AM  
  
 
Opportunity for questions and clarification was provided.    
 
Patient transported with: 
 Registered Nurse- [address] [place] [address]

## 2018-07-27 NOTE — PROGRESS NOTES
Cardiology Progress Note Admit Date: 7/25/2018 Admit Diagnosis: Acute chest pain;SOB (shortness of breath); Uncontrolled typ* Date: 7/27/2018     Time: 10:01 AM 
 
Subjective: 
CP resolved. Feeling much better. On insulin gtt and better DM control Assessment and Plan 1. Chest pain - atypical 
                      - 20 hours since onset, not alleviated by NTG paste - Troponin 0.05 x 2 and normal 
                      - EKG NSR and no evidence for ACS 
                      - Stress echo on hold - will complete as outpatient 2. DMII 
                      - poorly controlled - Glucose 435 on admission - around 250s now 
                      - SSI and gtt -  
3. Hypomagnesemia - Mg 1.6 - 1.5 and replacing 4. Hypokalemia - K 3.8 resolved 5. CKD Stage III - Cr 1.5-1.9 6. Dyslipidemia Trig 3777, HDL 39 Lipitor 40 mg 
                      Tricor 145 
7. HTN 
                      - Elevated on presentation - No home meds 8. Body mass index is 40.64 kg/(m^2). - Morbid obesity 9. Tobacco abuse 
                      - Nicotine patch 
  
CP resolved. HTG syndrome likely related to her DM but cannot exclude FHTG Patient with CP I suspect may be in part related to some degree of pancreatitis? She received PLASMAPHERESIS in the past and notes she felt much better. Will complete stress test as outpatient. I have seen and examined the patient and agree with PA assessment. Patient tells me she feels better with less cp Stress echo to be done as outpatient once her metabolic issues have settle Consider repeating tg and if still elevated consider plasmapheresis but will defer decision to primary team consider nephrology consult for plasmapheresis if TG still elevated We will see back on Monday and available over the weekend as needed 
 
  
 
  
ROS: 
 
 GENERAL Recent weight loss - no Fever -----------------   no 
 Chills -----------------   no 
 
 EYES, VISION Visual Changes - no 
 
 EARS, NOSE, THROAT Hearing loss ----------- no 
 Swallowing difficulties - no CARDIOVASCULAR Chest pain/pressure ---- no 
 Arrhythmia/palpitations - no RESPIRATORY Cough ------------------ no Shortness of breath - no Wheezing -------------- no   GASTROINTESTINAL Abdominal pain - no Heartburn -------- no 
 Bloody stool ----- no 
 
 GENITOURINARY Frequent urination - no Urgency -------------- no 
 
 MUSCULOSKELETAL Joint pain/swelling ---- no 
 Musculoskeletal pain - no 
 
 SKIN & INTEGUMENTARY Rashes - no Sores --- no 
 
 
   NEUROLOGICAL Numbness/tingling - no Sensation loss ------ no 
 
 PSYCHIATRIC Nervousness/anxiety - no Depression -------------- no 
 
 ENDOCRINE Heat/cold intolerance - no Excessive thirst -------- no 
 
 HEMATOLOGIC/LYMPHATIC Abnormal bleeding - no ALL/IMMUN Allergic reaction ------ no 
 Recurrent infections - no Objective: 
 
 Physical Exam: 
             
Visit Vitals  /85 (BP 1 Location: Left arm, BP Patient Position: At rest)  Pulse 94  Temp 99.2 °F (37.3 °C)  Resp 16  
 Ht 5' 9\" (1.753 m)  Wt 123 kg (271 lb 2.7 oz)  LMP 07/03/2018 (Exact Date)  SpO2 95%  BMI 40.04 kg/m2 General Appearance:   Well developed, well nourished,alert and oriented x 3, and 
 individual in no acute distress. Ears/Nose/Mouth/Throat:    Hearing grossly normal. 
  
    Neck:  Supple. Chest:    Lungs clear to auscultation bilaterally. Cardiovascular:   Regular rate and rhythm, S1, S2 normal, no murmur. Abdomen:    Soft, non-tender, bowel sounds are active. Extremities:  No edema bilaterally. Skin:  Warm and dry.   
 
Telemetry: normal sinus rhythm Data Review:  
 Labs:   
Recent Results (from the past 24 hour(s)) GLUCOSE, POC Collection Time: 07/26/18  2:02 PM  
Result Value Ref Range Glucose (POC) 242 (H) 65 - 100 mg/dL Performed by Kamala Walter Collection Time: 07/26/18  2:03 PM  
Result Value Ref Range Glucose 242 mg/dL Insulin order 9.1 units/hour Insulin adminstered 9.1 units/hour Multiplier 0.050 Low target 200 mg/dL High target 300 mg/dL D50 order 0.0 ml  
 D50 administered 0.00 ml Minutes until next  min Order initials jf   
 Administered initials jf   
 GLSCOM Comments GLUCOSE, POC Collection Time: 07/26/18  4:18 PM  
Result Value Ref Range Glucose (POC) 239 (H) 65 - 100 mg/dL Performed by Kamala Prather GLUCOSE, POC Collection Time: 07/26/18  9:52 PM  
Result Value Ref Range Glucose (POC) 240 (H) 65 - 100 mg/dL Performed by Anitra Jernigan Collection Time: 07/27/18  4:07 AM  
Result Value Ref Range Magnesium 1.5 (L) 1.6 - 2.4 mg/dL RENAL FUNCTION PANEL Collection Time: 07/27/18  4:07 AM  
Result Value Ref Range Sodium 131 (L) 136 - 145 mmol/L Potassium 3.8 3.5 - 5.1 mmol/L Chloride 100 97 - 108 mmol/L  
 CO2 25 21 - 32 mmol/L Anion gap 6 5 - 15 mmol/L Glucose 327 (H) 65 - 100 mg/dL BUN  6 - 20 MG/DL UNABLE TO OBTAIN ACCURATE RESULTS DUE TO GROSS LIPEMIA Creatinine 1.40 (H) 0.55 - 1.02 MG/DL  
 BUN/Creatinine ratio  12 - 20 UNABLE TO OBTAIN ACCURATE RESULTS DUE TO GROSS LIPEMIA  
 GFR est AA 51 (L) >60 ml/min/1.73m2 GFR est non-AA 42 (L) >60 ml/min/1.73m2 Calcium  8.5 - 10.1 MG/DL UNABLE TO OBTAIN ACCURATE RESULTS DUE TO GROSS LIPEMIA Phosphorus 2.6 2.6 - 4.7 MG/DL Albumin  3.5 - 5.0 g/dL UNABLE TO OBTAIN ACCURATE RESULTS DUE TO GROSS LIPEMIA  
GLUCOSE, POC Collection Time: 07/27/18  6:58 AM  
Result Value Ref Range  Glucose (POC) 249 (H) 65 - 100 mg/dL Performed by Kenna Mane GLUCOSE, POC Collection Time: 07/27/18 11:17 AM  
Result Value Ref Range Glucose (POC) 382 (H) 65 - 100 mg/dL Performed by Loni Acosta  PCT Radiology:  
 
  
Current Facility-Administered Medications Medication Dose Route Frequency  magnesium sulfate 2 g/50 ml IVPB (premix or compounded)  2 g IntraVENous ONCE  
 insulin glargine (LANTUS) injection 50 Units (syringe 1 of 2, total dose = 100 units)  50 Units SubCUTAneous BID And  
 insulin glargine (LANTUS) injection 50 Units (syringe 2 of 2, total dose = 100 units)  50 Units SubCUTAneous BID  atorvastatin (LIPITOR) tablet 40 mg  40 mg Oral QHS  fenofibrate nanocrystallized (TRICOR) tablet 145 mg  145 mg Oral DAILY  gabapentin (NEURONTIN) capsule 600 mg  600 mg Oral TID  fish oil-omega-3 fatty acids 340-1,000 mg capsule 2 Cap  2 Cap Oral BID  sodium chloride (NS) flush 5-10 mL  5-10 mL IntraVENous Q8H  
 sodium chloride (NS) flush 5-10 mL  5-10 mL IntraVENous PRN  
 0.9% sodium chloride infusion  125 mL/hr IntraVENous CONTINUOUS  
 fentaNYL citrate (PF) injection 25 mcg  25 mcg IntraVENous Q4H PRN  
 nicotine (NICODERM CQ) 21 mg/24 hr patch 1 Patch  1 Patch TransDERmal DAILY  0.9% sodium chloride infusion  125 mL/hr IntraVENous CONTINUOUS  
 ondansetron (ZOFRAN) injection 4 mg  4 mg IntraVENous Q6H PRN  
 acetaminophen (TYLENOL) tablet 650 mg  650 mg Oral Q6H PRN  
 oxyCODONE IR (ROXICODONE) tablet 5 mg  5 mg Oral Q4H PRN  
 insulin lispro (HUMALOG) injection   SubCUTAneous AC&HS  
 glucose chewable tablet 16 g  4 Tab Oral PRN  
 dextrose (D50W) injection syrg 12.5-25 g  12.5-25 g IntraVENous PRN  
 glucagon (GLUCAGEN) injection 1 mg  1 mg IntraMUSCular PRN Hull Mahsa. EARL Herrera M.D. [facility] [address] [place] [address], [address] [facility] 
 [phone]
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
## 2018-07-27 NOTE — PROGRESS NOTES
Pt arrived from ICU in stable condition. She is a/o x 4, denies pain, ambulates without gait disturbance.
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
## 2018-07-28 VITALS
HEIGHT: 69 IN | WEIGHT: 271.17 LBS | SYSTOLIC BLOOD PRESSURE: 159 MMHG | TEMPERATURE: 98.2 F | DIASTOLIC BLOOD PRESSURE: 94 MMHG | RESPIRATION RATE: 16 BRPM | OXYGEN SATURATION: 98 % | HEART RATE: 78 BPM | BODY MASS INDEX: 40.16 KG/M2

## 2018-07-28 LAB
ALBUMIN SERPL-MCNC: 2.2 G/DL (ref 3.5–5)
ANION GAP SERPL CALC-SCNC: 8 MMOL/L (ref 5–15)
BUN SERPL-MCNC: ABNORMAL MG/DL (ref 6–20)
BUN/CREAT SERPL: ABNORMAL (ref 12–20)
CALCIUM SERPL-MCNC: ABNORMAL MG/DL (ref 8.5–10.1)
CHLORIDE SERPL-SCNC: 103 MMOL/L (ref 97–108)
CHOLEST SERPL-MCNC: 499 MG/DL
CO2 SERPL-SCNC: 25 MMOL/L (ref 21–32)
CREAT SERPL-MCNC: 0.64 MG/DL (ref 0.55–1.02)
GLUCOSE BLD STRIP.AUTO-MCNC: 215 MG/DL (ref 65–100)
GLUCOSE BLD STRIP.AUTO-MCNC: 230 MG/DL (ref 65–100)
GLUCOSE BLD STRIP.AUTO-MCNC: 276 MG/DL (ref 65–100)
GLUCOSE SERPL-MCNC: 297 MG/DL (ref 65–100)
HDLC SERPL-MCNC: 42 MG/DL
HDLC SERPL: 11.9 {RATIO} (ref 0–5)
LDLC SERPL CALC-MCNC: ABNORMAL MG/DL (ref 0–100)
LDLC SERPL DIRECT ASSAY-MCNC: 184 MG/DL (ref 0–100)
LIPID PROFILE,FLP: ABNORMAL
PHOSPHATE SERPL-MCNC: 2.4 MG/DL (ref 2.6–4.7)
POTASSIUM SERPL-SCNC: 3.6 MMOL/L (ref 3.5–5.1)
SERVICE CMNT-IMP: ABNORMAL
SODIUM SERPL-SCNC: 136 MMOL/L (ref 136–145)
TRIGL SERPL-MCNC: 3849 MG/DL (ref ?–150)
VLDLC SERPL CALC-MCNC: ABNORMAL MG/DL

## 2018-07-28 PROCEDURE — 82962 GLUCOSE BLOOD TEST: CPT

## 2018-07-28 PROCEDURE — 80069 RENAL FUNCTION PANEL: CPT | Performed by: HOSPITALIST

## 2018-07-28 PROCEDURE — 74011250636 HC RX REV CODE- 250/636: Performed by: HOSPITALIST

## 2018-07-28 PROCEDURE — 83721 ASSAY OF BLOOD LIPOPROTEIN: CPT | Performed by: SPECIALIST

## 2018-07-28 PROCEDURE — 74011250637 HC RX REV CODE- 250/637: Performed by: FAMILY MEDICINE

## 2018-07-28 PROCEDURE — 74011636637 HC RX REV CODE- 636/637: Performed by: HOSPITALIST

## 2018-07-28 PROCEDURE — 80061 LIPID PANEL: CPT | Performed by: SPECIALIST

## 2018-07-28 PROCEDURE — 36415 COLL VENOUS BLD VENIPUNCTURE: CPT | Performed by: SPECIALIST

## 2018-07-28 RX ORDER — PANTOPRAZOLE SODIUM 40 MG/1
40 TABLET, DELAYED RELEASE ORAL DAILY
Qty: 30 TAB | Refills: 0 | Status: SHIPPED | OUTPATIENT
Start: 2018-07-28

## 2018-07-28 RX ADMIN — INSULIN LISPRO 4 UNITS: 100 INJECTION, SOLUTION INTRAVENOUS; SUBCUTANEOUS at 07:37

## 2018-07-28 RX ADMIN — INSULIN GLARGINE 50 UNITS: 100 INJECTION, SOLUTION SUBCUTANEOUS at 09:38

## 2018-07-28 RX ADMIN — GABAPENTIN 600 MG: 300 CAPSULE ORAL at 09:37

## 2018-07-28 RX ADMIN — INSULIN LISPRO 7 UNITS: 100 INJECTION, SOLUTION INTRAVENOUS; SUBCUTANEOUS at 12:20

## 2018-07-28 RX ADMIN — Medication 10 ML: at 06:45

## 2018-07-28 RX ADMIN — FENOFIBRATE 145 MG: 145 TABLET ORAL at 09:37

## 2018-07-28 RX ADMIN — Medication 2 CAPSULE: at 09:37

## 2018-07-28 RX ADMIN — SODIUM CHLORIDE 50 ML/HR: 900 INJECTION, SOLUTION INTRAVENOUS at 06:45

## 2018-07-28 NOTE — PROGRESS NOTES
Cardiology Progress Note 2018 12:39 PM 
 
Admit Date: 2018 Admit Diagnosis: Acute chest pain;SOB (shortness of breath); Uncontrolled typ* Subjective:  
 
Karri Villasenor is seen for Dr Marylen Gale She has chest pain Stress test to be done in office TG high and she had plasmapheresis October at [facility] Past Medical History:  
Diagnosis Date  Calculus of kidney  Diabetes ([facility]  Hyperlipidemia  Hypertension  MI (myocardial infarction) ([facility] [address]   Other ill-defined conditions(799.89) Neuropathies  Pancreatitis   
 due to hypertriglyceridemia  Vitamin D deficiency 2017 Past Surgical History:  
Procedure Laterality Date  CARDIAC SURG PROCEDURE UNLIST    
 cath  CYSTOSCOPY    
 HX  SECTION    
 x 2  
 HX CHOLECYSTECTOMY  HX GYN    
 tubes clamped  HX TONSIL AND ADENOIDECTOMY  HX WISDOM TEETH EXTRACTION Social History Social History  Marital status: SINGLE Spouse name: N/A  
 Number of children: N/A  
 Years of education: N/A Occupational History  Unemployed Social History Main Topics  Smoking status: Current Some Day Smoker Packs/day: 0.50  Smokeless tobacco: Never Used  Alcohol use No  
   Comment: once per year  Drug use: No  
 Sexual activity: Yes  
  Partners: Male Other Topics Concern  Not on file Social History Narrative Patient is . Visit Vitals  BP (!) 159/94 (BP 1 Location: Right arm, BP Patient Position: At rest)  Pulse 78  Temp 98.2 °F (36.8 °C)  Resp 16  
 Ht 5' 9\" (1.753 m)  Wt 271 lb 2.7 oz (123 kg)  LMP 2018 (Exact Date)  SpO2 98%  BMI 40.04 kg/m2 Current Facility-Administered Medications Medication Dose Route Frequency  insulin glargine (LANTUS) injection 50 Units (syringe 1 of 2, total dose = 100 units)  50 Units SubCUTAneous BID  And  
 insulin glargine (LANTUS) injection 50 Units (syringe 2 of 2, total dose = 100 units)  50 Units SubCUTAneous BID  atorvastatin (LIPITOR) tablet 40 mg  40 mg Oral QHS  fenofibrate nanocrystallized (TRICOR) tablet 145 mg  145 mg Oral DAILY  gabapentin (NEURONTIN) capsule 600 mg  600 mg Oral TID  fish oil-omega-3 fatty acids 340-1,000 mg capsule 2 Cap  2 Cap Oral BID  sodium chloride (NS) flush 5-10 mL  5-10 mL IntraVENous Q8H  
 sodium chloride (NS) flush 5-10 mL  5-10 mL IntraVENous PRN  
 0.9% sodium chloride infusion  50 mL/hr IntraVENous CONTINUOUS  
 fentaNYL citrate (PF) injection 25 mcg  25 mcg IntraVENous Q4H PRN  
 nicotine (NICODERM CQ) 21 mg/24 hr patch 1 Patch  1 Patch TransDERmal DAILY  ondansetron (ZOFRAN) injection 4 mg  4 mg IntraVENous Q6H PRN  
 acetaminophen (TYLENOL) tablet 650 mg  650 mg Oral Q6H PRN  
 oxyCODONE IR (ROXICODONE) tablet 5 mg  5 mg Oral Q4H PRN  
 insulin lispro (HUMALOG) injection   SubCUTAneous AC&HS  
 glucose chewable tablet 16 g  4 Tab Oral PRN  
 dextrose (D50W) injection syrg 12.5-25 g  12.5-25 g IntraVENous PRN  
 glucagon (GLUCAGEN) injection 1 mg  1 mg IntraMUSCular PRN Objective:  
  
Physical Exam: 
Visit Vitals  BP (!) 159/94 (BP 1 Location: Right arm, BP Patient Position: At rest)  Pulse 78  Temp 98.2 °F (36.8 °C)  Resp 16  
 Ht 5' 9\" (1.753 m)  Wt 271 lb 2.7 oz (123 kg)  LMP 07/03/2018 (Exact Date)  SpO2 98%  BMI 40.04 kg/m2 General Appearance:  Well developed, well nourished,alert and oriented x 3, and individual in no acute distress. Ears/Nose/Mouth/Throat:   Hearing grossly normal. 
  
    Neck: Supple. Chest:   Lungs clear to auscultation bilaterally. Cardiovascular:  Regular rhythm, no murmur. Abdomen:   Soft, obese Extremities: No edema bilaterally. Full ROM Skin: Warm and dry. Data Review:  
Labs:   
Recent Results (from the past 24 hour(s)) GLUCOSE, POC Collection Time: 07/27/18  2:06 PM  
Result Value Ref Range Glucose (POC) 328 (H) 65 - 100 mg/dL Performed by Angie Lester GLUCOSE, POC Collection Time: 07/27/18  4:14 PM  
Result Value Ref Range Glucose (POC) 260 (H) 65 - 100 mg/dL Performed by Rita Krause, POC Collection Time: 07/27/18 10:34 PM  
Result Value Ref Range Glucose (POC) 235 (H) 65 - 100 mg/dL Performed by Claudia Rosa LIPID PANEL Collection Time: 07/28/18  6:43 AM  
Result Value Ref Range LIPID PROFILE Cholesterol, total 499 (H) <200 MG/DL Triglyceride 3849 (H) <150 MG/DL  
 HDL Cholesterol 42 MG/DL  
 LDL, calculated Not calculated due to elevated triglyceride level 0 - 100 MG/DL VLDL, calculated  MG/DL Calculation not valid with this patient's other Lipid values. CHOL/HDL Ratio 11.9 (H) 0 - 5.0 RENAL FUNCTION PANEL Collection Time: 07/28/18  6:43 AM  
Result Value Ref Range Sodium 136 136 - 145 mmol/L Potassium 3.6 3.5 - 5.1 mmol/L Chloride 103 97 - 108 mmol/L  
 CO2 25 21 - 32 mmol/L Anion gap 8 5 - 15 mmol/L Glucose 297 (H) 65 - 100 mg/dL BUN  6 - 20 MG/DL UNABLE TO OBTAIN ACCURATE RESULTS DUE TO GROSS LIPEMIA Creatinine 0.64 0.55 - 1.02 MG/DL  
 BUN/Creatinine ratio  12 - 20 UNABLE TO OBTAIN ACCURATE RESULTS DUE TO GROSS LIPEMIA  
 GFR est AA >60 >60 ml/min/1.73m2 GFR est non-AA >60 >60 ml/min/1.73m2 Calcium  8.5 - 10.1 MG/DL UNABLE TO OBTAIN ACCURATE RESULTS DUE TO GROSS LIPEMIA Phosphorus 2.4 (L) 2.6 - 4.7 MG/DL Albumin 2.2 (L) 3.5 - 5.0 g/dL LDL, DIRECT Collection Time: 07/28/18  6:43 AM  
Result Value Ref Range LDL,Direct 184 (H) 0 - 100 mg/dl GLUCOSE, POC Collection Time: 07/28/18  6:57 AM  
Result Value Ref Range Glucose (POC) 215 (H) 65 - 100 mg/dL Performed by Claudia Rosa GLUCOSE, POC Collection Time: 07/28/18  9:36 AM  
Result Value Ref Range Glucose (POC) 230 (H) 65 - 100 mg/dL Performed by Eric Burleson GLUCOSE, POC  Collection Time: 07/28/18 11:11 AM  
Result Value Ref Range Glucose (POC) 276 (H) 65 - 100 mg/dL Performed by Gisselle Cordero   
 
 
  
 
Assessment:  
 
Principal Problem: 
  Uncontrolled type 2 diabetes mellitus with hyperglycemia ([address] (7/26/2018) Active Problems: 
  SOB (shortness of breath) (7/26/2018) Acute chest pain (7/26/2018) 
 
hypertriglyceridemia Obesity Plan:  
Need to control BP and DM2, lose weight Chest pain but no MI troponin < 0.05 Need to lower triglyceride with plasmapheresis and have stress test in office with Dr Amara Reed I gave her contact # and will ask his nurse to call her Monday Renetta Rivas M.D. [facility] Electrophysiology/Cardiology [facility] [address], [facility], [place] [address] [place] [address] 
[phone]

## 2018-07-28 NOTE — DISCHARGE SUMMARY
Discharge Summary       PATIENT ID: Yonatan Dunn  MRN: [id number]   YOB: 1980    DATE OF ADMISSION: 7/25/2018 10:15 PM    DATE OF DISCHARGE: 7/28/2018  PRIMARY CARE PROVIDER: None     ATTENDING PHYSICIAN: Tracy Ragland MD    DISCHARGING PROVIDER: Tracy Ragland MD    To contact this individual call [phone] and ask the  to page. If unavailable ask to be transferred the Adult Hospitalist Department. CONSULTATIONS: IP CONSULT TO HOSPITALIST  IP CONSULT TO CARDIOLOGY    PROCEDURES/SURGERIES: * No surgery found *    ADMITTING DIAGNOSES & HOSPITAL COURSE:   40 y.o F with PMH of insulin dependent diabetes,morbid obesity,hypertriglyceridemia was admitted to the hospital because of chest pain and hyperglycemia. She was found to HHS. Was started on insulin drip and IVF. As the BS are better,transitioned to lantus and meal time insulin. Repleted electrolytes. Cardiology recommended stress test as outpatient. Lipase levels normal. Renal function improved with fluid resuscitation. Patient states that she was checking blood sugars 4-5 times daily, morning blood sugars approx 90 and preprandial -150s. Blood sugars >200 only twice or thrice a week. Her blood sugars were well controlled after the recent discharge from the hospital. She had stressful situation at home due to ongoing issues in the family which probably caused elevated blood sugars prior to admission. Will resume home insulin regimen at discharge. Stress test as outpatient. Prescribed protonix for GERD    PENDING TEST RESULTS:   At the time of discharge the following test results are still pending: none    FOLLOW UP APPOINTMENTS:    Follow-up Information     Follow up With Details Comments Contact Info    None   None (395) Patient stated that they have no PCP             ADDITIONAL CARE RECOMMENDATIONS: call your endocrinologist for insulin dose titration,please check your blood sugars regularly.     DIET: Diabetic Diet      ACTIVITY: Activity as tolerated    WOUND CARE: na    EQUIPMENT needed: na      DISCHARGE MEDICATIONS:  Current Discharge Medication List      START taking these medications    Details   pantoprazole (PROTONIX) 40 mg tablet Take 1 Tab by mouth daily. Qty: 30 Tab, Refills: 0         CONTINUE these medications which have NOT CHANGED    Details   ergocalciferol (ERGOCALCIFEROL) 50,000 unit capsule Take 50,000 Units by mouth every seven (7) days. Takes on Saturdays      ondansetron (ZOFRAN ODT) 4 mg disintegrating tablet Take 1 Tab by mouth every eight (8) hours as needed for Nausea. Qty: 10 Tab, Refills: 0      insulin glargine (LANTUS U-100 INSULIN) 100 unit/mL injection 100 Units by SubCUTAneous route two (2) times a day. Indications: type 2 diabetes mellitus  Qty: 2 Vial, Refills: 0      insulin lispro (HUMALOG) 100 unit/mL injection Inject 5 units for every 50 points over 1502  Qty: 2 Vial, Refills: 0      acetaminophen (TYLENOL) 500 mg tablet Take 1,000-2,500 mg by mouth every six (6) hours as needed for Pain. atorvastatin (LIPITOR) 40 mg tablet Take 1 Tab by mouth nightly. Qty: 30 Tab, Refills: 11      fenofibrate nanocrystallized (TRICOR) 145 mg tablet Take 1 Tab by mouth daily. Qty: 30 Tab, Refills: 11      omega-3 acid ethyl esters (LOVAZA) 1 gram capsule Take 2 Caps by mouth two (2) times a day. Qty: 120 Cap, Refills: 11      metFORMIN (GLUCOPHAGE) 1,000 mg tablet Take 1 Tab by mouth two (2) times daily (with meals). Qty: 60 Tab, Refills: 11      gabapentin (NEURONTIN) 300 mg capsule Take 2 Caps by mouth three (3) times daily. Qty: 180 Cap, Refills: 11      albuterol (PROAIR HFA) 90 mcg/actuation inhaler Take 2 Puffs by inhalation every four (4) hours as needed for Wheezing. STOP taking these medications       levoFLOXacin (LEVAQUIN) 500 mg tablet Comments:   Reason for Stopping:                 NOTIFY YOUR PHYSICIAN FOR ANY OF THE FOLLOWING:   Fever over 101 degrees for 24 hours. Chest pain, shortness of breath, fever, chills, nausea, vomiting, diarrhea, change in mentation, falling, weakness, bleeding. Severe pain or pain not relieved by medications. Or, any other signs or symptoms that you may have questions about. DISPOSITION:  X  Home With:   OT  PT  HH  RN       Long term SNF/Inpatient Rehab    Independent/assisted living    Hospice    Other:       PATIENT CONDITION AT DISCHARGE:     Functional status    Poor     Deconditioned    X Independent      Cognition    X Lucid     Forgetful     Dementia      Catheters/lines (plus indication)    Betancourt     PICC     PEG    X None      Code status    X Full code     DNR      PHYSICAL EXAMINATION AT DISCHARGE:  Gen - NAD  HEENT - mucous moist,normocephalic and atraumatic  Neck - supple, full ROM  CV - RRR, no MRG  Resp - lungs CTA b/l, no wheezing, normal WOB  GI - abdomen soft, minimal tenderness in the epigastric region, ND, +BS.  No hepatosplenomegaly   - no CVA tenderness, bladder non-palpable in lower abdomen  MSK - normal tone and bulk, no edema  Neuro - A&O, no focal deficits  Psych - calm and cooperative with normal affect      CHRONIC MEDICAL DIAGNOSES:  Problem List as of 7/28/2018  Date Reviewed: 7/26/2018          Codes Class Noted - Resolved    SOB (shortness of breath) ICD-10-CM: R06.02  ICD-9-CM: 786.05  7/26/2018 - Present        Acute chest pain ICD-10-CM: R07.9  ICD-9-CM: 786.50  7/26/2018 - Present        * (Principal)Uncontrolled type 2 diabetes mellitus with hyperglycemia ([facility] ICD-10-CM: E11.65  ICD-9-CM: 250.02  7/26/2018 - Present        Hyperosmolar non-ketotic state in patient with type 2 diabetes mellitus ([facility] ICD-10-CM: E11.01  ICD-9-CM: 250.20  7/5/2018 - Present        Vulvar abscess ICD-10-CM: N76.4  ICD-9-CM: 616.4  5/17/2018 - Present        Recurrent depression ([facility] ICD-10-CM: F33.9  ICD-9-CM: 296.30  1/11/2018 - Present        Breast abscess of female ICD-10-CM: N61.1  ICD-9-CM: 611.0  12/5/2017 - Present Obesity, Class III, BMI 40-49.9 (morbid obesity) ([facility] ICD-10-CM: E66.01  ICD-9-CM: 278.01  11/30/2017 - Present        Vitamin D deficiency ICD-10-CM: E55.9  ICD-9-CM: 268.9  11/30/2017 - Present        Essential hypertension ICD-10-CM: I10  ICD-9-CM: 401.9  10/3/2017 - Present        Uncontrolled type 2 diabetes mellitus with diabetic polyneuropathy, with long-term current use of insulin (HCC) ICD-10-CM: E11.42, Z79.4, E11.65  ICD-9-CM: 250.62, 357.2, V58.67  10/3/2017 - Present        Hyperlipidemia LDL goal <100 ICD-10-CM: E78.5  ICD-9-CM: 272.4  10/3/2017 - Present        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 ICD-10-CM: G40.309  ICD-9-CM: 345.10  1/25/2011 - Present        RESOLVED: DKA (diabetic ketoacidoses) ([facility] ICD-10-CM: E13.10  ICD-9-CM: 250.10  12/30/2017 - 2/19/2018        RESOLVED: Pancreatitis ICD-10-CM: K85.90  ICD-9-CM: 577.0  10/5/2017 - 2/19/2018        RESOLVED: Abdominal pain, right upper quadrant ICD-10-CM: R10.11  ICD-9-CM: 789.01  4/15/2011 - 2/19/2018        RESOLVED:  hyperglycemia hyperosmolar non-ketotic coma ICD-10-CM: E13.01  ICD-9-CM: 249.20  12/13/2010 - 2/19/2018        RESOLVED: Other chest pain ICD-10-CM: R07.89  ICD-9-CM: 786.59  12/13/2010 - 2/19/2018            23  minutes were spent with the patient on counseling and coordination of care    Signed:   Rock Tal MD  7/28/2018

## 2018-07-28 NOTE — DISCHARGE INSTRUCTIONS
Discharge Instructions       PATIENT ID: Casper Torres  MRN: [id number]   YOB: 1980    DATE OF ADMISSION: 7/25/2018 10:15 PM    DATE OF DISCHARGE: 7/28/2018    PRIMARY CARE PROVIDER: None     ATTENDING PHYSICIAN: Avtar Herrera MD  DISCHARGING PROVIDER: Avtar Herrera MD    To contact this individual call [phone] and ask the  to page. If unavailable ask to be transferred the Adult Hospitalist Department. DISCHARGE DIAGNOSES Hyperosmolar hyperglycemic syndrome    CONSULTATIONS: IP CONSULT TO HOSPITALIST  IP CONSULT TO CARDIOLOGY    PROCEDURES/SURGERIES: * No surgery found *    PENDING TEST RESULTS:   At the time of discharge the following test results are still pending:none    FOLLOW UP APPOINTMENTS:   Follow-up Information     Follow up With Details Comments Contact Info    None   None (395) Patient stated that they have no PCP      Farideh Nelson MD In 2 weeks  [address] [place] [phone]             ADDITIONAL CARE RECOMMENDATIONS: Check your blood sugars and call your endocrinologist for further dose titration. Follow up with cardiology for stress test as outpatient    DIET: Diabetic Diet      ACTIVITY: Activity as tolerated    WOUND CARE: na    EQUIPMENT needed: na      DISCHARGE MEDICATIONS:   See Medication Reconciliation Form    · It is important that you take the medication exactly as they are prescribed. · Keep your medication in the bottles provided by the pharmacist and keep a list of the medication names, dosages, and times to be taken in your wallet. · Do not take other medications without consulting your doctor. NOTIFY YOUR PHYSICIAN FOR ANY OF THE FOLLOWING:   Fever over 101 degrees for 24 hours. Chest pain, shortness of breath, fever, chills, nausea, vomiting, diarrhea, change in mentation, falling, weakness, bleeding. Severe pain or pain not relieved by medications.   Or, any other signs or symptoms that you may have questions about.       DISPOSITION:   X Home With:   OT  PT  HH  RN       SNF/Inpatient Rehab/LTAC    Independent/assisted living    Hospice    Other:           Signed:   Maria E Shaw MD  7/28/2018  9:02 AM
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
## 2018-08-22 ENCOUNTER — HOSPITAL ENCOUNTER (EMERGENCY)
Age: 38
Discharge: HOME | End: 2018-08-23
Attending: EMERGENCY MEDICINE | Admitting: EMERGENCY MEDICINE
Payer: MEDICAID

## 2018-08-22 ENCOUNTER — APPOINTMENT (OUTPATIENT)
Dept: CT IMAGING | Age: 38
End: 2018-08-22
Attending: EMERGENCY MEDICINE
Payer: MEDICAID

## 2018-08-22 DIAGNOSIS — R10.84 ABDOMINAL PAIN, GENERALIZED: ICD-10-CM

## 2018-08-22 DIAGNOSIS — E87.1 HYPONATREMIA: ICD-10-CM

## 2018-08-22 DIAGNOSIS — R11.2 NAUSEA AND VOMITING, INTRACTABILITY OF VOMITING NOT SPECIFIED, UNSPECIFIED VOMITING TYPE: ICD-10-CM

## 2018-08-22 DIAGNOSIS — Z79.4 UNCONTROLLED TYPE 2 DIABETES MELLITUS WITH HYPERGLYCEMIA, WITH LONG-TERM CURRENT USE OF INSULIN (HCC): Primary | ICD-10-CM

## 2018-08-22 DIAGNOSIS — E11.65 UNCONTROLLED TYPE 2 DIABETES MELLITUS WITH HYPERGLYCEMIA, WITH LONG-TERM CURRENT USE OF INSULIN (HCC): Primary | ICD-10-CM

## 2018-08-22 LAB
ALBUMIN SERPL-MCNC: 2.2 G/DL (ref 3.5–5)
ALBUMIN/GLOB SERPL: ABNORMAL {RATIO} (ref 1.1–2.2)
ALP SERPL-CCNC: ABNORMAL U/L (ref 45–117)
ALT SERPL-CCNC: ABNORMAL U/L (ref 12–78)
ANION GAP SERPL CALC-SCNC: 5 MMOL/L (ref 5–15)
APPEARANCE UR: CLEAR
ARTERIAL PATENCY WRIST A: YES
AST SERPL-CCNC: ABNORMAL U/L (ref 15–37)
BACTERIA URNS QL MICRO: NEGATIVE /HPF
BASE EXCESS BLDA CALC-SCNC: 1.6 MMOL/L
BASOPHILS # BLD: 0.1 K/UL (ref 0–0.1)
BASOPHILS NFR BLD: 1 % (ref 0–1)
BDY SITE: ABNORMAL
BILIRUB SERPL-MCNC: 1.6 MG/DL (ref 0.2–1)
BILIRUB UR QL: NEGATIVE
BREATHS.SPONTANEOUS ON VENT: 25
BUN SERPL-MCNC: ABNORMAL MG/DL (ref 6–20)
BUN/CREAT SERPL: ABNORMAL (ref 12–20)
CALCIUM SERPL-MCNC: 8.9 MG/DL (ref 8.5–10.1)
CHLORIDE SERPL-SCNC: 86 MMOL/L (ref 97–108)
CO2 SERPL-SCNC: 29 MMOL/L (ref 21–32)
COLOR UR: ABNORMAL
CREAT SERPL-MCNC: 1.8 MG/DL (ref 0.55–1.02)
DIFFERENTIAL METHOD BLD: ABNORMAL
EOSINOPHIL # BLD: 0.1 K/UL (ref 0–0.4)
EOSINOPHIL NFR BLD: 1 % (ref 0–7)
EPITH CASTS URNS QL MICRO: ABNORMAL /LPF
ERYTHROCYTE [DISTWIDTH] IN BLOOD BY AUTOMATED COUNT: 15 % (ref 11.5–14.5)
GLOBULIN SER CALC-MCNC: ABNORMAL G/DL (ref 2–4)
GLUCOSE SERPL-MCNC: 671 MG/DL (ref 65–100)
GLUCOSE UR STRIP.AUTO-MCNC: >1000 MG/DL
HCG UR QL: NEGATIVE
HCO3 BLDA-SCNC: 24 MMOL/L (ref 22–26)
HCT VFR BLD AUTO: 32 % (ref 35–47)
HGB BLD-MCNC: 12.1 G/DL (ref 11.5–16)
HGB UR QL STRIP: ABNORMAL
IMM GRANULOCYTES # BLD: 0 K/UL (ref 0–0.04)
IMM GRANULOCYTES NFR BLD AUTO: 0 % (ref 0–0.5)
KETONES UR QL STRIP.AUTO: NEGATIVE MG/DL
LEUKOCYTE ESTERASE UR QL STRIP.AUTO: NEGATIVE
LYMPHOCYTES # BLD: 2 K/UL (ref 0.8–3.5)
LYMPHOCYTES NFR BLD: 21 % (ref 12–49)
MCH RBC QN AUTO: 28.9 PG (ref 26–34)
MCHC RBC AUTO-ENTMCNC: 37.8 G/DL (ref 30–36.5)
MCV RBC AUTO: 76.6 FL (ref 80–99)
MONOCYTES # BLD: 0.2 K/UL (ref 0–1)
MONOCYTES NFR BLD: 2 % (ref 5–13)
NEUTS SEG # BLD: 6.9 K/UL (ref 1.8–8)
NEUTS SEG NFR BLD: 75 % (ref 32–75)
NITRITE UR QL STRIP.AUTO: NEGATIVE
NRBC # BLD: 0 K/UL (ref 0–0.01)
NRBC BLD-RTO: 0 PER 100 WBC
PCO2 BLDA: 33 MMHG (ref 35–45)
PH BLDA: 7.48 [PH] (ref 7.35–7.45)
PH UR STRIP: 7.5 [PH] (ref 5–8)
PLATELET # BLD AUTO: 182 K/UL (ref 150–400)
PMV BLD AUTO: 10.2 FL (ref 8.9–12.9)
PO2 BLDA: 92 MMHG (ref 80–100)
POTASSIUM SERPL-SCNC: 3.5 MMOL/L (ref 3.5–5.1)
PROT SERPL-MCNC: ABNORMAL G/DL (ref 6.4–8.2)
PROT UR STRIP-MCNC: 100 MG/DL
RBC # BLD AUTO: 4.18 M/UL (ref 3.8–5.2)
RBC #/AREA URNS HPF: ABNORMAL /HPF (ref 0–5)
RBC MORPH BLD: ABNORMAL
SAO2 % BLD: 98 % (ref 92–97)
SAO2% DEVICE SAO2% SENSOR NAME: ABNORMAL
SODIUM SERPL-SCNC: 120 MMOL/L (ref 136–145)
SP GR UR REFRACTOMETRY: >1.03 (ref 1–1.03)
SPECIMEN SITE: ABNORMAL
TROPONIN I SERPL-MCNC: <0.05 NG/ML
UROBILINOGEN UR QL STRIP.AUTO: 0.2 EU/DL (ref 0.2–1)
WBC # BLD AUTO: 9.3 K/UL (ref 3.6–11)
WBC URNS QL MICRO: ABNORMAL /HPF (ref 0–4)
YEAST BUDDING URNS QL: PRESENT
YEAST URNS QL MICRO: PRESENT

## 2018-08-22 PROCEDURE — 99285 EMERGENCY DEPT VISIT HI MDM: CPT

## 2018-08-22 PROCEDURE — 84484 ASSAY OF TROPONIN QUANT: CPT | Performed by: EMERGENCY MEDICINE

## 2018-08-22 PROCEDURE — 85025 COMPLETE CBC W/AUTO DIFF WBC: CPT | Performed by: EMERGENCY MEDICINE

## 2018-08-22 PROCEDURE — 36600 WITHDRAWAL OF ARTERIAL BLOOD: CPT | Performed by: EMERGENCY MEDICINE

## 2018-08-22 PROCEDURE — 81001 URINALYSIS AUTO W/SCOPE: CPT | Performed by: EMERGENCY MEDICINE

## 2018-08-22 PROCEDURE — 96375 TX/PRO/DX INJ NEW DRUG ADDON: CPT

## 2018-08-22 PROCEDURE — 96376 TX/PRO/DX INJ SAME DRUG ADON: CPT

## 2018-08-22 PROCEDURE — 96374 THER/PROPH/DIAG INJ IV PUSH: CPT

## 2018-08-22 PROCEDURE — 74011000250 HC RX REV CODE- 250: Performed by: EMERGENCY MEDICINE

## 2018-08-22 PROCEDURE — 82803 BLOOD GASES ANY COMBINATION: CPT | Performed by: EMERGENCY MEDICINE

## 2018-08-22 PROCEDURE — 83690 ASSAY OF LIPASE: CPT | Performed by: EMERGENCY MEDICINE

## 2018-08-22 PROCEDURE — 81025 URINE PREGNANCY TEST: CPT | Performed by: EMERGENCY MEDICINE

## 2018-08-22 PROCEDURE — 96361 HYDRATE IV INFUSION ADD-ON: CPT

## 2018-08-22 PROCEDURE — 74176 CT ABD & PELVIS W/O CONTRAST: CPT

## 2018-08-22 PROCEDURE — 74011636637 HC RX REV CODE- 636/637: Performed by: EMERGENCY MEDICINE

## 2018-08-22 PROCEDURE — 36415 COLL VENOUS BLD VENIPUNCTURE: CPT | Performed by: EMERGENCY MEDICINE

## 2018-08-22 PROCEDURE — 93005 ELECTROCARDIOGRAM TRACING: CPT

## 2018-08-22 PROCEDURE — 80053 COMPREHEN METABOLIC PANEL: CPT | Performed by: EMERGENCY MEDICINE

## 2018-08-22 PROCEDURE — 74011250636 HC RX REV CODE- 250/636: Performed by: EMERGENCY MEDICINE

## 2018-08-22 RX ORDER — PROCHLORPERAZINE EDISYLATE 5 MG/ML
10 INJECTION INTRAMUSCULAR; INTRAVENOUS
Status: DISCONTINUED | OUTPATIENT
Start: 2018-08-22 | End: 2018-08-22

## 2018-08-22 RX ORDER — DIPHENHYDRAMINE HYDROCHLORIDE 50 MG/ML
25 INJECTION, SOLUTION INTRAMUSCULAR; INTRAVENOUS
Status: COMPLETED | OUTPATIENT
Start: 2018-08-22 | End: 2018-08-22

## 2018-08-22 RX ORDER — FENTANYL CITRATE 50 UG/ML
100 INJECTION, SOLUTION INTRAMUSCULAR; INTRAVENOUS
Status: COMPLETED | OUTPATIENT
Start: 2018-08-22 | End: 2018-08-22

## 2018-08-22 RX ORDER — ONDANSETRON 2 MG/ML
8 INJECTION INTRAMUSCULAR; INTRAVENOUS
Status: COMPLETED | OUTPATIENT
Start: 2018-08-22 | End: 2018-08-22

## 2018-08-22 RX ORDER — FENTANYL CITRATE 50 UG/ML
50 INJECTION, SOLUTION INTRAMUSCULAR; INTRAVENOUS
Status: COMPLETED | OUTPATIENT
Start: 2018-08-22 | End: 2018-08-22

## 2018-08-22 RX ADMIN — FENTANYL CITRATE 50 MCG: 50 INJECTION, SOLUTION INTRAMUSCULAR; INTRAVENOUS at 21:04

## 2018-08-22 RX ADMIN — SODIUM CHLORIDE 10 MG: 9 INJECTION INTRAMUSCULAR; INTRAVENOUS; SUBCUTANEOUS at 23:43

## 2018-08-22 RX ADMIN — SODIUM CHLORIDE 1000 ML: 900 INJECTION, SOLUTION INTRAVENOUS at 23:42

## 2018-08-22 RX ADMIN — SODIUM CHLORIDE 1000 ML: 900 INJECTION, SOLUTION INTRAVENOUS at 21:09

## 2018-08-22 RX ADMIN — FENTANYL CITRATE 100 MCG: 50 INJECTION, SOLUTION INTRAMUSCULAR; INTRAVENOUS at 23:43

## 2018-08-22 RX ADMIN — ONDANSETRON 8 MG: 2 INJECTION, SOLUTION INTRAMUSCULAR; INTRAVENOUS at 21:05

## 2018-08-22 RX ADMIN — INSULIN HUMAN 10 UNITS: 100 INJECTION, SOLUTION PARENTERAL at 23:43

## 2018-08-22 RX ADMIN — DIPHENHYDRAMINE HYDROCHLORIDE 25 MG: 50 INJECTION, SOLUTION INTRAMUSCULAR; INTRAVENOUS at 23:43

## 2018-08-22 NOTE — ED TRIAGE NOTES
Pt arrives to ed with complaints of abdominal pain, nausea, vomiting, and diarrhea since last night. Pt states she has been \"under an extreme amount of stress\". Pt states she has insulin she is supposed to take insulin after each meal and has not done so today.  Pt states she took no meds pta  Pt placed in position of comfort with call bell in reach and family bedside

## 2018-08-23 ENCOUNTER — TELEPHONE (OUTPATIENT)
Dept: ENDOCRINOLOGY | Age: 38
End: 2018-08-23

## 2018-08-23 ENCOUNTER — APPOINTMENT (OUTPATIENT)
Dept: GENERAL RADIOLOGY | Age: 38
End: 2018-08-23
Attending: EMERGENCY MEDICINE
Payer: MEDICAID

## 2018-08-23 VITALS
SYSTOLIC BLOOD PRESSURE: 147 MMHG | WEIGHT: 268.08 LBS | BODY MASS INDEX: 39.71 KG/M2 | HEIGHT: 69 IN | HEART RATE: 77 BPM | RESPIRATION RATE: 20 BRPM | TEMPERATURE: 98.9 F | DIASTOLIC BLOOD PRESSURE: 82 MMHG | OXYGEN SATURATION: 96 %

## 2018-08-23 LAB
ANION GAP BLD CALC-SCNC: 10 MMOL/L (ref 10–20)
ATRIAL RATE: 87 BPM
BUN BLD-MCNC: 9 MG/DL (ref 9–20)
CA-I BLD-MCNC: 1.15 MMOL/L (ref 1.12–1.32)
CALCULATED P AXIS, ECG09: 28 DEGREES
CALCULATED R AXIS, ECG10: -14 DEGREES
CALCULATED T AXIS, ECG11: 25 DEGREES
CHLORIDE BLD-SCNC: 105 MMOL/L (ref 98–107)
CO2 BLD-SCNC: 24 MMOL/L (ref 21–32)
CREAT BLD-MCNC: 0.6 MG/DL (ref 0.6–1.3)
DIAGNOSIS, 93000: NORMAL
GLUCOSE BLD STRIP.AUTO-MCNC: 317 MG/DL (ref 65–100)
GLUCOSE BLD STRIP.AUTO-MCNC: 319 MG/DL (ref 65–100)
GLUCOSE BLD STRIP.AUTO-MCNC: 343 MG/DL (ref 65–100)
GLUCOSE BLD-MCNC: 290 MG/DL (ref 65–100)
HCT VFR BLD CALC: 34 % (ref 35–47)
LIPASE SERPL-CCNC: 175 U/L (ref 73–393)
P-R INTERVAL, ECG05: 140 MS
POTASSIUM BLD-SCNC: 3.5 MMOL/L (ref 3.5–5.1)
Q-T INTERVAL, ECG07: 402 MS
QRS DURATION, ECG06: 88 MS
QTC CALCULATION (BEZET), ECG08: 483 MS
SERVICE CMNT-IMP: ABNORMAL
SODIUM BLD-SCNC: 135 MMOL/L (ref 136–145)
VENTRICULAR RATE, ECG03: 87 BPM

## 2018-08-23 PROCEDURE — 74011250636 HC RX REV CODE- 250/636: Performed by: EMERGENCY MEDICINE

## 2018-08-23 PROCEDURE — 96361 HYDRATE IV INFUSION ADD-ON: CPT

## 2018-08-23 PROCEDURE — 74011636637 HC RX REV CODE- 636/637: Performed by: EMERGENCY MEDICINE

## 2018-08-23 PROCEDURE — 80047 BASIC METABLC PNL IONIZED CA: CPT

## 2018-08-23 PROCEDURE — 82962 GLUCOSE BLOOD TEST: CPT

## 2018-08-23 PROCEDURE — 96376 TX/PRO/DX INJ SAME DRUG ADON: CPT

## 2018-08-23 RX ORDER — INSULIN LISPRO 100 [IU]/ML
INJECTION, SOLUTION INTRAVENOUS; SUBCUTANEOUS
Qty: 2 VIAL | Refills: 11 | Status: SHIPPED | OUTPATIENT
Start: 2018-08-23

## 2018-08-23 RX ORDER — INSULIN GLARGINE 100 [IU]/ML
100 INJECTION, SOLUTION SUBCUTANEOUS 2 TIMES DAILY
Qty: 6 VIAL | Refills: 11 | Status: SHIPPED | OUTPATIENT
Start: 2018-08-23

## 2018-08-23 RX ORDER — ONDANSETRON 4 MG/1
4 TABLET, ORALLY DISINTEGRATING ORAL
Qty: 10 TAB | Refills: 0 | Status: SHIPPED | OUTPATIENT
Start: 2018-08-23

## 2018-08-23 RX ORDER — FENOFIBRATE 145 MG/1
145 TABLET, COATED ORAL DAILY
Qty: 30 TAB | Refills: 11 | Status: SHIPPED | OUTPATIENT
Start: 2018-08-23

## 2018-08-23 RX ORDER — FLUCONAZOLE 150 MG/1
TABLET ORAL
Qty: 2 TAB | Refills: 3 | Status: SHIPPED | OUTPATIENT
Start: 2018-08-23

## 2018-08-23 RX ORDER — INSULIN GLARGINE 100 [IU]/ML
50 INJECTION, SOLUTION SUBCUTANEOUS
Status: COMPLETED | OUTPATIENT
Start: 2018-08-23 | End: 2018-08-23

## 2018-08-23 RX ORDER — METFORMIN HYDROCHLORIDE 1000 MG/1
1000 TABLET ORAL 2 TIMES DAILY WITH MEALS
Qty: 60 TAB | Refills: 11 | Status: SHIPPED | OUTPATIENT
Start: 2018-08-23

## 2018-08-23 RX ORDER — ERGOCALCIFEROL 1.25 MG/1
50000 CAPSULE ORAL
Qty: 12 CAP | Refills: 11 | Status: SHIPPED | OUTPATIENT
Start: 2018-08-24

## 2018-08-23 RX ORDER — GABAPENTIN 300 MG/1
600 CAPSULE ORAL 3 TIMES DAILY
Qty: 180 CAP | Refills: 11 | Status: SHIPPED | OUTPATIENT
Start: 2018-08-23 | End: 2018-12-06 | Stop reason: SDUPTHER

## 2018-08-23 RX ORDER — SYRINGE AND NEEDLE,INSULIN,1ML 31GX15/64"
SYRINGE, EMPTY DISPOSABLE MISCELLANEOUS
Qty: 200 PEN NEEDLE | Refills: 11 | Status: SHIPPED | OUTPATIENT
Start: 2018-08-23

## 2018-08-23 RX ORDER — OMEGA-3-ACID ETHYL ESTERS 1 G/1
2 CAPSULE, LIQUID FILLED ORAL 2 TIMES DAILY
Qty: 120 CAP | Refills: 11 | Status: SHIPPED | OUTPATIENT
Start: 2018-08-23

## 2018-08-23 RX ORDER — INSULIN GLARGINE 100 [IU]/ML
100 INJECTION, SOLUTION SUBCUTANEOUS
Status: DISCONTINUED | OUTPATIENT
Start: 2018-08-23 | End: 2018-08-23

## 2018-08-23 RX ORDER — ATORVASTATIN CALCIUM 40 MG/1
40 TABLET, FILM COATED ORAL
Qty: 30 TAB | Refills: 11 | Status: SHIPPED | OUTPATIENT
Start: 2018-08-23

## 2018-08-23 RX ADMIN — INSULIN GLARGINE 50 UNITS: 100 INJECTION, SOLUTION SUBCUTANEOUS at 00:55

## 2018-08-23 RX ADMIN — INSULIN GLARGINE 50 UNITS: 100 INJECTION, SOLUTION SUBCUTANEOUS at 00:54

## 2018-08-23 RX ADMIN — INSULIN HUMAN 10 UNITS: 100 INJECTION, SOLUTION PARENTERAL at 02:07

## 2018-08-23 RX ADMIN — SODIUM CHLORIDE 1000 ML: 900 INJECTION, SOLUTION INTRAVENOUS at 02:05

## 2018-08-23 NOTE — TELEPHONE ENCOUNTER
Sent her the following message through [employer]:    I submitted an authorization for the omega 3 caps through our electronic system called covermymeds and received notification that this med has been approved so you should be able to go to the pharmacy to pick this med up. Please let me know if you have any trouble getting this filled.

## 2018-08-23 NOTE — DISCHARGE INSTRUCTIONS

## 2018-08-23 NOTE — ED NOTES
Received call from CT, pt's GFR is 32, questioning ability to use contrast dye for scan.  CT to call MD
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
## 2018-08-23 NOTE — ED NOTES
Received call from lab, pt glucose 671  Pt's blood is \"too fatty\" to run tests as quickly as normal, will be delay in tests resulting  Will notify MD  Pt ambulatory to bathroom to provide urine sample

## 2018-08-23 NOTE — ED NOTES
Patient discharged by Dr. Rosy Rose. Patient provided with discharge instructions Rx and instructions on follow up care. Patient out of ED ambulatory accompanied by  and child.

## 2018-08-24 ENCOUNTER — TELEPHONE (OUTPATIENT)
Dept: ENDOCRINOLOGY | Age: 38
End: 2018-08-24

## 2018-12-06 RX ORDER — GABAPENTIN 300 MG/1
CAPSULE ORAL
Qty: 180 CAP | Refills: 0 | Status: SHIPPED | OUTPATIENT
Start: 2018-12-06

## 2019-01-05 RX ORDER — GABAPENTIN 300 MG/1
CAPSULE ORAL
Qty: 180 CAP | Refills: 0 | OUTPATIENT
Start: 2019-01-05

## 2020-05-18 NOTE — MR AVS SNAPSHOT
Visit Information Date & Time Provider Department Dept. Phone Encounter #  
 1/11/2018 12:10 PM Jerome Hernandez, [facility] [phone] Follow-up Instructions Return for 3/15/18 at 2:10pm.  
  
Your Appointments 2/20/2018 11:00 AM  
New Patient with MD Brenda Karimi [facility]) Appt Note: Np establish care [address] [place] [address] [phone]  
  
   
 [address] Upcoming Health Maintenance Date Due  
 EYE EXAM RETINAL OR DILATED Q1 8/26/1990 DTaP/Tdap/Td series (1 - Tdap) 8/26/2001 PAP AKA CERVICAL CYTOLOGY 8/26/2001 HEMOGLOBIN A1C Q6M 6/30/2018 MICROALBUMIN Q1 10/3/2018 LIPID PANEL Q1 10/6/2018 FOOT EXAM Q1 11/30/2018 Allergies as of 1/11/2018  Review Complete On: 1/11/2018 By: Jerome Hernandez MD  
  
 Severity Noted Reaction Type Reactions Toradol [Ketorolac Tromethamine] High 01/25/2011   Systemic Unknown (comments) Seizure like symptoms per pt. Augmentin [Amoxicillin-pot Clavulanate]  10/03/2017    Swelling Demerol [Meperidine]  10/01/2017    Swelling Heparin  10/05/2017    Hives Ibuprofen  01/25/2011    Diarrhea, Nausea and Vomiting Muscle tightness Keflex [Cephalexin]  03/07/2011    Shortness of Breath Morphine  10/01/2017    Swelling Nubain [Nalbuphine]  12/12/2010    Hives Pcn [Penicillins]  12/12/2010    Swelling Sulfa (Sulfonamide Antibiotics)  12/12/2010    Unknown (comments) Current Immunizations  Reviewed on 10/3/2017 Name Date Influenza Vaccine (Quad) PF 10/3/2017 Influenza Vaccine Split 12/14/2010  6:36 PM  
 ZZZ-RETIRED (DO NOT USE) Pneumococcal Vaccine (Unspecified Type) 10/1/2007 Not reviewed this visit You Were Diagnosed With   
  
 Codes Comments Uncontrolled type 2 diabetes mellitus with diabetic polyneuropathy, with long-term current use of insulin (HCC)    -  Primary ICD-10-CM: E11.42, Z79.4, E11.65 ICD-9-CM: 250.62, 357.2, V58.67 Vitals BP Pulse Height(growth percentile) Weight(growth percentile) LMP BMI  
 127/76 82 5' 8\" (1.727 m) 284 lb 3.2 oz (128.9 kg) 12/15/2017 43.21 kg/m2 OB Status Smoking Status Having regular periods Current Some Day Smoker Vitals History BMI and BSA Data Body Mass Index Body Surface Area  
 43.21 kg/m 2 2.49 m 2 Preferred Pharmacy Pharmacy Name Phone [facility] [address] [facility], [address] [place]  [phone] Your Updated Medication List  
  
   
This list is accurate as of: 1/11/18  1:38 PM.  Always use your most recent med list.  
  
  
  
  
 atorvastatin 40 mg tablet Commonly known as:  LIPITOR Take 1 Tab by mouth nightly. Blood-Glucose Meter Misc Commonly known as:  ONETOUCH VERIO FLEX  
4 times/day  
  
 ergocalciferol 50,000 unit capsule Commonly known as:  VITAMIN D2 Take 1 Cap by mouth every seven (7) days. famotidine 20 mg tablet Commonly known as:  PEPCID Take 1 Tab by mouth two (2) times a day. fenofibrate nanocrystallized 145 mg tablet Commonly known as:  [employer] Take 1 Tab by mouth daily. folic acid 1 mg tablet Commonly known as:  [employer] Take 1 Tab by mouth daily. gabapentin 300 mg capsule Commonly known as:  NEURONTIN Take 2 Caps by mouth three (3) times daily. glucose blood VI test strips strip Commonly known as:  ONETOUCH VERIO  
4 times/day  
  
 insulin glargine 100 unit/mL (3 mL) Inpn Commonly known as:  LANTUS SOLOSTAR Inject 100 units in the morning and 100 units at night Insulin Needles (Disposable) 31 gauge x 5/16\" Ndle Commonly known as:  BD INSULIN PEN NEEDLE UF SHORT Use as directed twice daily insulin U-500 CONCENTRATED regular 500 unit/mL (3 mL) Inpn subQ pen Commonly known as:  HumuLIN R U-500 (Conc) Dinah Soho 120 Units by SubCUTAneous route two (2) times a day. And increase as directed to a max of 400 units per day  
  
 lisinopril 10 mg tablet Commonly known as:  Bonnie Fair Take 1 Tab by mouth daily. metFORMIN 1,000 mg tablet Commonly known as:  GLUCOPHAGE Take 1 Tab by mouth two (2) times daily (with meals). omega-3 acid ethyl esters 1 gram capsule Commonly known as:  Jas Rowels Take 2 Caps by mouth two (2) times a day. PROAIR HFA 90 mcg/actuation inhaler Generic drug:  albuterol Take 2 Puffs by inhalation every four (4) hours as needed for Wheezing. sertraline 25 mg tablet Commonly known as:  ZOLOFT Take 1 Tab by mouth nightly. Prescriptions Sent to Pharmacy Refills  
 insulin U-500 CONCENTRATED regular (HUMULIN R U-500, CONC, KWIKPEN) 500 unit/mL (3 mL) inpn subQ pen 11 Si Units by SubCUTAneous route two (2) times a day. And increase as directed to a max of 400 units per day Class: Normal  
 Pharmacy: [facility] [address] [place] [address] Ph #: [phone] Route: SubCUTAneous Follow-up Instructions Return for 3/15/18 at 2:10pm.  
  
  
Patient Instructions 1) Stop the lantus and humalog and change to U-500 insulin. You will take 120 units before breakfast and before dinner. Give this 4 days and if your morning sugar is still over 130, then go up by 10 units with each dose every 4 days until you get to the dose that keeps your sugar under 130 in the morning.    
 
2) If the pharmacy doesn't have the U-500 today or they need to order it or it requires an authorization, then go home and take a now dose of 50 units of lantus and plan on increasing the lantus to 120 units twice daily as 2 separate shots of 60 units back to back along with the humalog scale until the U-500 is ready. 3) Please call [phone] to get help setting up your [employer] account. 4) Plan on going to the lab 2-3 days before your next visit and the order is already the system. Introducing [facility]! [facility] introduces [employer] patient portal. Now you can access parts of your medical record, email your doctor's office, and request medication refills online. 1. In your internet browser, go to [email or web address] 2. Click on the First Time User? Click Here link in the Sign In box. You will see the New Member Sign Up page. 3. Enter your [employer] Access Code exactly as it appears below. You will not need to use this code after youve completed the sign-up process. If you do not sign up before the expiration date, you must request a new code. · [employer] Access Code: [email or web address] Expires: 4/1/2018 12:47 PM 
 
4. Enter the last four digits of your Social Security Number (xxxx) and Date of Birth (mm/dd/yyyy) as indicated and click Submit. You will be taken to the next sign-up page. 5. Create a [employer] ID. This will be your [employer] login ID and cannot be changed, so think of one that is secure and easy to remember. 6. Create a [employer] password. You can change your password at any time. 7. Enter your Password Reset Question and Answer. This can be used at a later time if you forget your password. 8. Enter your e-mail address. You will receive e-mail notification when new information is available in [address] 9. Click Sign Up. You can now view and download portions of your medical record. 10. Click the Download Summary menu link to download a portable copy of your medical information. If you have questions, please visit the Frequently Asked Questions section of the [employer] website. Remember, [employer] is NOT to be used for urgent needs. For medical emergencies, dial 911. Now available from your iPhone and Android! Please provide this summary of care documentation to your next provider. If you have any questions after today's visit, please call [phone]. Authored by Resident/PA/NP

## 2022-10-27 NOTE — TELEPHONE ENCOUNTER
Patient missed her hospital follow-up appointment on 10/26/17 and wanted to know when she could be seen here in the office/?  I will be happy to call her back at:  [phone]. Hernandez

## (undated) DEVICE — CULTURETTE SGL EVAC TUBE PALL -- 100/CA

## (undated) DEVICE — HANDLE LT SNAP ON ULT DURABLE LENS FOR TRUMPF ALC DISPOSABLE

## (undated) DEVICE — DEVON™ KNEE AND BODY STRAP 60" X 3" (1.5 M X 7.6 CM): Brand: DEVON

## (undated) DEVICE — GAUZE SPONGES,12 PLY: Brand: CURITY

## (undated) DEVICE — SYR 10ML LUER LOK 1/5ML GRAD --

## (undated) DEVICE — SWAB CULT LIQ STUART AGR AERB MOD IN BRK SGL RAYON TIP PLAS 220099] BECTON DICKINSON MICRO]

## (undated) DEVICE — ROCKER SWITCH PENCIL BLADE ELECTRODE, HOLSTER: Brand: EDGE

## (undated) DEVICE — CURITY IDOFORM PACKING STRIP: Brand: CURITY

## (undated) DEVICE — GOWN,SIRUS,FABRNF,2XL,18/CS: Brand: MEDLINE

## (undated) DEVICE — INTENDED FOR TISSUE SEPARATION, AND OTHER PROCEDURES THAT REQUIRE A SHARP SURGICAL BLADE TO PUNCTURE OR CUT.: Brand: BARD-PARKER ® CARBON RIB-BACK BLADES

## (undated) DEVICE — TOWEL SURG W17XL27IN STD BLU COT NONFENESTRATED PREWASHED

## (undated) DEVICE — ABDOMINAL PAD: Brand: DERMACEA

## (undated) DEVICE — REM POLYHESIVE ADULT PATIENT RETURN ELECTRODE: Brand: VALLEYLAB

## (undated) DEVICE — NEEDLE HYPO 25GA L1.5IN BVL ORIENTED ECLIPSE

## (undated) DEVICE — SURGICAL PROCEDURE PACK BASIN MAJ SET CUST NO CAUT

## (undated) DEVICE — INFECTION CONTROL KIT SYS

## (undated) DEVICE — SOLUTION IV 1000ML 0.9% SOD CHL

## (undated) DEVICE — SUTURE VCRL SZ 3-0 L27IN ABSRB UD L26MM SH 1/2 CIR J416H

## (undated) DEVICE — STERILE LAVH DRAPE PACK: Brand: CARDINAL HEALTH